# Patient Record
Sex: FEMALE | Race: WHITE | Employment: OTHER | URBAN - NONMETROPOLITAN AREA
[De-identification: names, ages, dates, MRNs, and addresses within clinical notes are randomized per-mention and may not be internally consistent; named-entity substitution may affect disease eponyms.]

---

## 2017-10-27 ENCOUNTER — HOSPITAL ENCOUNTER (OUTPATIENT)
Dept: WOMENS IMAGING | Age: 72
Discharge: HOME | End: 2017-10-27
Payer: MEDICARE

## 2017-10-27 DIAGNOSIS — Z12.31 VISIT FOR SCREENING MAMMOGRAM: ICD-10-CM

## 2017-10-27 PROCEDURE — 77063 BREAST TOMOSYNTHESIS BI: CPT

## 2017-11-24 ENCOUNTER — HOSPITAL ENCOUNTER (EMERGENCY)
Age: 72
Discharge: HOME | End: 2017-11-24
Attending: FAMILY MEDICINE
Payer: MEDICARE

## 2017-11-24 ENCOUNTER — APPOINTMENT (OUTPATIENT)
Dept: GENERAL RADIOLOGY | Age: 72
End: 2017-11-24
Payer: MEDICARE

## 2017-11-24 VITALS
WEIGHT: 190 LBS | TEMPERATURE: 98 F | HEART RATE: 84 BPM | OXYGEN SATURATION: 95 % | DIASTOLIC BLOOD PRESSURE: 67 MMHG | RESPIRATION RATE: 13 BRPM | BODY MASS INDEX: 30.53 KG/M2 | HEIGHT: 66 IN | SYSTOLIC BLOOD PRESSURE: 158 MMHG

## 2017-11-24 DIAGNOSIS — E03.8 OTHER SPECIFIED HYPOTHYROIDISM: ICD-10-CM

## 2017-11-24 DIAGNOSIS — E83.52 HYPERCALCEMIA: ICD-10-CM

## 2017-11-24 DIAGNOSIS — R00.2 PALPITATIONS: Primary | ICD-10-CM

## 2017-11-24 LAB
ALBUMIN SERPL-MCNC: 4.8 G/DL (ref 3.5–5.1)
ALP BLD-CCNC: 39 U/L (ref 38–126)
ALT SERPL-CCNC: 21 U/L (ref 11–66)
ANION GAP SERPL CALCULATED.3IONS-SCNC: 14 MEQ/L (ref 8–16)
AST SERPL-CCNC: 26 U/L (ref 5–40)
BASOPHILS # BLD: 0.5 %
BASOPHILS ABSOLUTE: 0 THOU/MM3 (ref 0–0.1)
BILIRUB SERPL-MCNC: 0.5 MG/DL (ref 0.3–1.2)
BILIRUBIN DIRECT: < 0.2 MG/DL (ref 0–0.3)
BUN BLDV-MCNC: 34 MG/DL (ref 7–22)
CALCIUM SERPL-MCNC: 11 MG/DL (ref 8.5–10.5)
CHLORIDE BLD-SCNC: 94 MEQ/L (ref 98–111)
CO2: 27 MEQ/L (ref 23–33)
CREAT SERPL-MCNC: 1.1 MG/DL (ref 0.4–1.2)
EKG ATRIAL RATE: 91 BPM
EKG P AXIS: 59 DEGREES
EKG P-R INTERVAL: 212 MS
EKG Q-T INTERVAL: 372 MS
EKG QRS DURATION: 86 MS
EKG QTC CALCULATION (BAZETT): 457 MS
EKG R AXIS: -41 DEGREES
EKG T AXIS: 69 DEGREES
EKG VENTRICULAR RATE: 91 BPM
EOSINOPHIL # BLD: 2.2 %
EOSINOPHILS ABSOLUTE: 0.1 THOU/MM3 (ref 0–0.4)
GFR SERPL CREATININE-BSD FRML MDRD: 49 ML/MIN/1.73M2
GLUCOSE BLD-MCNC: 181 MG/DL (ref 70–108)
HCT VFR BLD CALC: 36.9 % (ref 37–47)
HEMOGLOBIN: 12.4 GM/DL (ref 12–16)
LIPASE: 34.4 U/L (ref 5.6–51.3)
LYMPHOCYTES # BLD: 33.8 %
LYMPHOCYTES ABSOLUTE: 1.5 THOU/MM3 (ref 1–4.8)
MACROCYTES: ABNORMAL
MAGNESIUM: 1.6 MG/DL (ref 1.6–2.4)
MCH RBC QN AUTO: 33.6 PG (ref 27–31)
MCHC RBC AUTO-ENTMCNC: 33.8 GM/DL (ref 33–37)
MCV RBC AUTO: 99.5 FL (ref 81–99)
MONOCYTES # BLD: 5.9 %
MONOCYTES ABSOLUTE: 0.3 THOU/MM3 (ref 0.4–1.3)
NUCLEATED RED BLOOD CELLS: 0 /100 WBC
OSMOLALITY CALCULATION: 282.3 MOSMOL/KG (ref 275–300)
PDW BLD-RTO: 14 % (ref 11.5–14.5)
PLATELET # BLD: 278 THOU/MM3 (ref 130–400)
PMV BLD AUTO: 7.2 MCM (ref 7.4–10.4)
POTASSIUM SERPL-SCNC: 4 MEQ/L (ref 3.5–5.2)
PRO-BNP: 188 PG/ML (ref 0–900)
RBC # BLD: 3.71 MILL/MM3 (ref 4.2–5.4)
SEG NEUTROPHILS: 57.6 %
SEGMENTED NEUTROPHILS ABSOLUTE COUNT: 2.6 THOU/MM3 (ref 1.8–7.7)
SODIUM BLD-SCNC: 135 MEQ/L (ref 135–145)
TOTAL PROTEIN: 7.2 G/DL (ref 6.1–8)
TROPONIN T: < 0.01 NG/ML
TSH SERPL DL<=0.05 MIU/L-ACNC: 4.42 UIU/ML (ref 0.4–4.2)
WBC # BLD: 4.5 THOU/MM3 (ref 4.8–10.8)

## 2017-11-24 PROCEDURE — 93005 ELECTROCARDIOGRAM TRACING: CPT

## 2017-11-24 PROCEDURE — 80053 COMPREHEN METABOLIC PANEL: CPT

## 2017-11-24 PROCEDURE — 99285 EMERGENCY DEPT VISIT HI MDM: CPT

## 2017-11-24 PROCEDURE — 83880 ASSAY OF NATRIURETIC PEPTIDE: CPT

## 2017-11-24 PROCEDURE — 2580000003 HC RX 258: Performed by: FAMILY MEDICINE

## 2017-11-24 PROCEDURE — 84484 ASSAY OF TROPONIN QUANT: CPT

## 2017-11-24 PROCEDURE — 83735 ASSAY OF MAGNESIUM: CPT

## 2017-11-24 PROCEDURE — 82248 BILIRUBIN DIRECT: CPT

## 2017-11-24 PROCEDURE — 85025 COMPLETE CBC W/AUTO DIFF WBC: CPT

## 2017-11-24 PROCEDURE — 71010 XR CHEST PORTABLE: CPT

## 2017-11-24 PROCEDURE — 36415 COLL VENOUS BLD VENIPUNCTURE: CPT

## 2017-11-24 PROCEDURE — 83690 ASSAY OF LIPASE: CPT

## 2017-11-24 PROCEDURE — 84443 ASSAY THYROID STIM HORMONE: CPT

## 2017-11-24 RX ORDER — 0.9 % SODIUM CHLORIDE 0.9 %
500 INTRAVENOUS SOLUTION INTRAVENOUS ONCE
Status: COMPLETED | OUTPATIENT
Start: 2017-11-24 | End: 2017-11-24

## 2017-11-24 RX ADMIN — SODIUM CHLORIDE 500 ML: 9 INJECTION, SOLUTION INTRAVENOUS at 15:07

## 2017-11-24 ASSESSMENT — ENCOUNTER SYMPTOMS
SHORTNESS OF BREATH: 1
CHEST TIGHTNESS: 0
NAUSEA: 0
ABDOMINAL PAIN: 0
APNEA: 0
SORE THROAT: 0
CHOKING: 0
WHEEZING: 0
COLOR CHANGE: 0
DIARRHEA: 0
VOMITING: 0
BACK PAIN: 0
STRIDOR: 0
TROUBLE SWALLOWING: 0
COUGH: 0

## 2017-11-24 NOTE — ED PROVIDER NOTES
[facility]  eMERGENCY dEPARTMENT eNCOUnter          CHIEF COMPLAINT       Chief Complaint   Patient presents with    Palpitations       Nurses Notes reviewed and I agree except as noted in the HPI. HISTORY OF PRESENT ILLNESS    Lamar Reilly is a 81 y.o. female who presents to the Emergency Department for the evaluation of Palpitations. Patient has a significant medical history of non-Hodgkin's lymphoma (in remission), diabetes, hypothyroidism and previous stroke. She states that over the past week, she has been experiencing irregular heartbeats. The palpitations occur on a daily basis lasting for several minutes and resolving on their own. During these episodes, she reports fatigue, weakness and presyncope. She feels like she is going to faint, but denies any syncopal episodes. She reports dizziness and lightheadedness during the episodes that also resolved. Denies any spinning room sensation or feeling unbalanced on her feet. Patient reports caffeine intake, drinks 2-3 cups of tea in the morning. Denies any new medications. Denies any fevers, chills, night sweats or weight loss. Denies nausea, vomiting, diarrhea, back or abdominal pain. Denies any chest pain or tightness. She does report dyspnea with and without exertion during these episodes. She reports being hospitalized in May when she was traveling to [place] after suffering a broken ankle that required surgical intervention. No other complaints concerns at this time. The HPI was provided by the patient. REVIEW OF SYSTEMS     Review of Systems   Constitutional: Positive for fatigue. Negative for chills, diaphoresis, fever and unexpected weight change. HENT: Negative for sore throat and trouble swallowing. Eyes: Negative for visual disturbance. Respiratory: Positive for shortness of breath. Negative for apnea, cough, choking, chest tightness, wheezing and stridor. Cardiovascular: Positive for palpitations.  Negative agreement with plan of care. CRITICAL CARE:   None     CONSULTS:  Cardiology - discuss case with Dr. Wyatt Rosario, who recommended outpatient follow-up. She'll be instructed to contact cardiology clinic on Monday to schedule appointment for further evaluation and management. PROCEDURES:  None     FINAL IMPRESSION      1. Palpitations    2. Other specified hypothyroidism    3.  Hypercalcemia          DISPOSITION/PLAN   Discharge     PATIENT REFERRED TO:  Komal Cabrera MD  [address] [facility] [place] [address]  [phone]    Schedule an appointment as soon as possible for a visit in 1 week      [address] [facility] [address] [address] EMERGENCY DEPT  [address]  Go to   If symptoms worsen    [address], [place]  [facility]  Shannan Haskins [facility] 92    Schedule an appointment as soon as possible for a visit in 3 days        DISCHARGE MEDICATIONS:  Discharge Medication List as of 11/24/2017  3:58 PM          (Please note that portions of this note were completed with a voice recognition program.  Efforts were made to edit the dictations but occasionally words are mis-transcribed.)    Lisa Briceno MD 11/24/17 12:50 PM                             Lisa Briceno MD  11/25/17 0059

## 2017-12-20 ENCOUNTER — HOSPITAL ENCOUNTER (OUTPATIENT)
Age: 72
Discharge: HOME | End: 2017-12-20
Payer: MEDICARE

## 2017-12-20 LAB
AVERAGE GLUCOSE: 150 MG/DL (ref 70–126)
BUN BLDV-MCNC: 19 MG/DL (ref 7–22)
CALCIUM SERPL-MCNC: 10.9 MG/DL (ref 8.5–10.5)
CREAT SERPL-MCNC: 0.8 MG/DL (ref 0.4–1.2)
GFR SERPL CREATININE-BSD FRML MDRD: 71 ML/MIN/1.73M2
HBA1C MFR BLD: 7 % (ref 4.4–6.4)
TSH SERPL DL<=0.05 MIU/L-ACNC: 3.94 UIU/ML (ref 0.4–4.2)

## 2017-12-20 PROCEDURE — 82565 ASSAY OF CREATININE: CPT

## 2017-12-20 PROCEDURE — 84443 ASSAY THYROID STIM HORMONE: CPT

## 2017-12-20 PROCEDURE — 84520 ASSAY OF UREA NITROGEN: CPT

## 2017-12-20 PROCEDURE — 83036 HEMOGLOBIN GLYCOSYLATED A1C: CPT

## 2017-12-20 PROCEDURE — 82310 ASSAY OF CALCIUM: CPT

## 2017-12-20 PROCEDURE — 36415 COLL VENOUS BLD VENIPUNCTURE: CPT

## 2018-04-26 ENCOUNTER — HOSPITAL ENCOUNTER (OUTPATIENT)
Age: 73
Discharge: HOME | End: 2018-04-26
Payer: MEDICARE

## 2018-04-26 LAB
AVERAGE GLUCOSE: 141 MG/DL (ref 70–126)
BUN BLDV-MCNC: 23 MG/DL (ref 7–22)
CREAT SERPL-MCNC: 0.8 MG/DL (ref 0.4–1.2)
GFR SERPL CREATININE-BSD FRML MDRD: 71 ML/MIN/1.73M2
HBA1C MFR BLD: 6.7 % (ref 4.4–6.4)

## 2018-04-26 PROCEDURE — 84520 ASSAY OF UREA NITROGEN: CPT

## 2018-04-26 PROCEDURE — 82565 ASSAY OF CREATININE: CPT

## 2018-04-26 PROCEDURE — 36415 COLL VENOUS BLD VENIPUNCTURE: CPT

## 2018-04-26 PROCEDURE — 83036 HEMOGLOBIN GLYCOSYLATED A1C: CPT

## 2018-04-26 PROCEDURE — 83090 ASSAY OF HOMOCYSTEINE: CPT

## 2018-04-27 LAB — HOMOCYSTEINE, TOTAL: 13 UMOL/L

## 2018-05-17 ENCOUNTER — OFFICE VISIT (OUTPATIENT)
Dept: FAMILY MEDICINE CLINIC | Age: 73
End: 2018-05-17
Payer: MEDICARE

## 2018-05-17 VITALS
BODY MASS INDEX: 32.32 KG/M2 | DIASTOLIC BLOOD PRESSURE: 74 MMHG | RESPIRATION RATE: 14 BRPM | WEIGHT: 194 LBS | HEART RATE: 56 BPM | SYSTOLIC BLOOD PRESSURE: 110 MMHG | HEIGHT: 65 IN | TEMPERATURE: 98 F

## 2018-05-17 DIAGNOSIS — Z91.81 AT HIGH RISK FOR FALLS: ICD-10-CM

## 2018-05-17 DIAGNOSIS — Z85.72 HISTORY OF NON-HODGKIN'S LYMPHOMA: ICD-10-CM

## 2018-05-17 DIAGNOSIS — Z79.4 TYPE 2 DIABETES MELLITUS WITH OTHER SPECIFIED COMPLICATION, WITH LONG-TERM CURRENT USE OF INSULIN (HCC): Primary | ICD-10-CM

## 2018-05-17 DIAGNOSIS — I10 ESSENTIAL HYPERTENSION: ICD-10-CM

## 2018-05-17 DIAGNOSIS — R80.9 MICROALBUMINURIA DUE TO TYPE 2 DIABETES MELLITUS (HCC): ICD-10-CM

## 2018-05-17 DIAGNOSIS — E03.8 OTHER SPECIFIED HYPOTHYROIDISM: ICD-10-CM

## 2018-05-17 DIAGNOSIS — E11.29 MICROALBUMINURIA DUE TO TYPE 2 DIABETES MELLITUS (HCC): ICD-10-CM

## 2018-05-17 DIAGNOSIS — E11.69 TYPE 2 DIABETES MELLITUS WITH OTHER SPECIFIED COMPLICATION, WITH LONG-TERM CURRENT USE OF INSULIN (HCC): Primary | ICD-10-CM

## 2018-05-17 DIAGNOSIS — I48.20 CHRONIC ATRIAL FIBRILLATION (HCC): ICD-10-CM

## 2018-05-17 DIAGNOSIS — Z86.73 HISTORY OF CARDIOEMBOLIC CEREBROVASCULAR ACCIDENT (CVA): ICD-10-CM

## 2018-05-17 DIAGNOSIS — M85.88 OSTEOPENIA OF SPINE: ICD-10-CM

## 2018-05-17 PROBLEM — E11.9 DIABETES MELLITUS (HCC): Status: ACTIVE | Noted: 2018-05-17

## 2018-05-17 LAB
CREATININE URINE POCT: NORMAL
MICROALBUMIN/CREAT 24H UR: NORMAL MG/G{CREAT}
MICROALBUMIN/CREAT UR-RTO: NORMAL

## 2018-05-17 PROCEDURE — 1123F ACP DISCUSS/DSCN MKR DOCD: CPT | Performed by: FAMILY MEDICINE

## 2018-05-17 PROCEDURE — G8427 DOCREV CUR MEDS BY ELIG CLIN: HCPCS | Performed by: FAMILY MEDICINE

## 2018-05-17 PROCEDURE — 1090F PRES/ABSN URINE INCON ASSESS: CPT | Performed by: FAMILY MEDICINE

## 2018-05-17 PROCEDURE — 4040F PNEUMOC VAC/ADMIN/RCVD: CPT | Performed by: FAMILY MEDICINE

## 2018-05-17 PROCEDURE — G8417 CALC BMI ABV UP PARAM F/U: HCPCS | Performed by: FAMILY MEDICINE

## 2018-05-17 PROCEDURE — 2022F DILAT RTA XM EVC RTNOPTHY: CPT | Performed by: FAMILY MEDICINE

## 2018-05-17 PROCEDURE — 3044F HG A1C LEVEL LT 7.0%: CPT | Performed by: FAMILY MEDICINE

## 2018-05-17 PROCEDURE — 82044 UR ALBUMIN SEMIQUANTITATIVE: CPT | Performed by: FAMILY MEDICINE

## 2018-05-17 PROCEDURE — 3017F COLORECTAL CA SCREEN DOC REV: CPT | Performed by: FAMILY MEDICINE

## 2018-05-17 PROCEDURE — 99204 OFFICE O/P NEW MOD 45 MIN: CPT | Performed by: FAMILY MEDICINE

## 2018-05-17 PROCEDURE — G8400 PT W/DXA NO RESULTS DOC: HCPCS | Performed by: FAMILY MEDICINE

## 2018-05-17 PROCEDURE — 1036F TOBACCO NON-USER: CPT | Performed by: FAMILY MEDICINE

## 2018-05-17 RX ORDER — AMIODARONE HYDROCHLORIDE 200 MG/1
100 TABLET ORAL DAILY
COMMUNITY
End: 2019-10-24 | Stop reason: ALTCHOICE

## 2018-05-17 RX ORDER — ASPIRIN 325 MG
81 TABLET ORAL DAILY
COMMUNITY
End: 2020-09-24

## 2018-05-17 RX ORDER — HYDROCHLOROTHIAZIDE 25 MG/1
25 TABLET ORAL DAILY
Status: ON HOLD | COMMUNITY
End: 2018-09-21 | Stop reason: HOSPADM

## 2018-05-17 RX ORDER — LOSARTAN POTASSIUM 25 MG/1
25 TABLET ORAL DAILY
COMMUNITY
End: 2018-10-30 | Stop reason: SDUPTHER

## 2018-05-17 RX ORDER — FOLIC ACID 1 MG/1
1 TABLET ORAL 3 TIMES DAILY
COMMUNITY

## 2018-05-17 ASSESSMENT — PATIENT HEALTH QUESTIONNAIRE - PHQ9
2. FEELING DOWN, DEPRESSED OR HOPELESS: 0
SUM OF ALL RESPONSES TO PHQ QUESTIONS 1-9: 0
1. LITTLE INTEREST OR PLEASURE IN DOING THINGS: 0
SUM OF ALL RESPONSES TO PHQ9 QUESTIONS 1 & 2: 0

## 2018-05-17 ASSESSMENT — ENCOUNTER SYMPTOMS
DIARRHEA: 0
DOUBLE VISION: 0
HEMOPTYSIS: 0
BLURRED VISION: 0
VOMITING: 0
EYE REDNESS: 0
EYE DISCHARGE: 0
BLOOD IN STOOL: 0
CONSTIPATION: 0
SHORTNESS OF BREATH: 0
NAUSEA: 0
HEARTBURN: 0
SORE THROAT: 0
WHEEZING: 0
COUGH: 0
EYE PAIN: 0
BACK PAIN: 0
ORTHOPNEA: 0

## 2018-06-28 ENCOUNTER — HOSPITAL ENCOUNTER (OUTPATIENT)
Dept: GENERAL RADIOLOGY | Age: 73
Discharge: HOME | End: 2018-06-28
Payer: MEDICARE

## 2018-06-28 ENCOUNTER — HOSPITAL ENCOUNTER (OUTPATIENT)
Dept: PULMONOLOGY | Age: 73
Discharge: HOME | End: 2018-06-28
Payer: MEDICARE

## 2018-06-28 ENCOUNTER — HOSPITAL ENCOUNTER (OUTPATIENT)
Age: 73
Discharge: HOME | End: 2018-06-28
Payer: MEDICARE

## 2018-06-28 DIAGNOSIS — I10 HYPERTENSION, UNSPECIFIED TYPE: ICD-10-CM

## 2018-06-28 LAB
ALBUMIN SERPL-MCNC: 4.6 G/DL (ref 3.5–5.1)
ALP BLD-CCNC: 39 U/L (ref 38–126)
ALT SERPL-CCNC: 30 U/L (ref 11–66)
ANION GAP SERPL CALCULATED.3IONS-SCNC: 14 MEQ/L (ref 8–16)
AST SERPL-CCNC: 27 U/L (ref 5–40)
BILIRUB SERPL-MCNC: 0.6 MG/DL (ref 0.3–1.2)
BILIRUBIN DIRECT: < 0.2 MG/DL (ref 0–0.3)
BUN BLDV-MCNC: 21 MG/DL (ref 7–22)
CALCIUM SERPL-MCNC: 10.6 MG/DL (ref 8.5–10.5)
CHLORIDE BLD-SCNC: 90 MEQ/L (ref 98–111)
CO2: 26 MEQ/L (ref 23–33)
CREAT SERPL-MCNC: 0.8 MG/DL (ref 0.4–1.2)
GFR SERPL CREATININE-BSD FRML MDRD: 71 ML/MIN/1.73M2
GLUCOSE BLD-MCNC: 123 MG/DL (ref 70–108)
POTASSIUM SERPL-SCNC: 4.7 MEQ/L (ref 3.5–5.2)
SODIUM BLD-SCNC: 130 MEQ/L (ref 135–145)
T4 FREE: 1.45 NG/DL (ref 0.93–1.76)
TOTAL PROTEIN: 7.4 G/DL (ref 6.1–8)
TSH SERPL DL<=0.05 MIU/L-ACNC: 13.84 UIU/ML (ref 0.4–4.2)

## 2018-06-28 PROCEDURE — 82248 BILIRUBIN DIRECT: CPT

## 2018-06-28 PROCEDURE — 84439 ASSAY OF FREE THYROXINE: CPT

## 2018-06-28 PROCEDURE — 94729 DIFFUSING CAPACITY: CPT

## 2018-06-28 PROCEDURE — 94726 PLETHYSMOGRAPHY LUNG VOLUMES: CPT

## 2018-06-28 PROCEDURE — 94060 EVALUATION OF WHEEZING: CPT

## 2018-06-28 PROCEDURE — 84443 ASSAY THYROID STIM HORMONE: CPT

## 2018-06-28 PROCEDURE — 71046 X-RAY EXAM CHEST 2 VIEWS: CPT

## 2018-06-28 PROCEDURE — 80053 COMPREHEN METABOLIC PANEL: CPT

## 2018-06-28 PROCEDURE — 36415 COLL VENOUS BLD VENIPUNCTURE: CPT

## 2018-07-25 ENCOUNTER — HOSPITAL ENCOUNTER (OUTPATIENT)
Age: 73
Discharge: HOME | End: 2018-07-25
Payer: MEDICARE

## 2018-07-25 LAB
ALBUMIN SERPL-MCNC: 4.7 G/DL (ref 3.5–5.1)
ALP BLD-CCNC: 36 U/L (ref 38–126)
ALT SERPL-CCNC: 33 U/L (ref 11–66)
ANION GAP SERPL CALCULATED.3IONS-SCNC: 14 MEQ/L (ref 8–16)
AST SERPL-CCNC: 29 U/L (ref 5–40)
BASOPHILS # BLD: 0.8 %
BASOPHILS ABSOLUTE: 0 THOU/MM3 (ref 0–0.1)
BILIRUB SERPL-MCNC: 0.6 MG/DL (ref 0.3–1.2)
BUN BLDV-MCNC: 25 MG/DL (ref 7–22)
CALCIUM SERPL-MCNC: 11 MG/DL (ref 8.5–10.5)
CHLORIDE BLD-SCNC: 91 MEQ/L (ref 98–111)
CO2: 28 MEQ/L (ref 23–33)
CREAT SERPL-MCNC: 0.8 MG/DL (ref 0.4–1.2)
EOSINOPHIL # BLD: 2 %
EOSINOPHILS ABSOLUTE: 0.1 THOU/MM3 (ref 0–0.4)
ERYTHROCYTE [DISTWIDTH] IN BLOOD BY AUTOMATED COUNT: 13 % (ref 11.5–14.5)
ERYTHROCYTE [DISTWIDTH] IN BLOOD BY AUTOMATED COUNT: 47.4 FL (ref 35–45)
GFR SERPL CREATININE-BSD FRML MDRD: 71 ML/MIN/1.73M2
GLUCOSE BLD-MCNC: 105 MG/DL (ref 70–108)
HCT VFR BLD CALC: 38.8 % (ref 37–47)
HEMOGLOBIN: 13.1 GM/DL (ref 12–16)
IMMATURE GRANS (ABS): 0.01 THOU/MM3 (ref 0–0.07)
IMMATURE GRANULOCYTES: 0.3 %
LYMPHOCYTES # BLD: 29.4 %
LYMPHOCYTES ABSOLUTE: 1.1 THOU/MM3 (ref 1–4.8)
MCH RBC QN AUTO: 33.7 PG (ref 26–33)
MCHC RBC AUTO-ENTMCNC: 33.8 GM/DL (ref 32.2–35.5)
MCV RBC AUTO: 99.7 FL (ref 81–99)
MONOCYTES # BLD: 8.7 %
MONOCYTES ABSOLUTE: 0.3 THOU/MM3 (ref 0.4–1.3)
NUCLEATED RED BLOOD CELLS: 0 /100 WBC
PLATELET # BLD: 217 THOU/MM3 (ref 130–400)
PMV BLD AUTO: 8.8 FL (ref 9.4–12.4)
POTASSIUM SERPL-SCNC: 4.5 MEQ/L (ref 3.5–5.2)
RBC # BLD: 3.89 MILL/MM3 (ref 4.2–5.4)
SEG NEUTROPHILS: 58.8 %
SEGMENTED NEUTROPHILS ABSOLUTE COUNT: 2.1 THOU/MM3 (ref 1.8–7.7)
SODIUM BLD-SCNC: 133 MEQ/L (ref 135–145)
TOTAL PROTEIN: 7.3 G/DL (ref 6.1–8)
WBC # BLD: 3.6 THOU/MM3 (ref 4.8–10.8)

## 2018-07-25 PROCEDURE — 36415 COLL VENOUS BLD VENIPUNCTURE: CPT

## 2018-07-25 PROCEDURE — 80053 COMPREHEN METABOLIC PANEL: CPT

## 2018-07-25 PROCEDURE — 85025 COMPLETE CBC W/AUTO DIFF WBC: CPT

## 2018-07-25 PROCEDURE — 83090 ASSAY OF HOMOCYSTEINE: CPT

## 2018-07-26 LAB — HOMOCYSTEINE, TOTAL: 12 UMOL/L

## 2018-07-30 ENCOUNTER — PATIENT MESSAGE (OUTPATIENT)
Dept: FAMILY MEDICINE CLINIC | Age: 73
End: 2018-07-30

## 2018-08-03 ENCOUNTER — TELEPHONE (OUTPATIENT)
Dept: FAMILY MEDICINE CLINIC | Age: 73
End: 2018-08-03

## 2018-08-07 ENCOUNTER — TELEPHONE (OUTPATIENT)
Dept: FAMILY MEDICINE CLINIC | Age: 73
End: 2018-08-07

## 2018-08-07 RX ORDER — LEVOTHYROXINE SODIUM 0.15 MG/1
150 TABLET ORAL DAILY
Qty: 90 TABLET | Refills: 3 | Status: SHIPPED | OUTPATIENT
Start: 2018-08-07 | End: 2019-06-21

## 2018-08-07 NOTE — TELEPHONE ENCOUNTER
Spoke with pt. Refill request for levothyroxine sent to PCP. Pt informed that clarification of diagnosis code on test strips sent to pharmacy 3 times today. Fax confirmation received and scanned to chart. No further assistance needed at this time.

## 2018-09-17 ENCOUNTER — APPOINTMENT (OUTPATIENT)
Dept: CT IMAGING | Age: 73
DRG: 493 | End: 2018-09-17
Payer: MEDICARE

## 2018-09-17 ENCOUNTER — HOSPITAL ENCOUNTER (INPATIENT)
Age: 73
LOS: 4 days | Discharge: NURSING, REHAB OR LONG-TERM CARE | DRG: 493 | End: 2018-09-21
Attending: EMERGENCY MEDICINE | Admitting: SURGERY
Payer: MEDICARE

## 2018-09-17 ENCOUNTER — APPOINTMENT (OUTPATIENT)
Dept: GENERAL RADIOLOGY | Age: 73
DRG: 493 | End: 2018-09-17
Payer: MEDICARE

## 2018-09-17 DIAGNOSIS — R52 PAIN: ICD-10-CM

## 2018-09-17 DIAGNOSIS — S42.334A CLOSED NONDISPLACED OBLIQUE FRACTURE OF SHAFT OF RIGHT HUMERUS, INITIAL ENCOUNTER: ICD-10-CM

## 2018-09-17 DIAGNOSIS — S42.251A CLOSED DISPLACED FRACTURE OF GREATER TUBEROSITY OF RIGHT HUMERUS, INITIAL ENCOUNTER: Primary | ICD-10-CM

## 2018-09-17 PROBLEM — S42.112A CLOSED FRACTURE OF BODY OF LEFT SCAPULA: Status: ACTIVE | Noted: 2018-09-17

## 2018-09-17 PROBLEM — S42.331A CLOSED DISPLACED OBLIQUE FRACTURE OF SHAFT OF RIGHT HUMERUS: Status: ACTIVE | Noted: 2018-09-17

## 2018-09-17 PROBLEM — Z79.01 ANTICOAGULANT LONG-TERM USE: Status: ACTIVE | Noted: 2018-09-17
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
## 2018-09-17 LAB
ANION GAP SERPL CALCULATED.3IONS-SCNC: 15 MEQ/L (ref 8–16)
ANION GAP SERPL CALCULATED.3IONS-SCNC: 19 MEQ/L (ref 8–16)
BASOPHILS # BLD: 0.1 %
BASOPHILS # BLD: 0.2 %
BASOPHILS ABSOLUTE: 0 THOU/MM3 (ref 0–0.1)
BASOPHILS ABSOLUTE: 0 THOU/MM3 (ref 0–0.1)
BUN BLDV-MCNC: 18 MG/DL (ref 7–22)
BUN BLDV-MCNC: 21 MG/DL (ref 7–22)
CALCIUM SERPL-MCNC: 10.1 MG/DL (ref 8.5–10.5)
CALCIUM SERPL-MCNC: 10.7 MG/DL (ref 8.5–10.5)
CHLORIDE BLD-SCNC: 89 MEQ/L (ref 98–111)
CHLORIDE BLD-SCNC: 90 MEQ/L (ref 98–111)
CO2: 22 MEQ/L (ref 23–33)
CO2: 24 MEQ/L (ref 23–33)
CREAT SERPL-MCNC: 0.7 MG/DL (ref 0.4–1.2)
CREAT SERPL-MCNC: 0.7 MG/DL (ref 0.4–1.2)
EKG ATRIAL RATE: 69 BPM
EKG P AXIS: -28 DEGREES
EKG P-R INTERVAL: 224 MS
EKG Q-T INTERVAL: 428 MS
EKG QRS DURATION: 100 MS
EKG QTC CALCULATION (BAZETT): 458 MS
EKG R AXIS: -43 DEGREES
EKG T AXIS: 58 DEGREES
EKG VENTRICULAR RATE: 69 BPM
EOSINOPHIL # BLD: 0.1 %
EOSINOPHIL # BLD: 0.2 %
EOSINOPHILS ABSOLUTE: 0 THOU/MM3 (ref 0–0.4)
EOSINOPHILS ABSOLUTE: 0 THOU/MM3 (ref 0–0.4)
ERYTHROCYTE [DISTWIDTH] IN BLOOD BY AUTOMATED COUNT: 12.6 % (ref 11.5–14.5)
ERYTHROCYTE [DISTWIDTH] IN BLOOD BY AUTOMATED COUNT: 12.7 % (ref 11.5–14.5)
ERYTHROCYTE [DISTWIDTH] IN BLOOD BY AUTOMATED COUNT: 45.2 FL (ref 35–45)
ERYTHROCYTE [DISTWIDTH] IN BLOOD BY AUTOMATED COUNT: 45.2 FL (ref 35–45)
GFR SERPL CREATININE-BSD FRML MDRD: 82 ML/MIN/1.73M2
GFR SERPL CREATININE-BSD FRML MDRD: 82 ML/MIN/1.73M2
GLUCOSE BLD-MCNC: 166 MG/DL (ref 70–108)
GLUCOSE BLD-MCNC: 207 MG/DL (ref 70–108)
GLUCOSE BLD-MCNC: 215 MG/DL (ref 70–108)
GLUCOSE BLD-MCNC: 229 MG/DL (ref 70–108)
HCT VFR BLD CALC: 36.6 % (ref 37–47)
HCT VFR BLD CALC: 38.6 % (ref 37–47)
HEMOGLOBIN: 12.4 GM/DL (ref 12–16)
HEMOGLOBIN: 13.1 GM/DL (ref 12–16)
IMMATURE GRANS (ABS): 0.04 THOU/MM3 (ref 0–0.07)
IMMATURE GRANS (ABS): 0.08 THOU/MM3 (ref 0–0.07)
IMMATURE GRANULOCYTES: 0.5 %
IMMATURE GRANULOCYTES: 0.9 %
LYMPHOCYTES # BLD: 7.5 %
LYMPHOCYTES # BLD: 9.6 %
LYMPHOCYTES ABSOLUTE: 0.7 THOU/MM3 (ref 1–4.8)
LYMPHOCYTES ABSOLUTE: 0.7 THOU/MM3 (ref 1–4.8)
MCH RBC QN AUTO: 33.6 PG (ref 26–33)
MCH RBC QN AUTO: 33.7 PG (ref 26–33)
MCHC RBC AUTO-ENTMCNC: 33.9 GM/DL (ref 32.2–35.5)
MCHC RBC AUTO-ENTMCNC: 33.9 GM/DL (ref 32.2–35.5)
MCV RBC AUTO: 99 FL (ref 81–99)
MCV RBC AUTO: 99.5 FL (ref 81–99)
MONOCYTES # BLD: 4.3 %
MONOCYTES # BLD: 5.7 %
MONOCYTES ABSOLUTE: 0.4 THOU/MM3 (ref 0.4–1.3)
MONOCYTES ABSOLUTE: 0.4 THOU/MM3 (ref 0.4–1.3)
NUCLEATED RED BLOOD CELLS: 0 /100 WBC
NUCLEATED RED BLOOD CELLS: 0 /100 WBC
OSMOLALITY CALCULATION: 271.7 MOSMOL/KG (ref 275–300)
PLATELET # BLD: 214 THOU/MM3 (ref 130–400)
PLATELET # BLD: 217 THOU/MM3 (ref 130–400)
PMV BLD AUTO: 9.4 FL (ref 9.4–12.4)
PMV BLD AUTO: 9.6 FL (ref 9.4–12.4)
POTASSIUM SERPL-SCNC: 4.6 MEQ/L (ref 3.5–5.2)
POTASSIUM SERPL-SCNC: 4.8 MEQ/L (ref 3.5–5.2)
RBC # BLD: 3.68 MILL/MM3 (ref 4.2–5.4)
RBC # BLD: 3.9 MILL/MM3 (ref 4.2–5.4)
SEG NEUTROPHILS: 84 %
SEG NEUTROPHILS: 86.9 %
SEGMENTED NEUTROPHILS ABSOLUTE COUNT: 6.2 THOU/MM3 (ref 1.8–7.7)
SEGMENTED NEUTROPHILS ABSOLUTE COUNT: 7.8 THOU/MM3 (ref 1.8–7.7)
SODIUM BLD-SCNC: 128 MEQ/L (ref 135–145)
SODIUM BLD-SCNC: 131 MEQ/L (ref 135–145)
WBC # BLD: 7.4 THOU/MM3 (ref 4.8–10.8)
WBC # BLD: 9 THOU/MM3 (ref 4.8–10.8)

## 2018-09-17 PROCEDURE — 70450 CT HEAD/BRAIN W/O DYE: CPT

## 2018-09-17 PROCEDURE — 6370000000 HC RX 637 (ALT 250 FOR IP): Performed by: SURGERY

## 2018-09-17 PROCEDURE — 6820000001 HC L2 TRAUMA SURGERY EVALUATION

## 2018-09-17 PROCEDURE — 99285 EMERGENCY DEPT VISIT HI MDM: CPT

## 2018-09-17 PROCEDURE — 93005 ELECTROCARDIOGRAM TRACING: CPT | Performed by: EMERGENCY MEDICINE

## 2018-09-17 PROCEDURE — 94760 N-INVAS EAR/PLS OXIMETRY 1: CPT

## 2018-09-17 PROCEDURE — 71045 X-RAY EXAM CHEST 1 VIEW: CPT

## 2018-09-17 PROCEDURE — 73060 X-RAY EXAM OF HUMERUS: CPT

## 2018-09-17 PROCEDURE — 6360000002 HC RX W HCPCS: Performed by: EMERGENCY MEDICINE

## 2018-09-17 PROCEDURE — 6370000000 HC RX 637 (ALT 250 FOR IP): Performed by: INTERNAL MEDICINE

## 2018-09-17 PROCEDURE — 99223 1ST HOSP IP/OBS HIGH 75: CPT | Performed by: SURGERY

## 2018-09-17 PROCEDURE — 1200000000 HC SEMI PRIVATE

## 2018-09-17 PROCEDURE — 2580000003 HC RX 258: Performed by: PHYSICIAN ASSISTANT

## 2018-09-17 PROCEDURE — 80048 BASIC METABOLIC PNL TOTAL CA: CPT

## 2018-09-17 PROCEDURE — 73030 X-RAY EXAM OF SHOULDER: CPT

## 2018-09-17 PROCEDURE — 83036 HEMOGLOBIN GLYCOSYLATED A1C: CPT

## 2018-09-17 PROCEDURE — 71250 CT THORAX DX C-: CPT

## 2018-09-17 PROCEDURE — 96374 THER/PROPH/DIAG INJ IV PUSH: CPT

## 2018-09-17 PROCEDURE — 36415 COLL VENOUS BLD VENIPUNCTURE: CPT

## 2018-09-17 PROCEDURE — APPSS180 APP SPLIT SHARED TIME > 60 MINUTES: Performed by: PHYSICIAN ASSISTANT

## 2018-09-17 PROCEDURE — 85025 COMPLETE CBC W/AUTO DIFF WBC: CPT

## 2018-09-17 PROCEDURE — 82948 REAGENT STRIP/BLOOD GLUCOSE: CPT

## 2018-09-17 PROCEDURE — 6370000000 HC RX 637 (ALT 250 FOR IP): Performed by: PHYSICIAN ASSISTANT

## 2018-09-17 RX ORDER — ACETAMINOPHEN 325 MG/1
650 TABLET ORAL EVERY 4 HOURS PRN
Status: DISCONTINUED | OUTPATIENT
Start: 2018-09-17 | End: 2018-09-21 | Stop reason: HOSPADM

## 2018-09-17 RX ORDER — HYDROCHLOROTHIAZIDE 25 MG/1
25 TABLET ORAL DAILY
Status: DISCONTINUED | OUTPATIENT
Start: 2018-09-18 | End: 2018-09-18

## 2018-09-17 RX ORDER — MORPHINE SULFATE 2 MG/ML
2 INJECTION, SOLUTION INTRAMUSCULAR; INTRAVENOUS
Status: DISCONTINUED | OUTPATIENT
Start: 2018-09-17 | End: 2018-09-21 | Stop reason: HOSPADM

## 2018-09-17 RX ORDER — LEVOTHYROXINE SODIUM 0.15 MG/1
150 TABLET ORAL DAILY
Status: DISCONTINUED | OUTPATIENT
Start: 2018-09-18 | End: 2018-09-21 | Stop reason: HOSPADM

## 2018-09-17 RX ORDER — AMIODARONE HYDROCHLORIDE 200 MG/1
200 TABLET ORAL DAILY
Status: DISCONTINUED | OUTPATIENT
Start: 2018-09-18 | End: 2018-09-21 | Stop reason: HOSPADM

## 2018-09-17 RX ORDER — ACETAMINOPHEN 325 MG/1
650 TABLET ORAL EVERY 4 HOURS PRN
Status: CANCELLED | OUTPATIENT
Start: 2018-09-17

## 2018-09-17 RX ORDER — HYDROCODONE BITARTRATE AND ACETAMINOPHEN 5; 325 MG/1; MG/1
1 TABLET ORAL EVERY 4 HOURS PRN
Status: DISCONTINUED | OUTPATIENT
Start: 2018-09-17 | End: 2018-09-21 | Stop reason: HOSPADM

## 2018-09-17 RX ORDER — LOSARTAN POTASSIUM 25 MG/1
25 TABLET ORAL DAILY
Status: DISCONTINUED | OUTPATIENT
Start: 2018-09-17 | End: 2018-09-21 | Stop reason: HOSPADM

## 2018-09-17 RX ORDER — SODIUM CHLORIDE 0.9 % (FLUSH) 0.9 %
10 SYRINGE (ML) INJECTION PRN
Status: CANCELLED | OUTPATIENT
Start: 2018-09-17

## 2018-09-17 RX ORDER — MORPHINE SULFATE 2 MG/ML
2 INJECTION, SOLUTION INTRAMUSCULAR; INTRAVENOUS ONCE
Status: COMPLETED | OUTPATIENT
Start: 2018-09-17 | End: 2018-09-17

## 2018-09-17 RX ORDER — SODIUM CHLORIDE 0.9 % (FLUSH) 0.9 %
10 SYRINGE (ML) INJECTION PRN
Status: DISCONTINUED | OUTPATIENT
Start: 2018-09-17 | End: 2018-09-21 | Stop reason: HOSPADM

## 2018-09-17 RX ORDER — DOCUSATE SODIUM 100 MG/1
100 CAPSULE, LIQUID FILLED ORAL 2 TIMES DAILY
Status: DISCONTINUED | OUTPATIENT
Start: 2018-09-17 | End: 2018-09-21 | Stop reason: HOSPADM

## 2018-09-17 RX ORDER — SIMVASTATIN 40 MG
40 TABLET ORAL NIGHTLY
Status: DISCONTINUED | OUTPATIENT
Start: 2018-09-17 | End: 2018-09-18

## 2018-09-17 RX ORDER — ASPIRIN 81 MG/1
81 TABLET, CHEWABLE ORAL DAILY
Status: DISCONTINUED | OUTPATIENT
Start: 2018-09-18 | End: 2018-09-21 | Stop reason: HOSPADM

## 2018-09-17 RX ORDER — MORPHINE SULFATE 4 MG/ML
4 INJECTION, SOLUTION INTRAMUSCULAR; INTRAVENOUS
Status: DISCONTINUED | OUTPATIENT
Start: 2018-09-17 | End: 2018-09-21 | Stop reason: HOSPADM

## 2018-09-17 RX ORDER — HYDROCODONE BITARTRATE AND ACETAMINOPHEN 5; 325 MG/1; MG/1
2 TABLET ORAL EVERY 4 HOURS PRN
Status: DISCONTINUED | OUTPATIENT
Start: 2018-09-17 | End: 2018-09-21 | Stop reason: HOSPADM

## 2018-09-17 RX ORDER — FOLIC ACID 1 MG/1
2 TABLET ORAL 2 TIMES DAILY
Status: DISCONTINUED | OUTPATIENT
Start: 2018-09-17 | End: 2018-09-21 | Stop reason: HOSPADM

## 2018-09-17 RX ORDER — ONDANSETRON 2 MG/ML
4 INJECTION INTRAMUSCULAR; INTRAVENOUS EVERY 6 HOURS PRN
Status: CANCELLED | OUTPATIENT
Start: 2018-09-17

## 2018-09-17 RX ORDER — DEXTROSE MONOHYDRATE 50 MG/ML
100 INJECTION, SOLUTION INTRAVENOUS PRN
Status: DISCONTINUED | OUTPATIENT
Start: 2018-09-17 | End: 2018-09-21 | Stop reason: HOSPADM

## 2018-09-17 RX ORDER — NICOTINE POLACRILEX 4 MG
15 LOZENGE BUCCAL PRN
Status: DISCONTINUED | OUTPATIENT
Start: 2018-09-17 | End: 2018-09-21 | Stop reason: HOSPADM

## 2018-09-17 RX ORDER — FAMOTIDINE 20 MG/1
20 TABLET, FILM COATED ORAL 2 TIMES DAILY
Status: DISCONTINUED | OUTPATIENT
Start: 2018-09-17 | End: 2018-09-21 | Stop reason: HOSPADM

## 2018-09-17 RX ORDER — ONDANSETRON 2 MG/ML
4 INJECTION INTRAMUSCULAR; INTRAVENOUS EVERY 6 HOURS PRN
Status: DISCONTINUED | OUTPATIENT
Start: 2018-09-17 | End: 2018-09-21 | Stop reason: HOSPADM

## 2018-09-17 RX ORDER — DEXTROSE MONOHYDRATE 25 G/50ML
12.5 INJECTION, SOLUTION INTRAVENOUS PRN
Status: DISCONTINUED | OUTPATIENT
Start: 2018-09-17 | End: 2018-09-21 | Stop reason: HOSPADM

## 2018-09-17 RX ORDER — SODIUM CHLORIDE 0.9 % (FLUSH) 0.9 %
10 SYRINGE (ML) INJECTION EVERY 12 HOURS SCHEDULED
Status: CANCELLED | OUTPATIENT
Start: 2018-09-17

## 2018-09-17 RX ORDER — SODIUM CHLORIDE 0.9 % (FLUSH) 0.9 %
10 SYRINGE (ML) INJECTION EVERY 12 HOURS SCHEDULED
Status: DISCONTINUED | OUTPATIENT
Start: 2018-09-17 | End: 2018-09-21 | Stop reason: HOSPADM

## 2018-09-17 RX ADMIN — Medication 1 UNITS: at 21:10

## 2018-09-17 RX ADMIN — Medication 10 ML: at 21:25

## 2018-09-17 RX ADMIN — MORPHINE SULFATE 2 MG: 2 INJECTION, SOLUTION INTRAMUSCULAR; INTRAVENOUS at 12:23

## 2018-09-17 RX ADMIN — HYDROMORPHONE HYDROCHLORIDE 1 MG: 1 INJECTION, SOLUTION INTRAMUSCULAR; INTRAVENOUS; SUBCUTANEOUS at 13:51

## 2018-09-17 RX ADMIN — METOPROLOL TARTRATE 25 MG: 25 TABLET ORAL at 21:24

## 2018-09-17 RX ADMIN — SIMVASTATIN 40 MG: 40 TABLET, FILM COATED ORAL at 21:09

## 2018-09-17 RX ADMIN — LOSARTAN POTASSIUM 25 MG: 25 TABLET, FILM COATED ORAL at 21:09

## 2018-09-17 RX ADMIN — FOLIC ACID 2 MG: 1 TABLET ORAL at 21:10

## 2018-09-17 RX ADMIN — DOCUSATE SODIUM 100 MG: 100 CAPSULE, LIQUID FILLED ORAL at 21:10

## 2018-09-17 RX ADMIN — HYDROCODONE BITARTRATE AND ACETAMINOPHEN 2 TABLET: 5; 325 TABLET ORAL at 21:10

## 2018-09-17 RX ADMIN — FAMOTIDINE 20 MG: 20 TABLET ORAL at 21:10

## 2018-09-17 ASSESSMENT — PAIN DESCRIPTION - PAIN TYPE
TYPE: ACUTE PAIN

## 2018-09-17 ASSESSMENT — PAIN DESCRIPTION - FREQUENCY
FREQUENCY: CONTINUOUS

## 2018-09-17 ASSESSMENT — PAIN SCALES - GENERAL
PAINLEVEL_OUTOF10: 5
PAINLEVEL_OUTOF10: 7
PAINLEVEL_OUTOF10: 5
PAINLEVEL_OUTOF10: 6
PAINLEVEL_OUTOF10: 7
PAINLEVEL_OUTOF10: 8
PAINLEVEL_OUTOF10: 4
PAINLEVEL_OUTOF10: 5
PAINLEVEL_OUTOF10: 5

## 2018-09-17 ASSESSMENT — PAIN DESCRIPTION - DESCRIPTORS
DESCRIPTORS: ACHING

## 2018-09-17 ASSESSMENT — PAIN DESCRIPTION - ONSET: ONSET: ON-GOING

## 2018-09-17 ASSESSMENT — ENCOUNTER SYMPTOMS
EYE PAIN: 0
DIARRHEA: 0
SINUS PAIN: 0
BACK PAIN: 0
WHEEZING: 0
COUGH: 0
EYE DISCHARGE: 0
VOMITING: 0
RHINORRHEA: 0
COLOR CHANGE: 0
SORE THROAT: 0
CHEST TIGHTNESS: 0
NAUSEA: 0
SHORTNESS OF BREATH: 0
ABDOMINAL PAIN: 0

## 2018-09-17 ASSESSMENT — PAIN DESCRIPTION - LOCATION
LOCATION: SHOULDER
LOCATION: ARM
LOCATION: ARM

## 2018-09-17 ASSESSMENT — PAIN DESCRIPTION - PROGRESSION: CLINICAL_PROGRESSION: NOT CHANGED

## 2018-09-17 ASSESSMENT — PAIN DESCRIPTION - ORIENTATION
ORIENTATION: RIGHT;LEFT
ORIENTATION: RIGHT;LEFT
ORIENTATION: RIGHT

## 2018-09-17 NOTE — CONSULTS
Consults       Consult History & Physical      Patient:  Lance Galindo  YOB: 1945    MRN: [id number]     Acct: [de-identified]      Chief Complaint:  Medical mngt    Date of Service: Pt seen/examined in consultation on 9.17.2018    History Of Present Illness:      72 y.o. female who we are asked to see/evaluate by Shea Fried MD for medical management of medical mngt, afib,hypothyroid,htn, hlp, and dm. No current issues except for pain    Past Medical History:        Diagnosis Date    Atrial fibrillation ([address]     Cancer ([facility]     Diabetes mellitus ([facility] [facility]     Deepika filter in place     Hyperlipidemia     Stroke (cerebrum) ([facility] [address]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    Thyroid disease        Past Surgical History:        Procedure Laterality Date    APPENDECTOMY      CHOLECYSTECTOMY      FRACTURE SURGERY      ankle surgery    HERNIA REPAIR      HYSTERECTOMY      TUBAL LIGATION         Medications Prior to Admission:    Prior to Admission medications    Medication Sig Start Date End Date Taking? Authorizing Provider   levothyroxine (SYNTHROID) 150 MCG tablet Take 1 tablet by mouth Daily 8/7/18  Yes Santiago Harrison, DO   blood glucose test strips (EXACTECH TEST) strip 1 each by In Vitro route 3 times daily As needed.  7/30/18  Yes Santiago Harrison, DO   insulin glargine (BASAGLAR KWIKPEN) 100 UNIT/ML injection pen Inject 25 Units into the skin every morning (before breakfast)   Yes Historical Provider, MD   losartan (COZAAR) 25 MG tablet Take 25 mg by mouth daily   Yes Historical Provider, MD   hydrochlorothiazide (HYDRODIURIL) 25 MG tablet Take 25 mg by mouth daily   Yes Historical Provider, MD   metoprolol tartrate (LOPRESSOR) 25 MG tablet Take 25 mg by mouth 2 times daily   Yes Historical Provider, MD   apixaban (ELIQUIS) 5 MG TABS tablet Take by mouth 2 times daily   Yes Historical Provider, MD   amiodarone (CORDARONE) 200 MG tablet Take 200 mg by mouth daily   Yes Historical Provider, MD   folic acid (FOLVITE) 1 MG tablet Take 2 mg by mouth 2 times daily   Yes Historical Provider, MD   aspirin 81 MG tablet Take 81 mg by mouth daily   Yes Historical Provider, MD   Multiple Vitamins-Minerals (ICAPS AREDS 2) CAPS Take by mouth 2 times daily   Yes Historical Provider, MD   metFORMIN (GLUCOPHAGE) 500 MG tablet Take 500 mg by mouth 2 times daily (with meals). Yes Historical Provider, MD   glimepiride (AMARYL) 4 MG tablet Take 4 mg by mouth 2 times daily. Yes Historical Provider, MD   simvastatin (ZOCOR) 40 MG tablet Take 40 mg by mouth nightly. Yes Historical Provider, MD   Multiple Vitamin (THERA-PLUS) LIQD Take 5 mLs by mouth daily. Yes Historical Provider, MD   Omega-3 Fatty Acids (FISH OIL PO) Take  by mouth. Historical Provider, MD       Allergies:  Patient has no known allergies. Social History:      The patient currently lives spouse    TOBACCO:   reports that she has never smoked. She has never used smokeless tobacco.  ETOH:   reports that she does not drink alcohol. Family History:         Family History   Problem Relation Age of Onset    Heart Disease Mother     Heart Disease Father        Diet:  DIET CARB CONTROL;  DIET CARB CONTROL; Carb Control: 4 carb choices (60 gms)/meal  Diet NPO, After Midnight    REVIEW OF SYSTEMS:   Pertinent positives as noted in the HPI. All other systems reviewed and negative. PHYSICAL EXAM:  BP (!) 176/74   Pulse 67   Temp 96.7 °F (35.9 °C) (Oral)   Resp 16   Ht 5' 6\" (1.676 m)   Wt 190 lb (86.2 kg)   SpO2 98%   BMI 30.67 kg/m²   General appearance:  No apparent distress, appears stated age and cooperative. HEENT:  Normal cephalic, atraumatic without obvious deformity. Pupils equal, round, and reactive to light. Extra ocular muscles intact. Conjunctivae/corneas clear. Neck: Supple, with full range of motion. no jugular venous distention. Trachea midline. no carotid bruits  Respiratory:  Normal respiratory effort.  Clear to auscultation, created using voice recognition software. It may contain minor errors which are inherent in voice recognition technology. **      Final report electronically signed by Dr. Alisson Marte on 9/17/2018 2:43 PM      XR HUMERUS LEFT (MIN 2 VIEWS)   Final Result   1. Normal left humerus. 2. Suggestion of a scapular fracture. Routine scapula x-rays recommended for further evaluation. **This report has been created using voice recognition software. It may contain minor errors which are inherent in voice recognition technology. **      Final report electronically signed by Dr. Benita Brooke on 9/17/2018 1:30 PM      XR CHEST 1 VW   Final Result   1. Normal heart size. No acute infiltrates or effusions are seen. 2. Several old rib fractures right side. 3. Acute mildly comminuted fracture proximal right humerus. There appears be a left scapular fracture. .      Final report electronically signed by Dr. Benita Brooke on 9/17/2018 1:28 PM      XR SHOULDER RIGHT (MIN 2 VIEWS)   Final Result   Mildly comminuted moderately displaced, mildly angulated proximal humeral shaft fracture with suspicion of extension through the greater tuberosity of the humeral head. .         **This report has been created using voice recognition software. It may contain minor errors which are inherent in voice recognition technology. **      Final report electronically signed by Dr. Benita Brooke on 9/17/2018 1:27 PM      XR HUMERUS RIGHT (MIN 2 VIEWS)   Final Result   Mildly comminuted moderately displaced, mildly angulated proximal humeral shaft fracture with suspicion of extension through the greater tuberosity of the humeral head. .         **This report has been created using voice recognition software. It may contain minor errors which are inherent in voice recognition technology. **      Final report electronically signed by Dr. Benita Brooke on 9/17/2018 1:27 PM             EKG:  I have reviewed the EKG with the following interpretation:        DVT prophylaxis: [] Lovenox                                 [x] SCDs                                 [] SQ Heparin                                 [] Encourage ambulation           [] Already on Anticoagulation      Code Status: Full Code    PT/OT Eval Status: Active and ongoing      ASSESSMENT:    Active Hospital Problems    Diagnosis Date Noted    Closed displaced fracture of greater tuberosity of right humerus [S42.251A] 09/17/2018    Closed fracture of body of left scapula [S42.112A] 09/17/2018    Anticoagulant long-term use [Z79.01] 09/17/2018    Fall on same level from slipping, tripping or stumbling [W01. 0XXA] 09/17/2018    Closed nondisplaced oblique fracture of shaft of right humerus [S42.334A] 09/17/2018    Chronic atrial fibrillation ([facility] [I48.2] 05/17/2018    Diabetes mellitus ([facility] [E11.9] 05/17/2018    Essential hypertension [I10] 05/17/2018    Osteopenia of spine [M85.88] 05/17/2018    Other specified hypothyroidism [E03.8] 05/17/2018       PLAN:    1. Hold eliquis  2. Insulin sliding scale  3. Resume home meds in am  4. Check tsh free t4  5. Cbc  6. bmp         Thank you for the consultation.     Electronically signed by Pricilla Curiel DO on 9/17/2018 at 7:40 PM

## 2018-09-17 NOTE — ED NOTES
Pt admitted to hospital. Transported to floor by cart in stable condition       Rickford Echevaria List, LPN  72/82/56 [id number]

## 2018-09-17 NOTE — H&P
Trauma H&P  Non-activation  Dr. Kaushik Fuentes    Patient:  Osvaldo Monge date: 9/17/2018   YOB: 1945 Date of Evaluation: 9/17/2018  MRN: [id number]  Acct: [de-identified]    Injury Date:09/17/18  Injury time: ~1030  PCP: Suzie Barber DO   Referring physician: Zeenat Conde DO     Time of Trauma Surgeon Assessment: 1510    Assessment:    Principal Problem:    Closed displaced fracture of greater tuberosity of right humerus  Active Problems:    Chronic atrial fibrillation ([address]    Diabetes mellitus ([facility]    Essential hypertension    Osteopenia of spine    Other specified hypothyroidism    Closed fracture of body of left scapula    Anticoagulant long-term use    Fall on same level from slipping, tripping or stumbling    Closed nondisplaced oblique fracture of shaft of right humerus  Resolved Problems:    * No resolved hospital problems. *    Plan:      Patient admitted under Trauma Services with telemetry to [address] [place] [address]    Closed displaced fracture of greater tuberosity of right humerus   -orthopedic surgery consulted   -likely nonoperative, with slinging   -Routine neurovascular checks   -Nonweightbearing    -pain control   -PT/OT when appropriate    Closed fracture of body of left scapula   -Orthopedic surgery consulted   -Nonoperative   -pain control    Pain Management   -Morphine, Norco    Prophylaxis: SCD's, Incentive Spirometry, Colace, Pepcid, Zofran    Gen.  Cardiac control diet, unless advised otherwise by orthopedic surgery    Restart home meds, hold anticoagulation  Regular Neurovascular Checks  Repeat Labs Tomorrow AM  PT/OT Eval and Treat  Activity as tolerated, pt up with assistance    Planned Discharge pending clinical course    Activation: []Level I (Trauma Alert) []Level II (Injury Call) [x]Level III (Trauma Consult)  Mode of Arrival: EMS transportation  Referring Facility:   Loss of Consciousness [x]No []Yes[]Unknown  Duration(min)  Mechanism of Injury:  []Motor Vehicle crash   []Single arthralgias ( Bilateral shoulder, L posterior, R anterior) and myalgias. Negative for neck stiffness. Skin: Negative for color change and wound. Neurological: Negative for dizziness, syncope, weakness, light-headedness, numbness and headaches. Psychiatric/Behavioral: Negative for confusion and decreased concentration. The patient is not nervous/anxious. Patient has no known allergies. Past Surgical History:   Procedure Laterality Date    APPENDECTOMY      CHOLECYSTECTOMY      FRACTURE SURGERY      ankle surgery    HERNIA REPAIR      HYSTERECTOMY      TUBAL LIGATION       Past Medical History:   Diagnosis Date    At[facility] ([facility]     Cancer ([facility]     Diabetes mellitus ([facility]     Deepika filter in place     Hyperlipidemia     Stroke (cerebrum) ([facility] [facility]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    Thyroid disease      Past Surgical History:   Procedure Laterality Date    APPENDECTOMY      CHOLECYSTECTOMY      FRACTURE SURGERY      ankle surgery    HERNIA REPAIR      HYSTERECTOMY      TUBAL LIGATION       Social History     Social History    Marital status:      Spouse name: N/A    Number of children: N/A    Years of education: N/A     Social History Main Topics    Smoking status: Never Smoker    Smokeless tobacco: Never Used    Alcohol use No    Drug use: No    Sexual activity: Not Asked     Other Topics Concern    None     Social History Narrative    None     Family History   Problem Relation Age of Onset    Heart Disease Mother     Heart Disease Father        Home medications:    Previous Medications    AMIODARONE (CORDARONE) 200 MG TABLET    Take 200 mg by mouth daily    APIXABAN (ELIQUIS) 5 MG TABS TABLET    Take by mouth 2 times daily    ASPIRIN 81 MG TABLET    Take 81 mg by mouth daily    BLOOD GLUCOSE TEST STRIPS (EXACTECH TEST) STRIP    1 each by In Vitro route 3 times daily As needed.     FOLIC ACID (FOLVITE) 1 MG TABLET    Take 2 mg by mouth 2 times daily 400 thou/mm3    MPV 9.6 9.4 - 12.4 fL    Seg Neutrophils 86.9 %    Lymphocytes 7.5 %    Monocytes 4.3 %    Eosinophils 0.2 %    Basophils 0.2 %    Immature Granulocytes 0.9 %    Segs Absolute 7.8 (H) 1.8 - 7.7 thou/mm3    Lymphocytes # 0.7 (L) 1.0 - 4.8 thou/mm3    Monocytes # 0.4 0.4 - 1.3 thou/mm3    Eosinophils # 0.0 0.0 - 0.4 thou/mm3    Basophils # 0.0 0.0 - 0.1 thou/mm3    Immature Grans (Abs) 0.08 (H) 0.00 - 0.07 thou/mm3    nRBC 0 /100 wbc   Anion Gap   Result Value Ref Range    Anion Gap 19.0 (H) 8.0 - 16.0 meq/L   Glomerular Filtration Rate, Estimated   Result Value Ref Range    Est, Glom Filt Rate 82 (A) ml/min/1.73m2   Osmolality   Result Value Ref Range    Osmolality Calc 271.7 (L) 275.0 - 300 mOsmol/kg       Physical Exam:  Patient Vitals for the past 24 hrs:   BP Temp Temp src Pulse Resp SpO2 Height Weight   09/17/18 1542 (!) 182/60 - - 79 16 99 % - -   09/17/18 1436 (!) 157/59 - - - - - - -   09/17/18 1435 - - - 68 16 97 % - -   09/17/18 1317 (!) 159/48 - - 63 21 100 % - -   09/17/18 1205 (!) 135/47 - - - - - - -   09/17/18 1203 - 98.3 °F (36.8 °C) Oral - - - 5' 6\" (1.676 m) 190 lb (86.2 kg)   09/17/18 1200 - - - 82 20 - - -     Primary Assessment:  Airway: Patent, trachea midline  Breathing: Breath sounds present and equal bilaterally, spontaneous, and unlabored  Circulation: Hemodynamically stable, 2+ central and peripheral pulses. Disability: RAMIREZ x 4, following commands. GCS =15    Secondary Assessment:  General: Alert, NAD. Head: Normocephalic, mid face stable, Tympanic membranes intact, Nares patent bilaterally, no epistaxis. Mouth clear of foreign bodies, no lacerations or abrasions. Eyes: PERRLA, EOMI, Nontraumatic  Neurologic: A & O x3. Following commands. CN 2-12 intact  Neck: Immobilized in cervical collar, trachea midline. Cervical spines NTTP midline, without step-offs, crepitus or deformity. Back:TL spines are NTTP midline, without step-offs, crepitus or deformity.   No abrasions, contusions, or ecchymosis noted. Lungs: Clear to auscultation bilaterally. Chest Wall: Chest rise symmetrical.  Chest wall without tenderness to palpation. No crepitus, deformities, lacerations, or abrasions. Heart: RRR. Normal S1/S2. No obvious M/G/R. Abdomen:  Soft, NTTP. No guarding. Non-peritoneal.  Pelvis:  NTTP, stable to compression. Femoral pulses 2+. GI/: No blood at the urinary meatus. No gross hematuria. Extremities: Radial /DP/PT pulses 2+ bilaterally. Right shoulder: She exhibits decreased range of motion secondary to pain, with severe diffuse tenderness to palpation. No significant swelling, ecchymoses, bony tenderness. Neurovascularly intact in distal right upper extremity. Left shoulder: She exhibits decreased range of motion secondary to pain with posterior tenderness to palpation. No notable deformity, swelling, ecchymoses, bony tenderness. neurovascularly intact in distal left upper extremity  Skin: Skin warm and dry. Normal for ethnicity. Radiology:     CT HEAD WO CONTRAST   Final Result   There is no acute fracture or hemorrhage. **This report has been created using voice recognition software. It may contain minor errors which are inherent in voice recognition technology. **      Final report electronically signed by Dr. Balbina Smith on 9/17/2018 2:49 PM      CT CHEST WO CONTRAST   Final Result       Bilateral infiltrates as above. Fractures involve the right humerus and left scapula. **This report has been created using voice recognition software. It may contain minor errors which are inherent in voice recognition technology. **      Final report electronically signed by Dr. Balbina Smith on 9/17/2018 2:43 PM      XR HUMERUS LEFT (MIN 2 VIEWS)   Final Result   1. Normal left humerus. 2. Suggestion of a scapular fracture. Routine scapula x-rays recommended for further evaluation.                **This report has been created using voice recognition software. It may contain minor errors which are inherent in voice recognition technology. **      Final report electronically signed by Dr. Adela Wiggins on 9/17/2018 1:30 PM      XR CHEST 1 VW   Final Result   1. Normal heart size. No acute infiltrates or effusions are seen. 2. Several old rib fractures right side. 3. Acute mildly comminuted fracture proximal right humerus. There appears be a left scapular fracture. .      Final report electronically signed by Dr. Adela Wiggins on 9/17/2018 1:28 PM      XR SHOULDER RIGHT (MIN 2 VIEWS)   Final Result   Mildly comminuted moderately displaced, mildly angulated proximal humeral shaft fracture with suspicion of extension through the greater tuberosity of the humeral head. .         **This report has been created using voice recognition software. It may contain minor errors which are inherent in voice recognition technology. **      Final report electronically signed by Dr. Adela Wiggins on 9/17/2018 1:27 PM      XR HUMERUS RIGHT (MIN 2 VIEWS)   Final Result   Mildly comminuted moderately displaced, mildly angulated proximal humeral shaft fracture with suspicion of extension through the greater tuberosity of the humeral head. .         **This report has been created using voice recognition software. It may contain minor errors which are inherent in voice recognition technology. **      Final report electronically signed by Dr. Adela Wiggins on 9/17/2018 1:27 PM        Fast Exam: No, not clinically relevant secondary to mechanism of injury, patient's sign symptoms    Electronically signed by Martin Burch PA-C on 9/17/2018 at 3:50 PM  Pt seen  See my separate note

## 2018-09-17 NOTE — PROGRESS NOTES
Pt arrived in [address] [place] [address] via cart. Complaints: Right Humerus bone and left scapula fracture       pt has patent IV in left arm. Pt alert and oriented X4. Refer to flow sheet for MAR and Head to toe assessment. Call light and bedside table within reach. The best day to schedule a follow up Dr appointment is:  Thursday p.m.       The patient is interested in [facility]. [facility] meds to [facility] program?:  No

## 2018-09-17 NOTE — ED NOTES
Dr. Jono Escudero made aware of pt's bp at this time 182/60- no new orders.       Shanthi Hillman RN  09/17/18 [id number]

## 2018-09-17 NOTE — ED PROVIDER NOTES
[facility]  eMERGENCY dEPARTMENT eNCOUnter          CHIEF COMPLAINT       Chief Complaint   Patient presents with    Fall    Shoulder Injury       Nurses Notes reviewed and I agree except as noted in the HPI. HISTORY OF PRESENT ILLNESS    Dali Hayes is a 72 y.o. female who presents to the Emergency Department via EMS where she given 100 mg Fentanyl for the evaluation following a mechanical fall. The patient reports to have been working in her yard when she committed her fall. She denies knowing the exact mechanism which caused her to commit her fall. During her fall, the patient states that she extended both her arms to catch her fall. She denies a head trauma or loss of consciousness. She currently complains or bilateral shoulder pain with increasing pain within her right shoulder when compared to the left. She rates her current pain as a 5/10 in severity and aching in character. She states to be experiencing minimal radiating pain down her upper extremities. Her shoulder pain is exacerbated secondary to movement and is relieved with elevation. She denies any neck pain, chest pain, abdominal pain, knee pain, or back pain. The patient is currently taking Eliquis for her A-fib. The patient reports no additional symptoms or complaints at this time. The HPI was provided by the patient. REVIEW OF SYSTEMS     Review of Systems   Constitutional: Negative for appetite change, chills, fatigue and fever. HENT: Negative for congestion, ear pain, rhinorrhea and sore throat. Eyes: Negative for pain, discharge and visual disturbance. Respiratory: Negative for cough, shortness of breath and wheezing. Cardiovascular: Negative for chest pain, palpitations and leg swelling. Gastrointestinal: Negative for abdominal pain, diarrhea, nausea and vomiting. Genitourinary: Negative for difficulty urinating, dysuria, hematuria and vaginal discharge.    Musculoskeletal: Positive for arthralgias (bilateral shoulder pain) and myalgias. Negative for back pain, joint swelling and neck pain. Skin: Negative for pallor and rash. Neurological: Negative for dizziness, syncope, weakness, light-headedness, numbness and headaches. Hematological: Negative for adenopathy. Psychiatric/Behavioral: Negative for confusion and suicidal ideas. The patient is not nervous/anxious. PAST MEDICAL HISTORY    has a past medical history of Atrial fibrillation ([facility] [address] Cancer ([facility] [address] Diabetes mellitus ([facility] [address] Deepika filter in place; Hyperlipidemia; Stroke (cerebrum) ([facility] [address] and Thyroid disease. SURGICAL HISTORY      has a past surgical history that includes Hysterectomy; Appendectomy; Tubal ligation; hernia repair; Cholecystectomy; and fracture surgery. CURRENT MEDICATIONS       Current Discharge Medication List      CONTINUE these medications which have NOT CHANGED    Details   levothyroxine (SYNTHROID) 150 MCG tablet Take 1 tablet by mouth Daily  Qty: 90 tablet, Refills: 3      blood glucose test strips (EXACTECH TEST) strip 1 each by In Vitro route 3 times daily As needed. Qty: 100 each, Refills: 3      insulin glargine (BASAGLAR KWIKPEN) 100 UNIT/ML injection pen Inject 25 Units into the skin every morning (before breakfast)      losartan (COZAAR) 25 MG tablet Take 25 mg by mouth daily      hydrochlorothiazide (HYDRODIURIL) 25 MG tablet Take 25 mg by mouth daily      metoprolol tartrate (LOPRESSOR) 25 MG tablet Take 25 mg by mouth 2 times daily      apixaban (ELIQUIS) 5 MG TABS tablet Take by mouth 2 times daily      amiodarone (CORDARONE) 200 MG tablet Take 200 mg by mouth daily      folic acid (FOLVITE) 1 MG tablet Take 2 mg by mouth 2 times daily      aspirin 81 MG tablet Take 81 mg by mouth daily      Multiple Vitamins-Minerals (ICAPS AREDS 2) CAPS Take by mouth 2 times daily      metFORMIN (GLUCOPHAGE) 500 MG tablet Take 500 mg by mouth 2 times daily (with meals).       glimepiride (AMARYL) 4 MG tablet Take 4 mg by mouth 2 times daily. simvastatin (ZOCOR) 40 MG tablet Take 40 mg by mouth nightly. Multiple Vitamin (THERA-PLUS) LIQD Take 5 mLs by mouth daily. Omega-3 Fatty Acids (FISH OIL PO) Take  by mouth. ALLERGIES     has No Known Allergies. FAMILY HISTORY     indicated that the status of her mother is unknown. She indicated that the status of her father is unknown.    family history includes Heart Disease in her father and mother. SOCIAL HISTORY      reports that she has never smoked. She has never used smokeless tobacco. She reports that she does not drink alcohol or use drugs. PHYSICAL EXAM     INITIAL VITALS:  height is 5' 6\" (1.676 m) and weight is 190 lb (86.2 kg). Her oral temperature is 98.3 °F (36.8 °C). Her blood pressure is 174/87 (abnormal) and her pulse is 107. Her respiration is 18 and oxygen saturation is 93%. Physical Exam   Constitutional: She is oriented to person, place, and time. She appears well-developed and well-nourished. Non-toxic appearance. Nasal cannula in place. HENT:   Head: Normocephalic and atraumatic. Right Ear: Tympanic membrane and external ear normal.   Left Ear: Tympanic membrane and external ear normal.   Nose: Nose normal.   Mouth/Throat: Oropharynx is clear and moist and mucous membranes are normal. No oropharyngeal exudate, posterior oropharyngeal edema or posterior oropharyngeal erythema. Eyes: Conjunctivae and EOM are normal.   Neck: Normal range of motion. Neck supple. No JVD present. Cardiovascular: Normal rate, regular rhythm, normal heart sounds, intact distal pulses and normal pulses. Exam reveals no gallop and no friction rub. No murmur heard. Pulmonary/Chest: Effort normal and breath sounds normal. No respiratory distress. She has no decreased breath sounds. She has no wheezes. She has no rhonchi. She has no rales. Abdominal: Soft. Bowel sounds are normal. She exhibits no distension. There is no tenderness. There is no rebound, no guarding and no CVA tenderness. Musculoskeletal: She exhibits no edema. Right shoulder: She exhibits decreased range of motion (secondary to pain) and tenderness. She exhibits no bony tenderness and no swelling. Left shoulder: She exhibits decreased range of motion (secondary to pain) and tenderness. She exhibits no bony tenderness and no swelling. Right hip: Normal. She exhibits normal range of motion, normal strength and no tenderness. Left hip: Normal. She exhibits normal range of motion, normal strength and no tenderness. Right knee: Normal. She exhibits normal range of motion, no swelling, no effusion and no ecchymosis. No tenderness found. Left knee: Normal. She exhibits normal range of motion, no swelling, no effusion and no ecchymosis. No tenderness found. Cervical back: Normal. She exhibits normal range of motion, no tenderness, no bony tenderness and no swelling. Thoracic back: Normal. She exhibits normal range of motion, no tenderness, no bony tenderness and no swelling. Lumbar back: Normal. She exhibits normal range of motion, no tenderness, no bony tenderness and no swelling. Neurological: She is alert and oriented to person, place, and time. She exhibits normal muscle tone. Coordination normal.   Skin: Skin is warm and dry. No rash noted. She is not diaphoretic. Nursing note and vitals reviewed. DIFFERENTIAL DIAGNOSIS:   Include and are not limited to: fracture, dislocation, contusion, hematoma    DIAGNOSTIC RESULTS     EKG: All EKG's are interpreted by the Emergency Department Physician who either signs or Co-signs this chart in the absence of a cardiologist.  EKG interpreted by Edna Ng, DO:    None    RADIOLOGY: non-plain film images(s) such as CT, Ultrasound and MRI are read by the radiologist.    Karis Ling   Final Result   There is no acute fracture or hemorrhage.                 **This report has been created using voice recognition software. It may contain minor errors which are inherent in voice recognition technology. **      Final report electronically signed by Dr. Francois Gao on 9/17/2018 2:49 PM      CT CHEST WO CONTRAST   Final Result       Bilateral infiltrates as above. Fractures involve the right humerus and left scapula. **This report has been created using voice recognition software. It may contain minor errors which are inherent in voice recognition technology. **      Final report electronically signed by Dr. Francois Gao on 9/17/2018 2:43 PM      XR HUMERUS LEFT (MIN 2 VIEWS)   Final Result   1. Normal left humerus. 2. Suggestion of a scapular fracture. Routine scapula x-rays recommended for further evaluation. **This report has been created using voice recognition software. It may contain minor errors which are inherent in voice recognition technology. **      Final report electronically signed by Dr. Lizy Kang on 9/17/2018 1:30 PM      XR CHEST 1 VW   Final Result   1. Normal heart size. No acute infiltrates or effusions are seen. 2. Several old rib fractures right side. 3. Acute mildly comminuted fracture proximal right humerus. There appears be a left scapular fracture. .      Final report electronically signed by Dr. Lizy Kang on 9/17/2018 1:28 PM      XR SHOULDER RIGHT (MIN 2 VIEWS)   Final Result   Mildly comminuted moderately displaced, mildly angulated proximal humeral shaft fracture with suspicion of extension through the greater tuberosity of the humeral head. .         **This report has been created using voice recognition software. It may contain minor errors which are inherent in voice recognition technology. **      Final report electronically signed by Dr. Lizy Kang on 9/17/2018 1:27 PM      XR HUMERUS RIGHT (MIN 2 VIEWS)   Final Result   Mildly comminuted moderately displaced, mildly angulated proximal humeral shaft fracture with suspicion of extension through the greater tuberosity of the humeral head. .         **This report has been created using voice recognition software. It may contain minor errors which are inherent in voice recognition technology. **      Final report electronically signed by Dr. Adam Cooley on 9/17/2018 1:27 PM          LABS:   Labs Reviewed   BASIC METABOLIC PANEL - Abnormal; Notable for the following:        Result Value    Sodium 131 (*)     Chloride 90 (*)     CO2 22 (*)     Glucose 207 (*)     Calcium 10.7 (*)     All other components within normal limits   CBC WITH AUTO DIFFERENTIAL - Abnormal; Notable for the following:     RBC 3.90 (*)     MCH 33.6 (*)     RDW-SD 45.2 (*)     Segs Absolute 7.8 (*)     Lymphocytes # 0.7 (*)     Immature Grans (Abs) 0.08 (*)     All other components within normal limits   ANION GAP - Abnormal; Notable for the following:      Anion Gap 19.0 (*)     All other components within normal limits   GLOMERULAR FILTRATION RATE, ESTIMATED - Abnormal; Notable for the following:     Est, Glom Filt Rate 82 (*)     All other components within normal limits   OSMOLALITY - Abnormal; Notable for the following:     Osmolality Calc 271.7 (*)     All other components within normal limits   BASIC METABOLIC PANEL W/ REFLEX TO MG FOR LOW K  - Abnormal; Notable for the following:     Sodium 127 (*)     Chloride 89 (*)     CO2 22 (*)     Glucose 154 (*)     All other components within normal limits   BASIC METABOLIC PANEL - Abnormal; Notable for the following:     Sodium 128 (*)     Chloride 89 (*)     Glucose 215 (*)     All other components within normal limits   CBC WITH AUTO DIFFERENTIAL - Abnormal; Notable for the following:     RBC 3.68 (*)     Hematocrit 36.6 (*)     MCV 99.5 (*)     MCH 33.7 (*)     RDW-SD 45.2 (*)     Lymphocytes # 0.7 (*)     All other components within normal limits   HEMOGLOBIN A1C - Abnormal; Notable for the following:     AVERAGE GLUCOSE 132 (*)     All other components within normal limits   GLOMERULAR FILTRATION RATE, ESTIMATED - Abnormal; Notable for the following:     Est, Glom Filt Rate 82 (*)     All other components within normal limits   POCT GLUCOSE - Abnormal; Notable for the following:     POC Glucose 166 (*)     All other components within normal limits   POCT GLUCOSE - Abnormal; Notable for the following:     POC Glucose 229 (*)     All other components within normal limits   POCT GLUCOSE - Abnormal; Notable for the following:     POC Glucose 157 (*)     All other components within normal limits   HEMOGLOBIN AND HEMATOCRIT, BLOOD   ANION GAP   ANION GAP   GLOMERULAR FILTRATION RATE, ESTIMATED   TSH WITHOUT REFLEX   T4, FREE   POCT GLUCOSE       EMERGENCY DEPARTMENT COURSE:   Vitals:    Vitals:    09/17/18 2306 09/18/18 0015 09/18/18 0425 09/18/18 0812   BP:  (!) 174/75 (!) 174/90 (!) 174/87   Pulse:  62 90 107   Resp:  16 16 18   Temp:    98.3 °F (36.8 °C)   TempSrc:    Oral   SpO2: 92% 93% 93% 93%   Weight:       Height:           12:10 PM: The patient was seen and evaluated. MDM:  The patient was seen and evaluated within the ED today for the evaluation following a fall. She reported bilateral shoulder pain. The patient presented in minimal acute distress secondary to her shoulder pain. Physical exam revealed tenderness secondary to palpation and decreased range of motion of both the left and right shoulder. Labs showed no acute findings and were chronically stable. CT head without contrast reveals no acute fracture or hemorrhage. CT chest without contrast reveals bilateral infiltrates and fractures involving the right humerus and left scapula. Right shoulder and right humerus XR reveals a mildly comminuted moderately displaced, mildly angulated proximal humeral shaft fracture with suspicion of extension through the greater tuberosity of the humeral head. Left humerus XR reveals a possible scapular fracture.   Chest XR reveals a mildly comminuted

## 2018-09-18 PROBLEM — E87.1 HYPONATREMIA: Status: ACTIVE | Noted: 2018-09-18

## 2018-09-18 LAB
ANION GAP SERPL CALCULATED.3IONS-SCNC: 16 MEQ/L (ref 8–16)
AVERAGE GLUCOSE: 132 MG/DL (ref 70–126)
BUN BLDV-MCNC: 14 MG/DL (ref 7–22)
CALCIUM SERPL-MCNC: 10.1 MG/DL (ref 8.5–10.5)
CHLORIDE BLD-SCNC: 89 MEQ/L (ref 98–111)
CO2: 22 MEQ/L (ref 23–33)
CREAT SERPL-MCNC: 0.5 MG/DL (ref 0.4–1.2)
GFR SERPL CREATININE-BSD FRML MDRD: > 90 ML/MIN/1.73M2
GLUCOSE BLD-MCNC: 154 MG/DL (ref 70–108)
GLUCOSE BLD-MCNC: 157 MG/DL (ref 70–108)
GLUCOSE BLD-MCNC: 194 MG/DL (ref 70–108)
GLUCOSE BLD-MCNC: 195 MG/DL (ref 70–108)
GLUCOSE BLD-MCNC: 210 MG/DL (ref 70–108)
HBA1C MFR BLD: 6.4 % (ref 4.4–6.4)
HCT VFR BLD CALC: 38.3 % (ref 37–47)
HEMOGLOBIN: 13.4 GM/DL (ref 12–16)
POTASSIUM REFLEX MAGNESIUM: 4.2 MEQ/L (ref 3.5–5.2)
SODIUM BLD-SCNC: 127 MEQ/L (ref 135–145)
T4 FREE: 2.27 NG/DL (ref 0.93–1.76)
TSH SERPL DL<=0.05 MIU/L-ACNC: 2.44 UIU/ML (ref 0.4–4.2)

## 2018-09-18 PROCEDURE — 85018 HEMOGLOBIN: CPT

## 2018-09-18 PROCEDURE — 6360000002 HC RX W HCPCS: Performed by: SURGERY

## 2018-09-18 PROCEDURE — 84443 ASSAY THYROID STIM HORMONE: CPT

## 2018-09-18 PROCEDURE — 99232 SBSQ HOSP IP/OBS MODERATE 35: CPT | Performed by: SURGERY

## 2018-09-18 PROCEDURE — 80048 BASIC METABOLIC PNL TOTAL CA: CPT

## 2018-09-18 PROCEDURE — 6370000000 HC RX 637 (ALT 250 FOR IP): Performed by: INTERNAL MEDICINE

## 2018-09-18 PROCEDURE — 84439 ASSAY OF FREE THYROXINE: CPT

## 2018-09-18 PROCEDURE — 82948 REAGENT STRIP/BLOOD GLUCOSE: CPT

## 2018-09-18 PROCEDURE — 85014 HEMATOCRIT: CPT

## 2018-09-18 PROCEDURE — 1200000000 HC SEMI PRIVATE

## 2018-09-18 PROCEDURE — 6370000000 HC RX 637 (ALT 250 FOR IP): Performed by: PHYSICIAN ASSISTANT

## 2018-09-18 PROCEDURE — 2580000003 HC RX 258: Performed by: PHYSICIAN ASSISTANT

## 2018-09-18 PROCEDURE — 36415 COLL VENOUS BLD VENIPUNCTURE: CPT

## 2018-09-18 PROCEDURE — 99233 SBSQ HOSP IP/OBS HIGH 50: CPT | Performed by: INTERNAL MEDICINE

## 2018-09-18 PROCEDURE — 6370000000 HC RX 637 (ALT 250 FOR IP): Performed by: SURGERY

## 2018-09-18 PROCEDURE — 93010 ELECTROCARDIOGRAM REPORT: CPT | Performed by: INTERNAL MEDICINE

## 2018-09-18 PROCEDURE — 6360000002 HC RX W HCPCS: Performed by: INTERNAL MEDICINE

## 2018-09-18 PROCEDURE — APPSS60 APP SPLIT SHARED TIME 46-60 MINUTES: Performed by: PHYSICIAN ASSISTANT

## 2018-09-18 RX ORDER — INSULIN GLARGINE 100 [IU]/ML
15 INJECTION, SOLUTION SUBCUTANEOUS DAILY
Status: DISCONTINUED | OUTPATIENT
Start: 2018-09-18 | End: 2018-09-21 | Stop reason: HOSPADM

## 2018-09-18 RX ORDER — HYDRALAZINE HYDROCHLORIDE 20 MG/ML
5 INJECTION INTRAMUSCULAR; INTRAVENOUS EVERY 4 HOURS PRN
Status: DISCONTINUED | OUTPATIENT
Start: 2018-09-18 | End: 2018-09-21 | Stop reason: HOSPADM

## 2018-09-18 RX ORDER — AMLODIPINE BESYLATE 10 MG/1
10 TABLET ORAL DAILY
Status: DISCONTINUED | OUTPATIENT
Start: 2018-09-18 | End: 2018-09-21 | Stop reason: HOSPADM

## 2018-09-18 RX ORDER — ATORVASTATIN CALCIUM 20 MG/1
20 TABLET, FILM COATED ORAL NIGHTLY
Status: DISCONTINUED | OUTPATIENT
Start: 2018-09-18 | End: 2018-09-21 | Stop reason: HOSPADM

## 2018-09-18 RX ADMIN — Medication 10 ML: at 20:04

## 2018-09-18 RX ADMIN — INSULIN LISPRO 1 UNITS: 100 INJECTION, SOLUTION INTRAVENOUS; SUBCUTANEOUS at 17:27

## 2018-09-18 RX ADMIN — METOPROLOL TARTRATE 25 MG: 25 TABLET ORAL at 20:01

## 2018-09-18 RX ADMIN — MORPHINE SULFATE 2 MG: 2 INJECTION, SOLUTION INTRAMUSCULAR; INTRAVENOUS at 13:03

## 2018-09-18 RX ADMIN — HYDROCODONE BITARTRATE AND ACETAMINOPHEN 2 TABLET: 5; 325 TABLET ORAL at 20:01

## 2018-09-18 RX ADMIN — HYDROCODONE BITARTRATE AND ACETAMINOPHEN 1 TABLET: 5; 325 TABLET ORAL at 10:29

## 2018-09-18 RX ADMIN — INSULIN LISPRO 2 UNITS: 100 INJECTION, SOLUTION INTRAVENOUS; SUBCUTANEOUS at 11:25

## 2018-09-18 RX ADMIN — FOLIC ACID 2 MG: 1 TABLET ORAL at 08:34

## 2018-09-18 RX ADMIN — HYDRALAZINE HYDROCHLORIDE 5 MG: 20 INJECTION INTRAMUSCULAR; INTRAVENOUS at 13:07

## 2018-09-18 RX ADMIN — MORPHINE SULFATE 2 MG: 2 INJECTION, SOLUTION INTRAMUSCULAR; INTRAVENOUS at 00:22

## 2018-09-18 RX ADMIN — HYDROCHLOROTHIAZIDE 25 MG: 25 TABLET ORAL at 08:26

## 2018-09-18 RX ADMIN — MORPHINE SULFATE 2 MG: 2 INJECTION, SOLUTION INTRAMUSCULAR; INTRAVENOUS at 04:29

## 2018-09-18 RX ADMIN — MORPHINE SULFATE 4 MG: 4 INJECTION INTRAVENOUS at 08:29

## 2018-09-18 RX ADMIN — LOSARTAN POTASSIUM 25 MG: 25 TABLET, FILM COATED ORAL at 08:26

## 2018-09-18 RX ADMIN — Medication 10 ML: at 08:29

## 2018-09-18 RX ADMIN — MORPHINE SULFATE 2 MG: 2 INJECTION, SOLUTION INTRAMUSCULAR; INTRAVENOUS at 23:41

## 2018-09-18 RX ADMIN — ATORVASTATIN CALCIUM 20 MG: 20 TABLET, FILM COATED ORAL at 20:01

## 2018-09-18 RX ADMIN — AMLODIPINE BESYLATE 10 MG: 10 TABLET ORAL at 11:18

## 2018-09-18 RX ADMIN — FAMOTIDINE 20 MG: 20 TABLET ORAL at 08:26

## 2018-09-18 RX ADMIN — DOCUSATE SODIUM 100 MG: 100 CAPSULE, LIQUID FILLED ORAL at 20:01

## 2018-09-18 RX ADMIN — METOPROLOL TARTRATE 25 MG: 25 TABLET ORAL at 08:26

## 2018-09-18 RX ADMIN — FOLIC ACID 2 MG: 1 TABLET ORAL at 20:01

## 2018-09-18 RX ADMIN — HYDROCODONE BITARTRATE AND ACETAMINOPHEN 2 TABLET: 5; 325 TABLET ORAL at 15:17

## 2018-09-18 RX ADMIN — FAMOTIDINE 20 MG: 20 TABLET ORAL at 20:01

## 2018-09-18 RX ADMIN — LEVOTHYROXINE SODIUM 150 MCG: 150 TABLET ORAL at 08:26

## 2018-09-18 RX ADMIN — MORPHINE SULFATE 2 MG: 2 INJECTION, SOLUTION INTRAMUSCULAR; INTRAVENOUS at 18:21

## 2018-09-18 RX ADMIN — AMIODARONE HYDROCHLORIDE 200 MG: 200 TABLET ORAL at 08:26

## 2018-09-18 ASSESSMENT — PAIN SCALES - GENERAL
PAINLEVEL_OUTOF10: 4
PAINLEVEL_OUTOF10: 4
PAINLEVEL_OUTOF10: 5
PAINLEVEL_OUTOF10: 5
PAINLEVEL_OUTOF10: 4
PAINLEVEL_OUTOF10: 5
PAINLEVEL_OUTOF10: 7
PAINLEVEL_OUTOF10: 7
PAINLEVEL_OUTOF10: 4
PAINLEVEL_OUTOF10: 7
PAINLEVEL_OUTOF10: 5
PAINLEVEL_OUTOF10: 4
PAINLEVEL_OUTOF10: 5

## 2018-09-18 ASSESSMENT — PAIN DESCRIPTION - LOCATION
LOCATION: ARM

## 2018-09-18 ASSESSMENT — PAIN DESCRIPTION - ORIENTATION
ORIENTATION: LEFT;RIGHT

## 2018-09-18 ASSESSMENT — PAIN DESCRIPTION - DESCRIPTORS: DESCRIPTORS: ACHING;DISCOMFORT

## 2018-09-18 ASSESSMENT — PAIN DESCRIPTION - PAIN TYPE
TYPE: ACUTE PAIN

## 2018-09-18 ASSESSMENT — PAIN DESCRIPTION - FREQUENCY: FREQUENCY: CONTINUOUS

## 2018-09-18 NOTE — FLOWSHEET NOTE
[address]  OCCUPATIONAL THERAPY MISSED TREATMENT NOTE  [facility] 7K  [id number]      Date: 2018  Patient Name: Francisco Gardiner        CSN: [id number]   : 1945  (72 y.o.)  Gender: female                REASON FOR MISSED TREATMENT:  Hold treatment per nursing request.  Pt currently with bedrest orders, awaiting ortho input regarding surgery. Will hold OT this date, await ortho input.

## 2018-09-18 NOTE — PROGRESS NOTES
[facility]  Daily Progress Note  Pt Name: Y[address]iel [address] Record Number: [id number]  Date of Birth 1945   Today's Date: 9/18/2018    HD: # 1    \"My right arm really hurts when I move it. \"    ASSESSMENT  1. Active Hospital Problems    Diagnosis Date Noted    Hyponatremia [E87.1] 09/18/2018    Closed displaced fracture of greater tuberosity of right humerus [S42.251A] 09/17/2018    Closed fracture of body of left scapula [S42.112A] 09/17/2018    Anticoagulant long-term use [Z79.01] 09/17/2018    Fall on same level from slipping, tripping or stumbling [W01. 0XXA] 09/17/2018    Closed nondisplaced oblique fracture of shaft of right humerus [S42.334A] 09/17/2018    Chronic atrial fibrillation ([facility] [address] [I48.2] 05/17/2018    Diabetes mellitus ([facility] [address] [E11.9] 05/17/2018    Essential hypertension [I10] 05/17/2018    Osteopenia of spine [M85.88] 05/17/2018    Other specified hypothyroidism [E03.8] 05/17/2018         PLAN  Patient admitted under Trauma Services with telemetry to [address] [place] [address]     Closed displaced fracture of greater tuberosity of right humerus              -orthopedic surgery consulted              -awaiting their decision for operative vs. non-op management              -Routine neurovascular checks              -Nonweightbearing               -pain control              -PT/OT when appropriate  Closed fracture of body of left scapula              -Orthopedic surgery consulted              -Non-operative management, rest, Ice              -pain control  Pain Management              -Morphine, Norco  Prophylaxis: SCD's, Incentive Spirometry, Colace, Pepcid, Zofran  NPO until lifted by Orthopedics  Restart home meds, hold anticoagulation  Regular Neurovascular Checks  Repeat Labs Tomorrow AM  PT/OT Eval and Treat  Activity as tolerated, pt up with assistance    Planned Discharge pending clinical course     SUBJECTIVE  Pt continues on 7K.   She is doing fair this morning but continues to complain or right arm pain. She states the pain is unbearable when the arm is moved rating a 10/10, but the pain is tolerable without ROM at 5/10. She also complains of paresthesias extending into her right fingers, but not her thumb. Patient states her left scapula is sore with movement as well, and she has to \"inch her fingers slowly\" in order to move her left arm. She is currently NPO as Orthopedics decides whether or not they will be operating on her. Patient denies any nausea or vomiting. Her last bowel movement was yesterday and we will continue to monitor once she is able to eat. Discharge planning, pending clinical course, anticipate home with family.     Wt Readings from Last 3 Encounters:   09/17/18 190 lb (86.2 kg)   05/17/18 194 lb (88 kg)   11/24/17 190 lb (86.2 kg)     Temp Readings from Last 3 Encounters:   09/18/18 98.3 °F (36.8 °C) (Oral)   05/17/18 98 °F (36.7 °C) (Oral)   11/24/17 98 °F (36.7 °C) (Oral)     BP Readings from Last 3 Encounters:   09/18/18 (!) 174/87   05/17/18 110/74   11/24/17 (!) 158/67     Pulse Readings from Last 3 Encounters:   09/18/18 107   05/17/18 56   11/24/17 84       24 HR INTAKE/OUTPUT :   Intake/Output Summary (Last 24 hours) at 09/18/18 0919  Last data filed at 09/18/18 0600   Gross per 24 hour   Intake               60 ml   Output              800 ml   Net             -740 ml     Diet NPO, After Midnight    OBJECTIVE  CURRENT VITALS BP (!) 174/87   Pulse 107   Temp 98.3 °F (36.8 °C) (Oral)   Resp 18   Ht 5' 6\" (1.676 m)   Wt 190 lb (86.2 kg)   SpO2 93%   BMI 30.67 kg/m²   GENERAL: alert, cooperative, no distress  LUNGS: clear to auscultation bilaterally- no wheezes, rales or rhonchi, normal air movement, no respiratory distress  HEART: normal rate, normal S1 and S2, no gallops and intact distal pulses  ABDOMEN: soft, non-tender, non-distended, normal bowel sounds, no masses or organomegaly  WOUNDS: no dehiscence, no

## 2018-09-18 NOTE — CONSULTS
Consults       Consult Progress  Patient:  Heather Saenz  YOB: 1945    MRN: [id number]     Acct: [de-identified]      Chief Complaint:  Medical mngt    Date of Service: Pt seen/examined in consultation on 9.17.2018    History Of Present Illness:      72 y.o. female who we are asked to see/evaluate by King Bhupendra MD for medical management of medical mngt, afib,hypothyroid,htn, hlp, and dm. No current issues except for pain    Subjective-  Doing okay  Has left proximal right humerus fracture and a left scapular fracture-     Scheduled Meds:   amLODIPine  10 mg Oral Daily    atorvastatin  20 mg Oral Nightly    insulin glargine  15 Units Subcutaneous Daily    sodium chloride flush  10 mL Intravenous 2 times per day    docusate sodium  100 mg Oral BID    famotidine  20 mg Oral BID    amiodarone  200 mg Oral Daily    aspirin  81 mg Oral Daily    folic acid  2 mg Oral BID    levothyroxine  150 mcg Oral Daily    losartan  25 mg Oral Daily    metoprolol tartrate  25 mg Oral BID    insulin lispro  0-6 Units Subcutaneous TID WC    insulin lispro  0-3 Units Subcutaneous Nightly     Continuous Infusions:   dextrose       PRN Meds:.hydrALAZINE, sodium chloride flush, acetaminophen, magnesium hydroxide, ondansetron, HYDROcodone 5 mg - acetaminophen **OR** HYDROcodone 5 mg - acetaminophen, morphine **OR** morphine, glucose, dextrose, glucagon (rDNA), dextrose    Diet:  DIET CARB CONTROL;    REVIEW OF SYSTEMS:   Pertinent positives as noted in the HPI. All other systems reviewed and negative. PHYSICAL EXAM:  BP (!) 164/71   Pulse 62   Temp 97.8 °F (36.6 °C) (Oral)   Resp 18   Ht 5' 6\" (1.676 m)   Wt 190 lb (86.2 kg)   SpO2 94%   BMI 30.67 kg/m²   General appearance:  No apparent distress, appears stated age and cooperative. HEENT:  Normal cephalic, atraumatic without obvious deformity. Pupils equal, round, and reactive to light. Extra ocular muscles intact.  Conjunctivae/corneas clear.  Neck: Supple, with full range of motion. no jugular venous distention. Trachea midline. no carotid bruits  Respiratory:  Normal respiratory effort. Clear to auscultation, bilaterally without Rales/Wheezes/Rhonchi. Breath sounds equal bilaterally  Cardiovascular:  Regular rate and rhythm with normal S1/S2 without murmurs, rubs or gallops. PMI non displaced  Abdomen: Soft, non-tender, non-distended with normal bowel sounds. No guarding, rebound. Musculoskeletal:  No clubbing, cyanosis or edema bilaterally. Full range of motion without deformity. Skin: Skin color, texture, turgor normal.  No rashes or lesions, or suspicious lesions. Neurologic:  Neurovascularly intact without any focal sensory/motor deficits. Cranial nerves: II-XII intact, grossly non-focal.  Psychiatric:  Alert and oriented, thought content appropriate, normal insight  Capillary Refill: Brisk,< 2 seconds   Peripheral Pulses: +2 palpable, equal bilaterally upper and lower extremities  Lymphatics: no lymphadenopathy    Labs:     Recent Labs      09/17/18   1352  09/17/18   2035  09/18/18   0602   WBC  9.0  7.4   --    HGB  13.1  12.4  13.4   HCT  38.6  36.6*  38.3   PLT  217  214   --      Recent Labs      09/17/18   1352  09/17/18   2035  09/18/18   0602   NA  131*  128*  127*   K  4.8  4.6  4.2   CL  90*  89*  89*   CO2  22*  24  22*   BUN  21  18  14   CREATININE  0.7  0.7  0.5   CALCIUM  10.7*  10.1  10.1     No results for input(s): AST, ALT, BILIDIR, BILITOT, ALKPHOS in the last 72 hours. No results for input(s): INR in the last 72 hours. No results for input(s): Mitcheal Buckle in the last 72 hours. Urinalysis:    Lab Results   Component Value Date    NITRU NEGATIVE 02/27/2012    SPECGRAV 1.017 02/27/2012       Radiology: I have reviewed the radiology reports with the following interpretation:     CT HEAD WO CONTRAST   Final Result   There is no acute fracture or hemorrhage.                 **This report has been created using voice recognition software. It may contain minor errors which are inherent in voice recognition technology. **      Final report electronically signed by Dr. Rojas Barry on 9/17/2018 2:49 PM      CT CHEST WO CONTRAST   Final Result       Bilateral infiltrates as above. Fractures involve the right humerus and left scapula. **This report has been created using voice recognition software. It may contain minor errors which are inherent in voice recognition technology. **      Final report electronically signed by Dr. Rojas Barry on 9/17/2018 2:43 PM      XR HUMERUS LEFT (MIN 2 VIEWS)   Final Result   1. Normal left humerus. 2. Suggestion of a scapular fracture. Routine scapula x-rays recommended for further evaluation. **This report has been created using voice recognition software. It may contain minor errors which are inherent in voice recognition technology. **      Final report electronically signed by Dr. Brian Mejia on 9/17/2018 1:30 PM      XR CHEST 1 VW   Final Result   1. Normal heart size. No acute infiltrates or effusions are seen. 2. Several old rib fractures right side. 3. Acute mildly comminuted fracture proximal right humerus. There appears be a left scapular fracture. .      Final report electronically signed by Dr. Brian Mejia on 9/17/2018 1:28 PM      XR SHOULDER RIGHT (MIN 2 VIEWS)   Final Result   Mildly comminuted moderately displaced, mildly angulated proximal humeral shaft fracture with suspicion of extension through the greater tuberosity of the humeral head. .         **This report has been created using voice recognition software. It may contain minor errors which are inherent in voice recognition technology. **      Final report electronically signed by Dr. Brian Mejia on 9/17/2018 1:27 PM      XR HUMERUS RIGHT (MIN 2 VIEWS)   Final Result   Mildly comminuted moderately displaced, mildly angulated proximal humeral shaft fracture with suspicion of hydrochlorothiazide and IV fluid          Thank you for the consultation.     Electronically signed by Heydi Brooks MD on 9/18/2018 at 3:29 PM

## 2018-09-18 NOTE — CONSULTS
quite a bit of pain associated with this  right shoulder. She maintains active range of motion of both right and  left upper extremity digits. She can flex into a full fist and extension  to palm. Shakes _____ all of her fingers. Sensation is well maintained in  bilateral upper extremities distally. Distal pulses are palpable. Compartments are soft. She is neurovascularly intact. ASSESSMENT:  Right comminuted displaced proximal humerus fracture as well  as left scapular fracture. PLAN:  At this point, scapular fracture will be treated conservatively. Rest, ice, pain medications as needed. As far as this right proximal  humerus fracture, it is quite comminuted and displaced. At this point, she  is on anticoagulant. We are going to hold on anticoagulants. I am going  to keep her n.p.o. after midnight and then, she is going to be evaluated by  Dr. Jack Rodriguez tomorrow for possible surgical intervention and to discuss  further treatment options. MAN Johnson     D: 09/17/2018 18:22:43       T: 09/18/2018 0:41:40     [email or web address]  Job#: [id number]     Doc#: [id number]    CC:

## 2018-09-18 NOTE — PLAN OF CARE
Problem: Pain:  Goal: Pain level will decrease  Pain level will decrease   Outcome: Ongoing  Patient complains of pain in the bilateral arms and shoulders with a rating of 4-5 on 0-10 scale. Pain is worsened by movement up to 10/10. Norco and Morphine given as ordered to help achieve patient's stated pain goal of 3. Problem: Neurological  Goal: Maximum potential motor/sensory/cognitive function  Outcome: Ongoing  Patient is alert and oriented x 4. Complains of chronic numbness and tingling in the lower extremities. Problem: Cardiovascular  Goal: No DVT, peripheral vascular complications  Outcome: Ongoing  Patient has had no s/sx of DVT during this shift. SCD and Eliquis used for prevention. Problem: Respiratory  Goal: O2 Sat > 90%  Outcome: Ongoing  Lung sounds clear throughout. O2 sats remain greater than 90% on room air. Denies any SOB. Patient encouraged to cough and deep breath. Problem: GI  Goal: No bowel complications  Outcome: Ongoing  Bowel sounds active x 4. Patient states that she has not passed gas recently. Colace given as ordered. No BM during this shift. Problem:   Goal: Adequate urinary output  Outcome: Ongoing  Patient has been voiding adequate amounts of clear, yellow urine during this shift. Problem: Nutrition  Goal: Optimal nutrition therapy  Outcome: Ongoing  Patient is on a carb control diet. Denies any nausea or vomiting during this shift. Tolerates PO fluids well. Problem: Skin Integrity/Risk  Goal: No skin breakdown during hospitalization  Outcome: Ongoing  No new skin issues noted during this shift. Buttocks is red, but blanchable. Patient has been encouraged to turn and reposition. Problem: Musculor/Skeletal Functional Status  Goal: Highest potential functional level  Outcome: Ongoing  Patient is on bedrest at this time. Goal: Absence of falls  Outcome: Ongoing  Patient has remained free of falls during this shift. Fall prevention measures in place.

## 2018-09-19 ENCOUNTER — APPOINTMENT (OUTPATIENT)
Dept: GENERAL RADIOLOGY | Age: 73
DRG: 493 | End: 2018-09-19
Payer: MEDICARE

## 2018-09-19 ENCOUNTER — ANESTHESIA EVENT (OUTPATIENT)
Dept: OPERATING ROOM | Age: 73
DRG: 493 | End: 2018-09-19
Payer: MEDICARE

## 2018-09-19 ENCOUNTER — ANESTHESIA (OUTPATIENT)
Dept: OPERATING ROOM | Age: 73
DRG: 493 | End: 2018-09-19
Payer: MEDICARE

## 2018-09-19 VITALS
SYSTOLIC BLOOD PRESSURE: 127 MMHG | DIASTOLIC BLOOD PRESSURE: 59 MMHG | RESPIRATION RATE: 23 BRPM | OXYGEN SATURATION: 100 %

## 2018-09-19 LAB
GLUCOSE BLD-MCNC: 204 MG/DL (ref 70–108)
GLUCOSE BLD-MCNC: 207 MG/DL (ref 70–108)
GLUCOSE BLD-MCNC: 217 MG/DL (ref 70–108)
GLUCOSE BLD-MCNC: 217 MG/DL (ref 70–108)
GLUCOSE BLD-MCNC: 256 MG/DL (ref 70–108)
INR BLD: 1.13 (ref 0.85–1.13)

## 2018-09-19 PROCEDURE — 0PSF04Z REPOSITION RIGHT HUMERAL SHAFT WITH INTERNAL FIXATION DEVICE, OPEN APPROACH: ICD-10-PCS | Performed by: ORTHOPAEDIC SURGERY

## 2018-09-19 PROCEDURE — 82948 REAGENT STRIP/BLOOD GLUCOSE: CPT

## 2018-09-19 PROCEDURE — 7100000001 HC PACU RECOVERY - ADDTL 15 MIN: Performed by: ORTHOPAEDIC SURGERY

## 2018-09-19 PROCEDURE — 94760 N-INVAS EAR/PLS OXIMETRY 1: CPT

## 2018-09-19 PROCEDURE — 3700000000 HC ANESTHESIA ATTENDED CARE: Performed by: ORTHOPAEDIC SURGERY

## 2018-09-19 PROCEDURE — 6360000002 HC RX W HCPCS: Performed by: SURGERY

## 2018-09-19 PROCEDURE — 3600000004 HC SURGERY LEVEL 4 BASE: Performed by: ORTHOPAEDIC SURGERY

## 2018-09-19 PROCEDURE — 3700000001 HC ADD 15 MINUTES (ANESTHESIA): Performed by: ORTHOPAEDIC SURGERY

## 2018-09-19 PROCEDURE — 3600000014 HC SURGERY LEVEL 4 ADDTL 15MIN: Performed by: ORTHOPAEDIC SURGERY

## 2018-09-19 PROCEDURE — 2709999900 HC NON-CHARGEABLE SUPPLY: Performed by: ORTHOPAEDIC SURGERY

## 2018-09-19 PROCEDURE — 6370000000 HC RX 637 (ALT 250 FOR IP): Performed by: INTERNAL MEDICINE

## 2018-09-19 PROCEDURE — 6360000002 HC RX W HCPCS: Performed by: PHYSICIAN ASSISTANT

## 2018-09-19 PROCEDURE — 6370000000 HC RX 637 (ALT 250 FOR IP): Performed by: SURGERY

## 2018-09-19 PROCEDURE — A4565 SLINGS: HCPCS | Performed by: ORTHOPAEDIC SURGERY

## 2018-09-19 PROCEDURE — C1713 ANCHOR/SCREW BN/BN,TIS/BN: HCPCS | Performed by: ORTHOPAEDIC SURGERY

## 2018-09-19 PROCEDURE — 1200000003 HC TELEMETRY R&B

## 2018-09-19 PROCEDURE — 2580000003 HC RX 258: Performed by: PHYSICIAN ASSISTANT

## 2018-09-19 PROCEDURE — APPSS45 APP SPLIT SHARED TIME 31-45 MINUTES: Performed by: PHYSICIAN ASSISTANT

## 2018-09-19 PROCEDURE — 7100000000 HC PACU RECOVERY - FIRST 15 MIN: Performed by: ORTHOPAEDIC SURGERY

## 2018-09-19 PROCEDURE — 64415 NJX AA&/STRD BRCH PLXS IMG: CPT | Performed by: ANESTHESIOLOGY

## 2018-09-19 PROCEDURE — 1200000000 HC SEMI PRIVATE

## 2018-09-19 PROCEDURE — 2700000000 HC OXYGEN THERAPY PER DAY

## 2018-09-19 PROCEDURE — 2580000003 HC RX 258: Performed by: ANESTHESIOLOGY

## 2018-09-19 PROCEDURE — 2500000003 HC RX 250 WO HCPCS: Performed by: ANESTHESIOLOGY

## 2018-09-19 PROCEDURE — 3E0T3BZ INTRODUCTION OF ANESTHETIC AGENT INTO PERIPHERAL NERVES AND PLEXI, PERCUTANEOUS APPROACH: ICD-10-PCS | Performed by: ANESTHESIOLOGY

## 2018-09-19 PROCEDURE — 85610 PROTHROMBIN TIME: CPT

## 2018-09-19 PROCEDURE — 6360000002 HC RX W HCPCS: Performed by: ANESTHESIOLOGY

## 2018-09-19 PROCEDURE — 73010 X-RAY EXAM OF SHOULDER BLADE: CPT

## 2018-09-19 PROCEDURE — 6370000000 HC RX 637 (ALT 250 FOR IP): Performed by: PHYSICIAN ASSISTANT

## 2018-09-19 PROCEDURE — 73060 X-RAY EXAM OF HUMERUS: CPT

## 2018-09-19 PROCEDURE — 99232 SBSQ HOSP IP/OBS MODERATE 35: CPT | Performed by: SURGERY

## 2018-09-19 PROCEDURE — 36415 COLL VENOUS BLD VENIPUNCTURE: CPT

## 2018-09-19 PROCEDURE — 3209999900 FLUORO FOR SURGICAL PROCEDURES

## 2018-09-19 DEVICE — PERI-LOC 3.5MM T20 LOCKING SCREW                                    36MM SELF-TAPPING
Type: IMPLANTABLE DEVICE | Site: SHOULDER | Status: FUNCTIONAL
Brand: PERI-LOC

## 2018-09-19 DEVICE — PERI-LOC 3.5MM PROXIMAL HUMERUS                                    LOCKING PLATE 3 HOLE RIGHT 89MM
Type: IMPLANTABLE DEVICE | Site: SHOULDER | Status: FUNCTIONAL
Brand: PERI-LOC

## 2018-09-19 DEVICE — PERI-LOC 3.5MM T20 LOCKING SCREW                                    40MM SELF-TAPPING
Type: IMPLANTABLE DEVICE | Site: SHOULDER | Status: FUNCTIONAL
Brand: PERI-LOC

## 2018-09-19 DEVICE — PERI-LOC 3.5MM T20 LOCKING SCREW                                    34MM SELF-TAPPING
Type: IMPLANTABLE DEVICE | Site: SHOULDER | Status: FUNCTIONAL
Brand: PERI-LOC

## 2018-09-19 DEVICE — PERI-LOC 3.5MM T20 CORTEX SCREW                                    24MM SELF-TAPPING
Type: IMPLANTABLE DEVICE | Site: SHOULDER | Status: FUNCTIONAL
Brand: PERI-LOC

## 2018-09-19 DEVICE — PERI-LOC K-WIRE 2.0MM X 150MM                                    LENGTH TROCAR POINT
Type: IMPLANTABLE DEVICE | Site: SHOULDER | Status: FUNCTIONAL
Brand: PERI-LOC

## 2018-09-19 DEVICE — PERI-LOC 3.5MM T20 LOCKING SCREW                                    32MM SELF-TAPPING
Type: IMPLANTABLE DEVICE | Site: SHOULDER | Status: FUNCTIONAL
Brand: PERI-LOC

## 2018-09-19 DEVICE — PERI-LOC 3.5MM T20 LOCKING SCREW                                    22MM SELF-TAPPING
Type: IMPLANTABLE DEVICE | Site: SHOULDER | Status: FUNCTIONAL
Brand: PERI-LOC

## 2018-09-19 DEVICE — PERI-LOC 3.5MM T20 LOCKING SCREW                                    30MM SELF-TAPPING
Type: IMPLANTABLE DEVICE | Site: SHOULDER | Status: FUNCTIONAL
Brand: PERI-LOC

## 2018-09-19 RX ORDER — LABETALOL HYDROCHLORIDE 5 MG/ML
5 INJECTION, SOLUTION INTRAVENOUS EVERY 5 MIN PRN
Status: DISCONTINUED | OUTPATIENT
Start: 2018-09-19 | End: 2018-09-19 | Stop reason: HOSPADM

## 2018-09-19 RX ORDER — PROPOFOL 10 MG/ML
INJECTION, EMULSION INTRAVENOUS PRN
Status: DISCONTINUED | OUTPATIENT
Start: 2018-09-19 | End: 2018-09-19 | Stop reason: SDUPTHER

## 2018-09-19 RX ORDER — HYDRALAZINE HYDROCHLORIDE 20 MG/ML
5 INJECTION INTRAMUSCULAR; INTRAVENOUS EVERY 10 MIN PRN
Status: DISCONTINUED | OUTPATIENT
Start: 2018-09-19 | End: 2018-09-19 | Stop reason: HOSPADM

## 2018-09-19 RX ORDER — SODIUM CHLORIDE 0.9 % (FLUSH) 0.9 %
10 SYRINGE (ML) INJECTION EVERY 12 HOURS SCHEDULED
Status: DISCONTINUED | OUTPATIENT
Start: 2018-09-19 | End: 2018-09-21 | Stop reason: HOSPADM

## 2018-09-19 RX ORDER — DIPHENHYDRAMINE HYDROCHLORIDE 50 MG/ML
12.5 INJECTION INTRAMUSCULAR; INTRAVENOUS
Status: DISCONTINUED | OUTPATIENT
Start: 2018-09-19 | End: 2018-09-19 | Stop reason: HOSPADM

## 2018-09-19 RX ORDER — NEOSTIGMINE METHYLSULFATE 5 MG/5 ML
SYRINGE (ML) INTRAVENOUS PRN
Status: DISCONTINUED | OUTPATIENT
Start: 2018-09-19 | End: 2018-09-19 | Stop reason: SDUPTHER

## 2018-09-19 RX ORDER — CEFAZOLIN SODIUM 1 G/3ML
INJECTION, POWDER, FOR SOLUTION INTRAMUSCULAR; INTRAVENOUS PRN
Status: DISCONTINUED | OUTPATIENT
Start: 2018-09-19 | End: 2018-09-19 | Stop reason: SDUPTHER

## 2018-09-19 RX ORDER — SODIUM CHLORIDE 9 MG/ML
INJECTION, SOLUTION INTRAVENOUS CONTINUOUS PRN
Status: DISCONTINUED | OUTPATIENT
Start: 2018-09-19 | End: 2018-09-19 | Stop reason: SDUPTHER

## 2018-09-19 RX ORDER — MORPHINE SULFATE 2 MG/ML
2 INJECTION, SOLUTION INTRAMUSCULAR; INTRAVENOUS EVERY 5 MIN PRN
Status: DISCONTINUED | OUTPATIENT
Start: 2018-09-19 | End: 2018-09-19 | Stop reason: HOSPADM

## 2018-09-19 RX ORDER — MEPERIDINE HYDROCHLORIDE 25 MG/ML
12.5 INJECTION INTRAMUSCULAR; INTRAVENOUS; SUBCUTANEOUS EVERY 5 MIN PRN
Status: DISCONTINUED | OUTPATIENT
Start: 2018-09-19 | End: 2018-09-19 | Stop reason: HOSPADM

## 2018-09-19 RX ORDER — MIDAZOLAM HYDROCHLORIDE 1 MG/ML
INJECTION INTRAMUSCULAR; INTRAVENOUS PRN
Status: DISCONTINUED | OUTPATIENT
Start: 2018-09-19 | End: 2018-09-19 | Stop reason: SDUPTHER

## 2018-09-19 RX ORDER — FENTANYL CITRATE 50 UG/ML
50 INJECTION, SOLUTION INTRAMUSCULAR; INTRAVENOUS EVERY 5 MIN PRN
Status: DISCONTINUED | OUTPATIENT
Start: 2018-09-19 | End: 2018-09-19 | Stop reason: HOSPADM

## 2018-09-19 RX ORDER — HYDRALAZINE HYDROCHLORIDE 25 MG/1
25 TABLET, FILM COATED ORAL EVERY 8 HOURS SCHEDULED
Status: DISCONTINUED | OUTPATIENT
Start: 2018-09-19 | End: 2018-09-21 | Stop reason: HOSPADM

## 2018-09-19 RX ORDER — FENTANYL CITRATE 50 UG/ML
INJECTION, SOLUTION INTRAMUSCULAR; INTRAVENOUS PRN
Status: DISCONTINUED | OUTPATIENT
Start: 2018-09-19 | End: 2018-09-19 | Stop reason: SDUPTHER

## 2018-09-19 RX ORDER — SODIUM CHLORIDE 0.9 % (FLUSH) 0.9 %
10 SYRINGE (ML) INJECTION PRN
Status: DISCONTINUED | OUTPATIENT
Start: 2018-09-19 | End: 2018-09-21 | Stop reason: HOSPADM

## 2018-09-19 RX ORDER — ROCURONIUM BROMIDE 10 MG/ML
INJECTION, SOLUTION INTRAVENOUS PRN
Status: DISCONTINUED | OUTPATIENT
Start: 2018-09-19 | End: 2018-09-19 | Stop reason: SDUPTHER

## 2018-09-19 RX ORDER — GLYCOPYRROLATE 1 MG/5 ML
SYRINGE (ML) INTRAVENOUS PRN
Status: DISCONTINUED | OUTPATIENT
Start: 2018-09-19 | End: 2018-09-19 | Stop reason: SDUPTHER

## 2018-09-19 RX ORDER — ONDANSETRON 2 MG/ML
4 INJECTION INTRAMUSCULAR; INTRAVENOUS
Status: DISCONTINUED | OUTPATIENT
Start: 2018-09-19 | End: 2018-09-19 | Stop reason: HOSPADM

## 2018-09-19 RX ADMIN — PHENYLEPHRINE HYDROCHLORIDE 100 MCG: 10 INJECTION INTRAVENOUS at 13:05

## 2018-09-19 RX ADMIN — PHENYLEPHRINE HYDROCHLORIDE 100 MCG: 10 INJECTION INTRAVENOUS at 13:21

## 2018-09-19 RX ADMIN — ATORVASTATIN CALCIUM 20 MG: 20 TABLET, FILM COATED ORAL at 20:39

## 2018-09-19 RX ADMIN — HYDROCODONE BITARTRATE AND ACETAMINOPHEN 2 TABLET: 5; 325 TABLET ORAL at 20:35

## 2018-09-19 RX ADMIN — PHENYLEPHRINE HYDROCHLORIDE 100 MCG: 10 INJECTION INTRAVENOUS at 12:49

## 2018-09-19 RX ADMIN — PHENYLEPHRINE HYDROCHLORIDE 100 MCG: 10 INJECTION INTRAVENOUS at 12:55

## 2018-09-19 RX ADMIN — SODIUM CHLORIDE: 9 INJECTION, SOLUTION INTRAVENOUS at 12:04

## 2018-09-19 RX ADMIN — HYDROCODONE BITARTRATE AND ACETAMINOPHEN 2 TABLET: 5; 325 TABLET ORAL at 00:32

## 2018-09-19 RX ADMIN — INSULIN LISPRO 2 UNITS: 100 INJECTION, SOLUTION INTRAVENOUS; SUBCUTANEOUS at 18:27

## 2018-09-19 RX ADMIN — PHENYLEPHRINE HYDROCHLORIDE 100 MCG: 10 INJECTION INTRAVENOUS at 12:35

## 2018-09-19 RX ADMIN — MIDAZOLAM HYDROCHLORIDE 2 MG: 1 INJECTION, SOLUTION INTRAMUSCULAR; INTRAVENOUS at 12:05

## 2018-09-19 RX ADMIN — ROCURONIUM BROMIDE 50 MG: 10 INJECTION INTRAVENOUS at 12:10

## 2018-09-19 RX ADMIN — Medication 0.4 MG: at 14:00

## 2018-09-19 RX ADMIN — AMLODIPINE BESYLATE 10 MG: 10 TABLET ORAL at 08:01

## 2018-09-19 RX ADMIN — CEFAZOLIN SODIUM 2 G: 10 INJECTION, POWDER, FOR SOLUTION INTRAVENOUS at 20:53

## 2018-09-19 RX ADMIN — FOLIC ACID 2 MG: 1 TABLET ORAL at 20:39

## 2018-09-19 RX ADMIN — MORPHINE SULFATE 2 MG: 2 INJECTION, SOLUTION INTRAMUSCULAR; INTRAVENOUS at 04:30

## 2018-09-19 RX ADMIN — PHENYLEPHRINE HYDROCHLORIDE 100 MCG: 10 INJECTION INTRAVENOUS at 13:25

## 2018-09-19 RX ADMIN — FAMOTIDINE 20 MG: 20 TABLET ORAL at 20:39

## 2018-09-19 RX ADMIN — FENTANYL CITRATE 100 MCG: 50 INJECTION INTRAMUSCULAR; INTRAVENOUS at 12:05

## 2018-09-19 RX ADMIN — MORPHINE SULFATE 2 MG: 2 INJECTION, SOLUTION INTRAMUSCULAR; INTRAVENOUS at 10:05

## 2018-09-19 RX ADMIN — METOPROLOL TARTRATE 25 MG: 25 TABLET ORAL at 08:01

## 2018-09-19 RX ADMIN — Medication 2 UNITS: at 20:46

## 2018-09-19 RX ADMIN — PROPOFOL 100 MG: 10 INJECTION, EMULSION INTRAVENOUS at 12:10

## 2018-09-19 RX ADMIN — PHENYLEPHRINE HYDROCHLORIDE 100 MCG: 10 INJECTION INTRAVENOUS at 12:47

## 2018-09-19 RX ADMIN — METOPROLOL TARTRATE 25 MG: 25 TABLET ORAL at 20:39

## 2018-09-19 RX ADMIN — LOSARTAN POTASSIUM 25 MG: 25 TABLET, FILM COATED ORAL at 08:01

## 2018-09-19 RX ADMIN — PHENYLEPHRINE HYDROCHLORIDE 100 MCG: 10 INJECTION INTRAVENOUS at 13:00

## 2018-09-19 RX ADMIN — PHENYLEPHRINE HYDROCHLORIDE 100 MCG: 10 INJECTION INTRAVENOUS at 13:17

## 2018-09-19 RX ADMIN — CEFAZOLIN 2000 MG: 1 INJECTION, POWDER, FOR SOLUTION INTRAMUSCULAR; INTRAVENOUS; PARENTERAL at 12:31

## 2018-09-19 RX ADMIN — PHENYLEPHRINE HYDROCHLORIDE 100 MCG: 10 INJECTION INTRAVENOUS at 13:10

## 2018-09-19 RX ADMIN — AMIODARONE HYDROCHLORIDE 200 MG: 200 TABLET ORAL at 08:01

## 2018-09-19 RX ADMIN — DOCUSATE SODIUM 100 MG: 100 CAPSULE, LIQUID FILLED ORAL at 20:39

## 2018-09-19 RX ADMIN — Medication 3 MG: at 14:00

## 2018-09-19 ASSESSMENT — PULMONARY FUNCTION TESTS
PIF_VALUE: 18
PIF_VALUE: 26
PIF_VALUE: 18
PIF_VALUE: 18
PIF_VALUE: 27
PIF_VALUE: 19
PIF_VALUE: 18
PIF_VALUE: 19
PIF_VALUE: 21
PIF_VALUE: 18
PIF_VALUE: 19
PIF_VALUE: 18
PIF_VALUE: 0
PIF_VALUE: 18
PIF_VALUE: 7
PIF_VALUE: 18
PIF_VALUE: 12
PIF_VALUE: 18
PIF_VALUE: 19
PIF_VALUE: 18
PIF_VALUE: 19
PIF_VALUE: 12
PIF_VALUE: 18
PIF_VALUE: 21
PIF_VALUE: 18
PIF_VALUE: 0
PIF_VALUE: 19
PIF_VALUE: 18
PIF_VALUE: 19
PIF_VALUE: 18
PIF_VALUE: 27
PIF_VALUE: 18
PIF_VALUE: 18
PIF_VALUE: 1
PIF_VALUE: 18
PIF_VALUE: 19
PIF_VALUE: 18
PIF_VALUE: 1
PIF_VALUE: 19
PIF_VALUE: 18
PIF_VALUE: 0
PIF_VALUE: 18
PIF_VALUE: 6
PIF_VALUE: 18
PIF_VALUE: 0
PIF_VALUE: 19
PIF_VALUE: 5
PIF_VALUE: 18
PIF_VALUE: 4
PIF_VALUE: 18
PIF_VALUE: 0
PIF_VALUE: 18
PIF_VALUE: 2
PIF_VALUE: 5
PIF_VALUE: 18
PIF_VALUE: 18
PIF_VALUE: 19
PIF_VALUE: 18
PIF_VALUE: 19
PIF_VALUE: 18
PIF_VALUE: 21
PIF_VALUE: 18
PIF_VALUE: 18

## 2018-09-19 ASSESSMENT — PAIN DESCRIPTION - DESCRIPTORS
DESCRIPTORS: ACHING;DISCOMFORT
DESCRIPTORS: ACHING
DESCRIPTORS: ACHING

## 2018-09-19 ASSESSMENT — PAIN DESCRIPTION - PROGRESSION: CLINICAL_PROGRESSION: NOT CHANGED

## 2018-09-19 ASSESSMENT — PAIN DESCRIPTION - PAIN TYPE
TYPE: ACUTE PAIN;SURGICAL PAIN
TYPE: ACUTE PAIN
TYPE: ACUTE PAIN

## 2018-09-19 ASSESSMENT — PAIN DESCRIPTION - LOCATION
LOCATION: ARM

## 2018-09-19 ASSESSMENT — PAIN SCALES - GENERAL
PAINLEVEL_OUTOF10: 7
PAINLEVEL_OUTOF10: 4
PAINLEVEL_OUTOF10: 5
PAINLEVEL_OUTOF10: 3

## 2018-09-19 ASSESSMENT — PAIN DESCRIPTION - FREQUENCY
FREQUENCY: CONTINUOUS
FREQUENCY: CONTINUOUS
FREQUENCY: INTERMITTENT

## 2018-09-19 ASSESSMENT — PAIN DESCRIPTION - ORIENTATION
ORIENTATION: LEFT;RIGHT
ORIENTATION: RIGHT;LEFT
ORIENTATION: RIGHT;LEFT

## 2018-09-19 ASSESSMENT — PAIN DESCRIPTION - ONSET: ONSET: ON-GOING

## 2018-09-19 NOTE — ANESTHESIA PRE PROCEDURE
Take 5 mLs by mouth daily. Yes Historical Provider, MD   Omega-3 Fatty Acids (FISH OIL PO) Take  by mouth.     Historical Provider, MD       Current medications:    Current Facility-Administered Medications   Medication Dose Route Frequency Provider Last Rate Last Dose    ceFAZolin (ANCEF) 1 g in dextrose 5 % 50 mL IVPB (mini-bag)  1 g Intravenous Once Romayne Lopez, APRN - CNP        hydrALAZINE (APRESOLINE) tablet 25 mg  25 mg Oral 3 times per day [address] MD        amLODIPine (NORVASC) tablet 10 mg  10 mg Oral Daily Carmen 40[address] Germaine Hoyos MD   10 mg at 09/19/18 0801    hydrALAZINE (APRESOLINE) injection 5 mg  5 mg Intravenous Q4H PRN Carmen Ervin MD   5 mg at 09/18/18 1307    atorvastatin (LIPITOR) tablet 20 mg  20 mg Oral Nightly Carmen [address] Germaine Hoyos MD   20 mg at 09/18/18 2001    insulin glargine (LANTUS) injection vial 15 Units  15 Units Subcutaneous Daily Carmen Ervin MD        sodium chloride flush 0.9 % injection 10 mL  10 mL Intravenous 2 times per day Hodan Anguiano PA-C   10 mL at 09/18/18 2004    sodium chloride flush 0.9 % injection 10 mL  10 mL Intravenous PRN Hodan Anguiano PA-C        acetaminophen (TYLENOL) tablet 650 mg  650 mg Oral Q4H PRN Hodan Anguiano PA-C        magnesium hydroxide (MILK OF MAGNESIA) 400 MG/5ML suspension 30 mL  30 mL Oral Daily PRN Hodan Anguiano PA-C        ondansetron (ZOFRAN) injection 4 mg  4 mg Intravenous Q6H PRN Hodan Anguiano PA-C        docusate sodium (COLACE) capsule 100 mg  100 mg Oral BID BERT Allen-C   100 mg at 09/18/18 2001    famotidine (PEPCID) tablet 20 mg  20 mg Oral BID Santy BERT Lynch-C   20 mg at 09/18/18 2001    HYDROcodone-acetaminophen (NORCO) 5-325 MG per tablet 1 tablet  1 tablet Oral Q4H PRN Trevor Siddiqi MD   1 tablet at 09/18/18 1029    Or    HYDROcodone-acetaminophen right humerus S42.251A    Closed fracture of body of left scapula S42.112A    Anticoagulant long-term use Z79.01    Fall on same level from slipping, tripping or stumbling W01. 0XXA    Closed nondisplaced oblique fracture of shaft of right humerus S42.334A    Hyponatremia E87.1       Past Medical History:        Diagnosis Date    [facility]     Cancer ([facility]     Diabetes mellitus ([facility]     [place] filter in place     Hyperlipidemia     Stroke (cerebrum) ([facility]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    Thyroid disease        Past Surgical History:        Procedure Laterality Date    APPENDECTOMY      CHOLECYSTECTOMY      FRACTURE SURGERY      ankle surgery    HERNIA REPAIR      HYSTERECTOMY      TUBAL LIGATION         Social History:    Social History   Substance Use Topics    Smoking status: Never Smoker    Smokeless tobacco: Never Used    Alcohol use No                                Counseling given: Not Answered      Vital Signs (Current):   Vitals:    09/18/18 1955 09/18/18 2335 09/19/18 0415 09/19/18 0754   BP: (!) 193/78 (!) 183/94 (!) 143/68 (!) 180/75   Pulse: 73 95 97 66   Resp: 16 16 16 16   Temp: 98.3 °F (36.8 °C)   98.2 °F (36.8 °C)   TempSrc: Oral   Oral   SpO2: 93% 93% 95% 95%   Weight:       Height:                                                  BP Readings from Last 3 Encounters:   09/19/18 (!) 180/75   05/17/18 110/74   11/24/17 (!) 158/67       NPO Status:                                                                                 BMI:   Wt Readings from Last 3 Encounters:   09/17/18 190 lb (86.2 kg)   05/17/18 194 lb (88 kg)   11/24/17 190 lb (86.2 kg)     Body mass index is 30.67 kg/m².     CBC:   Lab Results   Component Value Date    WBC 7.4 09/17/2018    RBC 3.68 09/17/2018    RBC 3.94 02/27/2012    HGB 13.4 09/18/2018    HCT 38.3 09/18/2018    MCV 99.5 09/17/2018    RDW 14.0 11/24/2017     09/17/2018       CMP:   Lab Results   Component Value Date    NA

## 2018-09-19 NOTE — ANESTHESIA POSTPROCEDURE EVALUATION
Department of Anesthesiology  Postprocedure Note    Patient: Aurelia Mathews  MRN: [id number]  YOB: 1945  Date of evaluation: 9/19/2018  Time:  3:46 PM     Procedure Summary     Date:  09/19/18 Room / Location:  [address] [place]    Anesthesia Start:  1204 Anesthesia Stop:  2636    Procedure:  ORIF RIGHT PROXIMAL HUMERUS FX (Right ) Diagnosis:  (CLOSED DISPLACED FX GREATER TUBEROSITY RIGHT HUMERUS)    Surgeon:  Molly Monroy MD Responsible Provider:  Natalie Ramirez MD    Anesthesia Type:  general ASA Status:  4          Anesthesia Type: general    Ochoa Phase I: [place] Score: 9    Ochoa Phase II:      Last vitals: Reviewed and per EMR flowsheets.        Anesthesia Post Evaluation    Patient location during evaluation: PACU  Patient participation: complete - patient participated  Level of consciousness: awake  Airway patency: patent  Nausea & Vomiting: no vomiting and no nausea  Complications: no  Cardiovascular status: hemodynamically stable  Respiratory status: acceptable  Hydration status: stable  Comments: Good pain relief from block

## 2018-09-19 NOTE — OP NOTE
the operating room and placed supine on the operating table. Anesthesia was administered and care of the head, neck, and airway was maintained by the anesthesia staff throughout the entire procedure. The patient was place in the beachchair position. Xray was brought in and over the patient. The arm was prepped and draped in the usual sterile orthopaedic fashion. A surgical timeout was performed. Antibiotics were confirmed to have been given. A deltopectoral approach to the humerus  was utilized. Once the deltopectoral interval was defined, care was taken to protect the cephalic vein throughout the procedure. A fukuda retractor was used to retract the humeral head after the subdeltoid plane was developed. Pull stitches with 2-0 fiberwire were placed in the subscap, supraspinatous, and infraspinatous tendons. The fracture site was exposed, cleaned, and irrigated. The fracture was reduced and held provisionally. A pre-contoured Smith [employer] Nephew small fragment plate was slid beneath the deltoid and, once positioned with the proximal tip 1cm below the greater tuberosity, was fixed with cortical screws placed in the sliding hole. The proximal portion was provisionally pinned. Fluoroscopic imaging confirmed an excellent overall reduction and appropriate placement of all hardware. The remaining proximal holes were filled with 3.5mm locking screws inserted under the subchondral surface. The distal shaft holes were also filled with locking screws. Live fluroscopy was used to confirm that the hole construct was moving as a unit and that no screw penetrated the subchondral bone. The fiberwire stitches were tied to the plate. Wounds were copiously irrigated with sterile saline and closed in a layered fashion. A sterile dressing was applied.   Once the patient was awakened from anesthesia, they were transported to the PACU in stable condition, having tolerated surgery well with no immediate

## 2018-09-19 NOTE — FLOWSHEET NOTE
[facility] [place]  PHYSICAL THERAPY MISSED TREATMENT NOTE  ACUTE CARE  [facility] ORTHOPEDICS 7K              Missed Treatment    Pt having sx to humerus for ORIF at 1430 today.  See pt tomorrow.

## 2018-09-19 NOTE — PLAN OF CARE
Problem: Pain:  Goal: Pain level will decrease  Pain level will decrease   Outcome: Ongoing  Patient complains of pain in the bilateral arms and shoulders with a rating of 5-6 on 0-10 scale. Norco and morphine given as ordered to help achieve patient's stated pain goal of no pain. Problem: Neurological  Goal: Maximum potential motor/sensory/cognitive function  Outcome: Ongoing  Patient is alert and oriented x 4. Complains of occasional tingling in the right hand that resolves with repositioning. Problem: Cardiovascular  Goal: No DVT, peripheral vascular complications  Outcome: Ongoing  Patient has had no s/sx of DVT during this shift. SCD used for prevention. Patient takes eliquis, but is on hold for possible surgery. Problem: Respiratory  Goal: O2 Sat > 90%  Outcome: Ongoing  Lung sounds clear throughout. Denies any SOB. O2 sats remain greater than 90% on room air. Patient encouraged to cough and deep breath. Problem: GI  Goal: No bowel complications  Outcome: Ongoing  Bowel sounds active x 4. Patient states that she is passing gas. Colace given as ordered. No BM during this shift. Problem:   Goal: Adequate urinary output  Outcome: Ongoing  Patient has been voiding adequate amounts of clear, yellow urine. Problem: Nutrition  Goal: Optimal nutrition therapy  Outcome: Ongoing  Patient is on a carb control diet. Denies any nausea or vomiting during this shift. Tolerates PO fluids. NPO at midnight. Problem: Skin Integrity/Risk  Goal: No skin breakdown during hospitalization  Outcome: Ongoing  No new skin issues noted during this shift. Redness noted on the face, back of neck, and buttocks. Patient has been encouraged to reposition frequently during this shift.      Problem: Musculor/Skeletal Functional Status  Goal: Highest potential functional level  Outcome: Ongoing  Patient is currently on bedrest.   Goal: Absence of falls  Outcome: Ongoing  Patient has remained free of falls during this shift. Fall prevention measures in place. Patient compliant with using call light for assistance when needed. Problem: Daily Care:  Goal: Daily care needs are met  Daily care needs are met   Outcome: Ongoing  Patient requires moderate assistance with daily care needs due to increased pain in the arms. Problem: Discharge Planning:  Goal: Discharged to appropriate level of care  Discharged to appropriate level of care   Outcome: Ongoing  Patient is planning to return home with her  at discharge and possibly [place]. Case management assisting with discharge needs. Comments: Care plan reviewed with patient. Patient verbalize understanding of the plan of care and contribute to goal setting.

## 2018-09-19 NOTE — CONSULTS
Consults         Ortho H&P/Consult  Patient:  Heather Saenz  YOB: 1945  MRN: [id number]     Acct: [de-identified]    PCP: Jhonathan Bowser DO  Date of Admission: 9/17/2018  Date of Service: Pt seen/examined on 9/19/2018     Chief Complaint: bilateral shoulder pain  History Of Present Illness: 72 y.o. female who presents with right proximal humerus fracture and left scapula fracture after a mechanical fall. Noted pain to left posterior shoulder and right latearl shoulder. Pain is described as aching and stabbing. Was very severe initially but improved now. Patient ambulation status: mild difficulty. Antiplatelets/Anticoagulation includes: eliquis for afib last taken 9/17/2018 at 0730 in am.    Hx from chart and/or Pt. 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 Authorizing Provider   levothyroxine (SYNTHROID) 150 MCG tablet Take 1 tablet by mouth Daily 8/7/18  Yes Santiago Harrison, DO   blood glucose test strips (EXACTECH TEST) strip 1 each by In Vitro route 3 times daily As needed.  7/30/18  Yes Santiago Harrison, DO   insulin glargine (BASAGLAR KWIKPEN) 100 UNIT/ML injection pen Inject 25 Units into the skin every morning (before breakfast)   Yes Historical Provider, MD   losartan (COZAAR) 25 MG tablet Take 25 mg by mouth daily   Yes Historical Provider, MD   hydrochlorothiazide (HYDRODIURIL) 25 MG tablet Take 25 mg by mouth daily   Yes Historical Provider, MD   metoprolol tartrate (LOPRESSOR) 25 MG tablet Take 25 mg by mouth 2 times daily   Yes Historical Provider, MD   apixaban (ELIQUIS) 5 MG TABS tablet Take by mouth 2 times daily   Yes Historical Provider, MD   amiodarone (CORDARONE) 200 MG tablet Take 200 mg by mouth daily   Yes Historical Provider, MD   folic acid (FOLVITE) 1 MG tablet Take 2 mg by mouth 2 times daily   Yes Historical Provider, MD   aspirin 81 MG tablet Take 81 mg by mouth daily   Yes Historical Provider, MD   Multiple Vitamins-Minerals (ICAPS AREDS 2) CAPS Take by mouth 2 times daily   Yes Historical Provider, MD   metFORMIN (GLUCOPHAGE) 500 MG tablet Take 500 mg by mouth 2 times daily (with meals). Yes Historical Provider, MD   glimepiride (AMARYL) 4 MG tablet Take 4 mg by mouth 2 times daily. Yes Historical Provider, MD   simvastatin (ZOCOR) 40 MG tablet Take 40 mg by mouth nightly. Yes Historical Provider, MD   Multiple Vitamin (THERA-PLUS) LIQD Take 5 mLs by mouth daily. Yes Historical Provider, MD   Omega-3 Fatty Acids (FISH OIL PO) Take  by mouth.     Historical Provider, MD       Current Hospital Medications:    Current Facility-Administered Medications:     ceFAZolin (ANCEF) 1 g in dextrose 5 % 50 mL IVPB (mini-bag), 1 g, Intravenous, Once, EMMANUEL Spence CNP    hydrALAZINE (APRESOLINE) tablet 25 mg, 25 mg, Oral, 3 times per day, Carmen Ervin MD    morphine injection 2 mg, 2 mg, Intravenous, Q5 Min PRN, Sabina Severs, MD    fentaNYL (SUBLIMAZE) injection 50 mcg, 50 mcg, Intravenous, Q5 Min PRN, Sabina Severs, MD    HYDROmorphone (DILAUDID) injection 0.25 mg, 0.25 mg, Intravenous, Q5 Min PRN, Sabina Severs, MD    diphenhydrAMINE (BENADRYL) injection 12.5 mg, 12.5 mg, Intravenous, Once PRN, Sabina Severs, MD    ondansetron [facility]) injection 4 mg, 4 mg, Intravenous, Once PRN, Sabina Severs, MD    labetalol (NORMODYNE;TRANDATE) injection 5 mg, 5 mg, Intravenous, Q5 Min PRN, Sabina Severs, MD    hydrALAZINE (APRESOLINE) injection 5 mg, 5 mg, Intravenous, Q10 Min PRN, Carolina Champion MD    meperidine (DEMEROL) injection 12.5 mg, 12.5 mg, Intravenous, Q5 Min PRN, Carolina Champion MD    amLODIPine (NORVASC) tablet 10 mg, 10 mg, Oral, Daily, Carmen Ervin MD, 10 mg at 09/19/18 0801    hydrALAZINE (APRESOLINE) injection 5 mg, 5 mg, Intravenous, Q4H PRN, Carmen Ervin MD, 5 mg at 09/18/18 1307    atorvastatin (LIPITOR) tablet 20 mg, 20 mg, Oral, Nightly, Carmen Ervin MD, 20 mg at 09/18/18 2001    insulin glargine (LANTUS) injection vial 15 Units, 15 Units, Subcutaneous, Daily, Carmen Ervin MD    sodium chloride flush 0.9 % injection 10 mL, 10 mL, Intravenous, 2 times per day, Katlyn Pablo PA-C, 10 mL at 09/18/18 2004    sodium chloride flush 0.9 % injection 10 mL, 10 mL, Intravenous, PRN, Elissa Gamez PA-C    acetaminophen (TYLENOL) tablet 650 mg, 650 mg, Oral, Q4H PRN, Katlyn Pablo PA-C    magnesium hydroxide (MILK OF MAGNESIA) 400 MG/5ML suspension 30 mL, 30 mL, Oral, Daily PRN, Katlyn Pablo PA-C    ondansetron (ZOFRAN) injection 4 mg, 4 mg, Intravenous, Q6H PRN, Katlyn Pablo PA-C    docusate sodium (COLACE) capsule 100 mg, 100 mg, Oral, BID, Katlyn Pablo PA-C, 100 mg at 09/18/18 2001    famotidine (PEPCID) tablet 20 mg, 20 mg, Oral, BID, Romero Pablo PA-C, 20 mg at 09/18/18 2001    HYDROcodone-acetaminophen (NORCO) 5-325 MG per tablet 1 tablet, 1 tablet, Oral, Q4H PRN, 1 tablet at 09/18/18 1029 **OR** HYDROcodone-acetaminophen (NORCO) 5-325 MG per tablet 2 tablet, 2 tablet, Oral, Q4H PRN, Wilfredo Banda MD, 2 tablet at 09/19/18 0032    morphine injection 2 mg, 2 mg, Intravenous, Q2H PRN, 2 mg at 09/19/18 1005 **OR** morphine (PF) injection 4 mg, 4 mg, Intravenous, Q2H PRN, Wilfredo Banda MD, 4 mg at 09/18/18 0274    amiodarone (CORDARONE) tablet 200 mg, 200 mg, Oral, Daily, Ja Velez DO, 200 mg at 09/19/18 0801    aspirin chewable tablet 81 mg, 81 mg, Oral, Daily, [facility],     folic acid (FOLVITE) tablet 2 mg, 2 mg, Oral, BID, [facility], DO, 2 mg at 09/18/18 2001    levothyroxine (SYNTHROID) tablet 150 mcg, 150 mcg, Oral, Daily, [facility], DO, 150 mcg at 09/18/18 9748    losartan (COZAAR) tablet 25 mg, 25 mg, Oral, Daily, [facility], DO, 25 mg at 09/19/18 0801    metoprolol tartrate (LOPRESSOR) tablet 25 mg, 25 mg, Oral, BID, [facility], DO, 25 mg at 09/19/18 0801    glucose (GLUTOSE) 40 % oral gel 15 g, 15 g, Oral, PRN, [facility],     dextrose 50 % solution 12.5 g, 12.5 g, Intravenous, PRN, [facility], DO    glucagon (rDNA) injection 1 mg, 1 mg, Intramuscular, PRN, [facility],     dextrose 5 % solution, 100 mL/hr, Intravenous, PRN, [facility], DO    insulin lispro (HUMALOG) injection vial 0-6 Units, 0-6 Units, Subcutaneous, TID WC, [facility], DO, 1 Units at 09/18/18 1727    insulin lispro (HUMALOG) injection vial 0-3 Units, 0-3 Units, Subcutaneous, Nightly, [facility], DO, 1 Units at 09/17/18 2110    Facility-Administered Medications Ordered in Other Encounters:     0.9 % sodium chloride infusion, , , Continuous PRN, Mike Orantes MD    midazolam (VERSED) injection, , , PRN, Mike Orantes MD, 2 mg at 09/19/18 1205    fentaNYL (SUBLIMAZE) injection, , , PRN, Mike Orantes MD, 100 mcg at 09/19/18 1205    ceFAZolin (ANCEF) injection, , Intravenous, PRN, Mike Orantes MD, 2,000 mg at 09/19/18 1231    propofol injection, , , PRN, Mike Orantes MD, 100 mg at 09/19/18 1210    rocuronium (ZEMURON) injection, , , PRN, Mike Orantes MD, 50 mg at 09/19/18 1210    phenylephrine (ROSALVA-SYNEPHRINE) injection, , , PRN, Mike Orantes MD, 100 mcg at 09/19/18 1325     Allergies:  Patient has no known allergies. Social History:    Social History     Social History    Marital status:      Spouse name: N/A    Number of children: N/A    Years of education: N/A     Occupational History    Not on file.      Social History Main Topics    Smoking status: Never Smoker    Smokeless tobacco: Never Used    Alcohol use No    Drug use: No    Sexual activity: Not on file     Other Topics Concern    Not on file     Social History Narrative    No narrative on file     Family History:    Family History   Problem Relation Age of Onset    Heart Disease Mother     Heart Disease Father      Further Family History is noncontributory to this injury. REVIEW OF SYSTEMS:      Review of Systems - General ROS: negative for - chills, fatigue, fever, malaise or night sweats  Psychological ROS: negative  Ophthalmic ROS: negative   ENT ROS: negative for - headaches or sore throat  Hematological and Lymphatic ROS: negative for - bleeding problems or blood clots  Respiratory ROS: no cough, shortness of breath, or wheezing  Cardiovascular ROS: no chest pain or dyspnea on exertion  Gastrointestinal ROS: negative  Musculoskeletal ROS: See HPI  Neurological ROS: negative for - bowel and bladder control changes, gait disturbance or numbness/tingling  All other systems reviewed and are negative      PHYSICAL EXAM:  BP (!) 180/75   Pulse 66   Temp 98.2 °F (36.8 °C) (Oral)   Resp 16   Ht 5' 6\" (1.676 m)   Wt 190 lb (86.2 kg)   SpO2 95%   BMI 30.67 kg/m²   GENERAL APPEARANCE: Awake and oriented x3. No acute distress, except appropriate to injury. MOOD AND AFFECT: Calm appropriate to situation  GAIT AND STATION: Patient is in bed and unable to ambulate secondary to known injury. REFLEXES: No clonus or Babinski. COORDINATION and BALANCE: Patient is grossly coordinated unable to ambulate secondary to known injury. Lymphadenopathy: none on examination of the affected extremity(s)    Right Upper Extremity:  No obvious pain or deformity to inspection with normal joint range of motion, stability, and muscle strength except noted below. Sensation intact to light touch in radial/median/and ulnar nerve distributions. Radial pulse 2+. No Lymphedema. Skin intact except where noted below. Painless passive range of motion at shoulder/elbow/wrist, no tenderness to palpation over clavicle/shoulder/humerus/elbow/forearm/distal radius/hand. Tender to palpation over right shoulder, sensation intact to light touch axillary    Left Upper Extremity:  No obvious pain or deformity to inspection with normal joint range of motion, stability, and muscle strength except noted below. Sensation intact to light touch in radial/median/and ulnar nerve distributions. Radial pulse 2+. No Lymphedema. Skin intact except where noted below. Painless passive range of motion at shoulder/elbow/wrist, no tenderness to palpation over clavicle/shoulder/humerus/elbow/forearm/distal radius/hand. TTP posterior shoulder and scapula    Right Lower Extremity:  No obvious pain or deformity to inspection with normal joint range of motion, stability, and muscle strength except noted below. DP/PT 1+. No Lymphedema. Skin intact except where noted below. No tenderness to palpation over hip/knee/tibia/medial mal/lateral mal/calcaneus/midfoot/forefoot. Sensation intact to light touch in the superficial peroneal, deep peroneal, tibial, sural, saphenous nerve distributions. Motor function of tibialis anterior, extensor hallucis longus, and gactrocsoleus complex intact. Chronic decreased sensation secondary to diabetic neuropathy    Left Lower Extremity:  No obvious pain or deformity to inspection with normal joint range of motion, stability, and muscle strength except noted below. DP/PT 1+. No Lymphedema. Skin intact except where noted below. No tenderness to palpation over hip/knee/tibia/medial mal/lateral mal/calcaneus/midfoot/forefoot. Sensation intact to light touch in the superficial peroneal, deep peroneal, tibial, sural, saphenous nerve distributions. Motor function of tibialis anterior, extensor hallucis longus, and gactrocsoleus complex intact.  Chronic decreased sensation secondary to diabetic

## 2018-09-19 NOTE — PROGRESS NOTES
Orthopedic Surgery      Orthopaedic Attending Note    Discussed the current issue with Dr. Christiano Hussein, Plan for OR today for ORIF right humerus today. Keep NPO.

## 2018-09-19 NOTE — PROGRESS NOTES
1408- Pt arrived to PACU, pt hooked up to monitor, VSS, pt placed on 02 on arrival, Dr Kristi Reyes at bedside for report. Pt has dressing to right shoulder /upper arm area, dry and intact, sling on, pt had nerve block pre op, pt is comfortable. Denies pain. SCD's  Placed on ice pack applied to right arm.   1426- Pt wakes up when name called. 1430- Pt denies pain, easily arouses, pt breathing deeply on 02.  1434- Report called to Lender Comes on 7 K 2517 (on hold)  [id number]-  Family updated in waiting room to return to room. 1440- PT meets discharge criteria, VSS, pt breathing deeply on 02 3 L, pt denies pain, dressing dry and intact, intact, ice pack on, fingers warm good cap refill palpable radial pulse, pt continues to deny pain. [address] called back for report. Questioning BP during anesthesia, Dr Jose Spring made aware. 1446- PT transported to floor via transport team on 02.

## 2018-09-19 NOTE — CARE COORDINATION
09/19/18  9:47 AM  Trauma, ortho, hosp following. Planned ORIF of right humerus today at 1430. PT/OT. Pain control. Incentive spirometry. Neuro checks. N/V checks.

## 2018-09-20 LAB
ANION GAP SERPL CALCULATED.3IONS-SCNC: 12 MEQ/L (ref 8–16)
BUN BLDV-MCNC: 16 MG/DL (ref 7–22)
CALCIUM SERPL-MCNC: 9.2 MG/DL (ref 8.5–10.5)
CHLORIDE BLD-SCNC: 94 MEQ/L (ref 98–111)
CO2: 24 MEQ/L (ref 23–33)
CREAT SERPL-MCNC: 0.7 MG/DL (ref 0.4–1.2)
ERYTHROCYTE [DISTWIDTH] IN BLOOD BY AUTOMATED COUNT: 12.9 % (ref 11.5–14.5)
ERYTHROCYTE [DISTWIDTH] IN BLOOD BY AUTOMATED COUNT: 46.2 FL (ref 35–45)
GFR SERPL CREATININE-BSD FRML MDRD: 82 ML/MIN/1.73M2
GLUCOSE BLD-MCNC: 172 MG/DL (ref 70–108)
GLUCOSE BLD-MCNC: 201 MG/DL (ref 70–108)
GLUCOSE BLD-MCNC: 251 MG/DL (ref 70–108)
GLUCOSE BLD-MCNC: 258 MG/DL (ref 70–108)
GLUCOSE BLD-MCNC: 261 MG/DL (ref 70–108)
HCT VFR BLD CALC: 34.5 % (ref 37–47)
HEMOGLOBIN: 11.8 GM/DL (ref 12–16)
MCH RBC QN AUTO: 33.7 PG (ref 26–33)
MCHC RBC AUTO-ENTMCNC: 34.2 GM/DL (ref 32.2–35.5)
MCV RBC AUTO: 98.6 FL (ref 81–99)
PLATELET # BLD: 220 THOU/MM3 (ref 130–400)
PMV BLD AUTO: 9.4 FL (ref 9.4–12.4)
POTASSIUM REFLEX MAGNESIUM: 3.9 MEQ/L (ref 3.5–5.2)
RBC # BLD: 3.5 MILL/MM3 (ref 4.2–5.4)
SODIUM BLD-SCNC: 130 MEQ/L (ref 135–145)
WBC # BLD: 5.1 THOU/MM3 (ref 4.8–10.8)

## 2018-09-20 PROCEDURE — 6370000000 HC RX 637 (ALT 250 FOR IP): Performed by: INTERNAL MEDICINE

## 2018-09-20 PROCEDURE — G8978 MOBILITY CURRENT STATUS: HCPCS

## 2018-09-20 PROCEDURE — 97166 OT EVAL MOD COMPLEX 45 MIN: CPT

## 2018-09-20 PROCEDURE — 1200000003 HC TELEMETRY R&B

## 2018-09-20 PROCEDURE — 97535 SELF CARE MNGMENT TRAINING: CPT

## 2018-09-20 PROCEDURE — APPSS60 APP SPLIT SHARED TIME 46-60 MINUTES: Performed by: NURSE PRACTITIONER

## 2018-09-20 PROCEDURE — 6360000002 HC RX W HCPCS: Performed by: SURGERY

## 2018-09-20 PROCEDURE — 85027 COMPLETE CBC AUTOMATED: CPT

## 2018-09-20 PROCEDURE — 36415 COLL VENOUS BLD VENIPUNCTURE: CPT

## 2018-09-20 PROCEDURE — 6360000002 HC RX W HCPCS: Performed by: PHYSICIAN ASSISTANT

## 2018-09-20 PROCEDURE — 6370000000 HC RX 637 (ALT 250 FOR IP): Performed by: SURGERY

## 2018-09-20 PROCEDURE — 97162 PT EVAL MOD COMPLEX 30 MIN: CPT

## 2018-09-20 PROCEDURE — 82948 REAGENT STRIP/BLOOD GLUCOSE: CPT

## 2018-09-20 PROCEDURE — 99232 SBSQ HOSP IP/OBS MODERATE 35: CPT | Performed by: SURGERY

## 2018-09-20 PROCEDURE — 6370000000 HC RX 637 (ALT 250 FOR IP): Performed by: NURSE PRACTITIONER

## 2018-09-20 PROCEDURE — G8979 MOBILITY GOAL STATUS: HCPCS

## 2018-09-20 PROCEDURE — 99232 SBSQ HOSP IP/OBS MODERATE 35: CPT | Performed by: INTERNAL MEDICINE

## 2018-09-20 PROCEDURE — 97530 THERAPEUTIC ACTIVITIES: CPT

## 2018-09-20 PROCEDURE — 2580000003 HC RX 258: Performed by: PHYSICIAN ASSISTANT

## 2018-09-20 PROCEDURE — 80048 BASIC METABOLIC PNL TOTAL CA: CPT

## 2018-09-20 PROCEDURE — 6370000000 HC RX 637 (ALT 250 FOR IP): Performed by: PHYSICIAN ASSISTANT

## 2018-09-20 RX ORDER — SENNA PLUS 8.6 MG/1
2 TABLET ORAL NIGHTLY
Status: DISCONTINUED | OUTPATIENT
Start: 2018-09-20 | End: 2018-09-21 | Stop reason: HOSPADM

## 2018-09-20 RX ORDER — POLYETHYLENE GLYCOL 3350 17 G/17G
17 POWDER, FOR SOLUTION ORAL DAILY
Status: DISCONTINUED | OUTPATIENT
Start: 2018-09-20 | End: 2018-09-21 | Stop reason: HOSPADM

## 2018-09-20 RX ADMIN — Medication 2 UNITS: at 20:56

## 2018-09-20 RX ADMIN — CEFAZOLIN SODIUM 2 G: 10 INJECTION, POWDER, FOR SOLUTION INTRAVENOUS at 05:37

## 2018-09-20 RX ADMIN — HYDRALAZINE HYDROCHLORIDE 25 MG: 25 TABLET ORAL at 21:48

## 2018-09-20 RX ADMIN — DOCUSATE SODIUM 100 MG: 100 CAPSULE, LIQUID FILLED ORAL at 09:03

## 2018-09-20 RX ADMIN — ATORVASTATIN CALCIUM 20 MG: 20 TABLET, FILM COATED ORAL at 20:55

## 2018-09-20 RX ADMIN — INSULIN LISPRO 3 UNITS: 100 INJECTION, SOLUTION INTRAVENOUS; SUBCUTANEOUS at 16:50

## 2018-09-20 RX ADMIN — HYDROCODONE BITARTRATE AND ACETAMINOPHEN 2 TABLET: 5; 325 TABLET ORAL at 12:56

## 2018-09-20 RX ADMIN — HYDROCODONE BITARTRATE AND ACETAMINOPHEN 2 TABLET: 5; 325 TABLET ORAL at 16:49

## 2018-09-20 RX ADMIN — METOPROLOL TARTRATE 25 MG: 25 TABLET ORAL at 20:59

## 2018-09-20 RX ADMIN — INSULIN LISPRO 3 UNITS: 100 INJECTION, SOLUTION INTRAVENOUS; SUBCUTANEOUS at 12:58

## 2018-09-20 RX ADMIN — INSULIN GLARGINE 15 UNITS: 100 INJECTION, SOLUTION SUBCUTANEOUS at 09:03

## 2018-09-20 RX ADMIN — AMIODARONE HYDROCHLORIDE 200 MG: 200 TABLET ORAL at 09:03

## 2018-09-20 RX ADMIN — POLYETHYLENE GLYCOL 3350 17 G: 17 POWDER, FOR SOLUTION ORAL at 16:49

## 2018-09-20 RX ADMIN — DOCUSATE SODIUM 100 MG: 100 CAPSULE, LIQUID FILLED ORAL at 20:55

## 2018-09-20 RX ADMIN — HYDROCODONE BITARTRATE AND ACETAMINOPHEN 2 TABLET: 5; 325 TABLET ORAL at 02:38

## 2018-09-20 RX ADMIN — Medication 10 ML: at 09:03

## 2018-09-20 RX ADMIN — ASPIRIN 81 MG CHEWABLE TABLET 81 MG: 81 TABLET CHEWABLE at 09:03

## 2018-09-20 RX ADMIN — FOLIC ACID 2 MG: 1 TABLET ORAL at 09:03

## 2018-09-20 RX ADMIN — METOPROLOL TARTRATE 25 MG: 25 TABLET ORAL at 09:09

## 2018-09-20 RX ADMIN — FOLIC ACID 2 MG: 1 TABLET ORAL at 20:55

## 2018-09-20 RX ADMIN — MORPHINE SULFATE 4 MG: 4 INJECTION INTRAVENOUS at 05:29

## 2018-09-20 RX ADMIN — FAMOTIDINE 20 MG: 20 TABLET ORAL at 20:55

## 2018-09-20 RX ADMIN — AMLODIPINE BESYLATE 10 MG: 10 TABLET ORAL at 12:56

## 2018-09-20 RX ADMIN — HYDROCODONE BITARTRATE AND ACETAMINOPHEN 2 TABLET: 5; 325 TABLET ORAL at 07:50

## 2018-09-20 RX ADMIN — HYDROCODONE BITARTRATE AND ACETAMINOPHEN 2 TABLET: 5; 325 TABLET ORAL at 20:55

## 2018-09-20 RX ADMIN — LOSARTAN POTASSIUM 25 MG: 25 TABLET, FILM COATED ORAL at 09:03

## 2018-09-20 RX ADMIN — Medication 10 ML: at 20:56

## 2018-09-20 RX ADMIN — SENNOSIDES 17.2 MG: 8.6 TABLET, FILM COATED ORAL at 20:55

## 2018-09-20 RX ADMIN — APIXABAN 5 MG: 5 TABLET, FILM COATED ORAL at 23:07

## 2018-09-20 RX ADMIN — FAMOTIDINE 20 MG: 20 TABLET ORAL at 09:03

## 2018-09-20 RX ADMIN — HYDRALAZINE HYDROCHLORIDE 25 MG: 25 TABLET ORAL at 07:52

## 2018-09-20 RX ADMIN — LEVOTHYROXINE SODIUM 150 MCG: 150 TABLET ORAL at 09:03

## 2018-09-20 ASSESSMENT — PAIN DESCRIPTION - PAIN TYPE
TYPE: SURGICAL PAIN
TYPE: SURGICAL PAIN
TYPE: ACUTE PAIN;SURGICAL PAIN
TYPE: SURGICAL PAIN

## 2018-09-20 ASSESSMENT — PAIN DESCRIPTION - DESCRIPTORS
DESCRIPTORS: ACHING
DESCRIPTORS: ACHING
DESCRIPTORS: ACHING;DISCOMFORT
DESCRIPTORS: ACHING
DESCRIPTORS: ACHING;DISCOMFORT
DESCRIPTORS: ACHING

## 2018-09-20 ASSESSMENT — PAIN SCALES - GENERAL
PAINLEVEL_OUTOF10: 8
PAINLEVEL_OUTOF10: 8
PAINLEVEL_OUTOF10: 5
PAINLEVEL_OUTOF10: 7
PAINLEVEL_OUTOF10: 8
PAINLEVEL_OUTOF10: 7
PAINLEVEL_OUTOF10: 5

## 2018-09-20 ASSESSMENT — PAIN DESCRIPTION - FREQUENCY
FREQUENCY: INTERMITTENT
FREQUENCY: INTERMITTENT

## 2018-09-20 ASSESSMENT — PAIN DESCRIPTION - LOCATION
LOCATION: ARM

## 2018-09-20 ASSESSMENT — PAIN DESCRIPTION - ORIENTATION
ORIENTATION: RIGHT;LEFT

## 2018-09-20 ASSESSMENT — PAIN - FUNCTIONAL ASSESSMENT: PAIN_FUNCTIONAL_ASSESSMENT: 0-10

## 2018-09-20 ASSESSMENT — PAIN DESCRIPTION - PROGRESSION: CLINICAL_PROGRESSION: NOT CHANGED

## 2018-09-20 ASSESSMENT — PAIN DESCRIPTION - ONSET: ONSET: PROGRESSIVE

## 2018-09-20 NOTE — CONSULTS
Consults       Consult Progress  Patient:  Katlyn Ojeda  YOB: 1945    MRN: [id number]     Acct: [de-identified]      Chief Complaint:  Medical mngt    Date of Service: Pt seen/examined in consultation on 9.17.2018    History Of Present Illness:      72 y.o. female who we are asked to see/evaluate by Woody Champion MD for medical management of medical mngt, afib,hypothyroid,htn, hlp, and dm. No current issues except for pain    Subjective-  Doing okay  Has left proximal right humerus fracture and a left scapular fracture- s/p Open reduction internal fixation of right proximal humerus fracture on 09/19/2018  In pain- however better now    Scheduled Meds:   hydrALAZINE  25 mg Oral 3 times per day    sodium chloride flush  10 mL Intravenous 2 times per day    amLODIPine  10 mg Oral Daily    atorvastatin  20 mg Oral Nightly    insulin glargine  15 Units Subcutaneous Daily    sodium chloride flush  10 mL Intravenous 2 times per day    docusate sodium  100 mg Oral BID    famotidine  20 mg Oral BID    amiodarone  200 mg Oral Daily    aspirin  81 mg Oral Daily    folic acid  2 mg Oral BID    levothyroxine  150 mcg Oral Daily    losartan  25 mg Oral Daily    metoprolol tartrate  25 mg Oral BID    insulin lispro  0-6 Units Subcutaneous TID WC    insulin lispro  0-3 Units Subcutaneous Nightly     Continuous Infusions:   dextrose       PRN Meds:.sodium chloride flush, hydrALAZINE, sodium chloride flush, acetaminophen, magnesium hydroxide, ondansetron, HYDROcodone 5 mg - acetaminophen **OR** HYDROcodone 5 mg - acetaminophen, morphine **OR** morphine, glucose, dextrose, glucagon (rDNA), dextrose    Diet:  DIET GENERAL;    REVIEW OF SYSTEMS:   Pertinent positives as noted in the HPI. All other systems reviewed and negative.     PHYSICAL EXAM:  /60   Pulse 75   Temp 98.2 °F (36.8 °C) (Oral)   Resp 16   Ht 5' 6\" (1.676 m)   Wt 190 lb (86.2 kg)   SpO2 97%   BMI 30.67 kg/m²   General appearance:  No apparent distress, appears stated age and cooperative. HEENT:  Normal cephalic, atraumatic without obvious deformity. Pupils equal, round, and reactive to light. Extra ocular muscles intact. Conjunctivae/corneas clear. Neck: Supple, with full range of motion. no jugular venous distention. Trachea midline. no carotid bruits  Respiratory:  Normal respiratory effort. Clear to auscultation, bilaterally without Rales/Wheezes/Rhonchi. Breath sounds equal bilaterally  Cardiovascular:  Regular rate and rhythm with normal S1/S2 without murmurs, rubs or gallops. PMI non displaced  Abdomen: Soft, non-tender, non-distended with normal bowel sounds. No guarding, rebound. Musculoskeletal:  No clubbing, cyanosis or edema bilaterally. Full range of motion without deformity. Skin: Skin color, texture, turgor normal.  No rashes or lesions, or suspicious lesions. Neurologic:  Neurovascularly intact without any focal sensory/motor deficits. Cranial nerves: II-XII intact, grossly non-focal.  Psychiatric:  Alert and oriented, thought content appropriate, normal insight  Capillary Refill: Brisk,< 2 seconds   Peripheral Pulses: +2 palpable, equal bilaterally upper and lower extremities  Lymphatics: no lymphadenopathy    Labs:     Recent Labs      09/17/18   1352  09/17/18 2035 09/18/18   0602  09/20/18   0526   WBC  9.0  7.4   --   5.1   HGB  13.1  12.4  13.4  11.8*   HCT  38.6  36.6*  38.3  34.5*   PLT  217  214   --   220     Recent Labs      09/17/18 2035 09/18/18   0602  09/20/18   0526   NA  128*  127*  130*   K  4.6  4.2  3.9   CL  89*  89*  94*   CO2  24  22*  24   BUN  18  14  16   CREATININE  0.7  0.5  0.7   CALCIUM  10.1  10.1  9.2     No results for input(s): AST, ALT, BILIDIR, BILITOT, ALKPHOS in the last 72 hours. Recent Labs      09/19/18   0803   INR  1.13     No results for input(s): Ann Bibber in the last 72 hours.     Urinalysis:    Lab Results   Component Value Date    NITRU NEGATIVE **This report has been created using voice recognition software. It may contain minor errors which are inherent in voice recognition technology. **      Final report electronically signed by Dr. Danae Ramirez on 9/17/2018 1:30 PM      XR CHEST 1 VW   Final Result   1. Normal heart size. No acute infiltrates or effusions are seen. 2. Several old rib fractures right side. 3. Acute mildly comminuted fracture proximal right humerus. There appears be a left scapular fracture. .      Final report electronically signed by Dr. Danae Ramirez on 9/17/2018 1:28 PM      XR SHOULDER RIGHT (MIN 2 VIEWS)   Final Result   Mildly comminuted moderately displaced, mildly angulated proximal humeral shaft fracture with suspicion of extension through the greater tuberosity of the humeral head. .         **This report has been created using voice recognition software. It may contain minor errors which are inherent in voice recognition technology. **      Final report electronically signed by Dr. Danae Ramirez on 9/17/2018 1:27 PM      XR HUMERUS RIGHT (MIN 2 VIEWS)   Final Result   Mildly comminuted moderately displaced, mildly angulated proximal humeral shaft fracture with suspicion of extension through the greater tuberosity of the humeral head. .         **This report has been created using voice recognition software. It may contain minor errors which are inherent in voice recognition technology. **      Final report electronically signed by Dr. Danae Ramirez on 9/17/2018 1:27 PM             EKG:  I have reviewed the EKG with the following interpretation:        DVT prophylaxis: [] Lovenox                                 [x] SCDs                                 [] SQ Heparin                                 [] Encourage ambulation           [] Already on Anticoagulation      Code Status: Full Code    PT/OT Eval Status: Active and ongoing      ASSESSMENT:    Active Hospital Problems    Diagnosis Date Noted    Hyponatremia [E87.1] 09/18/2018    Closed displaced fracture of greater tuberosity of right humerus [S42.251A] 09/17/2018    Closed fracture of body of left scapula [S42.112A] 09/17/2018    Anticoagulant long-term use [Z79.01] 09/17/2018    Fall on same level from slipping, tripping or stumbling [W01. 0XXA] 09/17/2018    Closed nondisplaced oblique fracture of shaft of right humerus [S42.334A] 09/17/2018    Chronic atrial fibrillation ([address] [I48.2] 05/17/2018    Diabetes mellitus ([address] [E11.9] 05/17/2018    Essential hypertension [I10] 05/17/2018    Osteopenia of spine [M85.88] 05/17/2018    Other specified hypothyroidism [E03.8] 05/17/2018       PLAN:    Principal Problem:    Closed displaced fracture of greater tuberosity of right humerus- Has left proximal right humerus fracture and a left scapular fracture- s/p Open reduction internal fixation of right proximal humerus fracture on 09/19/2018- management per ortho, incentive tonia, pt ot    Chronic atrial fibrillation ([address]- Holding anticoagulants for anticipating surgery    Diabetes mellitus ([address]- Add Lantus once she starts eating after surgery    Essential hypertension- Controlled for now    Osteopenia of spine   Fall on same level from slipping, tripping or stumbling  Hyponatremia- Stop hydrochlorothiazide - resolving slowly    Can go home whenever primary physician decides to do so    Medically stable  Thank you for the consultation.     Electronically signed by Denver Massing, MD on 9/20/2018 at 11:30 AM

## 2018-09-20 NOTE — CARE COORDINATION
9/20/18, 2:40 PM    DISCHARGE BARRIERS      Confirmed plan for [facility] with Kiana Jesus and her . They plan for  to transport, and are anticipating discharge tomorrow.

## 2018-09-20 NOTE — PROGRESS NOTES
[address] [id number]  Dept: [phone]  Loc: [phone]        Adult Orthopaedic Service      Patient Name:  Fransico Laura  YOB: 1945   MRN:  [id number]   Date: 09/20/18          Assessment:    s/p  Right proximal humerus ORIFon 9/19/18 doing well    Plan:   Keep dressing clean, dry, intact for 7-10 days  NWB LUE  Phase I PT; 0° ER, 90° FE  DVT per primary service; ok to resume eliquis from ortho standpoint  Follow up in the office in 10-17 days  OK for discharge to [facility] from ortho standpoint    Subjective:   Pain controlled, tolerating diet, sitting in chair, in no acute distress    Medications:      senna  2 tablet Oral Nightly    polyethylene glycol  17 g Oral Daily    hydrALAZINE  25 mg Oral 3 times per day    sodium chloride flush  10 mL Intravenous 2 times per day    amLODIPine  10 mg Oral Daily    atorvastatin  20 mg Oral Nightly    insulin glargine  15 Units Subcutaneous Daily    sodium chloride flush  10 mL Intravenous 2 times per day    docusate sodium  100 mg Oral BID    famotidine  20 mg Oral BID    amiodarone  200 mg Oral Daily    aspirin  81 mg Oral Daily    folic acid  2 mg Oral BID    levothyroxine  150 mcg Oral Daily    losartan  25 mg Oral Daily    metoprolol tartrate  25 mg Oral BID    insulin lispro  0-6 Units Subcutaneous TID WC    insulin lispro  0-3 Units Subcutaneous Nightly       Physical Exam:     Vitals:    09/20/18 1252   BP: 136/63   Pulse: 66   Resp: 16   Temp:    SpO2: 94%       Intake and Output Summary (Last 24 hours) at Date Time    Intake/Output Summary (Last 24 hours) at 09/20/18 1638  Last data filed at 09/20/18 1232   Gross per 24 hour   Intake          2121.59 ml   Output              800 ml   Net          1321.59 ml       General appearance - no acute distress  Musculoskeletal -  Dressing/Splint C/D/I  Fires AIN/PIN/U  SILT M/R/U  WWP fingers  Bilateral 2+ radial pulse  Calves soft, nontender bilateral    Labs:     CBC:   Lab Results   Component Value Date    WBC 5.1 09/20/2018    RBC 3.50 09/20/2018    RBC 3.94 02/27/2012    HGB 11.8 09/20/2018    HCT 34.5 09/20/2018    MCV 98.6 09/20/2018    MCH 33.7 09/20/2018    MCHC 34.2 09/20/2018    RDW 14.0 11/24/2017     09/20/2018    MPV 9.4 09/20/2018     Hemoglobin/Hematocrit:    Lab Results   Component Value Date    HGB 11.8 09/20/2018    HCT 34.5 09/20/2018     PT/INR:    Lab Results   Component Value Date    INR 1.13 09/19/2018     PTT:  No results found for: APTT, PTT[APTT}  Rads:   Radiological Procedure reviewed.   Signed by: Irene King

## 2018-09-20 NOTE — PLAN OF CARE
Problem: Pain:  Goal: Pain level will decrease  Pain level will decrease   Outcome: Ongoing  Patient reports pain level to RN and she has been receiving pain medications PRN. Problem: Neurological  Goal: Maximum potential motor/sensory/cognitive function  Outcome: Ongoing  She has been alert and oriented this shift. Problem: Cardiovascular  Goal: No DVT, peripheral vascular complications  Outcome: Ongoing  She has no redness noted to either calf and has been wearing SCDs while in bed. Problem: Respiratory  Goal: O2 Sat > 90%  Outcome: Ongoing  She has been 94% on room air for most of this shift. Care plan reviewed with patient and S/O. Patient and S/O verbalize understanding of the plan of care and contribute to goal setting.

## 2018-09-20 NOTE — PROGRESS NOTES
[facility]  Daily Progress Note  Pt Name: Yariel [address] Record Number: [id number]  Date of Birth 1945   Today's Date: 9/20/2018    HD: # 3    CC: \"I can feel the block wearing off, little more pain since middle of the night\"     ASSESSMENT  1. Active Hospital Problems    Diagnosis Date Noted    Hyponatremia [E87.1] 09/18/2018    Closed displaced fracture of greater tuberosity of right humerus [S42.251A] 09/17/2018    Closed fracture of body of left scapula [S42.112A] 09/17/2018    Anticoagulant long-term use [Z79.01] 09/17/2018    Fall on same level from slipping, tripping or stumbling [W01. 0XXA] 09/17/2018    Closed nondisplaced oblique fracture of shaft of right humerus [S42.334A] 09/17/2018    Chronic atrial fibrillation ([facility] [I48.2] 05/17/2018    Diabetes mellitus ([facility] [E11.9] 05/17/2018    Essential hypertension [I10] 05/17/2018    Osteopenia of spine [M85.88] 05/17/2018    Other specified hypothyroidism [E03.8] 05/17/2018     PROCEDURES    09/19/18 - Open reduction internal fixation of right proximal humerus fracture    PLAN  Patient admitted under Trauma Services with telemetry to [address] [place] [address]     Closed displaced fracture of greater tuberosity of right humerus              -orthopedic surgery managing   - S/P ORIF   - maintain sling              -Routine neurovascular checks              -Nonweightbearing     Closed fracture of body of left scapula              -Orthopedic surgery managing              -Non-operative management, rest, Ice              -pain control    Pain Management              -Morphine, Norco  Prophylaxis: SCD's, Incentive Spirometry, Colace, Pepcid, Zofran  General diet as tolerated  Restart home meds, restart anticoagulation as cleared by Ortho  Regular Neurovascular Checks  Repeat Labs Tomorrow AM  PT/OT Eval and Treat  Activity as tolerated, pt up with assistance    Planned Discharge pending clinical course    -

## 2018-09-20 NOTE — PLAN OF CARE
Problem: DISCHARGE BARRIERS  Goal: Patient's continuum of care needs are met  Outcome: Ongoing  Planning ecf. Please see progress note.

## 2018-09-20 NOTE — PLAN OF CARE
Problem: Pain:  Goal: Pain level will decrease  Pain level will decrease   Outcome: Ongoing  Patient pain is decreased with as needed pain meds with a pain goal of 3. Goal: Control of acute pain  Control of acute pain   Outcome: Met This Shift    Goal: Control of chronic pain  Control of chronic pain   Outcome: Met This Shift      Problem: Neurological  Goal: Maximum potential motor/sensory/cognitive function  Outcome: Ongoing  Patient is alert and oriented times 4 and complains of no numbness or tingling in both hands and feet. Problem: Cardiovascular  Goal: No DVT, peripheral vascular complications  Outcome: Ongoing  Patient has no signs of DVT and has has SCD's on. Problem: Respiratory  Goal: O2 Sat > 90%  Outcome: Ongoing  Patient is on 1 liter of oxygen and with pulse ox of 98, no SOB at this shift. Problem: GI  Goal: No bowel complications  Outcome: Ongoing  Patient has an active bowel sound, passing gas and takes stool softer to help with bowel movement. No complication at this shift. Problem:   Goal: Adequate urinary output  Outcome: Ongoing  Patient is having a clear urin output at this shift. Problem: Nutrition  Goal: Optimal nutrition therapy  Outcome: Ongoing  Patient eats great. Problem: Skin Integrity/Risk  Goal: No skin breakdown during hospitalization  Outcome: Ongoing  Patient has no skin breakdown at this shift. Problem: Musculor/Skeletal Functional Status  Goal: Highest potential functional level  Outcome: Ongoing  Patient is one assist and has weakness in both upper extremities. Goal: Absence of falls  Outcome: Ongoing  Patient has no falls at this shift, bed in a low position and call light within reach. Problem: Daily Care:  Goal: Daily care needs are met  Daily care needs are met   Outcome: Ongoing  Patient is one assist with daily care and is satisfied with care provided.     Problem: Discharge Planning:  Goal: Discharged to appropriate level of care  Discharged to appropriate level of care   Outcome: Ongoing  Patient plan home at discharge or to dicuss an ECF closer to home for therapy before going home. Comments: Care plan reviewed with patient, patient verbalized understanding and contribute to plan of care.

## 2018-09-20 NOTE — PROGRESS NOTES
[address] [address]  INPATIENT PHYSICAL THERAPY  EVALUATION  [facility] ORTHOPEDICS 7K - 7K-02/002-A    Time In: 0740  Time Out: 0806  Timed Code Treatment Minutes: 10 Minutes  Minutes: 26          Date: 2018  Patient Name: Leonard Hsu,  Gender:  female        MRN: [id number]  : 1945  (72 y.o.)      Referring Practitioner: BERT Eugene  Diagnosis: closed displaced oblique fracture of shaft of right humerus  Additional Pertinent Hx: admit s/p fall with above diagnosis, closed fracture body of left scapula, s/p ORIF right humerus fracture on 18     Past Medical History:   Diagnosis Date    Atrial fibrillation ([address]     Cancer ([facility]     Diabetes mellitus ([address]     Aguilar filter in place     Hyperlipidemia     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    Thyroid disease      Past Surgical History:   Procedure Laterality Date    APPENDECTOMY      CHOLECYSTECTOMY      FRACTURE SURGERY      ankle surgery    HERNIA REPAIR      HYSTERECTOMY      MI OFFICE/OUTPT VISIT,PROCEDURE ONLY Right 2018    ORIF RIGHT PROXIMAL HUMERUS FX performed by Katharina Hernandez MD at [address] [place] [address]         Restrictions/Precautions:  Weight Bearing, Fall Risk, General Precautions  Right Upper Extremity Weight Bearing: Non Weight Bearing  Left Upper Extremity Weight Bearing: Non Weight Bearing     Right Upper Extremity Brace/Splint: Sling    Subjective:  Chart Reviewed: Yes  Patient assessed for rehabilitation services?: Yes  Family / Caregiver Present: Yes  Subjective: pleasant and cooperative, spouse present and supportive    General:    Pain:  Yes.   Pain Assessment  Pain Level: 7 (RUE)       Social/Functional History:    Lives With: Spouse  Type of Home: House  Home Layout: One level  Home Access: Ramped entrance (no HR)  Home Equipment:  (no AD used PTA)   Ambulation Assistance: Independent  Transfer Assistance: Independent      Objective:       RLE AROM: WFL    LLE AROM : WFL           Strength RLE: WFL    Strength LLE: WFL      Balance  Sitting - Static: Good  Sitting - Dynamic: Good, -  Standing - Static: Fair  Comments: sitting edge of bed initially with S, static std initially with CGA    Supine to Sit: Moderate assistance (x1, HOB up 45 degrees, pt cues to bridge to scoot buttock to edge of bed and able to complete and bring BLE over edge of bed on own, however required inc assist at trunk with NWB BUE)  Sit to Supine: Dependent/Total    Transfers  Sit to Stand: Contact guard assistance (edge of bed)  Stand to sit: Maximum Assistance       Ambulation 1  Surface: level tile  Device: No Device  Assistance: Minimal assistance (to CGA)  Quality of Gait: after around 6' pt stated feeling shakey but ok to proceed to chair however pt become less responsive and became maxA to bring to edge of bed and on PT's lap with spouse calling out for assistance-then required assist of 3 to get supine in bed,  pt initial gait with slow and guarded with dec BLE step length and uneven steps  Distance: 6'x1             Exercises:  Comments: sup BLE ankle pumps, heelslide x10 reps,tolerated well          Activity Tolerance:  Activity Tolerance: Patient limited by pain  Activity Tolerance: pt with anticipate vagel response per nursing requiring inc assist at end of session    Treatment Initiated: pt sat edge of bed around 8 min with SBA with PT adjusting pt's gown and sling and to prepare for transfers/gait    Assessment:   Body structures, Functions, Activity limitations: Decreased functional mobility , Decreased endurance, Decreased balance, Decreased strength  Assessment: pt tolerated fair, pt with inc pain primarily in RUE, NWB BUE, generalized weakness, dec balance, on O2, inc assist for safe mobility, recommend cont PT to inc pt functional mobility  Prognosis: Excellent    Clinical Presentation: Moderate - Evolving with Changing Characteristics:   pt with inc pain primarily in RUE, NWB BUE, generalized weakness, dec balance, on O2, inc assist for safe mobility, recommend cont PT    Decision Making: High Complexitybased on patient assessment and decision making process of determining plan of care and establishing reasonable expectations for measurable functional outcomes    REQUIRES PT FOLLOW UP: Yes  Discharge Recommendations: Continue to assess pending progress, Subacute/Skilled Nursing Facility, Patient would benefit from continued therapy after discharge    Patient Education:  Patient Education: POC, NWB BUE with mobility    Equipment Recommendations:  Equipment Needed: No    Safety:  Type of devices: All fall risk precautions in place, Bed alarm in place, Call light within reach, Nurse notified, Gait belt, Patient at risk for falls, Left in bed (nursing with pt after session)    Plan:  Times per week: 5-6X O  Times per day: Daily  Specific instructions for Next Treatment: therex, mobility  Current Treatment Recommendations: Strengthening, Balance Training, Endurance Training, Functional Mobility Training, Transfer Training, Gait Training, Pain Management, Equipment Evaluation, Education, & procurement, Patient/Caregiver Education & Training, Home Exercise Program    Goals:  Patient goals : per pt her and spouse discussed SNF for rehab due to inability to use BUE  Short term goals  Time Frame for Short term goals: 2 weeks  Short term goal 1: bed mobility with minAx1 to get in/out of bed  Short term goal 2: transfer with CGA to get in/out of chairs  Short term goal 3: amb 50'x1 no AD and CGA to walk safely to bathroom  Long term goals  Time Frame for Long term goals : no LTGs set secondary to short ELOS    Evaluation Complexity: Based on the findings of patient history, examination, clinical presentation, and decision making during this evaluation, the evaluation of Rhonda Organ  is of medium complexity.     PT G-Codes  Functional Limitation: Mobility: Walking and moving around  Mobility: Walking

## 2018-09-21 VITALS
HEART RATE: 70 BPM | TEMPERATURE: 99.1 F | BODY MASS INDEX: 30.53 KG/M2 | SYSTOLIC BLOOD PRESSURE: 132 MMHG | DIASTOLIC BLOOD PRESSURE: 63 MMHG | HEIGHT: 66 IN | WEIGHT: 190 LBS | RESPIRATION RATE: 18 BRPM | OXYGEN SATURATION: 95 %

## 2018-09-21 LAB
GLUCOSE BLD-MCNC: 204 MG/DL (ref 70–108)
GLUCOSE BLD-MCNC: 296 MG/DL (ref 70–108)

## 2018-09-21 PROCEDURE — 6370000000 HC RX 637 (ALT 250 FOR IP): Performed by: INTERNAL MEDICINE

## 2018-09-21 PROCEDURE — 6370000000 HC RX 637 (ALT 250 FOR IP): Performed by: SURGERY

## 2018-09-21 PROCEDURE — 97116 GAIT TRAINING THERAPY: CPT

## 2018-09-21 PROCEDURE — 99239 HOSP IP/OBS DSCHRG MGMT >30: CPT | Performed by: SURGERY

## 2018-09-21 PROCEDURE — 97110 THERAPEUTIC EXERCISES: CPT

## 2018-09-21 PROCEDURE — 2580000003 HC RX 258: Performed by: PHYSICIAN ASSISTANT

## 2018-09-21 PROCEDURE — 6370000000 HC RX 637 (ALT 250 FOR IP): Performed by: PHYSICIAN ASSISTANT

## 2018-09-21 PROCEDURE — 6370000000 HC RX 637 (ALT 250 FOR IP): Performed by: NURSE PRACTITIONER

## 2018-09-21 PROCEDURE — APPSS45 APP SPLIT SHARED TIME 31-45 MINUTES: Performed by: PHYSICIAN ASSISTANT

## 2018-09-21 PROCEDURE — APPNB60 APP NON BILLABLE TIME 46-60 MINS: Performed by: PHYSICIAN ASSISTANT

## 2018-09-21 PROCEDURE — 82948 REAGENT STRIP/BLOOD GLUCOSE: CPT

## 2018-09-21 RX ORDER — HYDROCODONE BITARTRATE AND ACETAMINOPHEN 5; 325 MG/1; MG/1
1 TABLET ORAL EVERY 4 HOURS PRN
Qty: 42 TABLET | Refills: 0 | Status: SHIPPED | OUTPATIENT
Start: 2018-09-21 | End: 2018-09-28

## 2018-09-21 RX ORDER — PSEUDOEPHEDRINE HCL 30 MG
100 TABLET ORAL 2 TIMES DAILY PRN
DISCHARGE
Start: 2018-09-21 | End: 2019-10-24

## 2018-09-21 RX ORDER — HYDRALAZINE HYDROCHLORIDE 20 MG/ML
5 INJECTION INTRAMUSCULAR; INTRAVENOUS EVERY 4 HOURS PRN
Status: CANCELLED | DISCHARGE
Start: 2018-09-21

## 2018-09-21 RX ORDER — ATORVASTATIN CALCIUM 20 MG/1
20 TABLET, FILM COATED ORAL NIGHTLY
Qty: 30 TABLET | Refills: 3 | DISCHARGE
Start: 2018-09-21 | End: 2019-06-21

## 2018-09-21 RX ORDER — AMLODIPINE BESYLATE 10 MG/1
10 TABLET ORAL DAILY
Qty: 30 TABLET | Refills: 3 | DISCHARGE
Start: 2018-09-22 | End: 2019-06-21

## 2018-09-21 RX ORDER — HYDRALAZINE HYDROCHLORIDE 25 MG/1
25 TABLET, FILM COATED ORAL EVERY 8 HOURS SCHEDULED
Qty: 90 TABLET | Refills: 3 | DISCHARGE
Start: 2018-09-21 | End: 2019-06-21

## 2018-09-21 RX ORDER — POLYETHYLENE GLYCOL 3350 17 G/17G
17 POWDER, FOR SOLUTION ORAL DAILY PRN
Qty: 527 G | Refills: 1 | DISCHARGE
Start: 2018-09-21 | End: 2018-10-21

## 2018-09-21 RX ADMIN — FOLIC ACID 2 MG: 1 TABLET ORAL at 08:04

## 2018-09-21 RX ADMIN — DOCUSATE SODIUM 100 MG: 100 CAPSULE, LIQUID FILLED ORAL at 08:04

## 2018-09-21 RX ADMIN — LOSARTAN POTASSIUM 25 MG: 25 TABLET, FILM COATED ORAL at 08:04

## 2018-09-21 RX ADMIN — ASPIRIN 81 MG CHEWABLE TABLET 81 MG: 81 TABLET CHEWABLE at 08:04

## 2018-09-21 RX ADMIN — POLYETHYLENE GLYCOL 3350 17 G: 17 POWDER, FOR SOLUTION ORAL at 08:11

## 2018-09-21 RX ADMIN — AMLODIPINE BESYLATE 10 MG: 10 TABLET ORAL at 08:04

## 2018-09-21 RX ADMIN — HYDROCODONE BITARTRATE AND ACETAMINOPHEN 2 TABLET: 5; 325 TABLET ORAL at 05:06

## 2018-09-21 RX ADMIN — INSULIN GLARGINE 15 UNITS: 100 INJECTION, SOLUTION SUBCUTANEOUS at 08:07

## 2018-09-21 RX ADMIN — HYDROCODONE BITARTRATE AND ACETAMINOPHEN 2 TABLET: 5; 325 TABLET ORAL at 09:49

## 2018-09-21 RX ADMIN — HYDROCODONE BITARTRATE AND ACETAMINOPHEN 1 TABLET: 5; 325 TABLET ORAL at 00:50

## 2018-09-21 RX ADMIN — APIXABAN 5 MG: 5 TABLET, FILM COATED ORAL at 08:04

## 2018-09-21 RX ADMIN — HYDROCODONE BITARTRATE AND ACETAMINOPHEN 2 TABLET: 5; 325 TABLET ORAL at 13:20

## 2018-09-21 RX ADMIN — METOPROLOL TARTRATE 25 MG: 25 TABLET ORAL at 08:04

## 2018-09-21 RX ADMIN — INSULIN LISPRO 2 UNITS: 100 INJECTION, SOLUTION INTRAVENOUS; SUBCUTANEOUS at 08:05

## 2018-09-21 RX ADMIN — FAMOTIDINE 20 MG: 20 TABLET ORAL at 08:04

## 2018-09-21 RX ADMIN — Medication 10 ML: at 08:12

## 2018-09-21 RX ADMIN — AMIODARONE HYDROCHLORIDE 200 MG: 200 TABLET ORAL at 08:04

## 2018-09-21 RX ADMIN — HYDRALAZINE HYDROCHLORIDE 25 MG: 25 TABLET ORAL at 12:23

## 2018-09-21 RX ADMIN — HYDRALAZINE HYDROCHLORIDE 25 MG: 25 TABLET ORAL at 05:06

## 2018-09-21 RX ADMIN — LEVOTHYROXINE SODIUM 150 MCG: 150 TABLET ORAL at 05:06

## 2018-09-21 RX ADMIN — INSULIN LISPRO 3 UNITS: 100 INJECTION, SOLUTION INTRAVENOUS; SUBCUTANEOUS at 12:24

## 2018-09-21 ASSESSMENT — PAIN SCALES - GENERAL
PAINLEVEL_OUTOF10: 6
PAINLEVEL_OUTOF10: 4
PAINLEVEL_OUTOF10: 8
PAINLEVEL_OUTOF10: 4
PAINLEVEL_OUTOF10: 8
PAINLEVEL_OUTOF10: 5

## 2018-09-21 ASSESSMENT — PAIN DESCRIPTION - PAIN TYPE
TYPE: SURGICAL PAIN
TYPE: SURGICAL PAIN

## 2018-09-21 ASSESSMENT — PAIN DESCRIPTION - ORIENTATION
ORIENTATION: RIGHT
ORIENTATION: RIGHT

## 2018-09-21 ASSESSMENT — PAIN DESCRIPTION - LOCATION
LOCATION: ARM
LOCATION: ARM

## 2018-09-21 NOTE — FLOWSHEET NOTE
[address]  OCCUPATIONAL THERAPY MISSED TREATMENT NOTE  [facility] ORTHOPEDICS 7K  7K-02/002-A      Date: 2018  Patient Name: Fransico Laura        CSN: [id number]   : 1945  (72 y.o.)  Gender: female   Referring Practitioner: Elissa Gamez PA-C  Diagnosis: closed displaced oblique fracture of shaft of right humerus          REASON FOR MISSED TREATMENT:  Missed Treat. Pt working with physical therapy at times of attempt.  Will try again later as time permits

## 2018-09-21 NOTE — CARE COORDINATION
9/21/18, 12:20 PM    DISCHARGE BARRIERS      Spoke with [facility]. Facility is ready to accept today, if discharged. Transfer packet on chart for discharge.

## 2018-09-21 NOTE — PROGRESS NOTES
Physical Therapy   [address] [address]  INPATIENT PHYSICAL THERAPY  DAILYNOTE  [facility] 7K - 7K-02/002-A    Time In: 0935  Time Out: 1000  Timed Code Treatment Minutes: 25 Minutes  Minutes: 25          Date: 2018  Patient Name: Francisco Gardiner,  Gender:  female        MRN: [id number]  : 1945  (72 y.o.)     Referring Practitioner: BERT Horn  Diagnosis: closed displaced oblique fracture of shaft of right humerus  Additional Pertinent Hx: admit s/p fall with above diagnosis, closed fracture body of left scapula, s/p ORIF right humerus fracture on 18     Past Medical History:   Diagnosis Date    [facility]     Cancer ([facility]     Diabetes mellitus ([facility]     Buckner filter in place     Hyperlipidemia     Stroke (cerebrum) ([facility]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    Thyroid disease      Past Surgical History:   Procedure Laterality Date    APPENDECTOMY      CHOLECYSTECTOMY      FRACTURE SURGERY      ankle surgery    HERNIA REPAIR      HYSTERECTOMY      [place] OFFICE/OUTPT VISIT,PROCEDURE ONLY Right 2018    ORIF RIGHT PROXIMAL HUMERUS FX performed by Itzel Rogers MD at [facility]         Restrictions/Precautions:  Weight Bearing, Fall Risk, General Precautions                 Right Upper Extremity Weight Bearing: Non Weight Bearing  Left Upper Extremity Weight Bearing: Non Weight Bearing  Other position/activity restrictions: ok for RUE ER 0deg and PROM to 90 degrees forward flexion, L scap fx  Right Upper Extremity Brace/Splint: Sling    Prior Level of Function:  ADL Assistance: Independent  Homemaking Assistance: Independent  Ambulation Assistance: Independent  Transfer Assistance: Independent  Additional Comments: home all the time to assist     Subjective:     Subjective: RN approved session, and stated pt would like to amb to restroom. Pt resting in bed with  present. Pt pleasant and agreeable to session.      Pain:   .  Pain risk for falls, Left in bed (nursing with pt after session)    Plan:  Times per week: 5-6X O  Times per day: Daily  Specific instructions for Next Treatment: therex, mobility  Current Treatment Recommendations: Strengthening, Balance Training, Endurance Training, Functional Mobility Training, Transfer Training, Gait Training, Pain Management, Equipment Evaluation, Education, & procurement, Patient/Caregiver Education & Training, Home Exercise Program    Goals:  Patient goals : per pt her and spouse discussed SNF for rehab due to inability to use BUE    Short term goals  Time Frame for Short term goals: 2 weeks  Short term goal 1: bed mobility with minAx1 to get in/out of bed  Short term goal 2: transfer with CGA to get in/out of chairs  Short term goal 3: amb 50'x1 no AD and CGA to walk safely to bathroom    Long term goals  Time Frame for Long term goals : no LTGs set secondary to short ELOS            AM-PAC Inpatient Mobility without Stair Climbing Raw Score : 14  AM-PAC Inpatient without Stair Climbing T-Scale Score : 40.85  Mobility Inpatient CMS 0-100% Score: 53.33  Mobility Inpatient without Stair CMS G-Code Modifier : CK

## 2018-09-21 NOTE — PLAN OF CARE
Problem: Daily Care:  Goal: Daily care needs are met  Daily care needs are met   Outcome: Ongoing  Patient requires assistance from staff with ADLs. Patient is able to use call light to request assistance when needed. Will continue to address patient's needs as they arise. Problem: Discharge Planning:  Goal: Discharged to appropriate level of care  Discharged to appropriate level of care   Outcome: Ongoing  Patient planning discharge to [facility].  assisting patient with discharge needs. Problem: Risk for Impaired Skin Integrity  Goal: Tissue integrity - skin and mucous membranes  Structural intactness and normal physiological function of skin and  mucous membranes. Outcome: Ongoing  No new skin issues noted to patient this shift. Patient with surgical incision to RUE. Redness noted to buttocks. Patient assisted with repositioning in bed in order to prevent skin breakdown. Will continue to monitor. Problem: DISCHARGE BARRIERS  Goal: Patient's continuum of care needs are met  Outcome: Ongoing  Planning [facility] at discharge. Problem: Pain Control  Goal: Maintain pain level at or below patient's acceptable level (or 5 if patient is unable to determine acceptable level)  Outcome: Ongoing  Patient with stated pain goal of 3/10. Patient verbalizing pain to bilateral arms. Patient taking norco every four hours PRN for pain and voices relief of pain with PO pain medications. Periods of rest noted after administering pain medications. Will continue to assess patient for pain and administer pain medication as appropriate. Comments: Care plan reviewed with patient. Patient verbalizes understanding of plan of care and contributes to goal setting.

## 2018-09-22 NOTE — DISCHARGE SUMMARY
°F (37 °C) 99.1 °F (37.3 °C)     TempSrc: Oral Oral     SpO2: 95% 94% 95%    Weight:       Height:         Weight: Weight: 190 lb (86.2 kg)     24 hour intake/output:  Intake/Output Summary (Last 24 hours) at 09/21/18 2216  Last data filed at 09/21/18 1400   Gross per 24 hour   Intake              630 ml   Output              425 ml   Net              205 ml       GENERAL: alert, cooperative, no distress, resting comfortably in bed  LUNGS: clear to auscultation bilaterally- no wheezes, rales or rhonchi, normal air movement, no respiratory distress  HEART: normal rate, normal S1 and S2, no gallops and intact distal pulses  ABDOMEN: soft, non-tender, non-distended, normal bowel sounds, no masses or organomegaly  WOUNDS: RUE wrapped in dry dressing and ACE wrap, incision site not visualized  EXTREMITY: no cyanosis and no clubbing, intact  strength bl UE's, mild edema of RUE, pulses intact RUE, sensation intact, equal bilaterally. Dressing to right shoulder is dry and intact. Sling in place   Significant Diagnostics:   Radiology: Xr Shoulder Right (min 2 Views)    Result Date: 9/17/2018  PROCEDURE: XR HUMERUS RIGHT (MIN 2 VIEWS), XR SHOULDER RIGHT (MIN 2 VIEWS) CLINICAL INFORMATION: fall/pain, COMPARISON: No prior study. TECHNIQUE: 3 standard views of the right shoulder and 2 standard views of the right humerus were obtained. FINDINGS: RIGHT SHOULDER: There is a mildly comminuted slightly oblique fracture of the proximal humeral shaft. The major distal fracture fragment is displaced posteriorly one shaft width. There is moderate dilation of the concavity directed anteriorly. Note that a few tiny calcifications project over the midportion of the right scapula on one view are not seen on the other views, possibly artifactual. There is suggestion of the fracture extends through the greater tuberosity as well. RIGHT HUMERUS: the above-described comminuted fracture proximal humerus is again seen.  The remainder of the fracture extends through the greater tuberosity as well. RIGHT HUMERUS: the above-described comminuted fracture proximal humerus is again seen. The remainder of the right humerus is unremarkable. Mildly comminuted moderately displaced, mildly angulated proximal humeral shaft fracture with suspicion of extension through the greater tuberosity of the humeral head. . **This report has been created using voice recognition software. It may contain minor errors which are inherent in voice recognition technology. ** Final report electronically signed by Dr. Alba Limon on 9/17/2018 1:27 PM    Ct Head Wo Contrast    Result Date: 9/17/2018  PROCEDURE: CT HEAD WO CONTRAST CLINICAL INFORMATION: FALL/ ON Jerrol Sor. COMPARISON: No prior study. TECHNIQUE: Noncontrast 5 mm axial images were obtained through the brain. All CT scans at this facility use dose modulation, iterative reconstruction, and/or weight-based dosing when appropriate to reduce radiation dose to as low as reasonably achievable. FINDINGS: Parenchyma: There is no edema or mass effect. Ventricles and sulci: Mild atrophy. Other: There is no acute intracranial hemorrhage. No calvarial fracture. There are no air fluid levels. Mild ethmoid and left maxillary mucosal thickening. There is no acute fracture or hemorrhage. **This report has been created using voice recognition software. It may contain minor errors which are inherent in voice recognition technology. ** Final report electronically signed by Dr. Edna Lilly on 9/17/2018 2:49 PM    Ct Chest Wo Contrast    Result Date: 9/17/2018  PROCEDURE: CT CHEST WO CONTRAST CLINICAL INFORMATION: TRAUMA/SCAPULAR. Patient fell COMPARISON: No prior study. TECHNIQUE: 5 mm noncontrast axial images were obtained through the chest. Sagittal and coronal reconstructions were obtained.  All CT scans at this facility use dose modulation, iterative reconstruction, and/or weight-based dosing when appropriate to reduce Range    POC Glucose 217 (H) 70 - 108 mg/dl   POCT glucose    Collection Time: 09/19/18  3:30 PM   Result Value Ref Range    POC Glucose 204 (H) 70 - 108 mg/dl   POCT glucose    Collection Time: 09/19/18  6:24 PM   Result Value Ref Range    POC Glucose 217 (H) 70 - 108 mg/dl   POCT Glucose    Collection Time: 09/19/18  7:54 PM   Result Value Ref Range    POC Glucose 256 (H) 70 - 108 mg/dl   Basic Metabolic Panel w/ Reflex to MG    Collection Time: 09/20/18  5:26 AM   Result Value Ref Range    Sodium 130 (L) 135 - 145 meq/L    Potassium reflex Magnesium 3.9 3.5 - 5.2 meq/L    Chloride 94 (L) 98 - 111 meq/L    CO2 24 23 - 33 meq/L    Glucose 172 (H) 70 - 108 mg/dL    BUN 16 7 - 22 mg/dL    CREATININE 0.7 0.4 - 1.2 mg/dL    Calcium 9.2 8.5 - 10.5 mg/dL   CBC    Collection Time: 09/20/18  5:26 AM   Result Value Ref Range    WBC 5.1 4.8 - 10.8 thou/mm3    RBC 3.50 (L) 4.20 - 5.40 mill/mm3    Hemoglobin 11.8 (L) 12.0 - 16.0 gm/dl    Hematocrit 34.5 (L) 37.0 - 47.0 %    MCV 98.6 81.0 - 99.0 fL    MCH 33.7 (H) 26.0 - 33.0 pg    MCHC 34.2 32.2 - 35.5 gm/dl    RDW-CV 12.9 11.5 - 14.5 %    RDW-SD 46.2 (H) 35.0 - 45.0 fL    Platelets 611 554 - 901 thou/mm3    MPV 9.4 9.4 - 12.4 fL   Anion Gap    Collection Time: 09/20/18  5:26 AM   Result Value Ref Range    Anion Gap 12.0 8.0 - 16.0 meq/L   Glomerular Filtration Rate, Estimated    Collection Time: 09/20/18  5:26 AM   Result Value Ref Range    Est, Glom Filt Rate 82 (A) ml/min/1.73m2   POCT glucose    Collection Time: 09/20/18  6:12 AM   Result Value Ref Range    POC Glucose 201 (H) 70 - 108 mg/dl   POCT glucose    Collection Time: 09/20/18 10:31 AM   Result Value Ref Range    POC Glucose 251 (H) 70 - 108 mg/dl   POCT glucose    Collection Time: 09/20/18  3:22 PM   Result Value Ref Range    POC Glucose 261 (H) 70 - 108 mg/dl   POCT Glucose    Collection Time: 09/20/18  7:49 PM   Result Value Ref Range    POC Glucose 258 (H) 70 - 108 mg/dl   POCT glucose    Collection Time:

## 2018-10-30 ENCOUNTER — OFFICE VISIT (OUTPATIENT)
Dept: FAMILY MEDICINE CLINIC | Age: 73
End: 2018-10-30
Payer: MEDICARE

## 2018-10-30 VITALS
DIASTOLIC BLOOD PRESSURE: 84 MMHG | HEART RATE: 55 BPM | HEIGHT: 65 IN | SYSTOLIC BLOOD PRESSURE: 142 MMHG | WEIGHT: 184 LBS | TEMPERATURE: 98.1 F | BODY MASS INDEX: 30.66 KG/M2 | RESPIRATION RATE: 14 BRPM

## 2018-10-30 DIAGNOSIS — I10 ESSENTIAL HYPERTENSION: ICD-10-CM

## 2018-10-30 DIAGNOSIS — E03.8 OTHER SPECIFIED HYPOTHYROIDISM: ICD-10-CM

## 2018-10-30 DIAGNOSIS — E11.69 TYPE 2 DIABETES MELLITUS WITH OTHER SPECIFIED COMPLICATION, WITH LONG-TERM CURRENT USE OF INSULIN (HCC): Primary | ICD-10-CM

## 2018-10-30 DIAGNOSIS — Z79.4 TYPE 2 DIABETES MELLITUS WITH OTHER SPECIFIED COMPLICATION, WITH LONG-TERM CURRENT USE OF INSULIN (HCC): Primary | ICD-10-CM

## 2018-10-30 DIAGNOSIS — I48.20 CHRONIC ATRIAL FIBRILLATION (HCC): ICD-10-CM

## 2018-10-30 PROBLEM — S42.112A CLOSED FRACTURE OF BODY OF LEFT SCAPULA: Status: RESOLVED | Noted: 2018-09-17 | Resolved: 2018-10-30

## 2018-10-30 PROBLEM — S42.334A CLOSED NONDISPLACED OBLIQUE FRACTURE OF SHAFT OF RIGHT HUMERUS: Status: RESOLVED | Noted: 2018-09-17 | Resolved: 2018-10-30

## 2018-10-30 PROBLEM — S42.251A CLOSED DISPLACED FRACTURE OF GREATER TUBEROSITY OF RIGHT HUMERUS: Status: RESOLVED | Noted: 2018-09-17 | Resolved: 2018-10-30
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
## 2018-10-30 LAB — HBA1C MFR BLD: 6.4 %

## 2018-10-30 PROCEDURE — 1123F ACP DISCUSS/DSCN MKR DOCD: CPT | Performed by: FAMILY MEDICINE

## 2018-10-30 PROCEDURE — G8400 PT W/DXA NO RESULTS DOC: HCPCS | Performed by: FAMILY MEDICINE

## 2018-10-30 PROCEDURE — 3017F COLORECTAL CA SCREEN DOC REV: CPT | Performed by: FAMILY MEDICINE

## 2018-10-30 PROCEDURE — 1101F PT FALLS ASSESS-DOCD LE1/YR: CPT | Performed by: FAMILY MEDICINE

## 2018-10-30 PROCEDURE — 1090F PRES/ABSN URINE INCON ASSESS: CPT | Performed by: FAMILY MEDICINE

## 2018-10-30 PROCEDURE — 2022F DILAT RTA XM EVC RTNOPTHY: CPT | Performed by: FAMILY MEDICINE

## 2018-10-30 PROCEDURE — 99214 OFFICE O/P EST MOD 30 MIN: CPT | Performed by: FAMILY MEDICINE

## 2018-10-30 PROCEDURE — G8427 DOCREV CUR MEDS BY ELIG CLIN: HCPCS | Performed by: FAMILY MEDICINE

## 2018-10-30 PROCEDURE — 4040F PNEUMOC VAC/ADMIN/RCVD: CPT | Performed by: FAMILY MEDICINE

## 2018-10-30 PROCEDURE — 1036F TOBACCO NON-USER: CPT | Performed by: FAMILY MEDICINE

## 2018-10-30 PROCEDURE — G8482 FLU IMMUNIZE ORDER/ADMIN: HCPCS | Performed by: FAMILY MEDICINE

## 2018-10-30 PROCEDURE — G8417 CALC BMI ABV UP PARAM F/U: HCPCS | Performed by: FAMILY MEDICINE

## 2018-10-30 PROCEDURE — 3044F HG A1C LEVEL LT 7.0%: CPT | Performed by: FAMILY MEDICINE

## 2018-10-30 PROCEDURE — 83036 HEMOGLOBIN GLYCOSYLATED A1C: CPT | Performed by: FAMILY MEDICINE

## 2018-10-30 RX ORDER — HYDROCHLOROTHIAZIDE 25 MG/1
25 TABLET ORAL DAILY
Qty: 90 TABLET | Refills: 3 | Status: SHIPPED | OUTPATIENT
Start: 2018-10-30 | End: 2019-10-24 | Stop reason: SDUPTHER

## 2018-10-30 RX ORDER — LOSARTAN POTASSIUM 25 MG/1
25 TABLET ORAL DAILY
Qty: 90 TABLET | Refills: 3 | Status: SHIPPED | OUTPATIENT
Start: 2018-10-30 | End: 2019-06-21

## 2018-10-30 RX ORDER — HYDROCHLOROTHIAZIDE 25 MG/1
25 TABLET ORAL
COMMUNITY
End: 2018-10-30 | Stop reason: SDUPTHER

## 2018-10-30 NOTE — PROGRESS NOTES
Saloni Patrick is a 72 y.o. female that presents for     Chief Complaint   Patient presents with    Follow-up     6 month f/u DM, no new concerns            HPI:    Diabetes Type 2    Glucose control:   Does patient check blood glucoses at home? Yes - FBG generally below 100, today was 81. PM sugars are variable can be . Report of hypoglycemia: yes  Lab Results   Component Value Date    LABA1C 6.4 10/30/2018     No results found for: EAG    Symptoms  Polyuria, Polydipsia or Polyphagia? No  Chest Pain, SOB, or Palpitations? -  No  New Vision complaints? No  Paresthesias of the extremities? No    Medications  Current medication were reviewed. Compliant with medications? yes  Medication side effects? Yes - hypoglycemia once per week  On ACE-I or ARB? Yes  On antiplatelet therapy? Yes  On Statin? Yes    Exercise  Exercise? No  Wt Readings from Last 3 Encounters:   10/30/18 184 lb (83.5 kg)   09/17/18 190 lb (86.2 kg)   05/17/18 194 lb (88 kg)       Diet discipline?:  Low salt, fat, sugar diet?  no    Blood pressure control:  BP Readings from Last 3 Encounters:   10/30/18 (!) 142/84   09/21/18 132/63   09/19/18 (!) 127/59       No results found for: LABMICR, [id number]    Lab Results   Component Value Date    [address] [employer] 60 02/27/2012       HTN    Does patient check BP regularly at home? - Yes - has been a little higher at home recently  Current Medication regimen - Metoprolol, HCTZ, Hydralazine, Losartan  Tolerating medications well? - yes    Shortness of breath or chest pain? No  Headache or visual complaints? No  Neurologic changes like confusion? No  Extremity edema? No    BP Readings from Last 3 Encounters:   10/30/18 (!) 142/84   09/21/18 132/63   09/19/18 (!) 127/59     AFib:  Follow with Dr Alex Bedolla. Notes occasional palpitations. No CP, SOB.     Health Maintenance   Topic Date Due    Hepatitis C screen  1945    Diabetic foot exam  02/26/1957    Diabetic retinal exam  02/26/1957   Nick Ennis DTaP/Tdap/Td vaccine (1 - Tdap) 02/26/1966    Shingles Vaccine (1 of 2 - 2 Dose Series) 02/26/1997    Lipid screen  02/27/2013    Pneumococcal low/med risk (2 of 2 - PCV13) 06/23/2018    A1C test (Diabetic or Prediabetic)  09/17/2019    TSH testing  09/18/2019    Potassium monitoring  09/20/2019    Creatinine monitoring  09/20/2019    Breast cancer screen  10/27/2019    Colon cancer screen colonoscopy  07/02/2025    DEXA (modify frequency per FRAX score)  Completed    Flu vaccine  Completed       Past Medical History:   Diagnosis Date    Atrial fibrillation ([address]     Cancer ([address]     Diabetes mellitus ([address]     Deepika filter in place     Hyperlipidemia     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    Thyroid disease        Past Surgical History:   Procedure Laterality Date    APPENDECTOMY      CHOLECYSTECTOMY      FRACTURE SURGERY      ankle surgery    HERNIA REPAIR      HYSTERECTOMY      NM OFFICE/OUTPT VISIT,PROCEDURE ONLY Right 9/19/2018    ORIF RIGHT PROXIMAL HUMERUS FX performed by Narda Carnes MD at [address] [place] [address]         Social History   Substance Use Topics    Smoking status: Never Smoker    Smokeless tobacco: Never Used    Alcohol use No       Family History   Problem Relation Age of Onset    Heart Disease Mother     Heart Disease Father          I have reviewed the patient's past medical history, past surgical history, allergies, medications, social and family history and I have made updates where appropriate.     Review of Systems        PHYSICAL EXAM:  BP (!) 142/84   Pulse 55   Temp 98.1 °F (36.7 °C) (Oral)   Resp 14   Ht 5' 4.5\" (1.638 m)   Wt 184 lb (83.5 kg)   BMI 31.10 kg/m²     General Appearance: well developed and well- nourished, in no acute distress  Head: normocephalic and atraumatic  ENT: external ear and ear canal normal bilaterally, nose without deformity, nasal mucosa and turbinates normal without polyps, oropharynx normal,

## 2018-11-20 ENCOUNTER — NURSE ONLY (OUTPATIENT)
Dept: LAB | Age: 73
End: 2018-11-20

## 2018-11-20 LAB
ALBUMIN SERPL-MCNC: 4.2 G/DL (ref 3.5–5.1)
ALP BLD-CCNC: 62 U/L (ref 38–126)
ALT SERPL-CCNC: 35 U/L (ref 11–66)
ANION GAP SERPL CALCULATED.3IONS-SCNC: 15 MEQ/L (ref 8–16)
AST SERPL-CCNC: 38 U/L (ref 5–40)
BASOPHILS # BLD: 0.7 %
BASOPHILS ABSOLUTE: 0 THOU/MM3 (ref 0–0.1)
BILIRUB SERPL-MCNC: 0.5 MG/DL (ref 0.3–1.2)
BILIRUBIN DIRECT: < 0.2 MG/DL (ref 0–0.3)
BUN BLDV-MCNC: 19 MG/DL (ref 7–22)
CALCIUM SERPL-MCNC: 10.7 MG/DL (ref 8.5–10.5)
CHLORIDE BLD-SCNC: 91 MEQ/L (ref 98–111)
CHOLESTEROL, TOTAL: 153 MG/DL (ref 100–199)
CO2: 27 MEQ/L (ref 23–33)
CREAT SERPL-MCNC: 0.6 MG/DL (ref 0.4–1.2)
EOSINOPHIL # BLD: 2.5 %
EOSINOPHILS ABSOLUTE: 0.1 THOU/MM3 (ref 0–0.4)
ERYTHROCYTE [DISTWIDTH] IN BLOOD BY AUTOMATED COUNT: 13.1 % (ref 11.5–14.5)
ERYTHROCYTE [DISTWIDTH] IN BLOOD BY AUTOMATED COUNT: 48.6 FL (ref 35–45)
GFR SERPL CREATININE-BSD FRML MDRD: > 90 ML/MIN/1.73M2
GLUCOSE BLD-MCNC: 138 MG/DL (ref 70–108)
HCT VFR BLD CALC: 38.1 % (ref 37–47)
HDLC SERPL-MCNC: 46 MG/DL
HEMOGLOBIN: 12.5 GM/DL (ref 12–16)
IMMATURE GRANS (ABS): 0.02 THOU/MM3 (ref 0–0.07)
IMMATURE GRANULOCYTES: 0.5 %
LDL CHOLESTEROL CALCULATED: 76 MG/DL
LYMPHOCYTES # BLD: 21.9 %
LYMPHOCYTES ABSOLUTE: 0.9 THOU/MM3 (ref 1–4.8)
MCH RBC QN AUTO: 33.2 PG (ref 26–33)
MCHC RBC AUTO-ENTMCNC: 32.8 GM/DL (ref 32.2–35.5)
MCV RBC AUTO: 101.3 FL (ref 81–99)
MONOCYTES # BLD: 7.9 %
MONOCYTES ABSOLUTE: 0.3 THOU/MM3 (ref 0.4–1.3)
NUCLEATED RED BLOOD CELLS: 0 /100 WBC
PLATELET # BLD: 241 THOU/MM3 (ref 130–400)
PMV BLD AUTO: 10 FL (ref 9.4–12.4)
POTASSIUM SERPL-SCNC: 4.8 MEQ/L (ref 3.5–5.2)
RBC # BLD: 3.76 MILL/MM3 (ref 4.2–5.4)
SEG NEUTROPHILS: 66.5 %
SEGMENTED NEUTROPHILS ABSOLUTE COUNT: 2.7 THOU/MM3 (ref 1.8–7.7)
SODIUM BLD-SCNC: 133 MEQ/L (ref 135–145)
T4 FREE: 2.15 NG/DL (ref 0.93–1.76)
TOTAL PROTEIN: 7.3 G/DL (ref 6.1–8)
TRIGL SERPL-MCNC: 156 MG/DL (ref 0–199)
TSH SERPL DL<=0.05 MIU/L-ACNC: 4.36 UIU/ML (ref 0.4–4.2)
WBC # BLD: 4.1 THOU/MM3 (ref 4.8–10.8)

## 2018-11-22 LAB — HOMOCYSTEINE, TOTAL: 13 UMOL/L

## 2019-01-13 ENCOUNTER — PATIENT MESSAGE (OUTPATIENT)
Dept: FAMILY MEDICINE CLINIC | Age: 74
End: 2019-01-13

## 2019-01-13 DIAGNOSIS — E11.9 TYPE 2 DIABETES MELLITUS WITHOUT COMPLICATION, UNSPECIFIED WHETHER LONG TERM INSULIN USE (HCC): ICD-10-CM

## 2019-01-14 ENCOUNTER — TELEPHONE (OUTPATIENT)
Dept: FAMILY MEDICINE CLINIC | Age: 74
End: 2019-01-14

## 2019-01-14 DIAGNOSIS — E11.69 TYPE 2 DIABETES MELLITUS WITH OTHER SPECIFIED COMPLICATION, WITH LONG-TERM CURRENT USE OF INSULIN (HCC): Primary | ICD-10-CM

## 2019-01-14 DIAGNOSIS — Z79.4 TYPE 2 DIABETES MELLITUS WITH OTHER SPECIFIED COMPLICATION, WITH LONG-TERM CURRENT USE OF INSULIN (HCC): Primary | ICD-10-CM

## 2019-01-14 RX ORDER — METFORMIN HYDROCHLORIDE 500 MG/1
1000 TABLET, EXTENDED RELEASE ORAL 2 TIMES DAILY
Qty: 360 TABLET | Refills: 3 | Status: SHIPPED | OUTPATIENT
Start: 2019-01-14 | End: 2019-10-24 | Stop reason: DRUGHIGH

## 2019-02-15 ENCOUNTER — HOSPITAL ENCOUNTER (OUTPATIENT)
Dept: PULMONOLOGY | Age: 74
Discharge: HOME | End: 2019-02-15
Payer: MEDICARE

## 2019-02-15 ENCOUNTER — HOSPITAL ENCOUNTER (OUTPATIENT)
Age: 74
Discharge: HOME | End: 2019-02-15
Payer: MEDICARE

## 2019-02-15 ENCOUNTER — HOSPITAL ENCOUNTER (OUTPATIENT)
Dept: GENERAL RADIOLOGY | Age: 74
Discharge: HOME | End: 2019-02-15
Payer: MEDICARE

## 2019-02-15 DIAGNOSIS — R00.2 PALPITATION: ICD-10-CM

## 2019-02-15 LAB
ALBUMIN SERPL-MCNC: 4.6 G/DL (ref 3.5–5.1)
ALP BLD-CCNC: 58 U/L (ref 38–126)
ALT SERPL-CCNC: 40 U/L (ref 11–66)
AST SERPL-CCNC: 32 U/L (ref 5–40)
BASOPHILS # BLD: 1 %
BASOPHILS ABSOLUTE: 0 THOU/MM3 (ref 0–0.1)
BILIRUB SERPL-MCNC: 0.4 MG/DL (ref 0.3–1.2)
BILIRUBIN DIRECT: < 0.2 MG/DL (ref 0–0.3)
EOSINOPHIL # BLD: 3.6 %
EOSINOPHILS ABSOLUTE: 0.2 THOU/MM3 (ref 0–0.4)
ERYTHROCYTE [DISTWIDTH] IN BLOOD BY AUTOMATED COUNT: 13 % (ref 11.5–14.5)
ERYTHROCYTE [DISTWIDTH] IN BLOOD BY AUTOMATED COUNT: 46.5 FL (ref 35–45)
HCT VFR BLD CALC: 39.9 % (ref 37–47)
HEMOGLOBIN: 13.5 GM/DL (ref 12–16)
IMMATURE GRANS (ABS): 0.02 THOU/MM3 (ref 0–0.07)
IMMATURE GRANULOCYTES: 0.5 %
LYMPHOCYTES # BLD: 27.7 %
LYMPHOCYTES ABSOLUTE: 1.2 THOU/MM3 (ref 1–4.8)
MCH RBC QN AUTO: 32.9 PG (ref 26–33)
MCHC RBC AUTO-ENTMCNC: 33.8 GM/DL (ref 32.2–35.5)
MCV RBC AUTO: 97.3 FL (ref 81–99)
MONOCYTES # BLD: 7.9 %
MONOCYTES ABSOLUTE: 0.3 THOU/MM3 (ref 0.4–1.3)
NUCLEATED RED BLOOD CELLS: 0 /100 WBC
PLATELET # BLD: 225 THOU/MM3 (ref 130–400)
PMV BLD AUTO: 9.2 FL (ref 9.4–12.4)
RBC # BLD: 4.1 MILL/MM3 (ref 4.2–5.4)
SEG NEUTROPHILS: 59.3 %
SEGMENTED NEUTROPHILS ABSOLUTE COUNT: 2.5 THOU/MM3 (ref 1.8–7.7)
T4 FREE: 2.87 NG/DL (ref 0.93–1.76)
TOTAL PROTEIN: 7.5 G/DL (ref 6.1–8)
TSH SERPL DL<=0.05 MIU/L-ACNC: 0.58 UIU/ML (ref 0.4–4.2)
WBC # BLD: 4.2 THOU/MM3 (ref 4.8–10.8)

## 2019-02-15 PROCEDURE — 71046 X-RAY EXAM CHEST 2 VIEWS: CPT

## 2019-02-15 PROCEDURE — 94726 PLETHYSMOGRAPHY LUNG VOLUMES: CPT

## 2019-02-15 PROCEDURE — 80076 HEPATIC FUNCTION PANEL: CPT

## 2019-02-15 PROCEDURE — 84443 ASSAY THYROID STIM HORMONE: CPT

## 2019-02-15 PROCEDURE — 94060 EVALUATION OF WHEEZING: CPT

## 2019-02-15 PROCEDURE — 2709999900 HC NON-CHARGEABLE SUPPLY

## 2019-02-15 PROCEDURE — 36415 COLL VENOUS BLD VENIPUNCTURE: CPT

## 2019-02-15 PROCEDURE — 85025 COMPLETE CBC W/AUTO DIFF WBC: CPT

## 2019-02-15 PROCEDURE — 83090 ASSAY OF HOMOCYSTEINE: CPT

## 2019-02-15 PROCEDURE — 94729 DIFFUSING CAPACITY: CPT

## 2019-02-15 PROCEDURE — 84439 ASSAY OF FREE THYROXINE: CPT

## 2019-02-16 LAB — HOMOCYSTEINE, TOTAL: 13 UMOL/L

## 2019-03-04 RX ORDER — GLIMEPIRIDE 4 MG/1
4 TABLET ORAL 2 TIMES DAILY
Qty: 30 TABLET | Refills: 11 | Status: SHIPPED | OUTPATIENT
Start: 2019-03-04 | End: 2019-03-09 | Stop reason: SDUPTHER

## 2019-03-09 RX ORDER — GLIMEPIRIDE 4 MG/1
4 TABLET ORAL 2 TIMES DAILY
Qty: 180 TABLET | Refills: 3 | Status: SHIPPED | OUTPATIENT
Start: 2019-03-09 | End: 2020-03-09 | Stop reason: SDUPTHER

## 2019-03-28 ENCOUNTER — TELEPHONE (OUTPATIENT)
Dept: FAMILY MEDICINE CLINIC | Age: 74
End: 2019-03-28

## 2019-04-12 ENCOUNTER — INITIAL CONSULT (OUTPATIENT)
Dept: PULMONOLOGY | Age: 74
End: 2019-04-12
Payer: MEDICARE

## 2019-04-12 VITALS
DIASTOLIC BLOOD PRESSURE: 72 MMHG | BODY MASS INDEX: 31.99 KG/M2 | WEIGHT: 192 LBS | HEIGHT: 65 IN | TEMPERATURE: 97.6 F | OXYGEN SATURATION: 99 % | SYSTOLIC BLOOD PRESSURE: 124 MMHG | HEART RATE: 57 BPM

## 2019-04-12 DIAGNOSIS — G47.10 HYPERSOMNIA: ICD-10-CM

## 2019-04-12 DIAGNOSIS — I63.9 CEREBROVASCULAR ACCIDENT (CVA), UNSPECIFIED MECHANISM (HCC): ICD-10-CM

## 2019-04-12 DIAGNOSIS — R06.83 SNORING: Primary | ICD-10-CM

## 2019-04-12 DIAGNOSIS — I48.20 CHRONIC A-FIB (HCC): ICD-10-CM

## 2019-04-12 PROCEDURE — 1123F ACP DISCUSS/DSCN MKR DOCD: CPT | Performed by: INTERNAL MEDICINE

## 2019-04-12 PROCEDURE — G8400 PT W/DXA NO RESULTS DOC: HCPCS | Performed by: INTERNAL MEDICINE

## 2019-04-12 PROCEDURE — 99203 OFFICE O/P NEW LOW 30 MIN: CPT | Performed by: INTERNAL MEDICINE

## 2019-04-12 PROCEDURE — 1036F TOBACCO NON-USER: CPT | Performed by: INTERNAL MEDICINE

## 2019-04-12 PROCEDURE — G8417 CALC BMI ABV UP PARAM F/U: HCPCS | Performed by: INTERNAL MEDICINE

## 2019-04-12 PROCEDURE — G8427 DOCREV CUR MEDS BY ELIG CLIN: HCPCS | Performed by: INTERNAL MEDICINE

## 2019-04-12 PROCEDURE — 3017F COLORECTAL CA SCREEN DOC REV: CPT | Performed by: INTERNAL MEDICINE

## 2019-04-12 PROCEDURE — G8598 ASA/ANTIPLAT THER USED: HCPCS | Performed by: INTERNAL MEDICINE

## 2019-04-12 PROCEDURE — 4040F PNEUMOC VAC/ADMIN/RCVD: CPT | Performed by: INTERNAL MEDICINE

## 2019-04-12 PROCEDURE — 1090F PRES/ABSN URINE INCON ASSESS: CPT | Performed by: INTERNAL MEDICINE

## 2019-04-12 NOTE — PATIENT INSTRUCTIONS
Recommendations/Plan:  -Will schedule patient for polysomnogram in the sleep lab. -I had a discussion with patient regarding avialable treatment options for her sleep disorder breathing including but not limited to CPAP titration in the sleep lab Vs.Dental appliance placement with referral to a local dentist Vs other available surgical options including Uvulopalatopharyngoplasty, maxillomandibular ostomy and tracheostomy as last option. At the end of discussion, she is not decided on her   treatment if she found to have obstructive sleep apnea at this time.  -We will see Tasneem Pola back in 1week after the sleep study to go over the sleep study results and further management options.  -She was educated to practice good sleep hygiene practices. She  was provided with a good sleep hygiene hand out. Anton Carter was advised to make earlier appointment with my clinic if she develops any worsening of sleep symptoms. She verbalizes understanding.  -Carolina Hernandez was advised to not to drive any motor vehicles or operate heavy equipment until her sleep symptoms are under good control. Kristal Enamorado verbalizes understanding.  -She was advised to loose weight by controlling diet and doing exercise once cleared by her cardiologist Dr. Luis Antonio Pelletier was educated about my impression and plan. She verbalizes understanding.

## 2019-04-12 NOTE — PROGRESS NOTES
[facility]                                                                                        Sleep Medicine Initial Consultation    Erin Malcolm                                             Chief Complaint:  New pt ref by Dr Elvia Hart. No previous studies per pt      Chief complaint: Erin Malcolm is a 69 y. o.oldfemale came for further evaluation regarding her ?sleep apnea  with referral from Dr. Leobardo John MD.      [place]:    Sleep/Wake schedule:  Usual time to go to bed during the work/regular day of week: 10:00 to 11:00 PM.  Usual time to wake up during the work//regular day of week: 6:00 to 7:00 AM.  Over the weekends her sleep schedule: [x] Remain same. .  She usually falls a sleep in less than: 15 to 20 minutes. She takes naps: Yes. Number of naps per week:  7 times. During each nap she spends a total of: 20 Minutes. The naps were reported as refreshing: No.     Sleep Hygiene:    Is the temperature and evironment in her bed room is acceptable to her: Yes. She watches Television in her bed room: No.  She read books, study, pay bills etc in the bed: No.  Frequency She wake up during night/sleep: 1 to 2  Majority of nocturnal awakenings are for urination: Yes. Difficulty in falling back to sleep after nocturnal awakenings: Yes- some times  . Do you drink coffee: No.     Do you drink caffeinated beverages i.e sodas: No.     Do you drink decaffeinated tea:Yes. 2 to 3 cup/s/glasse/s per day. Do you drink alcoholic beverages: No.  History of recreational drug use: No.     Sleep apnea symptoms:  Noticed to have loud snoring:Yes. Noted by her family member- spouse  Witnessed apneas during sleep noticed: No.  History of choking and gasping sensation at night time: No.  History of headaches in the morning:No.  History of dry mouth in the morning: Yes.   History of palpitations during night time/nocturnal awakenings: No.  History of sweating during night time/nocturnal awakenings: No    General:  History of head injury in the past: No.  She had a CVA in 2015 with right sided weakness. Associated loss of consciousness with head injury: No.  History of seizures: No.   Rest less legs syndrome symptoms:NO  History suggestive of periodic limb movements during sleep: NO  History suggestive of hypnagogic hallucinations: NO  History suggestive of hypnopompic hallucinations: NO  History suggestive of sleep talking:NO  History suggestive of sleep walking:NO  History suggestive of bruxism: No.   History suggestive of cataplexy: NO  History suggestive of sleep paralysis: NO    Family history of sleep disorders:  Family history of obstructive sleep apnea: No.   Family history of Narcolepsy: No.   Family history of Rest less legs syndrome : No.     History regarding old sleep studies:  Prior history of sleep study: No.  Using CPAP device: No.  Currently using home Oxygen: NO.       Patient considerations:  Is the patient is ambulatory: Yes  Patient is currently using: None of these Wheelchair, [employer] or [employer]. Para/Quadriplegic: NO  Hearing deficit : NO  Claustrophobic: NO  MDD : NO  Blind: NO  Incontinent: NO  Para/Quadraplegi: NO.   Need transportation to and from Sleep Center:NO      Social History:  Social History     Tobacco Use    Smoking status: Never Smoker    Smokeless tobacco: Never Used   Substance Use Topics    Alcohol use: No    Drug use: No   .  She is currently working: No.       [x] Retired.                           Past Medical History:   Diagnosis Date    At[facility] fibrillation ([facility]     Cancer ([facility]     Diabetes mellitus ([facility]     [place] filter in place     Hyperlipidemia     Stroke (cerebrum) ([facility]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    Thyroid disease        Past Surgical History:   Procedure Laterality Date    APPENDECTOMY      CHOLECYSTECTOMY      FRACTURE SURGERY      ankle surgery    HERNIA REPAIR      HYSTERECTOMY      DE OFFICE/OUTPT VISIT,PROCEDURE ONLY Right 9/19/2018    ORIF RIGHT PROXIMAL HUMERUS FX performed by Arthurine Blizzard, MD at [address] [place] [address]         No Known Allergies    Current Outpatient Medications   Medication Sig Dispense Refill    blood glucose test strips (EXACTECH TEST) strip 1 each by In Vitro route 3 times daily As needed. 100 each 3    glimepiride (AMARYL) 4 MG tablet Take 1 tablet by mouth 2 times daily 180 tablet 3    metFORMIN (GLUCOPHAGE-XR) 500 MG extended release tablet Take 2 tablets by mouth 2 times daily 360 tablet 3    losartan (COZAAR) 25 MG tablet Take 1 tablet by mouth daily 90 tablet 3    insulin glargine (BASAGLAR KWIKPEN) 100 UNIT/ML injection pen Inject 18 Units into the skin every morning (before breakfast) 5 pen     amLODIPine (NORVASC) 10 MG tablet Take 1 tablet by mouth daily 30 tablet 3    docusate (COLACE, DULCOLAX) 100 MG CAPS Take 100 mg by mouth 2 times daily as needed (Constipation)      levothyroxine (SYNTHROID) 150 MCG tablet Take 1 tablet by mouth Daily 90 tablet 3    metoprolol tartrate (LOPRESSOR) 25 MG tablet Take 25 mg by mouth 2 times daily      apixaban (ELIQUIS) 5 MG TABS tablet Take by mouth 2 times daily      amiodarone (CORDARONE) 200 MG tablet Take 200 mg by mouth daily      folic acid (FOLVITE) 1 MG tablet Take 2 mg by mouth 2 times daily      aspirin 81 MG tablet Take 81 mg by mouth daily      Multiple Vitamins-Minerals (ICAPS AREDS 2) CAPS Take by mouth 2 times daily      Multiple Vitamin (THERA-PLUS) LIQD Take 5 mLs by mouth daily.  hydrochlorothiazide (HYDRODIURIL) 25 MG tablet Take 1 tablet by mouth daily 90 tablet 3    hydrALAZINE (APRESOLINE) 25 MG tablet Take 1 tablet by mouth every 8 hours 90 tablet 3    atorvastatin (LIPITOR) 20 MG tablet Take 1 tablet by mouth nightly 30 tablet 3    Omega-3 Fatty Acids (FISH OIL PO) Take  by mouth.        No current Extremities: Patient exhibits no edema and no tenderness. Lymphadenopathy:  No cervical adenopathy. Neurological: Patient is alert and oriented to person, place, and time. Skin: Skin is warm and dry. Patient is not diaphoretic. Psychiatric: Patient  has a normal mood and affect. Patient behavior is normal.     Diagnostic Data:  None related sleep. Holter monitoring:                Assessment:  -Snoring without witnessed apneas,frequent nocturnal awakenings and excessive daytime sleepiness to evaluate for obstructive sleep apnea. -Inadequate sleep hygiene.  -Hypersomnia ( Excessive daytime sleepiness)may be due to obstructive sleep apnea Vs Inadequate sleep hygiene.  -Chronic atrial fibrillation on treatment with Eliquis. She follows with Dr. Vishal Freeman.  -CVA in 2015 with right sided weakness.  -Diabetes mellitus ([address] -Non Hodgkin's lymphoma. S/p Radiation and chemo. She is following with Dr. Mika Galaviz.  -S/p [place] filter in place     Recommendations/Plan:  -Will schedule patient for polysomnogram in the sleep lab. -I had a discussion with patient regarding avialable treatment options for her sleep disorder breathing including but not limited to CPAP titration in the sleep lab Vs.Dental appliance placement with referral to a local dentist Vs other available surgical options including Uvulopalatopharyngoplasty, maxillomandibular ostomy and tracheostomy as last option. At the end of discussion, she is not decided on her   treatment if she found to have obstructive sleep apnea at this time.  -We will see Oksana Timmons back in 1week after the sleep study to go over the sleep study results and further management options.  -She was educated to practice good sleep hygiene practices. She  was provided with a good sleep hygiene hand out. Fabiana Castaneda was advised to make earlier appointment with my clinic if she develops any worsening of sleep symptoms.  She verbalizes understanding.  -Lorna was advised to not to drive any motor vehicles or operate heavy equipment until her sleep symptoms are under good control. Kristal Enamorado verbalizes understanding.  -She was advised to loose weight by controlling diet and doing exercise once cleared by her cardiologist Dr. Tricia Brewer was educated about my impression and plan. She verbalizes understanding.

## 2019-04-24 ENCOUNTER — OFFICE VISIT (OUTPATIENT)
Dept: FAMILY MEDICINE CLINIC | Age: 74
End: 2019-04-24
Payer: MEDICARE

## 2019-04-24 VITALS
DIASTOLIC BLOOD PRESSURE: 66 MMHG | WEIGHT: 193 LBS | TEMPERATURE: 97.6 F | SYSTOLIC BLOOD PRESSURE: 102 MMHG | BODY MASS INDEX: 32.15 KG/M2 | HEIGHT: 65 IN | HEART RATE: 64 BPM | RESPIRATION RATE: 16 BRPM

## 2019-04-24 DIAGNOSIS — Z12.31 ENCOUNTER FOR SCREENING MAMMOGRAM FOR MALIGNANT NEOPLASM OF BREAST: ICD-10-CM

## 2019-04-24 DIAGNOSIS — E11.29 MICROALBUMINURIA DUE TO TYPE 2 DIABETES MELLITUS (HCC): ICD-10-CM

## 2019-04-24 DIAGNOSIS — I10 ESSENTIAL HYPERTENSION: ICD-10-CM

## 2019-04-24 DIAGNOSIS — Z79.4 TYPE 2 DIABETES MELLITUS WITH OTHER SPECIFIED COMPLICATION, WITH LONG-TERM CURRENT USE OF INSULIN (HCC): ICD-10-CM

## 2019-04-24 DIAGNOSIS — E16.2 HYPOGLYCEMIA: ICD-10-CM

## 2019-04-24 DIAGNOSIS — E03.8 OTHER SPECIFIED HYPOTHYROIDISM: ICD-10-CM

## 2019-04-24 DIAGNOSIS — E11.69 TYPE 2 DIABETES MELLITUS WITH OTHER SPECIFIED COMPLICATION, WITH LONG-TERM CURRENT USE OF INSULIN (HCC): ICD-10-CM

## 2019-04-24 DIAGNOSIS — R80.9 MICROALBUMINURIA DUE TO TYPE 2 DIABETES MELLITUS (HCC): ICD-10-CM

## 2019-04-24 DIAGNOSIS — Z00.00 ROUTINE GENERAL MEDICAL EXAMINATION AT A HEALTH CARE FACILITY: Primary | ICD-10-CM

## 2019-04-24 LAB — HBA1C MFR BLD: 6.6 %

## 2019-04-24 PROCEDURE — 99214 OFFICE O/P EST MOD 30 MIN: CPT | Performed by: FAMILY MEDICINE

## 2019-04-24 PROCEDURE — G8598 ASA/ANTIPLAT THER USED: HCPCS | Performed by: FAMILY MEDICINE

## 2019-04-24 PROCEDURE — 1090F PRES/ABSN URINE INCON ASSESS: CPT | Performed by: FAMILY MEDICINE

## 2019-04-24 PROCEDURE — G8417 CALC BMI ABV UP PARAM F/U: HCPCS | Performed by: FAMILY MEDICINE

## 2019-04-24 PROCEDURE — G8427 DOCREV CUR MEDS BY ELIG CLIN: HCPCS | Performed by: FAMILY MEDICINE

## 2019-04-24 PROCEDURE — 2022F DILAT RTA XM EVC RTNOPTHY: CPT | Performed by: FAMILY MEDICINE

## 2019-04-24 PROCEDURE — 3017F COLORECTAL CA SCREEN DOC REV: CPT | Performed by: FAMILY MEDICINE

## 2019-04-24 PROCEDURE — 1036F TOBACCO NON-USER: CPT | Performed by: FAMILY MEDICINE

## 2019-04-24 PROCEDURE — 3044F HG A1C LEVEL LT 7.0%: CPT | Performed by: FAMILY MEDICINE

## 2019-04-24 PROCEDURE — 4040F PNEUMOC VAC/ADMIN/RCVD: CPT | Performed by: FAMILY MEDICINE

## 2019-04-24 PROCEDURE — G0439 PPPS, SUBSEQ VISIT: HCPCS | Performed by: FAMILY MEDICINE

## 2019-04-24 PROCEDURE — 1123F ACP DISCUSS/DSCN MKR DOCD: CPT | Performed by: FAMILY MEDICINE

## 2019-04-24 PROCEDURE — G8400 PT W/DXA NO RESULTS DOC: HCPCS | Performed by: FAMILY MEDICINE

## 2019-04-24 PROCEDURE — 83036 HEMOGLOBIN GLYCOSYLATED A1C: CPT | Performed by: FAMILY MEDICINE

## 2019-04-24 RX ORDER — SIMVASTATIN 40 MG
40 TABLET ORAL NIGHTLY
COMMUNITY
End: 2019-04-24 | Stop reason: SDUPTHER

## 2019-04-24 RX ORDER — SIMVASTATIN 40 MG
40 TABLET ORAL NIGHTLY
Qty: 90 TABLET | Refills: 3 | Status: SHIPPED | OUTPATIENT
Start: 2019-04-24 | End: 2019-04-24 | Stop reason: SDUPTHER

## 2019-04-24 RX ORDER — SIMVASTATIN 40 MG
40 TABLET ORAL NIGHTLY
Qty: 90 TABLET | Refills: 3 | Status: SHIPPED | OUTPATIENT
Start: 2019-04-24 | End: 2020-07-13

## 2019-04-24 RX ORDER — SPIRONOLACTONE 25 MG/1
25 TABLET ORAL DAILY
COMMUNITY
End: 2019-10-24 | Stop reason: ALTCHOICE

## 2019-04-24 ASSESSMENT — ANXIETY QUESTIONNAIRES: GAD7 TOTAL SCORE: 0

## 2019-04-24 ASSESSMENT — PATIENT HEALTH QUESTIONNAIRE - PHQ9
SUM OF ALL RESPONSES TO PHQ QUESTIONS 1-9: 0
SUM OF ALL RESPONSES TO PHQ QUESTIONS 1-9: 0

## 2019-04-24 NOTE — PROGRESS NOTES
levothyroxine and she is due for labs in about 1 month. Health Maintenance   Topic Date Due    Hepatitis C screen  1945    Diabetic foot exam  02/26/1957    DTaP/Tdap/Td vaccine (1 - Tdap) 02/26/1966    Shingles Vaccine (1 of 2) 02/26/1997    Pneumococcal 65+ years Vaccine (2 of 2 - PCV13) 06/23/2018    Diabetic retinal exam  04/25/2019    Breast cancer screen  10/27/2019    A1C test (Diabetic or Prediabetic)  10/30/2019    Lipid screen  11/20/2019    Potassium monitoring  11/20/2019    Creatinine monitoring  11/20/2019    TSH testing  02/15/2020    Colon cancer screen colonoscopy  07/02/2025    DEXA (modify frequency per FRAX score)  Completed    Flu vaccine  Completed       Past Medical History:   Diagnosis Date    Atrial fibrillation ([address]     Cancer ([address]     Diabetes mellitus ([address]     Deepika filter in place     Hyperlipidemia     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    Thyroid disease        Past Surgical History:   Procedure Laterality Date    APPENDECTOMY      CHOLECYSTECTOMY      FRACTURE SURGERY      ankle surgery    HERNIA REPAIR      HYSTERECTOMY      GA OFFICE/OUTPT VISIT,PROCEDURE ONLY Right 9/19/2018    ORIF RIGHT PROXIMAL HUMERUS FX performed by Camilo Anaya MD at [facility]         Social History     Tobacco Use    Smoking status: Never Smoker    Smokeless tobacco: Never Used   Substance Use Topics    Alcohol use: No    Drug use: No       Family History   Problem Relation Age of Onset    Heart Disease Mother     Heart Disease Father          I have reviewed the patient's past medical history, past surgical history, allergies, medications, social and family history and I have made updates where appropriate.     Review of Systems        PHYSICAL EXAM:  /66   Pulse 64   Temp 97.6 °F (36.4 °C)   Resp 16   Ht 5' 4.5\" (1.638 m)   Wt 193 lb (87.5 kg)   BMI 32.62 kg/m²     General Appearance: well developed and well- nourished, in no acute distress  Head: normocephalic and atraumatic  ENT: external ear and ear canal normal bilaterally, nose without deformity, nasal mucosa and turbinates normal without polyps, oropharynx normal, dentition is normal for age, no lipor gum lesions noted  Neck: supple and non-tender without mass, no thyromegaly or thyroid nodules, no cervical lymphadenopathy  Pulmonary/Chest: clear to auscultation bilaterally- no wheezes, rales or rhonchi, normal air movement, no respiratory distress or retractions  Cardiovascular: normal rate, regular rhythm, normal S1 and S2, no murmurs, rubs, clicks, or gallops, distal pulses intact  Extremities: no cyanosis, clubbing or edema of the lower extremities  Psych:  Normal affect without evidence of depression oranxiety, insight and judgement are appropriate, memory appears intact  Skin: warm and dry, no rash or erythema      ASSESSMENT & PLAN  BARB Banegas was seen today for medicare aw. Diagnoses and all orders for this visit:    Routine general medical examination at a health care facility    Microalbuminuria due to type 2 diabetes mellitus ([facility]    Type 2 diabetes mellitus with other specified complication, with long-term current use of insulin ([facility]  -     POCT glycosylated hemoglobin (Hb A1C)  -     Discontinue: simvastatin (ZOCOR) 40 MG tablet; Take 1 tablet by mouth nightly  -     simvastatin (ZOCOR) 40 MG tablet; Take 1 tablet by mouth nightly    Encounter for screening mammogram for malignant neoplasm of breast  -     MALINI DIGITAL SCREEN W CAD BILATERAL; Future    Essential hypertension    Other specified hypothyroidism    Hypoglycemia    -Will back down Basaglar to 10 units q daily due to hypoglycemia, continue Amaryl and metformin  -Other Chronic issues stable, continue current medications  -Advised to call if any issues      Return in about 6 months (around 10/24/2019), or if symptoms worsen or fail to improve.     Controlled Substances Monitoring:

## 2019-04-24 NOTE — PROGRESS NOTES
Medicare Annual Wellness Visit  Name: Santiago Elena Date: 2019   MRN: [id number] Sex: Female   Age: 69 y.o. Ethnicity: Non-/Non    : 1945 Race: Allan Jaimes is here for Medicare AWV (Pt presents for a Medicare AWV )    Screenings for behavioral, psychosocial and functional/safety risks, and cognitive dysfunction are all negative except as indicated below. These results, as well as other patient data from the [address] form, are documented in Flowsheets linked to this Encounter. No Known Allergies    Prior to Visit Medications    Medication Sig Taking? Authorizing Provider   spironolactone (ALDACTONE) 25 MG tablet Take 25 mg by mouth daily Yes Historical Provider, MD   simvastatin (ZOCOR) 40 MG tablet Take 40 mg by mouth nightly Yes Historical Provider, MD   blood glucose test strips (EXACTECH TEST) strip 1 each by In Vitro route 3 times daily As needed.  Yes Santiago Harrison DO   glimepiride (AMARYL) 4 MG tablet Take 1 tablet by mouth 2 times daily Yes Britni Kemp,    metFORMIN (GLUCOPHAGE-XR) 500 MG extended release tablet Take 2 tablets by mouth 2 times daily Yes Angelica Harrison DO   losartan (COZAAR) 25 MG tablet Take 1 tablet by mouth daily  Patient taking differently: Take 50 mg by mouth daily  Yes Britni Kemp DO   hydrochlorothiazide (HYDRODIURIL) 25 MG tablet Take 1 tablet by mouth daily Yes Santiago Harrison DO   insulin glargine (BASAGLAR KWIKPEN) 100 UNIT/ML injection pen Inject 18 Units into the skin every morning (before breakfast) Yes Santiago Harrison DO   amLODIPine (NORVASC) 10 MG tablet Take 1 tablet by mouth daily Yes Suzi Rose PA-C   docusate (COLACE, DULCOLAX) 100 MG CAPS Take 100 mg by mouth 2 times daily as needed (Constipation) Yes Suzi Rose PA-C   hydrALAZINE (APRESOLINE) 25 MG tablet Take 1 tablet by mouth every 8 hours Yes Suzi Rose PA-C   atorvastatin (LIPITOR) 20 MG tablet Take 1 tablet by mouth nightly Yes Larisa Vogel PA-C   levothyroxine (SYNTHROID) 150 MCG tablet Take 1 tablet by mouth Daily  Patient taking differently: Take 125 mcg by mouth Daily  Yes Trinidad Hodgkin, DO   metoprolol tartrate (LOPRESSOR) 25 MG tablet Take 25 mg by mouth 2 times daily Yes Historical Provider, MD   apixaban (ELIQUIS) 5 MG TABS tablet Take by mouth 2 times daily Yes Historical Provider, MD   amiodarone (CORDARONE) 200 MG tablet Take 200 mg by mouth daily Yes Historical Provider, MD   folic acid (FOLVITE) 1 MG tablet Take 2 mg by mouth 2 times daily Yes Historical Provider, MD   aspirin 81 MG tablet Take 81 mg by mouth daily Yes Historical Provider, MD   Multiple Vitamins-Minerals (ICAPS AREDS 2) CAPS Take by mouth 2 times daily Yes Historical Provider, MD   Omega-3 Fatty Acids (FISH OIL PO) Take  by mouth. Yes Historical Provider, MD   Multiple Vitamin (THERA-PLUS) LIQD Take 5 mLs by mouth daily.  Yes Historical Provider, MD       Past Medical History:   Diagnosis Date    Atrial fibrillation ([address]     Cancer ([address]     Diabetes mellitus ([address]     Deepika filter in place     Hyperlipidemia     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    Thyroid disease      Past Surgical History:   Procedure Laterality Date    APPENDECTOMY      CHOLECYSTECTOMY      FRACTURE SURGERY      ankle surgery    HERNIA REPAIR      HYSTERECTOMY      AL OFFICE/OUTPT VISIT,PROCEDURE ONLY Right 9/19/2018    ORIF RIGHT PROXIMAL HUMERUS FX performed [facility] Arthurine Blizzard, MD at [facility]         Family History   Problem Relation Age of Onset    Heart Disease Mother     Heart Disease Father        CareTeam (Including outside providers/suppliers regularly involved in providing care):   Patient Care Team:  Trinidad Hodgkin, DO as PCP - General (Family Medicine)  Trinidad Hodgkin, DO as PCP - [facility] Attributed Provider    Wt Readings from Last 3 Encounters:   04/24/19 193 lb (87.5 kg)   04/12/19 192 lb (87.1 kg)   10/30/18 184 lb (83.5 kg)     Vitals:    04/24/19 0905   BP: 102/66   Pulse: 64   Resp: 16   Temp: 97.6 °F (36.4 °C)   Weight: 193 lb (87.5 kg)   Height: 5' 4.5\" (1.638 m)     Body mass index is 32.62 kg/m². Based upon direct observation of the patient, evaluation of cognition reveals recent and remote memory intact. Patient's complete Health Risk Assessment and screening values have been reviewed and are found in Flowsheets. The following problems were reviewed today and where indicated follow up appointments were made and/or referrals ordered. Positive Risk Factor Screenings with Interventions:     Health Habits/Nutrition:     Body mass index is 32.62 kg/m². Health Habits/Nutrition Interventions:  · discussed with patient today    Personalized Preventive Plan   Current Health Maintenance Status  Immunization History   Administered Date(s) Administered    Influenza Virus Vaccine 10/24/2017    Influenza, High Dose (Fluzone 65 yrs and older) 10/01/2018    Pneumococcal Polysaccharide ([id number]) 06/23/2017        Health Maintenance   Topic Date Due    Hepatitis C screen  1945    Diabetic foot exam  02/26/1957    DTaP/Tdap/Td vaccine (1 - Tdap) 02/26/1966    Shingles Vaccine (1 of 2) 02/26/1997    Pneumococcal 65+ years Vaccine (2 of 2 - PCV13) 06/23/2018    Diabetic retinal exam  04/25/2019    Breast cancer screen  10/27/2019    A1C test (Diabetic or Prediabetic)  10/30/2019    Lipid screen  11/20/2019    Potassium monitoring  11/20/2019    Creatinine monitoring  11/20/2019    TSH testing  02/15/2020    Colon cancer screen colonoscopy  07/02/2025    DEXA (modify frequency per FRAX score)  Completed    Flu vaccine  Completed     Recommendations for Preventive Services Due: see orders and patient instructions/AVS.  .   Recommended screening schedule for the next 5-10 years is provided to the patient in written form: see Patient Instructions/AVS.

## 2019-04-24 NOTE — PATIENT INSTRUCTIONS
You may receive a survey about your visit with us today. The feedback from our patients helps us identify what is working well and where the service to all patients can be enhanced. Thank you! Personalized Preventive Plan for Jennifer Miller - 4/24/2019  Medicare offers a range of preventive health benefits. Some of the tests and screenings are paid in full while other may be subject to a deductible, co-insurance, and/or copay. Some of these benefits include a comprehensive review of your medical history including lifestyle, illnesses that may run in your family, and various assessments and screenings as appropriate. After reviewing your medical record and screening and assessments performed today your provider may have ordered immunizations, labs, imaging, and/or referrals for you. A list of these orders (if applicable) as well as your Preventive Care list are included within your After Visit Summary for your review. Other Preventive Recommendations:    · A preventive eye exam performed by an eye specialist is recommended every 1-2 years to screen for glaucoma; cataracts, macular degeneration, and other eye disorders. · A preventive dental visit is recommended every 6 months. · Try to get at least 150 minutes of exercise per week or 10,000 steps per day on a pedometer . · Order or download the FREE \"Exercise & Physical Activity: Your Everyday Guide\" from [employer]. Call 3-484.684.1556 or search [employer] online. · You need 6999-3687 mg of calcium and 7490-3219 IU of vitamin D per day. It is possible to meet your calcium requirement with diet alone, but a vitamin D supplement is usually necessary to meet this goal.  · When exposed to the sun, use a sunscreen that protects against both UVA and UVB radiation with an SPF of 30 or greater. Reapply every 2 to 3 hours or after sweating, drying off with a towel, or swimming.   · Always wear a seat belt when traveling in a car. Always wear a helmet when riding a bicycle or motorcycle.

## 2019-05-23 NOTE — TELEPHONE ENCOUNTER
Rodrigo Peter called requesting a refill on the following medications:  Requested Prescriptions     Pending Prescriptions Disp Refills    insulin glargine (BASAGLAR KWIKPEN) 100 UNIT/ML injection pen 5 pen      Sig: Inject 10 Units into the skin every morning (before breakfast)     Pharmacy verified: [facility]  .       Date of last visit:  4/24/19  Date of next visit (if applicable): 98/31/2311

## 2019-06-20 ENCOUNTER — HOSPITAL ENCOUNTER (OUTPATIENT)
Dept: GENERAL RADIOLOGY | Age: 74
Discharge: HOME | End: 2019-06-20
Payer: MEDICARE

## 2019-06-20 ENCOUNTER — HOSPITAL ENCOUNTER (OUTPATIENT)
Age: 74
Discharge: HOME | End: 2019-06-20
Payer: MEDICARE

## 2019-06-20 DIAGNOSIS — S42.231K: ICD-10-CM

## 2019-06-20 LAB
ALBUMIN SERPL-MCNC: 4.7 G/DL (ref 3.5–5.1)
ALP BLD-CCNC: 53 U/L (ref 38–126)
ALT SERPL-CCNC: 35 U/L (ref 11–66)
ANION GAP SERPL CALCULATED.3IONS-SCNC: 17 MEQ/L (ref 8–16)
AST SERPL-CCNC: 32 U/L (ref 5–40)
BASOPHILS # BLD: 0.7 %
BASOPHILS ABSOLUTE: 0 THOU/MM3 (ref 0–0.1)
BILIRUB SERPL-MCNC: 0.6 MG/DL (ref 0.3–1.2)
BILIRUBIN DIRECT: < 0.2 MG/DL (ref 0–0.3)
BUN BLDV-MCNC: 22 MG/DL (ref 7–22)
CALCIUM SERPL-MCNC: 11.2 MG/DL (ref 8.5–10.5)
CHLORIDE BLD-SCNC: 92 MEQ/L (ref 98–111)
CHOLESTEROL, TOTAL: 151 MG/DL (ref 100–199)
CO2: 25 MEQ/L (ref 23–33)
CREAT SERPL-MCNC: 0.6 MG/DL (ref 0.4–1.2)
EOSINOPHIL # BLD: 2.2 %
EOSINOPHILS ABSOLUTE: 0.1 THOU/MM3 (ref 0–0.4)
ERYTHROCYTE [DISTWIDTH] IN BLOOD BY AUTOMATED COUNT: 13 % (ref 11.5–14.5)
ERYTHROCYTE [DISTWIDTH] IN BLOOD BY AUTOMATED COUNT: 48.9 FL (ref 35–45)
GFR SERPL CREATININE-BSD FRML MDRD: > 90 ML/MIN/1.73M2
GLUCOSE BLD-MCNC: 131 MG/DL (ref 70–108)
HCT VFR BLD CALC: 38.9 % (ref 37–47)
HDLC SERPL-MCNC: 56 MG/DL
HEMOGLOBIN: 13.1 GM/DL (ref 12–16)
IMMATURE GRANS (ABS): 0.02 THOU/MM3 (ref 0–0.07)
IMMATURE GRANULOCYTES: 0.4 %
LDL CHOLESTEROL CALCULATED: 66 MG/DL
LYMPHOCYTES # BLD: 23 %
LYMPHOCYTES ABSOLUTE: 1.1 THOU/MM3 (ref 1–4.8)
MCH RBC QN AUTO: 34.4 PG (ref 26–33)
MCHC RBC AUTO-ENTMCNC: 33.7 GM/DL (ref 32.2–35.5)
MCV RBC AUTO: 102.1 FL (ref 81–99)
MONOCYTES # BLD: 7.4 %
MONOCYTES ABSOLUTE: 0.3 THOU/MM3 (ref 0.4–1.3)
NUCLEATED RED BLOOD CELLS: 0 /100 WBC
PLATELET # BLD: 249 THOU/MM3 (ref 130–400)
PMV BLD AUTO: 9.1 FL (ref 9.4–12.4)
POTASSIUM SERPL-SCNC: 4.7 MEQ/L (ref 3.5–5.2)
RBC # BLD: 3.81 MILL/MM3 (ref 4.2–5.4)
SEG NEUTROPHILS: 66.3 %
SEGMENTED NEUTROPHILS ABSOLUTE COUNT: 3 THOU/MM3 (ref 1.8–7.7)
SODIUM BLD-SCNC: 134 MEQ/L (ref 135–145)
T4 FREE: 2.34 NG/DL (ref 0.93–1.76)
TOTAL PROTEIN: 7.8 G/DL (ref 6.1–8)
TRIGL SERPL-MCNC: 145 MG/DL (ref 0–199)
TSH SERPL DL<=0.05 MIU/L-ACNC: 1.95 UIU/ML (ref 0.4–4.2)
WBC # BLD: 4.6 THOU/MM3 (ref 4.8–10.8)

## 2019-06-20 PROCEDURE — 83090 ASSAY OF HOMOCYSTEINE: CPT

## 2019-06-20 PROCEDURE — 71046 X-RAY EXAM CHEST 2 VIEWS: CPT

## 2019-06-20 PROCEDURE — 36415 COLL VENOUS BLD VENIPUNCTURE: CPT

## 2019-06-20 PROCEDURE — 84439 ASSAY OF FREE THYROXINE: CPT

## 2019-06-20 PROCEDURE — 80053 COMPREHEN METABOLIC PANEL: CPT

## 2019-06-20 PROCEDURE — 85025 COMPLETE CBC W/AUTO DIFF WBC: CPT

## 2019-06-20 PROCEDURE — 84443 ASSAY THYROID STIM HORMONE: CPT

## 2019-06-20 PROCEDURE — 80061 LIPID PANEL: CPT

## 2019-06-20 PROCEDURE — 82248 BILIRUBIN DIRECT: CPT

## 2019-06-21 RX ORDER — LANOLIN ALCOHOL/MO/W.PET/CERES
1000 CREAM (GRAM) TOPICAL DAILY
COMMUNITY

## 2019-06-21 RX ORDER — LOSARTAN POTASSIUM 50 MG/1
100 TABLET ORAL DAILY
COMMUNITY
End: 2020-09-21 | Stop reason: SDUPTHER

## 2019-06-21 RX ORDER — LEVOTHYROXINE SODIUM 0.12 MG/1
125 TABLET ORAL DAILY
COMMUNITY
End: 2019-07-08 | Stop reason: SDUPTHER

## 2019-06-22 LAB — HOMOCYSTEINE, TOTAL: 12 UMOL/L

## 2019-06-27 ENCOUNTER — ANESTHESIA (OUTPATIENT)
Dept: OPERATING ROOM | Age: 74
End: 2019-06-27
Payer: MEDICARE

## 2019-06-27 ENCOUNTER — APPOINTMENT (OUTPATIENT)
Dept: GENERAL RADIOLOGY | Age: 74
End: 2019-06-27
Attending: ORTHOPAEDIC SURGERY
Payer: MEDICARE

## 2019-06-27 ENCOUNTER — ANESTHESIA EVENT (OUTPATIENT)
Dept: OPERATING ROOM | Age: 74
End: 2019-06-27
Payer: MEDICARE

## 2019-06-27 ENCOUNTER — HOSPITAL ENCOUNTER (OUTPATIENT)
Age: 74
LOS: 1 days | Discharge: HOME | End: 2019-06-27
Attending: ORTHOPAEDIC SURGERY | Admitting: ORTHOPAEDIC SURGERY
Payer: MEDICARE

## 2019-06-27 VITALS
OXYGEN SATURATION: 100 % | TEMPERATURE: 98.2 F | DIASTOLIC BLOOD PRESSURE: 76 MMHG | WEIGHT: 192.02 LBS | SYSTOLIC BLOOD PRESSURE: 171 MMHG | RESPIRATION RATE: 18 BRPM | BODY MASS INDEX: 31.99 KG/M2 | HEIGHT: 65 IN | HEART RATE: 65 BPM

## 2019-06-27 VITALS
SYSTOLIC BLOOD PRESSURE: 189 MMHG | DIASTOLIC BLOOD PRESSURE: 132 MMHG | OXYGEN SATURATION: 100 % | RESPIRATION RATE: 7 BRPM

## 2019-06-27 DIAGNOSIS — Z98.890 H/O MAJOR ORTHOPEDIC SURGERY: Primary | ICD-10-CM

## 2019-06-27 PROBLEM — Z87.81 HISTORY OF HUMERUS FRACTURE: Status: ACTIVE | Noted: 2019-06-27

## 2019-06-27 LAB
GLUCOSE BLD-MCNC: 173 MG/DL (ref 70–108)
GLUCOSE BLD-MCNC: 179 MG/DL (ref 70–108)

## 2019-06-27 PROCEDURE — 7100000000 HC PACU RECOVERY - FIRST 15 MIN: Performed by: ORTHOPAEDIC SURGERY

## 2019-06-27 PROCEDURE — 3700000001 HC ADD 15 MINUTES (ANESTHESIA): Performed by: ORTHOPAEDIC SURGERY

## 2019-06-27 PROCEDURE — 3600000014 HC SURGERY LEVEL 4 ADDTL 15MIN: Performed by: ORTHOPAEDIC SURGERY

## 2019-06-27 PROCEDURE — 3600000004 HC SURGERY LEVEL 4 BASE: Performed by: ORTHOPAEDIC SURGERY

## 2019-06-27 PROCEDURE — 2580000003 HC RX 258: Performed by: ORTHOPAEDIC SURGERY

## 2019-06-27 PROCEDURE — 2720000010 HC SURG SUPPLY STERILE: Performed by: ORTHOPAEDIC SURGERY

## 2019-06-27 PROCEDURE — 6360000002 HC RX W HCPCS: Performed by: ANESTHESIOLOGY

## 2019-06-27 PROCEDURE — 87075 CULTR BACTERIA EXCEPT BLOOD: CPT

## 2019-06-27 PROCEDURE — 7100000010 HC PHASE II RECOVERY - FIRST 15 MIN: Performed by: ORTHOPAEDIC SURGERY

## 2019-06-27 PROCEDURE — 87070 CULTURE OTHR SPECIMN AEROBIC: CPT

## 2019-06-27 PROCEDURE — 3209999900 FLUORO FOR SURGICAL PROCEDURES

## 2019-06-27 PROCEDURE — 7100000001 HC PACU RECOVERY - ADDTL 15 MIN: Performed by: ORTHOPAEDIC SURGERY

## 2019-06-27 PROCEDURE — 2500000003 HC RX 250 WO HCPCS: Performed by: ORTHOPAEDIC SURGERY

## 2019-06-27 PROCEDURE — 2709999900 HC NON-CHARGEABLE SUPPLY: Performed by: ORTHOPAEDIC SURGERY

## 2019-06-27 PROCEDURE — 82948 REAGENT STRIP/BLOOD GLUCOSE: CPT

## 2019-06-27 PROCEDURE — 2500000003 HC RX 250 WO HCPCS: Performed by: ANESTHESIOLOGY

## 2019-06-27 PROCEDURE — 6360000002 HC RX W HCPCS: Performed by: ORTHOPAEDIC SURGERY

## 2019-06-27 PROCEDURE — C1713 ANCHOR/SCREW BN/BN,TIS/BN: HCPCS | Performed by: ORTHOPAEDIC SURGERY

## 2019-06-27 PROCEDURE — 87205 SMEAR GRAM STAIN: CPT

## 2019-06-27 PROCEDURE — 7100000011 HC PHASE II RECOVERY - ADDTL 15 MIN: Performed by: ORTHOPAEDIC SURGERY

## 2019-06-27 PROCEDURE — 3700000000 HC ANESTHESIA ATTENDED CARE: Performed by: ORTHOPAEDIC SURGERY

## 2019-06-27 PROCEDURE — 73060 X-RAY EXAM OF HUMERUS: CPT

## 2019-06-27 DEVICE — 5.0MM X 42MM SELF-TAPPING                                    CANCELLOUS SCREW TITANIUM
Type: IMPLANTABLE DEVICE | Site: HUMERUS | Status: FUNCTIONAL
Brand: TRIGEN

## 2019-06-27 DEVICE — TRIGEN 4.0MM X 24MM SELF-TAPPING                                    CORTICAL SCREW TITANIUM
Type: IMPLANTABLE DEVICE | Site: HUMERUS | Status: FUNCTIONAL
Brand: TRIGEN

## 2019-06-27 DEVICE — 5.0MM X 38MM SELF-TAPPING                                    CANCELLOUS SCREW TITANIUM
Type: IMPLANTABLE DEVICE | Site: HUMERUS | Status: FUNCTIONAL
Brand: TRIGEN

## 2019-06-27 DEVICE — 5.0MM X 40MM SELF-TAPPING                                    CANCELLOUS SCREW TITANIUM
Type: IMPLANTABLE DEVICE | Site: HUMERUS | Status: FUNCTIONAL
Brand: TRIGEN

## 2019-06-27 DEVICE — TRIGEN HUMERAL NAIL 8/7MM X 16CM                                    PROXIMAL BENT
Type: IMPLANTABLE DEVICE | Site: HUMERUS | Status: FUNCTIONAL
Brand: TRIGEN

## 2019-06-27 RX ORDER — SODIUM CHLORIDE 0.9 % (FLUSH) 0.9 %
10 SYRINGE (ML) INJECTION EVERY 12 HOURS SCHEDULED
Status: DISCONTINUED | OUTPATIENT
Start: 2019-06-27 | End: 2019-06-27 | Stop reason: HOSPADM

## 2019-06-27 RX ORDER — MORPHINE SULFATE 2 MG/ML
2 INJECTION, SOLUTION INTRAMUSCULAR; INTRAVENOUS EVERY 5 MIN PRN
Status: DISCONTINUED | OUTPATIENT
Start: 2019-06-27 | End: 2019-06-27 | Stop reason: HOSPADM

## 2019-06-27 RX ORDER — FENTANYL CITRATE 50 UG/ML
50 INJECTION, SOLUTION INTRAMUSCULAR; INTRAVENOUS EVERY 5 MIN PRN
Status: DISCONTINUED | OUTPATIENT
Start: 2019-06-27 | End: 2019-06-27 | Stop reason: HOSPADM

## 2019-06-27 RX ORDER — HYDROMORPHONE HCL 110MG/55ML
PATIENT CONTROLLED ANALGESIA SYRINGE INTRAVENOUS PRN
Status: DISCONTINUED | OUTPATIENT
Start: 2019-06-27 | End: 2019-06-27 | Stop reason: SDUPTHER

## 2019-06-27 RX ORDER — HYDRALAZINE HYDROCHLORIDE 20 MG/ML
5 INJECTION INTRAMUSCULAR; INTRAVENOUS EVERY 10 MIN PRN
Status: DISCONTINUED | OUTPATIENT
Start: 2019-06-27 | End: 2019-06-27 | Stop reason: HOSPADM

## 2019-06-27 RX ORDER — DIPHENHYDRAMINE HYDROCHLORIDE 50 MG/ML
12.5 INJECTION INTRAMUSCULAR; INTRAVENOUS
Status: DISCONTINUED | OUTPATIENT
Start: 2019-06-27 | End: 2019-06-27 | Stop reason: HOSPADM

## 2019-06-27 RX ORDER — MIDAZOLAM HYDROCHLORIDE 1 MG/ML
INJECTION INTRAMUSCULAR; INTRAVENOUS PRN
Status: DISCONTINUED | OUTPATIENT
Start: 2019-06-27 | End: 2019-06-27 | Stop reason: SDUPTHER

## 2019-06-27 RX ORDER — ONDANSETRON 2 MG/ML
4 INJECTION INTRAMUSCULAR; INTRAVENOUS
Status: DISCONTINUED | OUTPATIENT
Start: 2019-06-27 | End: 2019-06-27 | Stop reason: HOSPADM

## 2019-06-27 RX ORDER — SODIUM CHLORIDE 9 MG/ML
INJECTION, SOLUTION INTRAVENOUS CONTINUOUS
Status: DISCONTINUED | OUTPATIENT
Start: 2019-06-27 | End: 2019-06-27 | Stop reason: HOSPADM

## 2019-06-27 RX ORDER — PROPOFOL 10 MG/ML
INJECTION, EMULSION INTRAVENOUS PRN
Status: DISCONTINUED | OUTPATIENT
Start: 2019-06-27 | End: 2019-06-27 | Stop reason: SDUPTHER

## 2019-06-27 RX ORDER — ROCURONIUM BROMIDE 10 MG/ML
INJECTION, SOLUTION INTRAVENOUS PRN
Status: DISCONTINUED | OUTPATIENT
Start: 2019-06-27 | End: 2019-06-27 | Stop reason: SDUPTHER

## 2019-06-27 RX ORDER — BUPIVACAINE HYDROCHLORIDE AND EPINEPHRINE 5; 5 MG/ML; UG/ML
INJECTION, SOLUTION EPIDURAL; INTRACAUDAL; PERINEURAL PRN
Status: DISCONTINUED | OUTPATIENT
Start: 2019-06-27 | End: 2019-06-27 | Stop reason: ALTCHOICE

## 2019-06-27 RX ORDER — LABETALOL HYDROCHLORIDE 5 MG/ML
5 INJECTION, SOLUTION INTRAVENOUS EVERY 5 MIN PRN
Status: DISCONTINUED | OUTPATIENT
Start: 2019-06-27 | End: 2019-06-27 | Stop reason: HOSPADM

## 2019-06-27 RX ORDER — SODIUM CHLORIDE 0.9 % (FLUSH) 0.9 %
10 SYRINGE (ML) INJECTION PRN
Status: DISCONTINUED | OUTPATIENT
Start: 2019-06-27 | End: 2019-06-27 | Stop reason: HOSPADM

## 2019-06-27 RX ORDER — FENTANYL CITRATE 50 UG/ML
INJECTION, SOLUTION INTRAMUSCULAR; INTRAVENOUS PRN
Status: DISCONTINUED | OUTPATIENT
Start: 2019-06-27 | End: 2019-06-27 | Stop reason: SDUPTHER

## 2019-06-27 RX ORDER — MEPERIDINE HYDROCHLORIDE 25 MG/ML
12.5 INJECTION INTRAMUSCULAR; INTRAVENOUS; SUBCUTANEOUS EVERY 5 MIN PRN
Status: DISCONTINUED | OUTPATIENT
Start: 2019-06-27 | End: 2019-06-27 | Stop reason: HOSPADM

## 2019-06-27 RX ORDER — HYDROCODONE BITARTRATE AND ACETAMINOPHEN 5; 325 MG/1; MG/1
1-2 TABLET ORAL
Qty: 40 TABLET | Refills: 0 | Status: SHIPPED | OUTPATIENT
Start: 2019-06-27 | End: 2019-07-04

## 2019-06-27 RX ADMIN — HYDROMORPHONE HYDROCHLORIDE 0.2 MG: 1 INJECTION, SOLUTION INTRAMUSCULAR; INTRAVENOUS; SUBCUTANEOUS at 11:43

## 2019-06-27 RX ADMIN — HYDROMORPHONE HYDROCHLORIDE 0.2 MG: 2 INJECTION INTRAMUSCULAR; INTRAVENOUS; SUBCUTANEOUS at 10:07

## 2019-06-27 RX ADMIN — ROCURONIUM BROMIDE 50 MG: 10 INJECTION INTRAVENOUS at 09:28

## 2019-06-27 RX ADMIN — HYDRALAZINE HYDROCHLORIDE 5 MG: 20 INJECTION INTRAMUSCULAR; INTRAVENOUS at 11:36

## 2019-06-27 RX ADMIN — SUGAMMADEX 200 MG: 100 INJECTION, SOLUTION INTRAVENOUS at 10:41

## 2019-06-27 RX ADMIN — HYDROMORPHONE HYDROCHLORIDE 0.2 MG: 1 INJECTION, SOLUTION INTRAMUSCULAR; INTRAVENOUS; SUBCUTANEOUS at 11:05

## 2019-06-27 RX ADMIN — PHENYLEPHRINE HYDROCHLORIDE 100 MCG: 10 INJECTION INTRAVENOUS at 09:42

## 2019-06-27 RX ADMIN — HYDROMORPHONE HYDROCHLORIDE 0.25 MG: 1 INJECTION, SOLUTION INTRAMUSCULAR; INTRAVENOUS; SUBCUTANEOUS at 11:36

## 2019-06-27 RX ADMIN — HYDROMORPHONE HYDROCHLORIDE 0.25 MG: 1 INJECTION, SOLUTION INTRAMUSCULAR; INTRAVENOUS; SUBCUTANEOUS at 11:26

## 2019-06-27 RX ADMIN — PROPOFOL 130 MG: 10 INJECTION, EMULSION INTRAVENOUS at 09:28

## 2019-06-27 RX ADMIN — HYDRALAZINE HYDROCHLORIDE 5 MG: 20 INJECTION INTRAMUSCULAR; INTRAVENOUS at 11:25

## 2019-06-27 RX ADMIN — FENTANYL CITRATE 100 MCG: 50 INJECTION INTRAMUSCULAR; INTRAVENOUS at 09:23

## 2019-06-27 RX ADMIN — SODIUM CHLORIDE: 9 INJECTION, SOLUTION INTRAVENOUS at 07:16

## 2019-06-27 RX ADMIN — Medication 2 G: at 09:37

## 2019-06-27 RX ADMIN — HYDROMORPHONE HYDROCHLORIDE 1.8 MG: 2 INJECTION INTRAMUSCULAR; INTRAVENOUS; SUBCUTANEOUS at 11:00

## 2019-06-27 RX ADMIN — HYDROMORPHONE HYDROCHLORIDE 0.2 MG: 1 INJECTION, SOLUTION INTRAMUSCULAR; INTRAVENOUS; SUBCUTANEOUS at 11:10

## 2019-06-27 RX ADMIN — HYDROMORPHONE HYDROCHLORIDE 0.25 MG: 1 INJECTION, SOLUTION INTRAMUSCULAR; INTRAVENOUS; SUBCUTANEOUS at 11:20

## 2019-06-27 RX ADMIN — PHENYLEPHRINE HYDROCHLORIDE 100 MCG: 10 INJECTION INTRAVENOUS at 09:51

## 2019-06-27 RX ADMIN — HYDROMORPHONE HYDROCHLORIDE 0.25 MG: 1 INJECTION, SOLUTION INTRAMUSCULAR; INTRAVENOUS; SUBCUTANEOUS at 11:15

## 2019-06-27 RX ADMIN — MIDAZOLAM HYDROCHLORIDE 2 MG: 1 INJECTION, SOLUTION INTRAMUSCULAR; INTRAVENOUS at 09:23

## 2019-06-27 ASSESSMENT — PULMONARY FUNCTION TESTS
PIF_VALUE: 22
PIF_VALUE: 19
PIF_VALUE: 23
PIF_VALUE: 22
PIF_VALUE: 20
PIF_VALUE: 24
PIF_VALUE: 18
PIF_VALUE: 22
PIF_VALUE: 22
PIF_VALUE: 23
PIF_VALUE: 23
PIF_VALUE: 22
PIF_VALUE: 12
PIF_VALUE: 20
PIF_VALUE: 23
PIF_VALUE: 3
PIF_VALUE: 21
PIF_VALUE: 22
PIF_VALUE: 21
PIF_VALUE: 0
PIF_VALUE: 16
PIF_VALUE: 25
PIF_VALUE: 22
PIF_VALUE: 10
PIF_VALUE: 20
PIF_VALUE: 23
PIF_VALUE: 21
PIF_VALUE: 18
PIF_VALUE: 21
PIF_VALUE: 0
PIF_VALUE: 23
PIF_VALUE: 22
PIF_VALUE: 20
PIF_VALUE: 25
PIF_VALUE: 22
PIF_VALUE: 21
PIF_VALUE: 23
PIF_VALUE: 3
PIF_VALUE: 23
PIF_VALUE: 26
PIF_VALUE: 23
PIF_VALUE: 23
PIF_VALUE: 1
PIF_VALUE: 16
PIF_VALUE: 20
PIF_VALUE: 21
PIF_VALUE: 22
PIF_VALUE: 23
PIF_VALUE: 24
PIF_VALUE: 23
PIF_VALUE: 23
PIF_VALUE: 22
PIF_VALUE: 18
PIF_VALUE: 19
PIF_VALUE: 23
PIF_VALUE: 23
PIF_VALUE: 22
PIF_VALUE: 22
PIF_VALUE: 24
PIF_VALUE: 23
PIF_VALUE: 18
PIF_VALUE: 23
PIF_VALUE: 3
PIF_VALUE: 22
PIF_VALUE: 17
PIF_VALUE: 0
PIF_VALUE: 2
PIF_VALUE: 1
PIF_VALUE: 0
PIF_VALUE: 20
PIF_VALUE: 22
PIF_VALUE: 22
PIF_VALUE: 16
PIF_VALUE: 23
PIF_VALUE: 23
PIF_VALUE: 19
PIF_VALUE: 23
PIF_VALUE: 1
PIF_VALUE: 20
PIF_VALUE: 11
PIF_VALUE: 23
PIF_VALUE: 21
PIF_VALUE: 23
PIF_VALUE: 20
PIF_VALUE: 23
PIF_VALUE: 7

## 2019-06-27 ASSESSMENT — PAIN SCALES - GENERAL
PAINLEVEL_OUTOF10: 5
PAINLEVEL_OUTOF10: 2
PAINLEVEL_OUTOF10: 4
PAINLEVEL_OUTOF10: 2
PAINLEVEL_OUTOF10: 5
PAINLEVEL_OUTOF10: 5
PAINLEVEL_OUTOF10: 4
PAINLEVEL_OUTOF10: 6
PAINLEVEL_OUTOF10: 4
PAINLEVEL_OUTOF10: 5
PAINLEVEL_OUTOF10: 6

## 2019-06-27 ASSESSMENT — PAIN - FUNCTIONAL ASSESSMENT: PAIN_FUNCTIONAL_ASSESSMENT: 0-10

## 2019-06-27 ASSESSMENT — PAIN DESCRIPTION - DESCRIPTORS
DESCRIPTORS: CONSTANT
DESCRIPTORS: ACHING

## 2019-06-27 ASSESSMENT — PAIN DESCRIPTION - FREQUENCY: FREQUENCY: CONTINUOUS

## 2019-06-27 ASSESSMENT — PAIN DESCRIPTION - ORIENTATION: ORIENTATION: RIGHT

## 2019-06-27 ASSESSMENT — PAIN DESCRIPTION - LOCATION: LOCATION: ARM

## 2019-06-27 ASSESSMENT — PAIN DESCRIPTION - PAIN TYPE: TYPE: SURGICAL PAIN

## 2019-06-27 NOTE — PROGRESS NOTES
Admit to sds. Fall risk band applied to the patient. Spouse, Bennie and Daughter, Maria Luz Slice with the patient.

## 2019-06-27 NOTE — ANESTHESIA PRE PROCEDURE
Yes Historical Provider, MD   aspirin 81 MG tablet Take 81 mg by mouth daily   Yes Historical Provider, MD   Multiple Vitamins-Minerals (ICAPS AREDS 2) CAPS Take by mouth 2 times daily   Yes Historical Provider, MD   Multiple Vitamin (THERA-PLUS) LIQD Take 5 mLs by mouth daily. Yes Historical Provider, MD   blood glucose test strips (EXACTECH TEST) strip 1 each by In Vitro route 3 times daily As needed.  3/19/19   Maddie Moulton, DO       Current medications:    Current Facility-Administered Medications   Medication Dose Route Frequency Provider Last Rate Last Dose    sodium chloride flush 0.9 % injection 10 mL  10 mL Intravenous 2 times per day Mary Arnold MD        sodium chloride flush 0.9 % injection 10 mL  10 mL Intravenous PRN Mary Arnold MD        0.9 % sodium chloride infusion   Intravenous Continuous Mary Arnold  mL/hr at 06/27/19 0716      ceFAZolin (ANCEF) 2 g in dextrose 5 % 50 mL IVPB  2 g Intravenous On Call to Dilip Mayes MD           Allergies:  No Known Allergies    Problem List:    Patient Active Problem List   Diagnosis Code    Microalbuminuria due to type 2 diabetes mellitus ([facility] E11.29, R80.9    Chronic atrial fibrillation (HCC) I48.2    Diabetes mellitus ([address] E11.9    Essential hypertension I10    Osteopenia of spine M85.88    Other specified hypothyroidism E03.8    History of non-Hodgkin's lymphoma Z85.72    Anticoagulant long-term use Z79.01    Hyponatremia E87.1    History of humerus fracture Z87.81       Past Medical History:        Diagnosis Date    Arthritis     Atrial fibrillation ([address]     Cancer ([facility] 2000    NON HODGKINS LYMPHOMA    Diabetes mellitus ([address]     Moosic filter in place     Hx of blood clots 2000    LEGS    Hyperlipidemia     Hypertension     Prolonged emergence from general anesthesia     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    Thyroid disease        Past Surgical History:        Procedure Laterality Date    APPENDECTOMY      CARDIAC SURGERY      CHOLECYSTECTOMY      FRACTURE SURGERY      ankle surgery    FRACTURE SURGERY Right 09/2018    HUMERUS    HERNIA REPAIR      HYSTERECTOMY      CT OFFICE/OUTPT VISIT,PROCEDURE ONLY Right 9/19/2018    ORIF RIGHT PROXIMAL HUMERUS FX performed by Mechelle Chapman MD at [address] [place] [address]         Social History:    Social History     Tobacco Use    Smoking status: Never Smoker    Smokeless tobacco: Never Used   Substance Use Topics    Alcohol use: No                                Counseling given: Not Answered      Vital Signs (Current):   Vitals:    06/21/19 0958 06/27/19 0644   BP:  (!) 186/76   Pulse:  58   Resp:  18   Temp:  97.6 °F (36.4 °C)   TempSrc:  Temporal   SpO2:  97%   Weight: 190 lb (86.2 kg) 192 lb 0.3 oz (87.1 kg)   Height: 5' 5\" (1.651 m) 5' 5\" (1.651 m)                                              BP Readings from Last 3 Encounters:   06/27/19 (!) 186/76   04/24/19 102/66   04/12/19 124/72       NPO Status: Time of last liquid consumption: 0500                        Time of last solid consumption: 0830                        Date of last liquid consumption: 06/27/19                        Date of last solid food consumption: 06/26/19    BMI:   Wt Readings from Last 3 Encounters:   06/27/19 192 lb 0.3 oz (87.1 kg)   04/24/19 193 lb (87.5 kg)   04/12/19 192 lb (87.1 kg)     Body mass index is 31.95 kg/m².     CBC:   Lab Results   Component Value Date    WBC 4.6 06/20/2019    RBC 3.81 06/20/2019    RBC 3.94 02/27/2012    HGB 13.1 06/20/2019    HCT 38.9 06/20/2019    .1 06/20/2019    RDW 14.0 11/24/2017     06/20/2019       CMP:   Lab Results   Component Value Date     06/20/2019    K 4.7 06/20/2019    K 3.9 09/20/2018    CL 92 06/20/2019    CO2 25 06/20/2019    BUN 22 06/20/2019    CREATININE 0.6 06/20/2019    LABGLOM >90 06/20/2019    GLUCOSE 131 06/20/2019    GLUCOSE 167 02/27/2012    PROT 7.8 06/20/2019    CALCIUM 11.2 06/20/2019    BILITOT 0.6 06/20/2019    ALKPHOS 53 06/20/2019    AST 32 06/20/2019    ALT 35 06/20/2019       POC Tests:   Recent Labs     06/27/19  0716   POCGLU 173*       Coags:   Lab Results   Component Value Date    INR 1.13 09/19/2018       HCG (If Applicable): No results found for: PREGTESTUR, PREGSERUM, HCG, HCGQUANT     ABGs: No results found for: PHART, PO2ART, TMT6RCQ, XKY7ZZF, BEART, W1PFEREI     Type & Screen (If Applicable):  No results found for: LABABO, LABRH    Anesthesia Evaluation    Airway: Mallampati: II       Mouth opening: > = 3 FB Dental:          Pulmonary:                              Cardiovascular:    (+) hypertension:, dysrhythmias: atrial fibrillation,       ECG reviewed                        Neuro/Psych:   (+) CVA:,             GI/Hepatic/Renal:             Endo/Other:    (+) Diabetes, hypothyroidism::., .                 Abdominal:   (+) obese,         Vascular:                                        Anesthesia Plan      general     ASA 3       Induction: intravenous. Anesthetic plan and risks discussed with patient.                       Queenie Cantu MD   6/27/2019

## 2019-06-28 NOTE — ANESTHESIA POSTPROCEDURE EVALUATION
Department of Anesthesiology  Postprocedure Note    Patient: Akira Cooper  MRN: [id number]  YOB: 1945  Date of evaluation: 6/28/2019  Time:  7:54 AM     Procedure Summary     Date:  06/27/19 Room / Location:  [facility] / [address] OR    Anesthesia Start:  0922 Anesthesia Stop:  1058    Procedure:  REVISION RT HUMERUS FX, HARDWARE REMOVAL, HUMERAL NAIL INSERTION (Right ) Diagnosis:  (FAILED RIGHT PROX HUMERUS FIXATION)    Surgeon:  Jt Rubalcava MD Responsible Provider:  Yissel Lewis MD    Anesthesia Type:  general ASA Status:  3          Anesthesia Type: No value filed. Ochoa Phase I: Ochoa Score: 10    Ochoa Phase II: Ochoa Score: 10    Last vitals: Reviewed and per EMR flowsheets. Anesthesia Post Evaluation [facility] [address] [facility]  POST-ANESTHESIA NOTE       Name:  Akira Cooper                                         Age:  69 y.o. MRN:  [id number]      Last Vitals:  BP (!) 171/76   Pulse 65   Temp 98.2 °F (36.8 °C) (Temporal)   Resp 18   Ht 5' 5\" (1.651 m)   Wt 192 lb 0.3 oz (87.1 kg)   SpO2 100%   BMI 31.95 kg/m²   No data found.     Level of Consciousness:  Awake    Respiratory:  Stable    Oxygen Saturation:  Stable    Cardiovascular:  Stable    Hydration:  Adequate    PONV:  Stable    Post-op Pain:  Adequate analgesia    Post-op Assessment:  No apparent anesthetic complications    Additional Follow-Up / Treatment / Comment:  None    Yissel Lewis MD  June 28, 2019   7:55 AM

## 2019-07-02 LAB
AEROBIC CULTURE: NORMAL
ANAEROBIC CULTURE: NORMAL
GRAM STAIN RESULT: NORMAL

## 2019-08-20 ENCOUNTER — HOSPITAL ENCOUNTER (OUTPATIENT)
Dept: SLEEP CENTER | Age: 74
Discharge: HOME | End: 2019-08-22
Payer: MEDICARE

## 2019-08-20 DIAGNOSIS — R06.83 SNORING: ICD-10-CM

## 2019-08-20 DIAGNOSIS — I48.20 CHRONIC A-FIB (HCC): ICD-10-CM

## 2019-08-20 DIAGNOSIS — I63.9 CEREBROVASCULAR ACCIDENT (CVA), UNSPECIFIED MECHANISM (HCC): ICD-10-CM

## 2019-08-20 DIAGNOSIS — G47.10 HYPERSOMNIA: ICD-10-CM

## 2019-08-20 PROCEDURE — 95810 POLYSOM 6/> YRS 4/> PARAM: CPT

## 2019-08-23 LAB — STATUS: NORMAL

## 2019-08-27 ENCOUNTER — OFFICE VISIT (OUTPATIENT)
Dept: PULMONOLOGY | Age: 74
End: 2019-08-27
Payer: MEDICARE

## 2019-08-27 VITALS
BODY MASS INDEX: 32.79 KG/M2 | HEIGHT: 65 IN | SYSTOLIC BLOOD PRESSURE: 148 MMHG | OXYGEN SATURATION: 98 % | WEIGHT: 196.8 LBS | DIASTOLIC BLOOD PRESSURE: 72 MMHG | HEART RATE: 57 BPM

## 2019-08-27 DIAGNOSIS — G47.10 HYPERSOMNIA: ICD-10-CM

## 2019-08-27 DIAGNOSIS — I48.20 CHRONIC ATRIAL FIBRILLATION (HCC): ICD-10-CM

## 2019-08-27 DIAGNOSIS — G47.33 OBSTRUCTIVE SLEEP APNEA: Primary | ICD-10-CM

## 2019-08-27 DIAGNOSIS — I10 ESSENTIAL HYPERTENSION: ICD-10-CM

## 2019-08-27 DIAGNOSIS — E66.9 OBESITY (BMI 30-39.9): ICD-10-CM

## 2019-08-27 PROCEDURE — G8427 DOCREV CUR MEDS BY ELIG CLIN: HCPCS | Performed by: PHYSICIAN ASSISTANT

## 2019-08-27 PROCEDURE — G8417 CALC BMI ABV UP PARAM F/U: HCPCS | Performed by: PHYSICIAN ASSISTANT

## 2019-08-27 PROCEDURE — 4040F PNEUMOC VAC/ADMIN/RCVD: CPT | Performed by: PHYSICIAN ASSISTANT

## 2019-08-27 PROCEDURE — 99214 OFFICE O/P EST MOD 30 MIN: CPT | Performed by: PHYSICIAN ASSISTANT

## 2019-08-27 PROCEDURE — G8400 PT W/DXA NO RESULTS DOC: HCPCS | Performed by: PHYSICIAN ASSISTANT

## 2019-08-27 PROCEDURE — 1123F ACP DISCUSS/DSCN MKR DOCD: CPT | Performed by: PHYSICIAN ASSISTANT

## 2019-08-27 PROCEDURE — 1036F TOBACCO NON-USER: CPT | Performed by: PHYSICIAN ASSISTANT

## 2019-08-27 PROCEDURE — G8598 ASA/ANTIPLAT THER USED: HCPCS | Performed by: PHYSICIAN ASSISTANT

## 2019-08-27 PROCEDURE — 1090F PRES/ABSN URINE INCON ASSESS: CPT | Performed by: PHYSICIAN ASSISTANT

## 2019-08-27 PROCEDURE — 3017F COLORECTAL CA SCREEN DOC REV: CPT | Performed by: PHYSICIAN ASSISTANT

## 2019-08-27 ASSESSMENT — ENCOUNTER SYMPTOMS
DIARRHEA: 0
ALLERGIC/IMMUNOLOGIC NEGATIVE: 1
EYES NEGATIVE: 1
CHEST TIGHTNESS: 0
COUGH: 0
BACK PAIN: 0
WHEEZING: 0
NAUSEA: 0
SHORTNESS OF BREATH: 0
STRIDOR: 0

## 2019-08-27 NOTE — PROGRESS NOTES
addressing her concerns, Sharmin Brown  decided to move ahead with a CPAP titration. Sharmin Brown  also was interested trying out some masks before the study.   Follows with cardiology for HTN and Afib     Schedule for a CPAP Titration   Mask desensitization   Follow up 6-8 weeks after PAP set up        Taina Rosales PA-C, MPAS  8/27/2019

## 2019-10-02 ENCOUNTER — NURSE ONLY (OUTPATIENT)
Dept: FAMILY MEDICINE CLINIC | Age: 74
End: 2019-10-02
Payer: MEDICARE

## 2019-10-02 DIAGNOSIS — I10 ESSENTIAL HYPERTENSION: ICD-10-CM

## 2019-10-02 PROCEDURE — 36415 COLL VENOUS BLD VENIPUNCTURE: CPT | Performed by: NURSE PRACTITIONER

## 2019-10-08 ENCOUNTER — HOSPITAL ENCOUNTER (OUTPATIENT)
Dept: WOMENS IMAGING | Age: 74
Discharge: HOME | End: 2019-10-08
Payer: MEDICARE

## 2019-10-08 DIAGNOSIS — Z12.31 ENCOUNTER FOR SCREENING MAMMOGRAM FOR MALIGNANT NEOPLASM OF BREAST: ICD-10-CM

## 2019-10-08 PROCEDURE — 77063 BREAST TOMOSYNTHESIS BI: CPT

## 2019-10-09 DIAGNOSIS — G47.33 OSA (OBSTRUCTIVE SLEEP APNEA): Primary | ICD-10-CM

## 2019-10-14 ENCOUNTER — TELEPHONE (OUTPATIENT)
Dept: SLEEP CENTER | Age: 74
End: 2019-10-14

## 2019-10-24 ENCOUNTER — OFFICE VISIT (OUTPATIENT)
Dept: FAMILY MEDICINE CLINIC | Age: 74
End: 2019-10-24
Payer: MEDICARE

## 2019-10-24 VITALS
WEIGHT: 197 LBS | TEMPERATURE: 98.3 F | BODY MASS INDEX: 32.82 KG/M2 | HEIGHT: 65 IN | DIASTOLIC BLOOD PRESSURE: 76 MMHG | SYSTOLIC BLOOD PRESSURE: 134 MMHG | HEART RATE: 63 BPM | RESPIRATION RATE: 12 BRPM

## 2019-10-24 DIAGNOSIS — E03.8 OTHER SPECIFIED HYPOTHYROIDISM: ICD-10-CM

## 2019-10-24 DIAGNOSIS — E11.69 TYPE 2 DIABETES MELLITUS WITH OTHER SPECIFIED COMPLICATION, WITH LONG-TERM CURRENT USE OF INSULIN (HCC): Primary | ICD-10-CM

## 2019-10-24 DIAGNOSIS — Z91.81 AT HIGH RISK FOR FALLS: ICD-10-CM

## 2019-10-24 DIAGNOSIS — Z79.4 TYPE 2 DIABETES MELLITUS WITH OTHER SPECIFIED COMPLICATION, WITH LONG-TERM CURRENT USE OF INSULIN (HCC): Primary | ICD-10-CM

## 2019-10-24 DIAGNOSIS — L98.9 SKIN LESION OF LEFT UPPER EXTREMITY: ICD-10-CM

## 2019-10-24 DIAGNOSIS — I10 ESSENTIAL HYPERTENSION: ICD-10-CM

## 2019-10-24 LAB — HBA1C MFR BLD: 7.4 %

## 2019-10-24 PROCEDURE — 4040F PNEUMOC VAC/ADMIN/RCVD: CPT | Performed by: FAMILY MEDICINE

## 2019-10-24 PROCEDURE — 99214 OFFICE O/P EST MOD 30 MIN: CPT | Performed by: FAMILY MEDICINE

## 2019-10-24 PROCEDURE — 2022F DILAT RTA XM EVC RTNOPTHY: CPT | Performed by: FAMILY MEDICINE

## 2019-10-24 PROCEDURE — 1090F PRES/ABSN URINE INCON ASSESS: CPT | Performed by: FAMILY MEDICINE

## 2019-10-24 PROCEDURE — G8417 CALC BMI ABV UP PARAM F/U: HCPCS | Performed by: FAMILY MEDICINE

## 2019-10-24 PROCEDURE — G8484 FLU IMMUNIZE NO ADMIN: HCPCS | Performed by: FAMILY MEDICINE

## 2019-10-24 PROCEDURE — 3017F COLORECTAL CA SCREEN DOC REV: CPT | Performed by: FAMILY MEDICINE

## 2019-10-24 PROCEDURE — 83036 HEMOGLOBIN GLYCOSYLATED A1C: CPT | Performed by: FAMILY MEDICINE

## 2019-10-24 PROCEDURE — G8427 DOCREV CUR MEDS BY ELIG CLIN: HCPCS | Performed by: FAMILY MEDICINE

## 2019-10-24 PROCEDURE — G8400 PT W/DXA NO RESULTS DOC: HCPCS | Performed by: FAMILY MEDICINE

## 2019-10-24 PROCEDURE — 1123F ACP DISCUSS/DSCN MKR DOCD: CPT | Performed by: FAMILY MEDICINE

## 2019-10-24 PROCEDURE — 1036F TOBACCO NON-USER: CPT | Performed by: FAMILY MEDICINE

## 2019-10-24 PROCEDURE — G8598 ASA/ANTIPLAT THER USED: HCPCS | Performed by: FAMILY MEDICINE

## 2019-10-24 RX ORDER — METFORMIN HYDROCHLORIDE 500 MG/1
1000 TABLET, EXTENDED RELEASE ORAL
Qty: 180 TABLET | Refills: 3 | Status: SHIPPED | OUTPATIENT
Start: 2019-10-24 | End: 2019-10-26 | Stop reason: SDUPTHER

## 2019-10-24 RX ORDER — HYDRALAZINE HYDROCHLORIDE 50 MG/1
50 TABLET, FILM COATED ORAL 2 TIMES DAILY
COMMUNITY
End: 2020-09-21 | Stop reason: SDUPTHER

## 2019-10-24 RX ORDER — LEVOTHYROXINE SODIUM 0.1 MG/1
100 TABLET ORAL DAILY
Qty: 90 TABLET | Refills: 3 | Status: SHIPPED | OUTPATIENT
Start: 2019-10-24 | End: 2020-01-08 | Stop reason: SDUPTHER

## 2019-10-24 RX ORDER — HYDROCHLOROTHIAZIDE 25 MG/1
25 TABLET ORAL DAILY
Qty: 90 TABLET | Refills: 3 | Status: SHIPPED | OUTPATIENT
Start: 2019-10-24 | End: 2019-12-19 | Stop reason: SDUPTHER

## 2019-10-26 DIAGNOSIS — E11.69 TYPE 2 DIABETES MELLITUS WITH OTHER SPECIFIED COMPLICATION, WITH LONG-TERM CURRENT USE OF INSULIN (HCC): ICD-10-CM

## 2019-10-26 DIAGNOSIS — Z79.4 TYPE 2 DIABETES MELLITUS WITH OTHER SPECIFIED COMPLICATION, WITH LONG-TERM CURRENT USE OF INSULIN (HCC): ICD-10-CM

## 2019-10-28 RX ORDER — METFORMIN HYDROCHLORIDE 500 MG/1
1000 TABLET, EXTENDED RELEASE ORAL
Qty: 180 TABLET | Refills: 3 | Status: SHIPPED | OUTPATIENT
Start: 2019-10-28 | End: 2019-12-19

## 2019-12-05 ENCOUNTER — NURSE ONLY (OUTPATIENT)
Dept: FAMILY MEDICINE CLINIC | Age: 74
End: 2019-12-05
Payer: MEDICARE

## 2019-12-05 DIAGNOSIS — I10 ESSENTIAL HYPERTENSION: ICD-10-CM

## 2019-12-05 PROCEDURE — 36415 COLL VENOUS BLD VENIPUNCTURE: CPT | Performed by: NURSE PRACTITIONER

## 2019-12-19 ENCOUNTER — OFFICE VISIT (OUTPATIENT)
Dept: FAMILY MEDICINE CLINIC | Age: 74
End: 2019-12-19
Payer: MEDICARE

## 2019-12-19 VITALS
DIASTOLIC BLOOD PRESSURE: 70 MMHG | SYSTOLIC BLOOD PRESSURE: 132 MMHG | BODY MASS INDEX: 32.65 KG/M2 | HEIGHT: 65 IN | TEMPERATURE: 97.8 F | WEIGHT: 196 LBS | RESPIRATION RATE: 12 BRPM | HEART RATE: 67 BPM

## 2019-12-19 DIAGNOSIS — I10 ESSENTIAL HYPERTENSION: ICD-10-CM

## 2019-12-19 DIAGNOSIS — E11.65 TYPE 2 DIABETES MELLITUS WITH HYPERGLYCEMIA, WITHOUT LONG-TERM CURRENT USE OF INSULIN (HCC): Primary | ICD-10-CM

## 2019-12-19 PROCEDURE — 4040F PNEUMOC VAC/ADMIN/RCVD: CPT | Performed by: FAMILY MEDICINE

## 2019-12-19 PROCEDURE — 1036F TOBACCO NON-USER: CPT | Performed by: FAMILY MEDICINE

## 2019-12-19 PROCEDURE — G8598 ASA/ANTIPLAT THER USED: HCPCS | Performed by: FAMILY MEDICINE

## 2019-12-19 PROCEDURE — 3017F COLORECTAL CA SCREEN DOC REV: CPT | Performed by: FAMILY MEDICINE

## 2019-12-19 PROCEDURE — 1123F ACP DISCUSS/DSCN MKR DOCD: CPT | Performed by: FAMILY MEDICINE

## 2019-12-19 PROCEDURE — 99213 OFFICE O/P EST LOW 20 MIN: CPT | Performed by: FAMILY MEDICINE

## 2019-12-19 PROCEDURE — 3045F PR MOST RECENT HEMOGLOBIN A1C LEVEL 7.0-9.0%: CPT | Performed by: FAMILY MEDICINE

## 2019-12-19 PROCEDURE — 1090F PRES/ABSN URINE INCON ASSESS: CPT | Performed by: FAMILY MEDICINE

## 2019-12-19 PROCEDURE — G8484 FLU IMMUNIZE NO ADMIN: HCPCS | Performed by: FAMILY MEDICINE

## 2019-12-19 PROCEDURE — G8427 DOCREV CUR MEDS BY ELIG CLIN: HCPCS | Performed by: FAMILY MEDICINE

## 2019-12-19 PROCEDURE — G8400 PT W/DXA NO RESULTS DOC: HCPCS | Performed by: FAMILY MEDICINE

## 2019-12-19 PROCEDURE — 2022F DILAT RTA XM EVC RTNOPTHY: CPT | Performed by: FAMILY MEDICINE

## 2019-12-19 PROCEDURE — G8417 CALC BMI ABV UP PARAM F/U: HCPCS | Performed by: FAMILY MEDICINE

## 2019-12-19 RX ORDER — HYDROCHLOROTHIAZIDE 25 MG/1
12.5 TABLET ORAL DAILY
Qty: 45 TABLET | Refills: 3 | Status: SHIPPED | OUTPATIENT
Start: 2019-12-19 | End: 2020-05-20

## 2019-12-19 RX ORDER — METFORMIN HYDROCHLORIDE 500 MG/1
1000 TABLET, EXTENDED RELEASE ORAL 2 TIMES DAILY
Qty: 360 TABLET | Refills: 3 | Status: SHIPPED | OUTPATIENT
Start: 2019-12-19 | End: 2020-12-28 | Stop reason: SDUPTHER

## 2019-12-30 ENCOUNTER — OFFICE VISIT (OUTPATIENT)
Dept: PULMONOLOGY | Age: 74
End: 2019-12-30
Payer: MEDICARE

## 2019-12-30 VITALS
BODY MASS INDEX: 32.82 KG/M2 | HEART RATE: 66 BPM | WEIGHT: 197 LBS | OXYGEN SATURATION: 97 % | HEIGHT: 65 IN | SYSTOLIC BLOOD PRESSURE: 130 MMHG | DIASTOLIC BLOOD PRESSURE: 72 MMHG

## 2019-12-30 DIAGNOSIS — G47.33 OSA ON CPAP: Primary | ICD-10-CM

## 2019-12-30 DIAGNOSIS — Z99.89 OSA ON CPAP: Primary | ICD-10-CM

## 2019-12-30 DIAGNOSIS — E66.9 OBESITY (BMI 30-39.9): ICD-10-CM

## 2019-12-30 PROCEDURE — G8598 ASA/ANTIPLAT THER USED: HCPCS | Performed by: PHYSICIAN ASSISTANT

## 2019-12-30 PROCEDURE — G8484 FLU IMMUNIZE NO ADMIN: HCPCS | Performed by: PHYSICIAN ASSISTANT

## 2019-12-30 PROCEDURE — 3017F COLORECTAL CA SCREEN DOC REV: CPT | Performed by: PHYSICIAN ASSISTANT

## 2019-12-30 PROCEDURE — 1123F ACP DISCUSS/DSCN MKR DOCD: CPT | Performed by: PHYSICIAN ASSISTANT

## 2019-12-30 PROCEDURE — G8417 CALC BMI ABV UP PARAM F/U: HCPCS | Performed by: PHYSICIAN ASSISTANT

## 2019-12-30 PROCEDURE — G8427 DOCREV CUR MEDS BY ELIG CLIN: HCPCS | Performed by: PHYSICIAN ASSISTANT

## 2019-12-30 PROCEDURE — G8400 PT W/DXA NO RESULTS DOC: HCPCS | Performed by: PHYSICIAN ASSISTANT

## 2019-12-30 PROCEDURE — 1090F PRES/ABSN URINE INCON ASSESS: CPT | Performed by: PHYSICIAN ASSISTANT

## 2019-12-30 PROCEDURE — 1036F TOBACCO NON-USER: CPT | Performed by: PHYSICIAN ASSISTANT

## 2019-12-30 PROCEDURE — 99214 OFFICE O/P EST MOD 30 MIN: CPT | Performed by: PHYSICIAN ASSISTANT

## 2019-12-30 PROCEDURE — 4040F PNEUMOC VAC/ADMIN/RCVD: CPT | Performed by: PHYSICIAN ASSISTANT

## 2019-12-30 RX ORDER — NEBULIZER ACCESSORIES
EACH MISCELLANEOUS
Qty: 1 EACH | Refills: 0 | Status: SHIPPED | OUTPATIENT
Start: 2019-12-30 | End: 2020-09-24

## 2019-12-30 ASSESSMENT — ENCOUNTER SYMPTOMS
DIARRHEA: 0
SHORTNESS OF BREATH: 0
COUGH: 0
CHEST TIGHTNESS: 0
WHEEZING: 0
EYES NEGATIVE: 1
STRIDOR: 0
NAUSEA: 0
ALLERGIC/IMMUNOLOGIC NEGATIVE: 1
BACK PAIN: 0

## 2020-01-08 ENCOUNTER — NURSE ONLY (OUTPATIENT)
Dept: LAB | Age: 75
End: 2020-01-08

## 2020-01-08 LAB
T4 FREE: 1.51 NG/DL (ref 0.93–1.76)
TSH SERPL DL<=0.05 MIU/L-ACNC: 6.64 UIU/ML (ref 0.4–4.2)

## 2020-01-08 RX ORDER — LEVOTHYROXINE SODIUM 112 UG/1
112 TABLET ORAL DAILY
Qty: 90 TABLET | Refills: 1 | Status: SHIPPED | OUTPATIENT
Start: 2020-01-08 | End: 2020-07-11 | Stop reason: SDUPTHER

## 2020-02-25 ENCOUNTER — HOSPITAL ENCOUNTER (OUTPATIENT)
Age: 75
Setting detail: SPECIMEN
Discharge: HOME | End: 2020-02-25
Payer: MEDICARE

## 2020-02-25 LAB
ABSOLUTE EOS #: 0.1 K/UL (ref 0–0.44)
ABSOLUTE IMMATURE GRANULOCYTE: <0.03 K/UL (ref 0–0.3)
ABSOLUTE LYMPH #: 1.11 K/UL (ref 1.1–3.7)
ABSOLUTE MONO #: 0.27 K/UL (ref 0.1–1.2)
BASOPHILS # BLD: 1 % (ref 0–2)
BASOPHILS ABSOLUTE: 0.03 K/UL (ref 0–0.2)
DIFFERENTIAL TYPE: ABNORMAL
EOSINOPHILS RELATIVE PERCENT: 3 % (ref 1–4)
HCT VFR BLD CALC: 38.8 % (ref 36.3–47.1)
HEMOGLOBIN: 12.7 G/DL (ref 11.9–15.1)
HOMOCYSTEINE: 11 UMOL/L
IMMATURE GRANULOCYTES: 1 %
LYMPHOCYTES # BLD: 37 % (ref 24–43)
MCH RBC QN AUTO: 34.5 PG (ref 25.2–33.5)
MCHC RBC AUTO-ENTMCNC: 32.7 G/DL (ref 28.4–34.8)
MCV RBC AUTO: 105.4 FL (ref 82.6–102.9)
MONOCYTES # BLD: 9 % (ref 3–12)
NRBC AUTOMATED: 0 PER 100 WBC
PDW BLD-RTO: 12.5 % (ref 11.8–14.4)
PLATELET # BLD: 199 K/UL (ref 138–453)
PLATELET ESTIMATE: ABNORMAL
PMV BLD AUTO: 10.3 FL (ref 8.1–13.5)
RBC # BLD: 3.68 M/UL (ref 3.95–5.11)
RBC # BLD: ABNORMAL 10*6/UL
SEG NEUTROPHILS: 49 % (ref 36–65)
SEGMENTED NEUTROPHILS ABSOLUTE COUNT: 1.49 K/UL (ref 1.5–8.1)
WBC # BLD: 3 K/UL (ref 3.5–11.3)
WBC # BLD: ABNORMAL 10*3/UL

## 2020-02-27 ENCOUNTER — OFFICE VISIT (OUTPATIENT)
Dept: PULMONOLOGY | Age: 75
End: 2020-02-27
Payer: MEDICARE

## 2020-02-27 VITALS
DIASTOLIC BLOOD PRESSURE: 78 MMHG | HEART RATE: 69 BPM | OXYGEN SATURATION: 97 % | WEIGHT: 204.2 LBS | RESPIRATION RATE: 18 BRPM | SYSTOLIC BLOOD PRESSURE: 136 MMHG | HEIGHT: 65 IN | BODY MASS INDEX: 34.02 KG/M2

## 2020-02-27 PROCEDURE — 3017F COLORECTAL CA SCREEN DOC REV: CPT | Performed by: PHYSICIAN ASSISTANT

## 2020-02-27 PROCEDURE — 1036F TOBACCO NON-USER: CPT | Performed by: PHYSICIAN ASSISTANT

## 2020-02-27 PROCEDURE — G8427 DOCREV CUR MEDS BY ELIG CLIN: HCPCS | Performed by: PHYSICIAN ASSISTANT

## 2020-02-27 PROCEDURE — G8400 PT W/DXA NO RESULTS DOC: HCPCS | Performed by: PHYSICIAN ASSISTANT

## 2020-02-27 PROCEDURE — 1123F ACP DISCUSS/DSCN MKR DOCD: CPT | Performed by: PHYSICIAN ASSISTANT

## 2020-02-27 PROCEDURE — 4040F PNEUMOC VAC/ADMIN/RCVD: CPT | Performed by: PHYSICIAN ASSISTANT

## 2020-02-27 PROCEDURE — 1090F PRES/ABSN URINE INCON ASSESS: CPT | Performed by: PHYSICIAN ASSISTANT

## 2020-02-27 PROCEDURE — 99213 OFFICE O/P EST LOW 20 MIN: CPT | Performed by: PHYSICIAN ASSISTANT

## 2020-02-27 PROCEDURE — G8417 CALC BMI ABV UP PARAM F/U: HCPCS | Performed by: PHYSICIAN ASSISTANT

## 2020-02-27 PROCEDURE — G8484 FLU IMMUNIZE NO ADMIN: HCPCS | Performed by: PHYSICIAN ASSISTANT

## 2020-02-27 ASSESSMENT — ENCOUNTER SYMPTOMS
NAUSEA: 0
EYES NEGATIVE: 1
SHORTNESS OF BREATH: 0
WHEEZING: 0
COUGH: 0
DIARRHEA: 0
CHEST TIGHTNESS: 0
ALLERGIC/IMMUNOLOGIC NEGATIVE: 1
STRIDOR: 0
BACK PAIN: 0

## 2020-02-27 NOTE — PROGRESS NOTES
[facility]      Linette Jack         [id number]  2/27/2020   Chief Complaint   Patient presents with    Follow-up     TRACY 8 wk f/u with download. Less leaks now but  Mask leaves adair on face because so tight        Pt of Dr. Li Erp    PAP Download:   Original or initial AHI: 22.2     Date of initial study: 8-20-19    Compliant  93%     Noncompliant 3 %     PAP Type Auto set Level  5/10 cmH2O  Avg Hrs/Day 6 hrs 49 min   AHI: 1.3   Recorded compliance dates  1-26-20 to 2-24-20  Machine/Mfg: ResMed  Interface: FFM    Provider:    Lian Fatima        __ Jamas Bares    __ [address]            __[employer] __[employer]   __[place]:       __Other    Neck Size: 17.75 in   Mallampati IV  ESS:  7      Here is a scan of the most recent download:        Presentation:   Brandy Hutton presents for sleep medicine follow up for obstructive sleep apnea  Since the last visit, Brandy Hutton is doing much better. AHi is improved. She is complaining her mask is leaving marks on her face. Her mask is fitting better. Equipment issues: The pressure is  acceptable, the mask is acceptable     Sleep issues:  Do you feel better? Yes  More rested? Yes   Better concentration? yes    Progress History:   Since last visit any new medical issues? No  New ER or hospitlal visits? No  Any new or changes in medicines? No  Any new sleep medicines?  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 No current facility-administered medications for this visit. Family History   Problem Relation Age of Onset    Heart Disease Mother     Heart Disease Father         Review of Systems -   Review of Systems   Constitutional: Negative for activity change, appetite change, chills and fever. HENT: Negative for congestion and postnasal drip. Eyes: Negative. Respiratory: Negative for cough, chest tightness, shortness of breath, wheezing and stridor. Cardiovascular: Negative for chest pain and leg swelling. Gastrointestinal: Negative for diarrhea and nausea. Endocrine: Negative. Genitourinary: Negative. Musculoskeletal: Negative. Negative for arthralgias and back pain. Skin: Negative. Allergic/Immunologic: Negative. Neurological: Negative. Negative for dizziness and light-headedness. Psychiatric/Behavioral: Negative. All other systems reviewed and are negative. Physical Exam:    BMI:  Body mass index is 33.98 kg/m². Wt Readings from Last 3 Encounters:   02/27/20 204 lb 3.2 oz (92.6 kg)   12/30/19 197 lb (89.4 kg)   12/19/19 196 lb (88.9 kg)     Weight gained 7 lbs over 2 months  Vitals: /78 (Site: Left Upper Arm, Position: Sitting, Cuff Size: Medium Adult)   Pulse 69   Resp 18   Ht 5' 5\" (1.651 m)   Wt 204 lb 3.2 oz (92.6 kg)   SpO2 97% Comment: on RA  BMI 33.98 kg/m²       Physical Exam  Constitutional:       Appearance: She is well-developed. HENT:      Head: Normocephalic and atraumatic.       Right Ear: External ear normal.      Left Ear: External ear normal.   Eyes:      Conjunctiva/sclera: Conjunctivae

## 2020-03-08 ENCOUNTER — PATIENT MESSAGE (OUTPATIENT)
Dept: FAMILY MEDICINE CLINIC | Age: 75
End: 2020-03-08

## 2020-03-09 RX ORDER — GLIMEPIRIDE 4 MG/1
4 TABLET ORAL 2 TIMES DAILY
Qty: 180 TABLET | Refills: 3 | Status: SHIPPED | OUTPATIENT
Start: 2020-03-09 | End: 2021-03-05 | Stop reason: SDUPTHER

## 2020-04-24 ENCOUNTER — TELEMEDICINE (OUTPATIENT)
Dept: FAMILY MEDICINE CLINIC | Age: 75
End: 2020-04-24
Payer: MEDICARE

## 2020-04-24 PROCEDURE — G8400 PT W/DXA NO RESULTS DOC: HCPCS | Performed by: FAMILY MEDICINE

## 2020-04-24 PROCEDURE — G8427 DOCREV CUR MEDS BY ELIG CLIN: HCPCS | Performed by: FAMILY MEDICINE

## 2020-04-24 PROCEDURE — 1123F ACP DISCUSS/DSCN MKR DOCD: CPT | Performed by: FAMILY MEDICINE

## 2020-04-24 PROCEDURE — 3017F COLORECTAL CA SCREEN DOC REV: CPT | Performed by: FAMILY MEDICINE

## 2020-04-24 PROCEDURE — 4040F PNEUMOC VAC/ADMIN/RCVD: CPT | Performed by: FAMILY MEDICINE

## 2020-04-24 PROCEDURE — 99214 OFFICE O/P EST MOD 30 MIN: CPT | Performed by: FAMILY MEDICINE

## 2020-04-24 PROCEDURE — 2022F DILAT RTA XM EVC RTNOPTHY: CPT | Performed by: FAMILY MEDICINE

## 2020-04-24 PROCEDURE — 1090F PRES/ABSN URINE INCON ASSESS: CPT | Performed by: FAMILY MEDICINE

## 2020-04-24 PROCEDURE — 3046F HEMOGLOBIN A1C LEVEL >9.0%: CPT | Performed by: FAMILY MEDICINE

## 2020-04-24 RX ORDER — METOPROLOL TARTRATE 50 MG/1
50 TABLET, FILM COATED ORAL 2 TIMES DAILY
Qty: 60 TABLET | Refills: 5
Start: 2020-04-24 | End: 2020-05-17 | Stop reason: SDUPTHER

## 2020-04-24 NOTE — PROGRESS NOTES
(THERA-PLUS) LIQD Take 5 mLs by mouth daily. No current facility-administered medications on file prior to visit. PHYSICAL EXAMINATION:  Physical Exam  Nursing note reviewed. Constitutional:       Appearance: She is well-developed. Pulmonary:      Effort: Pulmonary effort is normal. No respiratory distress. Neurological:      Mental Status: She is alert. Psychiatric:         Behavior: Behavior normal.         Thought Content: Thought content normal.         Judgment: Judgment normal.           ASSESSMENT & PLAN  Mahogany Tejeda was seen today for diabetes. Diagnoses and all orders for this visit:    Type 2 diabetes mellitus with hyperglycemia, without long-term current use of insulin (HCC)  -     Comprehensive Metabolic Panel; Future  -     Lipid Panel; Future  -     Hemoglobin A1C; Future    Essential hypertension  -     Comprehensive Metabolic Panel; Future  -     Lipid Panel; Future  -     Hemoglobin A1C; Future    Other specified hypothyroidism  -     Comprehensive Metabolic Panel; Future  -     Lipid Panel; Future  -     Hemoglobin A1C; Future    Chronic atrial fibrillation  -     metoprolol tartrate (LOPRESSOR) 50 MG tablet; Take 1 tablet by mouth 2 times daily    Dermatitis  -     betamethasone valerate (VALISONE) 0.1 % cream; Apply topically 2 times daily PRN. -Increase metoprolol to 50mg PO BID for uncontrolled AFib sx  -Other chronic issues are stable, continue current medications  -Advised to call if any issues      Return if symptoms worsen or fail to improve. An  electronic signature was used to authenticate this note.     --Sindy Wise DO on 4/24/2020 at 9:36 AM        Pursuant to the emergency declaration under the [facility], [facility] waiver authority and the [employer] and [employer] Act, this Virtual  Visit was conducted, with patient's consent, to reduce the patient's risk of exposure to COVID-19 and provide continuity of care for an established patient. Services were provided through a video synchronous discussion virtually to substitute for in-person clinic visit.

## 2020-05-18 RX ORDER — METOPROLOL TARTRATE 50 MG/1
50 TABLET, FILM COATED ORAL 2 TIMES DAILY
Qty: 60 TABLET | Refills: 5 | Status: SHIPPED | OUTPATIENT
Start: 2020-05-18 | End: 2020-11-23 | Stop reason: SDUPTHER

## 2020-05-19 ENCOUNTER — NURSE ONLY (OUTPATIENT)
Dept: LAB | Age: 75
End: 2020-05-19

## 2020-05-19 ENCOUNTER — TELEPHONE (OUTPATIENT)
Dept: FAMILY MEDICINE CLINIC | Age: 75
End: 2020-05-19

## 2020-05-19 LAB
ANION GAP SERPL CALCULATED.3IONS-SCNC: 12 MEQ/L (ref 8–16)
BUN BLDV-MCNC: 14 MG/DL (ref 7–22)
CALCIUM SERPL-MCNC: 10.8 MG/DL (ref 8.5–10.5)
CHLORIDE BLD-SCNC: 92 MEQ/L (ref 98–111)
CO2: 25 MEQ/L (ref 23–33)
CREAT SERPL-MCNC: 0.7 MG/DL (ref 0.4–1.2)
GFR SERPL CREATININE-BSD FRML MDRD: 82 ML/MIN/1.73M2
GLUCOSE BLD-MCNC: 189 MG/DL (ref 70–108)
POTASSIUM SERPL-SCNC: 4.9 MEQ/L (ref 3.5–5.2)
SODIUM BLD-SCNC: 129 MEQ/L (ref 135–145)
T4 FREE: 1.46 NG/DL (ref 0.93–1.76)
TSH SERPL DL<=0.05 MIU/L-ACNC: 2.68 UIU/ML (ref 0.4–4.2)

## 2020-05-20 ENCOUNTER — TELEMEDICINE (OUTPATIENT)
Dept: FAMILY MEDICINE CLINIC | Age: 75
End: 2020-05-20
Payer: MEDICARE

## 2020-05-20 PROCEDURE — G8400 PT W/DXA NO RESULTS DOC: HCPCS | Performed by: FAMILY MEDICINE

## 2020-05-20 PROCEDURE — 99213 OFFICE O/P EST LOW 20 MIN: CPT | Performed by: FAMILY MEDICINE

## 2020-05-20 PROCEDURE — 1123F ACP DISCUSS/DSCN MKR DOCD: CPT | Performed by: FAMILY MEDICINE

## 2020-05-20 PROCEDURE — 3017F COLORECTAL CA SCREEN DOC REV: CPT | Performed by: FAMILY MEDICINE

## 2020-05-20 PROCEDURE — G8428 CUR MEDS NOT DOCUMENT: HCPCS | Performed by: FAMILY MEDICINE

## 2020-05-20 PROCEDURE — 4040F PNEUMOC VAC/ADMIN/RCVD: CPT | Performed by: FAMILY MEDICINE

## 2020-05-20 PROCEDURE — 1090F PRES/ABSN URINE INCON ASSESS: CPT | Performed by: FAMILY MEDICINE

## 2020-05-20 NOTE — PROGRESS NOTES
2020    TELEHEALTH EVALUATION -- Audio/Visual (During DGJKS-12 public health emergency)    HPI:    Dora Salter (:  1945) has requested an audio/video evaluation for   Chief Complaint   Patient presents with    Discuss Labs   :      Patient had recent labs that displayed mild hyponatremia and mild hypercalcemia. Denies N/V, headaches, edema. States that she is feeling well otherwise. States that the calcium has been a little high since her cancer treatments. She is on HCTZ 12.5mg. Advised patient that we should D/C the HCTZ first and recheck labs in 1 month. If labs remain abnormal, will initiate further w/u. Pt in agreement with plan.     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    Thyroid disease        Past Surgical History:   Procedure Laterality Date    APPENDECTOMY      CARDIAC SURGERY      CHOLECYSTECTOMY      FRACTURE SURGERY      ankle surgery    FRACTURE SURGERY Right 2018    HUMERUS    HERNIA REPAIR      HYSTERECTOMY      [place] OFFICE/OUTPT VISIT,PROCEDURE ONLY Right 2018    ORIF RIGHT PROXIMAL HUMERUS FX performed by Mike Kirby MD at [address] Right 2019    REVISION RT HUMERUS FX, HARDWARE REMOVAL, HUMERAL NAIL INSERTION performed by Mykel Jimenez MD at [address] [facility] History     Socioeconomic History    Marital status:      Spouse name: Not on file    Number of children: Not on file    Years of education: Not on file    Highest education level: Not on file   Occupational History    Not on file   Social Needs    Financial resource strain: Not

## 2020-06-09 ENCOUNTER — PATIENT MESSAGE (OUTPATIENT)
Dept: FAMILY MEDICINE CLINIC | Age: 75
End: 2020-06-09

## 2020-06-09 NOTE — TELEPHONE ENCOUNTER
From: Bradford Alvarado  To: Sindy Wise DO  Sent: 6/9/2020 7:51 AM EDT  Subject: Non-Urgent Medical Question    Dr. Tulio Harris, I'm not happy with my cardiologist, Dr. Clifton Garces. Could you refer me to a good cardiologist? When I told him my AFib was giving me problems he asked me why I thought I had AFib when he was the one who diagnosed it.    Thank you,  Cindy Stock

## 2020-06-12 ENCOUNTER — TELEMEDICINE (OUTPATIENT)
Dept: FAMILY MEDICINE CLINIC | Age: 75
End: 2020-06-12
Payer: MEDICARE

## 2020-06-12 PROCEDURE — G8400 PT W/DXA NO RESULTS DOC: HCPCS | Performed by: FAMILY MEDICINE

## 2020-06-12 PROCEDURE — 4040F PNEUMOC VAC/ADMIN/RCVD: CPT | Performed by: FAMILY MEDICINE

## 2020-06-12 PROCEDURE — 1123F ACP DISCUSS/DSCN MKR DOCD: CPT | Performed by: FAMILY MEDICINE

## 2020-06-12 PROCEDURE — 3017F COLORECTAL CA SCREEN DOC REV: CPT | Performed by: FAMILY MEDICINE

## 2020-06-12 PROCEDURE — 99214 OFFICE O/P EST MOD 30 MIN: CPT | Performed by: FAMILY MEDICINE

## 2020-06-12 PROCEDURE — 3046F HEMOGLOBIN A1C LEVEL >9.0%: CPT | Performed by: FAMILY MEDICINE

## 2020-06-12 PROCEDURE — 2022F DILAT RTA XM EVC RTNOPTHY: CPT | Performed by: FAMILY MEDICINE

## 2020-06-12 PROCEDURE — 1090F PRES/ABSN URINE INCON ASSESS: CPT | Performed by: FAMILY MEDICINE

## 2020-06-12 PROCEDURE — G8428 CUR MEDS NOT DOCUMENT: HCPCS | Performed by: FAMILY MEDICINE

## 2020-06-12 RX ORDER — INSULIN GLARGINE 100 [IU]/ML
13 INJECTION, SOLUTION SUBCUTANEOUS
Qty: 5 PEN | Refills: 2 | Status: SHIPPED
Start: 2020-06-12 | End: 2020-06-12 | Stop reason: SDUPTHER

## 2020-06-12 RX ORDER — INSULIN GLARGINE 100 [IU]/ML
13 INJECTION, SOLUTION SUBCUTANEOUS
Qty: 5 PEN | Refills: 2 | Status: SHIPPED | OUTPATIENT
Start: 2020-06-12 | End: 2020-07-10 | Stop reason: SDUPTHER

## 2020-06-15 ENCOUNTER — TELEPHONE (OUTPATIENT)
Dept: FAMILY MEDICINE CLINIC | Age: 75
End: 2020-06-15

## 2020-06-15 ENCOUNTER — NURSE ONLY (OUTPATIENT)
Dept: LAB | Age: 75
End: 2020-06-15

## 2020-06-15 ENCOUNTER — OFFICE VISIT (OUTPATIENT)
Dept: CARDIOLOGY CLINIC | Age: 75
End: 2020-06-15
Payer: MEDICARE

## 2020-06-15 VITALS
DIASTOLIC BLOOD PRESSURE: 52 MMHG | HEART RATE: 59 BPM | WEIGHT: 192 LBS | SYSTOLIC BLOOD PRESSURE: 138 MMHG | HEIGHT: 65 IN | BODY MASS INDEX: 31.99 KG/M2

## 2020-06-15 LAB
ALBUMIN SERPL-MCNC: 4.7 G/DL (ref 3.5–5.1)
ALP BLD-CCNC: 52 U/L (ref 38–126)
ALT SERPL-CCNC: 35 U/L (ref 11–66)
ANION GAP SERPL CALCULATED.3IONS-SCNC: 11 MEQ/L (ref 8–16)
AST SERPL-CCNC: 33 U/L (ref 5–40)
AVERAGE GLUCOSE: 180 MG/DL (ref 70–126)
BILIRUB SERPL-MCNC: 0.6 MG/DL (ref 0.3–1.2)
BUN BLDV-MCNC: 20 MG/DL (ref 7–22)
CALCIUM SERPL-MCNC: 11.3 MG/DL (ref 8.5–10.5)
CHLORIDE BLD-SCNC: 97 MEQ/L (ref 98–111)
CHOLESTEROL, TOTAL: 153 MG/DL (ref 100–199)
CO2: 24 MEQ/L (ref 23–33)
CREAT SERPL-MCNC: 0.7 MG/DL (ref 0.4–1.2)
GFR SERPL CREATININE-BSD FRML MDRD: 82 ML/MIN/1.73M2
GLUCOSE BLD-MCNC: 223 MG/DL (ref 70–108)
HBA1C MFR BLD: 8 % (ref 4.4–6.4)
HDLC SERPL-MCNC: 42 MG/DL
LDL CHOLESTEROL CALCULATED: 74 MG/DL
POTASSIUM SERPL-SCNC: 5.1 MEQ/L (ref 3.5–5.2)
PTH INTACT: 50.5 PG/ML (ref 15–65)
SODIUM BLD-SCNC: 132 MEQ/L (ref 135–145)
TOTAL PROTEIN: 7.3 G/DL (ref 6.1–8)
TRIGL SERPL-MCNC: 185 MG/DL (ref 0–199)

## 2020-06-15 PROCEDURE — G8427 DOCREV CUR MEDS BY ELIG CLIN: HCPCS | Performed by: NUCLEAR MEDICINE

## 2020-06-15 PROCEDURE — G8400 PT W/DXA NO RESULTS DOC: HCPCS | Performed by: NUCLEAR MEDICINE

## 2020-06-15 PROCEDURE — 1090F PRES/ABSN URINE INCON ASSESS: CPT | Performed by: NUCLEAR MEDICINE

## 2020-06-15 PROCEDURE — G8417 CALC BMI ABV UP PARAM F/U: HCPCS | Performed by: NUCLEAR MEDICINE

## 2020-06-15 PROCEDURE — 1036F TOBACCO NON-USER: CPT | Performed by: NUCLEAR MEDICINE

## 2020-06-15 PROCEDURE — 1123F ACP DISCUSS/DSCN MKR DOCD: CPT | Performed by: NUCLEAR MEDICINE

## 2020-06-15 PROCEDURE — 3017F COLORECTAL CA SCREEN DOC REV: CPT | Performed by: NUCLEAR MEDICINE

## 2020-06-15 PROCEDURE — 93000 ELECTROCARDIOGRAM COMPLETE: CPT | Performed by: NUCLEAR MEDICINE

## 2020-06-15 PROCEDURE — 4040F PNEUMOC VAC/ADMIN/RCVD: CPT | Performed by: NUCLEAR MEDICINE

## 2020-06-15 PROCEDURE — 99204 OFFICE O/P NEW MOD 45 MIN: CPT | Performed by: NUCLEAR MEDICINE

## 2020-06-15 ASSESSMENT — ENCOUNTER SYMPTOMS
ANAL BLEEDING: 0
DIARRHEA: 0
BLOOD IN STOOL: 0
CONSTIPATION: 0
ABDOMINAL PAIN: 0
PHOTOPHOBIA: 0
CHEST TIGHTNESS: 0
RECTAL PAIN: 0
SHORTNESS OF BREATH: 1
NAUSEA: 0
BACK PAIN: 1
VOMITING: 0
ABDOMINAL DISTENTION: 0
COLOR CHANGE: 0

## 2020-06-15 NOTE — TELEPHONE ENCOUNTER
----- Message from Aris Shah DO sent at 6/15/2020 10:47 AM EDT -----  Labs showed several minor abnormalities. Her a1c is a little elevated at 8 which we expected with her recent readings. The increase in basaglar should help this. Her sodium is improved since stopping the HCTZ, but her calcium has actually gone up. I would like to get a follow up blood test to evaluate this. Please call the lab and see if they can add a PTH to the blood work that she has there.

## 2020-06-15 NOTE — PROGRESS NOTES
[facility],[address]  [address] [place] [id number]  Dept: [phone]  Dept Fax: [phone]  Loc: [phone]    Visit Date: 6/15/2020    Anastacia Kelley is a 70 y.o. female who presents todayfor:  Chief Complaint   Patient presents with    New Patient    Hypertension    Diabetes    Hyperlipidemia     Here for the first time  Used to see [employer] like she was told A fib at some point but then was taken off after few months  She was told she is having no more A fib   eliquis was stopped  Here lately with more palpitation   Skipped beat  Associated dizziness  No syncope  Some fatigue and dyspnea with it   Lasts several minutes  No ER visits with it   Dm for many years  Not the best control , still being adjusted   Does have HTN   Seems under control   Does have hyperlipidemia  No known CAD  Does have a cath a while back   Some dyspnea on exertion   Some fatigue  Some pain between the shoulder blades  No smoking  Family history of CAD    HPI:  HPI  Past Medical History:   Diagnosis Date    Arthritis     Atrial fibrillation ([address]     Cancer ([address] 2000    NON HODGKINS LYMPHOMA    Diabetes mellitus ([address]     Regina filter in place     Hx of blood clots 2000    LEGS    Hyperlipidemia     Hypertension     Prolonged emergence from general anesthesia     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

## 2020-06-19 ENCOUNTER — NURSE ONLY (OUTPATIENT)
Dept: LAB | Age: 75
End: 2020-06-19

## 2020-06-19 LAB
CALCIUM URINE: 12.7 MG/DL
CALCIUM URINE: 254 MG/24HR (ref 100–240)
HOURS COLLECTED: 24 HRS
URINE VOLUME MEASURE: 2000 ML

## 2020-06-22 ENCOUNTER — TELEPHONE (OUTPATIENT)
Dept: FAMILY MEDICINE CLINIC | Age: 75
End: 2020-06-22

## 2020-06-22 NOTE — TELEPHONE ENCOUNTER
----- Message from Shira Ponce DO sent at 6/22/2020 12:23 PM EDT -----  Spoke with patient regarding results. Will refer to ENT for further evaluation.

## 2020-07-01 ENCOUNTER — HOSPITAL ENCOUNTER (OUTPATIENT)
Dept: NON INVASIVE DIAGNOSTICS | Age: 75
Discharge: HOME | End: 2020-07-01
Payer: MEDICARE

## 2020-07-01 ENCOUNTER — NURSE ONLY (OUTPATIENT)
Dept: LAB | Age: 75
End: 2020-07-01

## 2020-07-01 VITALS — BODY MASS INDEX: 31.99 KG/M2 | WEIGHT: 192 LBS | HEIGHT: 65 IN

## 2020-07-01 LAB
BASOPHILS # BLD: 0.7 %
BASOPHILS ABSOLUTE: 0 THOU/MM3 (ref 0–0.1)
EOSINOPHIL # BLD: 2.4 %
EOSINOPHILS ABSOLUTE: 0.1 THOU/MM3 (ref 0–0.4)
ERYTHROCYTE [DISTWIDTH] IN BLOOD BY AUTOMATED COUNT: 12.4 % (ref 11.5–14.5)
ERYTHROCYTE [DISTWIDTH] IN BLOOD BY AUTOMATED COUNT: 48.4 FL (ref 35–45)
HCT VFR BLD CALC: 39.5 % (ref 37–47)
HEMOGLOBIN: 12.6 GM/DL (ref 12–16)
IMMATURE GRANS (ABS): 0.02 THOU/MM3 (ref 0–0.07)
IMMATURE GRANULOCYTES: 0.3 %
LV EF: 60 %
LVEF MODALITY: NORMAL
LYMPHOCYTES # BLD: 21.9 %
LYMPHOCYTES ABSOLUTE: 1.3 THOU/MM3 (ref 1–4.8)
MCH RBC QN AUTO: 33.6 PG (ref 26–33)
MCHC RBC AUTO-ENTMCNC: 31.9 GM/DL (ref 32.2–35.5)
MCV RBC AUTO: 105.3 FL (ref 81–99)
MONOCYTES # BLD: 7.6 %
MONOCYTES ABSOLUTE: 0.4 THOU/MM3 (ref 0.4–1.3)
NUCLEATED RED BLOOD CELLS: 0 /100 WBC
PLATELET # BLD: 208 THOU/MM3 (ref 130–400)
PMV BLD AUTO: 10.5 FL (ref 9.4–12.4)
RBC # BLD: 3.75 MILL/MM3 (ref 4.2–5.4)
SEG NEUTROPHILS: 67.1 %
SEGMENTED NEUTROPHILS ABSOLUTE COUNT: 3.9 THOU/MM3 (ref 1.8–7.7)
WBC # BLD: 5.8 THOU/MM3 (ref 4.8–10.8)

## 2020-07-01 PROCEDURE — 93306 TTE W/DOPPLER COMPLETE: CPT

## 2020-07-01 PROCEDURE — A9500 TC99M SESTAMIBI: HCPCS | Performed by: NUCLEAR MEDICINE

## 2020-07-01 PROCEDURE — 6360000002 HC RX W HCPCS

## 2020-07-01 PROCEDURE — 93270 REMOTE 30 DAY ECG REV/REPORT: CPT

## 2020-07-01 PROCEDURE — 78452 HT MUSCLE IMAGE SPECT MULT: CPT

## 2020-07-01 PROCEDURE — 93017 CV STRESS TEST TRACING ONLY: CPT | Performed by: NUCLEAR MEDICINE

## 2020-07-01 PROCEDURE — 3430000000 HC RX DIAGNOSTIC RADIOPHARMACEUTICAL: Performed by: NUCLEAR MEDICINE

## 2020-07-01 RX ADMIN — Medication 30.5 MILLICURIE: at 13:20

## 2020-07-01 RX ADMIN — Medication 10 MILLICURIE: at 12:22

## 2020-07-02 LAB — HOMOCYSTEINE: 9.4 UMOL/L

## 2020-07-06 ENCOUNTER — TELEPHONE (OUTPATIENT)
Dept: CARDIOLOGY CLINIC | Age: 75
End: 2020-07-06

## 2020-07-10 ENCOUNTER — PATIENT MESSAGE (OUTPATIENT)
Dept: FAMILY MEDICINE CLINIC | Age: 75
End: 2020-07-10

## 2020-07-10 RX ORDER — INSULIN GLARGINE 100 [IU]/ML
15 INJECTION, SOLUTION SUBCUTANEOUS
Qty: 5 PEN | Refills: 2 | Status: SHIPPED | OUTPATIENT
Start: 2020-07-10 | End: 2021-04-16 | Stop reason: SDUPTHER

## 2020-07-10 NOTE — TELEPHONE ENCOUNTER
From: Husam Reynolds  To: Sidney Cote DO  Sent: 7/10/2020 7:02 AM EDT  Subject: Prescription Question    Dr. Brady Marte, I increased my Basaglar from 13 units to 15 units and the majority of my fasting sugars are below 150. I have not had any low sugars. Should I stay at 15 units? If so, do you need to change my script at [facility]?   Thank you,  Dawood Roman

## 2020-07-13 ENCOUNTER — OFFICE VISIT (OUTPATIENT)
Dept: ENT CLINIC | Age: 75
End: 2020-07-13
Payer: MEDICARE

## 2020-07-13 VITALS
DIASTOLIC BLOOD PRESSURE: 82 MMHG | WEIGHT: 196 LBS | HEART RATE: 60 BPM | TEMPERATURE: 97 F | BODY MASS INDEX: 32.62 KG/M2 | SYSTOLIC BLOOD PRESSURE: 116 MMHG | RESPIRATION RATE: 14 BRPM

## 2020-07-13 PROCEDURE — 99203 OFFICE O/P NEW LOW 30 MIN: CPT | Performed by: OTOLARYNGOLOGY

## 2020-07-13 RX ORDER — POTASSIUM CHLORIDE 750 MG/1
10 TABLET, EXTENDED RELEASE ORAL DAILY
COMMUNITY
Start: 2020-07-11 | End: 2020-09-21 | Stop reason: SDUPTHER

## 2020-07-13 RX ORDER — LOSARTAN POTASSIUM 100 MG/1
100 TABLET ORAL DAILY
COMMUNITY
Start: 2020-05-26 | End: 2020-09-21 | Stop reason: SDUPTHER

## 2020-07-13 RX ORDER — LEVOTHYROXINE SODIUM 112 UG/1
112 TABLET ORAL DAILY
Qty: 90 TABLET | Refills: 1 | Status: SHIPPED | OUTPATIENT
Start: 2020-07-13 | End: 2020-12-28 | Stop reason: SDUPTHER

## 2020-07-13 RX ORDER — SIMVASTATIN 40 MG
TABLET ORAL
Qty: 90 TABLET | Refills: 3 | Status: SHIPPED | OUTPATIENT
Start: 2020-07-13 | End: 2021-07-08 | Stop reason: SDUPTHER

## 2020-07-13 NOTE — PROGRESS NOTES
[facility] PROFESSIONAL SERVS  [facility] EAR, NOSE AND THROAT  [facility] [place]  Dept: [phone]  Dept Fax: [phone]  Loc: [phone]    Mariusz Yang is a 70 y.o. female who was referred by Macarena Lou DO for:  Chief Complaint   Patient presents with   Perla Franz New Patient     Here for hyperparathyroidism. Referred by Dr. Ruffin Greek       HPI:     Mariusz Yang is a 70 y.o. female is referred by More Tanner for evaluation of hypercalcemia and a clinical diagnosis of PTH in the context of high serum calcium and high calciuria. The patient has had at least 3 fractures of minimal trauma in the last couple of years. She broke both of her upper extremities by falling forward on her lawn while gardening. She had to have one revised because the bone that was reduced and fixated with internal screws was insufficiently strong to maintain the fracture. She required rods to be placed within the bones. And then she had a left ankle fracture with a minimal rotation of her left foot as she stepped off of a stair. She denies having ever had kidney stones or salivary gland stones. She has never been told she had calcium deposits in other vital organs. She does not consider herself depressed though she admits to being personally very unhappy with our current circumstance relative to the COVID-19 epidemic. She does have aches and pains of aging but has never attributed them to her elevated calcium. She has had bone imaging done with her bone density having been tested several years ago when she was told she is \"osteopenic\". Reimaging has not been done since that time. She has had a nuclear medicine study of some type as far back as 15 or 20 years ago looking for the basis of her high blood calcium levels. She does not know the results of that study but it was done at [facility].     The patient also has a history of nighttime snoring with obstructive events first noticed by her . She does have a history of obstructive sleep apnea diagnosed approximately 1 year ago by an all-night polysomnogram.  In November 2019 she was prescribed a CPAP machine which she still has significant difficulty getting to work the way she would like. She reports using it 7 out of 7 nights per week and waking up with it in the morning all 7 nights. Additionally she describes her sleepiness symptoms as having all improved though some are still not normal.  On an [place] sleepiness score inventory today she had a score of 5 out of 24, or a negative score for residual sleepiness. HIstory:     No Known Allergies  Current Outpatient Medications   Medication Sig Dispense Refill    losartan (COZAAR) 100 MG tablet Take 100 mg by mouth daily      KLOR-CON M10 10 MEQ extended release tablet Take 10 mg by mouth daily      insulin glargine (BASAGLAR KWIKPEN) 100 UNIT/ML injection pen Inject 15 Units into the skin every morning (before breakfast) 5 pen 2    apixaban (ELIQUIS) 5 MG TABS tablet Take 1 tablet by mouth 2 times daily 60 tablet 0    apixaban (ELIQUIS) 5 MG TABS tablet Take 1 tablet by mouth 2 times daily 180 tablet 3    metoprolol tartrate (LOPRESSOR) 50 MG tablet Take 1 tablet by mouth 2 times daily 60 tablet 5    betamethasone valerate (VALISONE) 0.1 % cream Apply topically 2 times daily PRN. 1 Tube 1    glimepiride (AMARYL) 4 MG tablet Take 1 tablet by mouth 2 times daily 180 tablet 3    CPAP Machine MISC by Does not apply route Please change CPAP pressure 8-10 auto cm H20.   Turn off ramp 1 each 0    Respiratory Therapy Supplies (ADULT MASK) MISC Needs mask re-fit 1 each 0    metFORMIN (GLUCOPHAGE-XR) 500 MG extended release tablet Take 2 tablets by mouth 2 times daily 360 tablet 3    blood glucose test strips (CONTOUR NEXT TEST) strip Check blood sugar twice per day due to hyperglycemia Dx E11.65 200 each 5    hydrALAZINE (APRESOLINE) 50 MG tablet Take 50 mg by mouth 2 times daily       Potassium Chloride (KLOR-CON 10 PO) Take 10 mEq by mouth 2 times daily       mupirocin (BACTROBAN) 2 % ointment Apply 2 times daily PRN. 1 Tube 1    vitamin B-12 (CYANOCOBALAMIN) 1000 MCG tablet Take 1,000 mcg by mouth daily      losartan (COZAAR) 50 MG tablet Take 100 mg by mouth daily       folic acid (FOLVITE) 1 MG tablet Take 3 mg by mouth 2 times daily       aspirin 325 MG tablet Take 81 mg by mouth daily       Multiple Vitamins-Minerals (ICAPS AREDS 2) CAPS Take by mouth 2 times daily      Multiple Vitamin (THERA-PLUS) LIQD Take 5 mLs by mouth daily.  levothyroxine (SYNTHROID) 112 MCG tablet Take 1 tablet by mouth Daily 90 tablet 1    simvastatin (ZOCOR) 40 MG tablet TAKE 1 TABLET NIGHTLY 90 tablet 3     No current facility-administered medications for this visit.       Past Medical History:   Diagnosis Date    Arthritis     Atrial fibrillation ([address]     Cancer [address] [address] 2000    NON HODGKINS LYMPHOMA    Diabetes mellitus ([facility] [address]     Deepika filter in place     Hx of blood clots 2000    LEGS    Hyperlipidemia     Hypertension     Prolonged emergence from general anesthesia     Stroke (cerebrum) (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    Thyroid disease       Past Surgical History:   Procedure Laterality Date    APPENDECTOMY      CARDIAC SURGERY      CHOLECYSTECTOMY      FRACTURE SURGERY      ankle surgery    FRACTURE SURGERY Right 09/2018    HUMERUS    HERNIA REPAIR      HYSTERECTOMY      [place] OFFICE/OUTPT VISIT,PROCEDURE ONLY Right 9/19/2018    ORIF RIGHT PROXIMAL HUMERUS FX performed by Jonathan Sandoval MD at [address] Right 6/27/2019    REVISION RT HUMERUS FX, HARDWARE REMOVAL, HUMERAL NAIL INSERTION performed by Kim Fontaien MD at [facility]       Family History   Problem Relation Age of Onset    Heart Disease Mother     Heart Disease Father      Social History     Tobacco Use    Smoking status: Never Smoker    Smokeless 05/19/2020    K 4.9 12/05/2019    K 3.9 09/20/2018    K 4.2 09/18/2018    CL 97 06/15/2020    CL 92 05/19/2020    CL 94 12/05/2019    CO2 24 06/15/2020    CO2 25 05/19/2020    CO2 25 12/05/2019    BUN 20 06/15/2020    BUN 14 05/19/2020    BUN 18 12/05/2019    CREATININE 0.7 06/15/2020    CREATININE 0.7 05/19/2020    CREATININE 0.9 12/05/2019    CALCIUM 11.3 06/15/2020    CALCIUM 10.8 05/19/2020    CALCIUM 10.6 12/05/2019    PROT 7.3 06/15/2020    PROT 7.8 06/20/2019    PROT 7.5 02/15/2019    LABALBU 4.7 06/15/2020    LABALBU 4.7 06/20/2019    LABALBU 4.6 02/15/2019    LABALBU 4.4 02/27/2012    BILITOT 0.6 06/15/2020    BILITOT 0.6 06/20/2019    BILITOT 0.4 02/15/2019    ALKPHOS 52 06/15/2020    ALKPHOS 53 06/20/2019    ALKPHOS 58 02/15/2019    AST 33 06/15/2020    AST 32 06/20/2019    AST 32 02/15/2019    ALT 35 06/15/2020    ALT 35 06/20/2019    ALT 40 02/15/2019       All of the past medical history, past surgical history, family history,social history, allergies and current medications were reviewed with the patient. Assessment & Plan   Diagnoses and all orders for this visit:     Diagnosis Orders   1. Hypercalcemia  NM PARATHYROID SCAN   2. Age-related osteoporosis with current pathological fracture, initial encounter     3. TRACY on CPAP     4. Impacted cerumen of right ear     5. Diminished pulse in upper extremity         Based on the patient's history, her laboratory studies, and today's physical exam the patient has hypercalcemia with a high normal PTH indicating a significant possibility of a parathyroid adenoma. I recommend getting a sestamibi scan to look for an asymmetrical gland that may be able to be treated surgically with little risk to iatrogenic hypocalcemia.   In addition her high calcium turnover from her bones is likely an important part of her pathological fractures in the form of her bilateral humeral fractures and her left ankle fracture in the recent past.  I recommended a new bone

## 2020-07-23 ENCOUNTER — TELEPHONE (OUTPATIENT)
Dept: FAMILY MEDICINE CLINIC | Age: 75
End: 2020-07-23

## 2020-07-23 NOTE — TELEPHONE ENCOUNTER
----- Message from Hetal Mukherjee DO sent at 7/23/2020  8:27 AM EDT -----  Please contact patient and get her scheduled for a 3 month OV  ----- Message -----  From: Hetal Mukherjee DO  Sent: 7/23/2020  To: Hetal Mukherjee DO    Schedule 3 month OV

## 2020-08-03 ENCOUNTER — OFFICE VISIT (OUTPATIENT)
Dept: FAMILY MEDICINE CLINIC | Age: 75
End: 2020-08-03
Payer: MEDICARE

## 2020-08-03 VITALS
OXYGEN SATURATION: 99 % | HEART RATE: 62 BPM | BODY MASS INDEX: 32.97 KG/M2 | TEMPERATURE: 98.9 F | SYSTOLIC BLOOD PRESSURE: 130 MMHG | DIASTOLIC BLOOD PRESSURE: 72 MMHG | HEIGHT: 65 IN | WEIGHT: 197.9 LBS | RESPIRATION RATE: 12 BRPM

## 2020-08-03 LAB — HBA1C MFR BLD: 7.4 % (ref 4.3–5.7)

## 2020-08-03 PROCEDURE — 1036F TOBACCO NON-USER: CPT | Performed by: FAMILY MEDICINE

## 2020-08-03 PROCEDURE — 3017F COLORECTAL CA SCREEN DOC REV: CPT | Performed by: FAMILY MEDICINE

## 2020-08-03 PROCEDURE — 99214 OFFICE O/P EST MOD 30 MIN: CPT | Performed by: FAMILY MEDICINE

## 2020-08-03 PROCEDURE — G8400 PT W/DXA NO RESULTS DOC: HCPCS | Performed by: FAMILY MEDICINE

## 2020-08-03 PROCEDURE — G8417 CALC BMI ABV UP PARAM F/U: HCPCS | Performed by: FAMILY MEDICINE

## 2020-08-03 PROCEDURE — 1123F ACP DISCUSS/DSCN MKR DOCD: CPT | Performed by: FAMILY MEDICINE

## 2020-08-03 PROCEDURE — 2022F DILAT RTA XM EVC RTNOPTHY: CPT | Performed by: FAMILY MEDICINE

## 2020-08-03 PROCEDURE — 3051F HG A1C>EQUAL 7.0%<8.0%: CPT | Performed by: FAMILY MEDICINE

## 2020-08-03 PROCEDURE — 4040F PNEUMOC VAC/ADMIN/RCVD: CPT | Performed by: FAMILY MEDICINE

## 2020-08-03 PROCEDURE — 1090F PRES/ABSN URINE INCON ASSESS: CPT | Performed by: FAMILY MEDICINE

## 2020-08-03 PROCEDURE — G8427 DOCREV CUR MEDS BY ELIG CLIN: HCPCS | Performed by: FAMILY MEDICINE

## 2020-08-03 ASSESSMENT — PATIENT HEALTH QUESTIONNAIRE - PHQ9
SUM OF ALL RESPONSES TO PHQ QUESTIONS 1-9: 0
SUM OF ALL RESPONSES TO PHQ9 QUESTIONS 1 & 2: 0
1. LITTLE INTEREST OR PLEASURE IN DOING THINGS: 0
2. FEELING DOWN, DEPRESSED OR HOPELESS: 0
SUM OF ALL RESPONSES TO PHQ QUESTIONS 1-9: 0

## 2020-08-03 NOTE — PROGRESS NOTES
Encounters:   08/03/20 130/72   07/13/20 116/82   06/15/20 (!) 138/52       No results found for: Thamas Lennox    Lab Results   Component Value Date    [facility] 74 06/15/2020         Health Maintenance   Topic Date Due    Hepatitis C screen  1945    Diabetic foot exam  02/26/1957    DTaP/Tdap/Td vaccine (1 - Tdap) 02/26/1966    Shingles Vaccine (1 of 2) 02/26/1997    Annual Wellness Visit (AWV)  05/29/2019    Diabetic retinal exam  04/17/2020    Flu vaccine (1) 09/01/2020    TSH testing  05/19/2021    A1C test (Diabetic or Prediabetic)  06/15/2021    Lipid screen  06/15/2021    Potassium monitoring  06/15/2021    Creatinine monitoring  06/15/2021    Breast cancer screen  10/08/2021    Colon cancer screen colonoscopy  07/02/2025    DEXA (modify frequency per FRAX score)  Completed    Pneumococcal 65+ years Vaccine  Completed    Hepatitis A vaccine  Aged Out    Hib vaccine  Aged Out    Meningococcal (ACWY) vaccine  Aged Out       Past Medical History:   Diagnosis Date    Arthritis     Atrial fibrillation ([address]     Cancer ([address] 2000    NON HODGKINS LYMPHOMA    Diabetes mellitus ([address]     Deepika filter in place     Hx of blood clots 2000    LEGS    Hyperlipidemia     Hypertension     Prolonged emergence from general anesthesia     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    Thyroid disease        Past Surgical History:   Procedure Laterality Date    APPENDECTOMY      CARDIAC SURGERY      CHOLECYSTECTOMY      FRACTURE SURGERY      ankle surgery    FRACTURE SURGERY Right 09/2018    HUMERUS    HERNIA REPAIR      HYSTERECTOMY      [place] OFFICE/OUTPT VISIT,PROCEDURE ONLY Right 9/19/2018    ORIF RIGHT PROXIMAL HUMERUS FX performed by Manuel Schulz MD at [address] Right 6/27/2019    REVISION RT HUMERUS FX, HARDWARE REMOVAL, HUMERAL NAIL INSERTION performed by Amber Bolanos MD at [address] [facility] medications  -Advised to call if any issues    Return in about 6 months (around 2/3/2021). Controlled Substance Monitoring:    Acute and Chronic Pain Monitoring:   No flowsheet data found. Kristal received counseling on the following healthy behaviors: medication adherence  Reviewed prior labs and health maintenance. Continue current medications, diet and exercise. Discussed use, benefit, and side effects of prescribed medications. Barriers to medication compliance addressed. Patient given educational materials - see patient instructions. All patient questions answered. Patient voiced understanding.

## 2020-08-04 ENCOUNTER — HOSPITAL ENCOUNTER (OUTPATIENT)
Dept: NUCLEAR MEDICINE | Age: 75
Discharge: HOME | End: 2020-08-04
Payer: MEDICARE

## 2020-08-04 PROCEDURE — A9500 TC99M SESTAMIBI: HCPCS | Performed by: OTOLARYNGOLOGY

## 2020-08-04 PROCEDURE — 78072 PARATHYRD PLANAR W/SPECT&CT: CPT

## 2020-08-04 PROCEDURE — 3430000000 HC RX DIAGNOSTIC RADIOPHARMACEUTICAL: Performed by: OTOLARYNGOLOGY

## 2020-08-04 RX ADMIN — Medication 24.8 MILLICURIE: at 09:50

## 2020-08-06 NOTE — PROCEDURES
[address] [id number]                                 EVENT MONITOR    PATIENT NAME: Nora Carter                     :        1945  MED REC NO:   [id number]                           ROOM:  ACCOUNT NO:   [de-identified]                           ADMIT DATE: 2020  PROVIDER:     Alex Rahman M.D. CLINICAL HISTORY AND INDICATION:  This is a patient with AFib. EVENT MONITOR DESCRIPTION:  Event monitor was attached to the patient  between 2020 and 2020. EVENT MONITOR FINDINGS:  Baseline rhythm showed atrial fibrillation,  atrial flutter pretty much throughout the monitor. The patient was in  this rhythm with varying heart rate with minimal episodes of sinus  bradycardia alternating with atrial flutter. No prolonged pauses. No  V-tach. CONCLUSION:  1. Alternating atrial flutter, fibrillation with short episodes of  sinus bradycardia. 2.  No prolonged pauses. 3.  Abnormal monitor. 4.  Clinical correlation is recommended.         Isrrael Pavon M.D.    D: 2020 15:24:50       T: 2020 16:42:41     [email or web address]  Job#: [id number]     Doc#: [id number]    CC:

## 2020-08-24 ENCOUNTER — OFFICE VISIT (OUTPATIENT)
Dept: ENT CLINIC | Age: 75
End: 2020-08-24
Payer: MEDICARE

## 2020-08-24 VITALS
RESPIRATION RATE: 16 BRPM | DIASTOLIC BLOOD PRESSURE: 78 MMHG | BODY MASS INDEX: 32.95 KG/M2 | WEIGHT: 198 LBS | SYSTOLIC BLOOD PRESSURE: 136 MMHG | TEMPERATURE: 97.2 F | HEART RATE: 70 BPM

## 2020-08-24 PROBLEM — E83.52 HYPERCALCEMIA: Status: ACTIVE | Noted: 2020-08-24

## 2020-08-24 PROBLEM — E34.9 ELEVATED PARATHYROID HORMONE: Status: ACTIVE | Noted: 2020-08-24

## 2020-08-24 PROBLEM — M80.00XA OSTEOPOROSIS WITH PATHOLOGICAL FRACTURE: Status: ACTIVE | Noted: 2020-08-24

## 2020-08-24 PROBLEM — R79.89 ELEVATED PARATHYROID HORMONE: Status: ACTIVE | Noted: 2020-08-24

## 2020-08-24 PROBLEM — D35.1 PARATHYROID ADENOMA: Status: ACTIVE | Noted: 2020-08-24

## 2020-08-24 PROCEDURE — G8400 PT W/DXA NO RESULTS DOC: HCPCS | Performed by: OTOLARYNGOLOGY

## 2020-08-24 PROCEDURE — 4040F PNEUMOC VAC/ADMIN/RCVD: CPT | Performed by: OTOLARYNGOLOGY

## 2020-08-24 PROCEDURE — 99214 OFFICE O/P EST MOD 30 MIN: CPT | Performed by: OTOLARYNGOLOGY

## 2020-08-24 PROCEDURE — 1036F TOBACCO NON-USER: CPT | Performed by: OTOLARYNGOLOGY

## 2020-08-24 PROCEDURE — G8417 CALC BMI ABV UP PARAM F/U: HCPCS | Performed by: OTOLARYNGOLOGY

## 2020-08-24 PROCEDURE — 3017F COLORECTAL CA SCREEN DOC REV: CPT | Performed by: OTOLARYNGOLOGY

## 2020-08-24 PROCEDURE — G8427 DOCREV CUR MEDS BY ELIG CLIN: HCPCS | Performed by: OTOLARYNGOLOGY

## 2020-08-24 PROCEDURE — 1090F PRES/ABSN URINE INCON ASSESS: CPT | Performed by: OTOLARYNGOLOGY

## 2020-08-24 PROCEDURE — 1123F ACP DISCUSS/DSCN MKR DOCD: CPT | Performed by: OTOLARYNGOLOGY

## 2020-08-24 NOTE — PROGRESS NOTES
This is not my patient. I have never seen this patient, and she is also not scheduled to see me. Please, forward to the correct PCP.

## 2020-08-24 NOTE — PROGRESS NOTES
TABS tablet Take 1 tablet by mouth 2 times daily 60 tablet 0    apixaban (ELIQUIS) 5 MG TABS tablet Take 1 tablet by mouth 2 times daily 180 tablet 3    metoprolol tartrate (LOPRESSOR) 50 MG tablet Take 1 tablet by mouth 2 times daily 60 tablet 5    betamethasone valerate (VALISONE) 0.1 % cream Apply topically 2 times daily PRN. 1 Tube 1    glimepiride (AMARYL) 4 MG tablet Take 1 tablet by mouth 2 times daily 180 tablet 3    CPAP Machine MISC by Does not apply route Please change CPAP pressure 8-10 auto cm H20. Turn off ramp 1 each 0    Respiratory Therapy Supplies (ADULT MASK) MISC Needs mask re-fit 1 each 0    metFORMIN (GLUCOPHAGE-XR) 500 MG extended release tablet Take 2 tablets by mouth 2 times daily 360 tablet 3    blood glucose test strips (CONTOUR NEXT TEST) strip Check blood sugar twice per day due to hyperglycemia Dx E11.65 200 each 5    hydrALAZINE (APRESOLINE) 50 MG tablet Take 50 mg by mouth 2 times daily       Potassium Chloride (KLOR-CON 10 PO) Take 10 mEq by mouth 2 times daily       mupirocin (BACTROBAN) 2 % ointment Apply 2 times daily PRN. 1 Tube 1    vitamin B-12 (CYANOCOBALAMIN) 1000 MCG tablet Take 1,000 mcg by mouth daily      losartan (COZAAR) 50 MG tablet Take 100 mg by mouth daily       folic acid (FOLVITE) 1 MG tablet Take 3 mg by mouth 2 times daily       aspirin 325 MG tablet Take 81 mg by mouth daily       Multiple Vitamins-Minerals (ICAPS AREDS 2) CAPS Take by mouth 2 times daily      Multiple Vitamin (THERA-PLUS) LIQD Take 5 mLs by mouth daily. No current facility-administered medications for this visit.       Past Medical History:   Diagnosis Date    Arthritis     Atrial fibrillation ([address]     Cancer ([facility] [address] 2000    NON HODGKINS LYMPHOMA    Diabetes mellitus ([facility] [address]     Deepika filter in place     Hx of blood clots 2000    LEGS    Hyperlipidemia     Hypertension     Prolonged emergence from general anesthesia     Stroke (cerebrum) ([facility] [address] 3/9/15 affected right side    Thyroid disease       Past Surgical History:   Procedure Laterality Date    APPENDECTOMY      CARDIAC SURGERY      CHOLECYSTECTOMY      FRACTURE SURGERY      ankle surgery    FRACTURE SURGERY Right 09/2018    HUMERUS    HERNIA REPAIR      HYSTERECTOMY      [place] OFFICE/OUTPT VISIT,PROCEDURE ONLY Right 9/19/2018    ORIF RIGHT PROXIMAL HUMERUS FX performed by Bradley Sabillon MD at [address] Right 6/27/2019    REVISION RT HUMERUS FX, HARDWARE REMOVAL, HUMERAL NAIL INSERTION performed by Fernando Gutierrez MD at [address] [facility]       Family History   Problem Relation Age of Onset    Heart Disease Mother     Heart Disease Father      Social History     Tobacco Use    Smoking status: Never Smoker    Smokeless tobacco: Never Used   Substance Use Topics    Alcohol use: No        Subjective:      Review of Systems  Rest of review of systems are negative, except as noted in HPI. Objective:     /78 (Site: Right Upper Arm, Position: Sitting)   Pulse 70   Temp 97.2 °F (36.2 °C) (Infrared)   Resp 16   Wt 198 lb (89.8 kg)   BMI 32.95 kg/m²     Physical Exam     Vitals reviewed. Nm Parathyroid W Spect/ct    Result Date: 8/4/2020  Indistinct focus of activity posterior to the left lower lobe of the thyroid gland suspicious for parathyroid adenoma.  Final report electronically signed by Dr. Thelma Hennessy on 8/4/2020 3:23 PM     Lab Results   Component Value Date     06/15/2020     05/19/2020     12/05/2019    K 5.1 06/15/2020    K 4.9 05/19/2020    K 4.9 12/05/2019    K 3.9 09/20/2018    K 4.2 09/18/2018    CL 97 06/15/2020    CL 92 05/19/2020    CL 94 12/05/2019    CO2 24 06/15/2020    CO2 25 05/19/2020    CO2 25 12/05/2019    BUN 20 06/15/2020    BUN 14 05/19/2020    BUN 18 12/05/2019    CREATININE 0.7 06/15/2020    CREATININE 0.7 05/19/2020    CREATININE 0.9 12/05/2019    CALCIUM 11.3 06/15/2020    CALCIUM 10.8 05/19/2020 CALCIUM 10.6 12/05/2019    PROT 7.3 06/15/2020    PROT 7.8 06/20/2019    PROT 7.5 02/15/2019    LABALBU 4.7 06/15/2020    LABALBU 4.7 06/20/2019    LABALBU 4.6 02/15/2019    LABALBU 4.4 02/27/2012    BILITOT 0.6 06/15/2020    BILITOT 0.6 06/20/2019    BILITOT 0.4 02/15/2019    ALKPHOS 52 06/15/2020    ALKPHOS 53 06/20/2019    ALKPHOS 58 02/15/2019    AST 33 06/15/2020    AST 32 06/20/2019    AST 32 02/15/2019    ALT 35 06/15/2020    ALT 35 06/20/2019    ALT 40 02/15/2019       All of the past medical history, past surgical history, family history,social history, allergies and current medications were reviewed with the patient. Assessment & Plan   Diagnoses and all orders for this visit:     Diagnosis Orders   1. Parathyroid adenoma     2. Hypercalcemia     3. Osteoporosis with pathological fracture, sequela         Based on her history and her lab results, I believe that she is a reasonable candidate for a deep neck exploration with the removal of an apparent hyperactive parathyroid gland that may well represent a parathyroid adenoma and the removal of which may produce significant drop in her serum calcium with a commensurate drop in her leaching of her calcium stores from her bones. I discussed this at length with the patient and her  at length, specifically describing the potential that the removal of a single gland may not be enough to bring her into the mid range of serum calcium and reduce this abnormal calcium homeostasis. It may be necessary to additionally explore her neck either doing this first operation or in the future for the removal of 1 or 2 additional glands depending on how big change she needs to have produced. In the past the removal of \"3-1/2 glands\" was the treatment of choice but frequently produced symptomatic hypocalcemia, arguably a bigger problem at least in the acute sense than hypercalcemia.   My preference would be to remove the suspicious gland and test her for her intact PTH.  If there is a sufficiently significant drop in her pre-removal PTH, I would stop the exploration. If on the other hand the drop is minimal, I may recommend and get her permission to allow me to explore the opposite inferior pole to look for a second gland or even a third gland, monitoring the change in her serum PTH in the process. While the risk to her recurrent laryngeal nerves would be increased by exploring the opposite side, I would be using nerve integrity monitoring the whole time to decrease the chances that significant recurrent laryngeal nerve injury would occur at all. I explained all this to the patient and her  to their satisfaction. I reviewed the possibility of multiple glands being operated on during the primary operation on her first visit to see me. During this visit I primarily described it in the context of reoperation. After reviewing her labs over the last several years I have come to the conclusion that she may well need more than one gland removed and possibly 3 glands removed to get her significantly lower in her PTH so that she can benefit from this surgical approach. I now believe that she may need a more extensive dissection at the first operation to reduce the chances that she will need subsequent operations in the future. I will contact her by phone to discuss this with him and arrange a second meeting with them  if she wishes. Return in about 4 weeks (around 9/21/2020) for 4-5 days postop for drain removal if her output is sufficiently low. .       I spent over 30 minutes reviewing all of these facets of her care and factors involved in decision making regarding the operation I have offered to her as well as answering her questions and addressing her and her 's concerns. They both reported understanding the basis of my recommendations and their willingness to proceed as such. **This report has been created using voice recognition software.  It may contain minor errors which are inherent in voice recognition technology. **

## 2020-08-25 ENCOUNTER — TELEPHONE (OUTPATIENT)
Dept: FAMILY MEDICINE CLINIC | Age: 75
End: 2020-08-25

## 2020-08-25 ENCOUNTER — OFFICE VISIT (OUTPATIENT)
Dept: FAMILY MEDICINE CLINIC | Age: 75
End: 2020-08-25
Payer: MEDICARE

## 2020-08-25 VITALS
HEART RATE: 68 BPM | HEIGHT: 64 IN | BODY MASS INDEX: 33.46 KG/M2 | SYSTOLIC BLOOD PRESSURE: 152 MMHG | DIASTOLIC BLOOD PRESSURE: 58 MMHG | TEMPERATURE: 98.4 F | WEIGHT: 196 LBS | RESPIRATION RATE: 14 BRPM | OXYGEN SATURATION: 98 %

## 2020-08-25 PROCEDURE — 1090F PRES/ABSN URINE INCON ASSESS: CPT | Performed by: NURSE PRACTITIONER

## 2020-08-25 PROCEDURE — 3017F COLORECTAL CA SCREEN DOC REV: CPT | Performed by: NURSE PRACTITIONER

## 2020-08-25 PROCEDURE — 4040F PNEUMOC VAC/ADMIN/RCVD: CPT | Performed by: NURSE PRACTITIONER

## 2020-08-25 PROCEDURE — 3051F HG A1C>EQUAL 7.0%<8.0%: CPT | Performed by: NURSE PRACTITIONER

## 2020-08-25 PROCEDURE — 1123F ACP DISCUSS/DSCN MKR DOCD: CPT | Performed by: NURSE PRACTITIONER

## 2020-08-25 PROCEDURE — G8417 CALC BMI ABV UP PARAM F/U: HCPCS | Performed by: NURSE PRACTITIONER

## 2020-08-25 PROCEDURE — 2022F DILAT RTA XM EVC RTNOPTHY: CPT | Performed by: NURSE PRACTITIONER

## 2020-08-25 PROCEDURE — G8400 PT W/DXA NO RESULTS DOC: HCPCS | Performed by: NURSE PRACTITIONER

## 2020-08-25 PROCEDURE — 99214 OFFICE O/P EST MOD 30 MIN: CPT | Performed by: NURSE PRACTITIONER

## 2020-08-25 PROCEDURE — G8428 CUR MEDS NOT DOCUMENT: HCPCS | Performed by: NURSE PRACTITIONER

## 2020-08-25 PROCEDURE — 1036F TOBACCO NON-USER: CPT | Performed by: NURSE PRACTITIONER

## 2020-08-25 NOTE — TELEPHONE ENCOUNTER
----- Message from Jose Gamble DO sent at 8/24/2020  5:15 PM EDT -----      ----- Message -----  From: Bronson Clayton MD  Sent: 8/24/2020   5:10 PM EDT  To: Jose Gamble DO    This is Emiherbie Huston will need medical clearance from her primary care clinician as well as her cardiologist before undergoing this operation. If need be the patient can have surgery with full anticoagulation. She will will just need to be in house for longer interval watching her for the potential of a hematoma.

## 2020-08-25 NOTE — TELEPHONE ENCOUNTER
Sidney Cote, DO at 8/24/2020  5:14 PM     Status: Signed       Her ENT surgeon would like for her to have Preop clearance for her procedure. Can she come in this week and do this? Can walk in M-F, 8-4.

## 2020-08-25 NOTE — PROGRESS NOTES
Josefa Vidal is a 70 y.o. female that presents for Pre-op Exam (Parathyroid, no date, [facility] )      At the request of Dr. Stacy Martinez presents for pre-operative evaluation for her surgery. Planned Surgery: Parathyroidectomy with Dr. Ravi Segura    Patient Active Problem List    Diagnosis Date Noted    Elevated parathyroid hormone 08/24/2020    Parathyroid adenoma 08/24/2020    Hypercalcemia 08/24/2020    Osteoporosis with pathological fracture 08/24/2020    TRACY on CPAP 08/27/2019    Obesity (BMI 30-39.9) 08/27/2019    History of humerus fracture 06/27/2019    Hyponatremia 09/18/2018    Anticoagulant long-term use 09/17/2018    Microalbuminuria due to type 2 diabetes mellitus ([facility] 05/17/2018    Chronic atrial fibrillation 05/17/2018    Diabetes mellitus ([facility] 05/17/2018    Essential hypertension 05/17/2018    Osteopenia of spine 05/17/2018    Other specified hypothyroidism 05/17/2018    History of non-Hodgkin's lymphoma 05/17/2018       PREOPERATIVE FAMILY HISTORY  - She reports that she does not have a history of bad reaction to anesthesia. Difficulty waking up  -she does not have a family history of bleeding problems such as hemophilia, or [place] disease, or von Willebrand disease. PREOPERATIVE ALLERGIES QUESTION:  No Known Allergies  she does not have a history of rash or swelling from exposure to rubber or latex.     PREOPERATIVE MEDICATION QUESTION:  Current Outpatient Medications   Medication Sig Dispense Refill    levothyroxine (SYNTHROID) 112 MCG tablet Take 1 tablet by mouth Daily 90 tablet 1    simvastatin (ZOCOR) 40 MG tablet TAKE 1 TABLET NIGHTLY 90 tablet 3    losartan (COZAAR) 100 MG tablet Take 100 mg by mouth daily      KLOR-CON M10 10 MEQ extended release tablet Take 10 mg by mouth daily      insulin glargine (BASAGLAR KWIKPEN) 100 UNIT/ML injection pen Inject 15 Units into the skin every morning (before breakfast) 5 pen 2    apixaban (ELIQUIS) 5 orthopnea. + slight LLE edema  + palpitations, hx Afib, shortness of breath when she is in Afib    she cannot walk up 1 flight of stairs or 8 steps without stopping (4 METS) Has to stop due to fatigue and bilateral knee pain    she does have a history of CAD  she does not have a history of CHF  she does have a history of CVA or TIA Stroke in 2015  she does have a history of DM requiring insulin Basaglar 15 units QAM  she does not have a history of Renal insufficiency (Cr > 2.0)    Event monitor in July noted alternating atrial flutter, atrial fibrillation with short episodes of sinus bradycardia. On Eliquis and Aspirin. http://circ. ahajournals. org/content/[email or web address]/17/[email or web address]/[email or web address]. large.jpg    PREOPERATIVE REVIEW OF SYSTEMS:  she does not have a history of MRSA or VRE. she does not have a history of seizures. she does have a history of phlebitis or blood clots in arms or legs. Hx in DVT, [place] filter  she does have a history of sleep apnea. Wear Cpap at home  she  does have a history of snoring loudly enough to be heard through a closed door. General/Constitutional:  Negative for fever, chills, fatigue, recent weight gain or loss. HEENT:  Negative for changes in vision, ear pain, nose pain, sore throat, dysphagia or odynophagia. Cardiovascular:  Negative chest pain, dyspnea on exertion, or orthopnea +palpitations at times  Respiratory:  Negative for  cough, hemoptysis, or wheezing.  +dyspnea w/afib  Gastrointestinal:  Negative for abdominal pain, nausea, vomiting, diarrhea, constipation or changes in bowel habits. Genitourinary: Negative for dysuria or hematuria. No frequency, urgency, or hesitancy. Musculoskeletal: Negative for arthralgias, myalgias, or back pain. +RUE swelling   Neurological: Negative for dizziness, syncope, focal weakness, paresthesias or headaches. Psychiatric: Negative for depression, anxiety, SI/HI   Skin: Negative for acute skin lesions.        PHYSICAL EXAM:  Blood pressure (!) 152/58, pulse 68, temperature 98.4 °F (36.9 °C), temperature source Oral, resp. rate 14, height 5' 4\" (1.626 m), weight 196 lb (88.9 kg), SpO2 98 %. GEN: No acute distress  HEENT:  NCAT, PERRL, EOMI, Nares clear, turbinates pink, mucosa is moist.  Oropharynx  is clear. Hearing grossly intact. Dentition is normal for age. Neck: No lymphadenopathy or masses. Thyroid not palpable; no nodules or masses. Heart: RRR. S1 and S2 normal, +murmur. No carotid bruits noted. Lungs:  CTAB,  No wheezing, ronchi, or rales. Normal symmetric air entry throughout both lung fields. Abdomen:  Soft, non tender, non distended. No rebound or guarding. No organomegaly. Extremities:  No gross deformity, erythema or edema of the lower extremities. RUE swelling, feels like fluid filled cyst, had previous surgery there. Skin: No pathologic lesions or significant rash. Psych:  Affect appropriate. Thought process is normal without evidence of depression or psychosis. Good insight and appropriae interaction. Cognition and memory appear to be intact. Silverio Velásquez was seen today for pre-op exam.    Diagnoses and all orders for this visit:    Pre-operative clearance    Type 2 diabetes mellitus without complication, with long-term current use of insulin (HCC)    Atrial fibrillation, unspecified type [facility])    Planning for Parathyroidectomy with Dr. Faby Corea in the near future  Recently had event monitor that showed atrial flutter, atrial fibrillation with intermittent bradycardia  On Eliquis and Aspirin  Follows with Dr. Татьяна Antonio  Will reach out to him for Cardiac Clearance  Advised Grant Ortega to hold Glimepride the day before and the day of surgery  Also instructed her to hold the [address] the morning of her surgery  RUBENJAMIN has two areas, one 50 cent piece sized, the other about 1.5 inch diameter, feel like fluid filled cysts.   Advised her to check with Ortho regarding this as she previously had shoulder surgery, thinks this may have began after her last surgery. Encouraged her to call me with any questions or concerns in the meantime. No follow-ups on file.      .Electronically signed by EMMANUEL Swenson CNP on 8/25/2020 at 3:32 PM

## 2020-08-25 NOTE — TELEPHONE ENCOUNTER
Spoke with pt and informed her of Dr Kenny Frey previous msg stating she could walk in M-F, 8-4.      Pt came into office today to be seen for Pre-op

## 2020-08-26 ASSESSMENT — ENCOUNTER SYMPTOMS
COUGH: 0
CHEST TIGHTNESS: 0
BACK PAIN: 0
WHEEZING: 0
EYES NEGATIVE: 1
STRIDOR: 0
SHORTNESS OF BREATH: 0
DIARRHEA: 0
NAUSEA: 0
ALLERGIC/IMMUNOLOGIC NEGATIVE: 1

## 2020-08-26 NOTE — PROGRESS NOTES
[facility]      Robertha Severe         [id number]  8/27/2020   Chief Complaint   Patient presents with    Follow-up     TRACY      PAP Download:   Original or initial AHI: 22.2     Date of initial study: 8-20-19     Compliant  93%     Noncompliant 7 %     PAP Type APAP Level  8-10   Avg Hrs/Day 7hr  AHI: 2.2     Machine/Mfg:   [x] ResMed    [] Respironics/Dreamstation   Interface:   [] Nasal    [] Nasal pillows   [x] FFM       Provider:      [x] [facility]     [][facility]     [] [employer]    [] [address]    [] [place]               [] [facility]      [] [employer]    [] [address]      [] Other    Here is a scan of the most recent download:          Presentation:   Dariel Donaldson presents for sleep medicine follow up for obstructive sleep apnea  Since the last visit, Dariel Donaldson is doing well with PAP. She had a pressure change after last visit. She is sleeping well and feels rested. Mask is leaking    Equipment issues: The pressure is  acceptable, the mask is unacceptable     Sleep issues:  Do you feel better? Yes  More rested? Yes   Better concentration? yes    Progress History:   Since last visit any new medical issues? Yes adenoma on thyroid  New ER or hospitlal visits? No  Any new or changes in medicines? No  Any new sleep medicines?  No        Past Medical History:   Diagnosis Date    Arthritis     Atrial fibrillation ([address]     Cancer ([address] 2000    NON HODGKINS LYMPHOMA    Diabetes mellitus ([address]     Deepika filter in place     Hx of blood clots 2000    LEGS    Hyperlipidemia     Hypertension     Prolonged emergence from general anesthesia     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    Thyroid disease        Past Surgical History:   Procedure Laterality Date    APPENDECTOMY      CARDIAC SURGERY      CHOLECYSTECTOMY      FRACTURE SURGERY      ankle surgery    FRACTURE SURGERY Right 09/2018    HUMERUS    HERNIA REPAIR      HYSTERECTOMY      [place] OFFICE/OUTPT VISIT,PROCEDURE ONLY Right 9/19/2018    ORIF RIGHT PROXIMAL HUMERUS FX performed by Edgar Sanders MD at [address] Right 6/27/2019    REVISION RT HUMERUS FX, HARDWARE REMOVAL, HUMERAL NAIL INSERTION performed by Luis Lai MD at [facility]         Social History     Tobacco Use    Smoking status: Never Smoker    Smokeless tobacco: Never Used   Substance Use Topics    Alcohol use: No    Drug use: No       No Known Allergies    Current Outpatient Medications   Medication Sig Dispense Refill    levothyroxine (SYNTHROID) 112 MCG tablet Take 1 tablet by mouth Daily 90 tablet 1    simvastatin (ZOCOR) 40 MG tablet TAKE 1 TABLET NIGHTLY 90 tablet 3    losartan (COZAAR) 100 MG tablet Take 100 mg by mouth daily      KLOR-CON M10 10 MEQ extended release tablet Take 10 mg by mouth daily      insulin glargine (BASAGLAR KWIKPEN) 100 UNIT/ML injection pen Inject 15 Units into the skin every morning (before breakfast) 5 pen 2    apixaban (ELIQUIS) 5 MG TABS tablet Take 1 tablet by mouth 2 times daily 60 tablet 0    apixaban (ELIQUIS) 5 MG TABS tablet Take 1 tablet by mouth 2 times daily 180 tablet 3    metoprolol tartrate (LOPRESSOR) 50 MG tablet Take 1 tablet by mouth 2 times daily 60 tablet 5    betamethasone valerate (VALISONE) 0.1 % cream Apply topically 2 times daily PRN. 1 Tube 1    glimepiride (AMARYL) 4 MG tablet Take 1 tablet by mouth 2 times daily 180 tablet 3    CPAP Machine MISC by Does not apply route Please change CPAP pressure 8-10 auto cm H20.   Turn off ramp 1 each 0    Respiratory Therapy Supplies (ADULT MASK) MISC Needs mask re-fit 1 each 0    metFORMIN (GLUCOPHAGE-XR) 500 MG extended release tablet Take 2 tablets by mouth 2 times daily 360 tablet 3    blood glucose test strips (CONTOUR NEXT TEST) strip Check blood sugar twice per day due to hyperglycemia Dx E11.65 200 each 5    hydrALAZINE (APRESOLINE) 50 MG tablet Take 50 mg by mouth 2 times daily       Potassium Chloride (KLOR-CON 10 PO) Take 10 mEq by mouth 2 times daily       mupirocin (BACTROBAN) 2 % ointment Apply 2 times daily PRN. 1 Tube 1    vitamin B-12 (CYANOCOBALAMIN) 1000 MCG tablet Take 1,000 mcg by mouth daily      losartan (COZAAR) 50 MG tablet Take 100 mg by mouth daily       folic acid (FOLVITE) 1 MG tablet Take 3 mg by mouth 2 times daily       aspirin 325 MG tablet Take 81 mg by mouth daily       Multiple Vitamins-Minerals (ICAPS AREDS 2) CAPS Take by mouth 2 times daily      Multiple Vitamin (THERA-PLUS) LIQD Take 5 mLs by mouth daily. No current facility-administered medications for this visit. Family History   Problem Relation Age of Onset    Heart Disease Mother     Heart Disease Father         Review of Systems -   Review of Systems   Constitutional: Negative for activity change, appetite change, chills and fever. HENT: Negative for congestion and postnasal drip. Eyes: Negative. Respiratory: Negative for cough, chest tightness, shortness of breath, wheezing and stridor. Cardiovascular: Negative for chest pain and leg swelling. Gastrointestinal: Negative for diarrhea and nausea. Endocrine: Negative. Genitourinary: Negative. Musculoskeletal: Negative. Negative for arthralgias and back pain. Skin: Negative. Allergic/Immunologic: Negative. Neurological: Negative. Negative for dizziness and light-headedness. Psychiatric/Behavioral: Negative. All other systems reviewed and are negative. Physical Exam:    BMI:  There is no height or weight on file to calculate BMI. Wt Readings from Last 3 Encounters:   08/25/20 196 lb (88.9 kg)   08/24/20 198 lb (89.8 kg)   08/03/20 197 lb 14.4 oz (89.8 kg)     Weight stable / unchanged  Vitals: There were no vitals taken for this visit. Physical Exam  Constitutional:       Appearance: Normal appearance. She is normal weight.    HENT:      Head: Normocephalic and atraumatic. Right Ear: External ear normal.      Left Ear: External ear normal.      Nose: Nose normal.   Eyes:      Extraocular Movements: Extraocular movements intact. Conjunctiva/sclera: Conjunctivae normal.      Pupils: Pupils are equal, round, and reactive to light. Neck:      Musculoskeletal: Normal range of motion and neck supple. Pulmonary:      Effort: Pulmonary effort is normal.   Neurological:      General: No focal deficit present. Mental Status: She is alert and oriented to person, place, and time. Psychiatric:         Attention and Perception: Attention and perception normal.         Mood and Affect: Mood and affect normal.         Speech: Speech normal.         Behavior: Behavior normal. Behavior is cooperative. Thought Content: Thought content normal.         Cognition and Memory: Cognition normal.         Judgment: Judgment normal.           ASSESSMENT/DIAGNOSIS     Diagnosis Orders   1. TRACY on CPAP     2. Obesity (BMI 30-39.9)     3. Chronic atrial fibrillation              Plan   Do you need any equipment today? Yes needs mask fitting    - She  was advised to continue current positive airway pressure therapy with above described pressure. - She  advised to keep good compliance with current recommended pressure to get optimal results and clinical improvement  - Recommend 7-9 hours of sleep with PAP  - She was advised to call [facility] regarding supplies if needed.   -She call my office for earlier appointment if needed for worsening of sleep symptoms.   - She was instructed on weight loss  - Radha Capps was educated about my impression and plan. Patient verbalizesunderstanding. We will see Graeme Diaz back in: 1 year with download    Information added by my medical assistant/LPN was reviewed today        ANASTACIA Jimenez  8/27/2020        Radha Capps is being evaluated by a Virtual Visit (video visit) encounter to address concerns as mentioned above.   A caregiver was present when appropriate. Due to this being a TeleHealth encounter (During VOHAQ-41 public health emergency), evaluation of the following organ systems was limited: Vitals/Constitutional/EENT/Resp/CV/GI//MS/Neuro/Skin/Heme-Lymph-Imm. Pursuant to the emergency declaration under the [address] [place] [address], [address] [place] [address] and the [employer] Act, this Virtual Visit was conducted with patient's (and/or legal guardian's) consent, to reduce the patient's risk of exposure to COVID-19 and provide necessary medical care. The patient (and/or legal guardian) has also been advised to contact this office for worsening conditions or problems, and seek emergency medical treatment and/or call 911 if deemed necessary. Services were provided through a video synchronous discussion virtually to substitute for in-person clinic visit. Patient and provider were located at their individual homes. Patient identification was verified at the start of the visit. Total time spent on this encounter was 15 min     ANASTACIA Beck  8/27/2020      An electronic signature was used to authenticate this note.

## 2020-08-27 ENCOUNTER — TELEMEDICINE (OUTPATIENT)
Dept: PULMONOLOGY | Age: 75
End: 2020-08-27
Payer: MEDICARE

## 2020-08-27 PROCEDURE — 3017F COLORECTAL CA SCREEN DOC REV: CPT | Performed by: PHYSICIAN ASSISTANT

## 2020-08-27 PROCEDURE — 4040F PNEUMOC VAC/ADMIN/RCVD: CPT | Performed by: PHYSICIAN ASSISTANT

## 2020-08-27 PROCEDURE — G8400 PT W/DXA NO RESULTS DOC: HCPCS | Performed by: PHYSICIAN ASSISTANT

## 2020-08-27 PROCEDURE — 1090F PRES/ABSN URINE INCON ASSESS: CPT | Performed by: PHYSICIAN ASSISTANT

## 2020-08-27 PROCEDURE — 99213 OFFICE O/P EST LOW 20 MIN: CPT | Performed by: PHYSICIAN ASSISTANT

## 2020-08-27 PROCEDURE — G8427 DOCREV CUR MEDS BY ELIG CLIN: HCPCS | Performed by: PHYSICIAN ASSISTANT

## 2020-08-27 PROCEDURE — 1036F TOBACCO NON-USER: CPT | Performed by: PHYSICIAN ASSISTANT

## 2020-08-27 PROCEDURE — 1123F ACP DISCUSS/DSCN MKR DOCD: CPT | Performed by: PHYSICIAN ASSISTANT

## 2020-08-27 PROCEDURE — G8417 CALC BMI ABV UP PARAM F/U: HCPCS | Performed by: PHYSICIAN ASSISTANT

## 2020-08-31 ENCOUNTER — TELEPHONE (OUTPATIENT)
Dept: ENT CLINIC | Age: 75
End: 2020-08-31

## 2020-08-31 NOTE — TELEPHONE ENCOUNTER
Patient called in asking to be scheduled for surgery. Patient seen by Dr Ken Nava on 08/24/20 for Parathyroid adenoma. I reviewed her chart and do not see a surgery order in [facility]. Informed patient I would have Dr Ken Nava place the order for surgery. Once that is done our  will be in touch with patient to schedule. Patient verbalized understanding and thanked me. Dr Ken Nava, please place order for surgery.

## 2020-09-04 ENCOUNTER — TELEPHONE (OUTPATIENT)
Dept: ENT CLINIC | Age: 75
End: 2020-09-04

## 2020-09-04 NOTE — TELEPHONE ENCOUNTER
Patient called and left a message on the clinical phone stating she would like to get surgery scheduled.

## 2020-09-14 ENCOUNTER — TELEPHONE (OUTPATIENT)
Dept: ENT CLINIC | Age: 75
End: 2020-09-14

## 2020-09-14 NOTE — TELEPHONE ENCOUNTER
I left a message for Dr. Pam Gomez nurse to call back regarding Kristal's upcoming surgery on 10-1-2020. She is scheduled to have a Parathyroidectomy. Dr. Reese Cerna wants the patient to stop Eliquis 5 days prior to surgery and bridge with Lovenox. The last dose of Lovenox will be the night before surgery and the patient is to continue the Lovenox 1 week after surgery. Dr. Reese Cerna is ok for the patient to continue her 81 mg  Asprin.

## 2020-09-21 ENCOUNTER — OFFICE VISIT (OUTPATIENT)
Dept: CARDIOLOGY CLINIC | Age: 75
End: 2020-09-21
Payer: MEDICARE

## 2020-09-21 ENCOUNTER — TELEPHONE (OUTPATIENT)
Dept: CARDIOLOGY CLINIC | Age: 75
End: 2020-09-21

## 2020-09-21 VITALS
WEIGHT: 193.6 LBS | HEART RATE: 77 BPM | SYSTOLIC BLOOD PRESSURE: 138 MMHG | BODY MASS INDEX: 32.26 KG/M2 | HEIGHT: 65 IN | DIASTOLIC BLOOD PRESSURE: 70 MMHG

## 2020-09-21 PROCEDURE — 93000 ELECTROCARDIOGRAM COMPLETE: CPT | Performed by: NUCLEAR MEDICINE

## 2020-09-21 PROCEDURE — 3017F COLORECTAL CA SCREEN DOC REV: CPT | Performed by: NUCLEAR MEDICINE

## 2020-09-21 PROCEDURE — G8427 DOCREV CUR MEDS BY ELIG CLIN: HCPCS | Performed by: NUCLEAR MEDICINE

## 2020-09-21 PROCEDURE — 1123F ACP DISCUSS/DSCN MKR DOCD: CPT | Performed by: NUCLEAR MEDICINE

## 2020-09-21 PROCEDURE — G8417 CALC BMI ABV UP PARAM F/U: HCPCS | Performed by: NUCLEAR MEDICINE

## 2020-09-21 PROCEDURE — 99214 OFFICE O/P EST MOD 30 MIN: CPT | Performed by: NUCLEAR MEDICINE

## 2020-09-21 PROCEDURE — G8400 PT W/DXA NO RESULTS DOC: HCPCS | Performed by: NUCLEAR MEDICINE

## 2020-09-21 PROCEDURE — 4040F PNEUMOC VAC/ADMIN/RCVD: CPT | Performed by: NUCLEAR MEDICINE

## 2020-09-21 PROCEDURE — 1090F PRES/ABSN URINE INCON ASSESS: CPT | Performed by: NUCLEAR MEDICINE

## 2020-09-21 PROCEDURE — 1036F TOBACCO NON-USER: CPT | Performed by: NUCLEAR MEDICINE

## 2020-09-21 RX ORDER — HYDRALAZINE HYDROCHLORIDE 50 MG/1
50 TABLET, FILM COATED ORAL 2 TIMES DAILY
Qty: 180 TABLET | Refills: 3 | Status: SHIPPED | OUTPATIENT
Start: 2020-09-21 | End: 2021-11-05 | Stop reason: SDUPTHER

## 2020-09-21 RX ORDER — LOSARTAN POTASSIUM 100 MG/1
100 TABLET ORAL DAILY
Qty: 90 TABLET | Refills: 3 | Status: SHIPPED | OUTPATIENT
Start: 2020-09-21 | End: 2021-11-05 | Stop reason: SDUPTHER

## 2020-09-21 RX ORDER — POTASSIUM CHLORIDE 750 MG/1
10 TABLET, EXTENDED RELEASE ORAL DAILY
Qty: 90 TABLET | Refills: 3 | Status: SHIPPED | OUTPATIENT
Start: 2020-09-21 | End: 2021-08-13

## 2020-09-21 NOTE — PROGRESS NOTES
Preop clearance. She denies having any chest pain, dizziness, lightheadedness or KIRA. She has SOB with exertion and palpitations. EKG completed.

## 2020-09-21 NOTE — PROGRESS NOTES
[address]  [place] [address]  Dept: [phone]  Dept Fax: [phone]  Loc: [phone]    Visit Date: 9/21/2020    Josefa Vidal is a 70 y.o. female who presents todayfor:  Chief Complaint   Patient presents with    3 Month Follow-Up    Cardiac Clearance    Atrial Fibrillation    Hypertension    Hyperlipidemia   uncontrolled a fib as documented by event monitor  Known a fib before  Many episodes of A fib lately   Some associated dyspnea   Associated fatigue   failed amiodarone therapy   Going for parathyroid surgery   Stress test and echo were okay   No chest pain  No changes in breathing  BP is stable  No dizziness  No syncope  On statins for hyperlipidemia  Under control         HPI:  HPI  Past Medical History:   Diagnosis Date    Arthritis     Atrial fibrillation ([address]     Cancer ([address] 2000    NON HODGKINS LYMPHOMA    Diabetes mellitus ([address]     Deepika filter in place     Hx of blood clots 2000    LEGS    Hyperlipidemia     Hypertension     Prolonged emergence from general anesthesia     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    Thyroid disease       Past Surgical History:   Procedure Laterality Date    APPENDECTOMY      CARDIAC SURGERY      CHOLECYSTECTOMY      FRACTURE SURGERY      ankle surgery    FRACTURE SURGERY Right 09/2018    HUMERUS    HERNIA REPAIR      HYSTERECTOMY      [place] OFFICE/OUTPT VISIT,PROCEDURE ONLY Right 9/19/2018    ORIF RIGHT PROXIMAL HUMERUS FX performed by Bradley Bryant MD at [address] Right 6/27/2019    REVISION RT HUMERUS FX, HARDWARE REMOVAL, HUMERAL NAIL INSERTION performed by Leti Enciso MD at [facility]       Family History   Problem Relation Age of Onset    Heart Disease Mother     Heart Disease Father      Social History     Tobacco Use    Smoking status: Never Smoker    Smokeless tobacco: Never Used   Substance Use Topics    Alcohol use: No      Current Outpatient Medications   Medication Sig Dispense Refill    levothyroxine (SYNTHROID) 112 MCG tablet Take 1 tablet by mouth Daily 90 tablet 1    simvastatin (ZOCOR) 40 MG tablet TAKE 1 TABLET NIGHTLY 90 tablet 3    losartan (COZAAR) 100 MG tablet Take 100 mg by mouth daily      KLOR-CON M10 10 MEQ extended release tablet Take 10 mg by mouth daily      insulin glargine (BASAGLAR KWIKPEN) 100 UNIT/ML injection pen Inject 15 Units into the skin every morning (before breakfast) 5 pen 2    apixaban (ELIQUIS) 5 MG TABS tablet Take 1 tablet by mouth 2 times daily 60 tablet 0    metoprolol tartrate (LOPRESSOR) 50 MG tablet Take 1 tablet by mouth 2 times daily 60 tablet 5    betamethasone valerate (VALISONE) 0.1 % cream Apply topically 2 times daily PRN. 1 Tube 1    glimepiride (AMARYL) 4 MG tablet Take 1 tablet by mouth 2 times daily 180 tablet 3    CPAP Machine MISC by Does not apply route Please change CPAP pressure 8-10 auto cm H20. Turn off ramp 1 each 0    Respiratory Therapy Supplies (ADULT MASK) MISC Needs mask re-fit 1 each 0    metFORMIN (GLUCOPHAGE-XR) 500 MG extended release tablet Take 2 tablets by mouth 2 times daily 360 tablet 3    blood glucose test strips (CONTOUR NEXT TEST) strip Check blood sugar twice per day due to hyperglycemia Dx E11.65 200 each 5    hydrALAZINE (APRESOLINE) 50 MG tablet Take 50 mg by mouth 2 times daily       Potassium Chloride (KLOR-CON 10 PO) Take 10 mEq by mouth 2 times daily       mupirocin (BACTROBAN) 2 % ointment Apply 2 times daily PRN. 1 Tube 1    vitamin B-12 (CYANOCOBALAMIN) 1000 MCG tablet Take 1,000 mcg by mouth daily      folic acid (FOLVITE) 1 MG tablet Take 3 mg by mouth 2 times daily       aspirin 325 MG tablet Take 81 mg by mouth daily       Multiple Vitamins-Minerals (ICAPS AREDS 2) CAPS Take by mouth 2 times daily      Multiple Vitamin (THERA-PLUS) LIQD Take 5 mLs by mouth daily. above  Uncontrolled A fib   Other wise stable for surgery from CAD stand point  Discussed at length   ECG in office was done today. I reviewed the ECG. No acute findings    Plan:  No follow-ups on file. Discussed options   Might consider rate control vs consider ablation   Symptomatic A fib is the concern  Clear for surgery with some risk associated with it   Continue risk factor modification and medical management'  Thank you for allowing me to participate in the care of your patient. Please don't hesitate to contact me regarding any further issues related to the patient care    Orders Placed:  Orders Placed This Encounter   Procedures    EKG 12 Lead     Order Specific Question:   Reason for Exam?     Answer: Other       Medications Prescribed:  No orders of the defined types were placed in this encounter. Discussed use, benefit, and side effects of prescribed medications. All patient questions answered. Pt voicedunderstanding. Instructed to continue current medications, diet and exercise. Continue risk factor modification and medical management. Patient agreed with treatment plan. Follow up as directed.     Electronically signedby Julisa Navarro MD on 9/21/2020 at 7:44 AM

## 2020-09-24 RX ORDER — M-VIT,TX,IRON,MINS/CALC/FOLIC 27MG-0.4MG
1 TABLET ORAL DAILY
COMMUNITY
End: 2021-08-13

## 2020-09-24 RX ORDER — ASPIRIN 81 MG/1
81 TABLET ORAL DAILY
COMMUNITY

## 2020-09-24 NOTE — PROGRESS NOTES
Following instructions given to patient, who states understanding:     NPO after midnight  [employer] and 's license  Wear comfortable clean clothing  Do not bring jewelry   Shower night before and morning of surgery with a liquid antibacterial soap  Bring medications in original bottles  Follow all instructions given by your physician   needed at discharge  Call [phone] for any questions  [facility] [place] on 2nd floor  If you would become ill prior to surgery, please call the surgeon  May have only 1 visitor accompany you for surgery  Please bring and wear mask  You will be receiving a phone call one or two days before surgery to review covid screening exposure    Patient tes she will get tested at [facility] on 9/25

## 2020-09-25 ENCOUNTER — HOSPITAL ENCOUNTER (OUTPATIENT)
Age: 75
Discharge: HOME | End: 2020-09-25
Payer: MEDICARE

## 2020-09-25 LAB
ALBUMIN SERPL-MCNC: 4.4 G/DL (ref 3.5–5.1)
ALP BLD-CCNC: 49 U/L (ref 38–126)
ALT SERPL-CCNC: 21 U/L (ref 11–66)
ANION GAP SERPL CALCULATED.3IONS-SCNC: 12 MEQ/L (ref 8–16)
AST SERPL-CCNC: 19 U/L (ref 5–40)
BASOPHILS # BLD: 0.9 %
BASOPHILS ABSOLUTE: 0 THOU/MM3 (ref 0–0.1)
BILIRUB SERPL-MCNC: 0.6 MG/DL (ref 0.3–1.2)
BUN BLDV-MCNC: 16 MG/DL (ref 7–22)
CALCIUM SERPL-MCNC: 11.1 MG/DL (ref 8.5–10.5)
CHLORIDE BLD-SCNC: 98 MEQ/L (ref 98–111)
CO2: 25 MEQ/L (ref 23–33)
CREAT SERPL-MCNC: 0.7 MG/DL (ref 0.4–1.2)
EOSINOPHIL # BLD: 1.6 %
EOSINOPHILS ABSOLUTE: 0.1 THOU/MM3 (ref 0–0.4)
ERYTHROCYTE [DISTWIDTH] IN BLOOD BY AUTOMATED COUNT: 12.6 % (ref 11.5–14.5)
ERYTHROCYTE [DISTWIDTH] IN BLOOD BY AUTOMATED COUNT: 46.8 FL (ref 35–45)
GFR SERPL CREATININE-BSD FRML MDRD: 82 ML/MIN/1.73M2
GLUCOSE BLD-MCNC: 204 MG/DL (ref 70–108)
HCT VFR BLD CALC: 42.6 % (ref 37–47)
HEMOGLOBIN: 14 GM/DL (ref 12–16)
IMMATURE GRANS (ABS): 0.02 THOU/MM3 (ref 0–0.07)
IMMATURE GRANULOCYTES: 0.5 %
LYMPHOCYTES # BLD: 23.3 %
LYMPHOCYTES ABSOLUTE: 1 THOU/MM3 (ref 1–4.8)
MCH RBC QN AUTO: 33.3 PG (ref 26–33)
MCHC RBC AUTO-ENTMCNC: 32.9 GM/DL (ref 32.2–35.5)
MCV RBC AUTO: 101.4 FL (ref 81–99)
MONOCYTES # BLD: 7.4 %
MONOCYTES ABSOLUTE: 0.3 THOU/MM3 (ref 0.4–1.3)
NUCLEATED RED BLOOD CELLS: 0 /100 WBC
PLATELET # BLD: 221 THOU/MM3 (ref 130–400)
PMV BLD AUTO: 9.1 FL (ref 9.4–12.4)
POTASSIUM SERPL-SCNC: 5.2 MEQ/L (ref 3.5–5.2)
RBC # BLD: 4.2 MILL/MM3 (ref 4.2–5.4)
SEG NEUTROPHILS: 66.3 %
SEGMENTED NEUTROPHILS ABSOLUTE COUNT: 2.9 THOU/MM3 (ref 1.8–7.7)
SODIUM BLD-SCNC: 135 MEQ/L (ref 135–145)
TOTAL PROTEIN: 7.4 G/DL (ref 6.1–8)
WBC # BLD: 4.4 THOU/MM3 (ref 4.8–10.8)

## 2020-09-25 PROCEDURE — 36415 COLL VENOUS BLD VENIPUNCTURE: CPT

## 2020-09-25 PROCEDURE — U0003 INFECTIOUS AGENT DETECTION BY NUCLEIC ACID (DNA OR RNA); SEVERE ACUTE RESPIRATORY SYNDROME CORONAVIRUS 2 (SARS-COV-2) (CORONAVIRUS DISEASE [COVID-19]), AMPLIFIED PROBE TECHNIQUE, MAKING USE OF HIGH THROUGHPUT TECHNOLOGIES AS DESCRIBED BY CMS-2020-01-R: HCPCS

## 2020-09-25 PROCEDURE — 85025 COMPLETE CBC W/AUTO DIFF WBC: CPT

## 2020-09-25 PROCEDURE — 80053 COMPREHEN METABOLIC PANEL: CPT

## 2020-09-27 LAB — SARS-COV-2: NOT DETECTED

## 2020-09-30 ENCOUNTER — ANESTHESIA EVENT (OUTPATIENT)
Dept: OPERATING ROOM | Age: 75
End: 2020-09-30
Payer: MEDICARE

## 2020-09-30 NOTE — PROGRESS NOTES
Patient contacted regarding COVID-19 screen. Following questions asked: In the last month, have you been in contact with someone who was confirmed or suspected to have Coronavirus/COVID-19:  Patient stated NO    Pt was informed to.please bring masks. Do you or family members have any of the following symptoms:  Cough-no   Muscle pain-no   Shortness of breath-no   Fever-no   Weakness-no  Severe headache-no   Sore throat-no   Respiratory symptoms-no    Have you traveled internationally in the last month?  No     Have you been to the emergency room recently-no

## 2020-10-01 ENCOUNTER — APPOINTMENT (OUTPATIENT)
Dept: GENERAL RADIOLOGY | Age: 75
End: 2020-10-01
Attending: OTOLARYNGOLOGY
Payer: MEDICARE

## 2020-10-01 ENCOUNTER — ANESTHESIA (OUTPATIENT)
Dept: OPERATING ROOM | Age: 75
End: 2020-10-01
Payer: MEDICARE

## 2020-10-01 ENCOUNTER — HOSPITAL ENCOUNTER (OUTPATIENT)
Age: 75
Setting detail: OBSERVATION
Discharge: HOME | End: 2020-10-02
Attending: OTOLARYNGOLOGY | Admitting: OTOLARYNGOLOGY
Payer: MEDICARE

## 2020-10-01 VITALS — DIASTOLIC BLOOD PRESSURE: 63 MMHG | OXYGEN SATURATION: 100 % | TEMPERATURE: 57 F | SYSTOLIC BLOOD PRESSURE: 142 MMHG

## 2020-10-01 PROBLEM — Z90.89 S/P PARATHYROIDECTOMY: Status: ACTIVE | Noted: 2020-10-01

## 2020-10-01 PROBLEM — Z98.890 S/P PARATHYROIDECTOMY: Status: ACTIVE | Noted: 2020-10-01

## 2020-10-01 PROBLEM — E89.2 S/P PARATHYROIDECTOMY (HCC): Status: ACTIVE | Noted: 2020-10-01

## 2020-10-01 PROBLEM — I48.91 ATRIAL FIBRILLATION/FLUTTER (HCC): Status: ACTIVE | Noted: 2020-10-01

## 2020-10-01 PROBLEM — I48.92 ATRIAL FIBRILLATION/FLUTTER (HCC): Status: ACTIVE | Noted: 2020-10-01

## 2020-10-01 LAB
ANION GAP SERPL CALCULATED.3IONS-SCNC: 12 MEQ/L (ref 8–16)
AVERAGE GLUCOSE: 180 MG/DL (ref 70–126)
BUN BLDV-MCNC: 16 MG/DL (ref 7–22)
CALCIUM IONIZED: 1.25 MMOL/L (ref 1.12–1.32)
CALCIUM SERPL-MCNC: 10.6 MG/DL (ref 8.5–10.5)
CHLORIDE BLD-SCNC: 98 MEQ/L (ref 98–111)
CO2: 26 MEQ/L (ref 23–33)
CREAT SERPL-MCNC: 0.6 MG/DL (ref 0.4–1.2)
EKG ATRIAL RATE: 94 BPM
EKG P AXIS: 8 DEGREES
EKG P-R INTERVAL: 240 MS
EKG Q-T INTERVAL: 364 MS
EKG QRS DURATION: 86 MS
EKG QTC CALCULATION (BAZETT): 455 MS
EKG R AXIS: -50 DEGREES
EKG T AXIS: 27 DEGREES
EKG VENTRICULAR RATE: 94 BPM
ERYTHROCYTE [DISTWIDTH] IN BLOOD BY AUTOMATED COUNT: 12.9 % (ref 11.5–14.5)
ERYTHROCYTE [DISTWIDTH] IN BLOOD BY AUTOMATED COUNT: 48.5 FL (ref 35–45)
GFR SERPL CREATININE-BSD FRML MDRD: > 90 ML/MIN/1.73M2
GLUCOSE BLD-MCNC: 199 MG/DL (ref 70–108)
GLUCOSE BLD-MCNC: 208 MG/DL (ref 70–108)
GLUCOSE BLD-MCNC: 259 MG/DL (ref 70–108)
GLUCOSE BLD-MCNC: 279 MG/DL (ref 70–108)
GLUCOSE BLD-MCNC: 340 MG/DL (ref 70–108)
HBA1C MFR BLD: 8 % (ref 4.4–6.4)
HCT VFR BLD CALC: 43.5 % (ref 37–47)
HEMOGLOBIN: 13.8 GM/DL (ref 12–16)
MCH RBC QN AUTO: 32.7 PG (ref 26–33)
MCHC RBC AUTO-ENTMCNC: 31.7 GM/DL (ref 32.2–35.5)
MCV RBC AUTO: 103.1 FL (ref 81–99)
PLATELET # BLD: 214 THOU/MM3 (ref 130–400)
PMV BLD AUTO: 9.1 FL (ref 9.4–12.4)
POTASSIUM SERPL-SCNC: 4.9 MEQ/L (ref 3.5–5.2)
POTASSIUM SERPL-SCNC: 5.2 MEQ/L (ref 3.5–5.2)
PTH INTACT: 32.1 PG/ML (ref 15–65)
PTH INTACT: 42.3 PG/ML (ref 15–65)
RBC # BLD: 4.22 MILL/MM3 (ref 4.2–5.4)
SODIUM BLD-SCNC: 136 MEQ/L (ref 135–145)
WBC # BLD: 7.5 THOU/MM3 (ref 4.8–10.8)

## 2020-10-01 PROCEDURE — 2580000003 HC RX 258: Performed by: NURSE ANESTHETIST, CERTIFIED REGISTERED

## 2020-10-01 PROCEDURE — 2580000003 HC RX 258: Performed by: OTOLARYNGOLOGY

## 2020-10-01 PROCEDURE — 7100000001 HC PACU RECOVERY - ADDTL 15 MIN: Performed by: OTOLARYNGOLOGY

## 2020-10-01 PROCEDURE — 6360000002 HC RX W HCPCS: Performed by: REGISTERED NURSE

## 2020-10-01 PROCEDURE — 3600000004 HC SURGERY LEVEL 4 BASE: Performed by: OTOLARYNGOLOGY

## 2020-10-01 PROCEDURE — 3700000001 HC ADD 15 MINUTES (ANESTHESIA): Performed by: OTOLARYNGOLOGY

## 2020-10-01 PROCEDURE — 36415 COLL VENOUS BLD VENIPUNCTURE: CPT

## 2020-10-01 PROCEDURE — 82330 ASSAY OF CALCIUM: CPT

## 2020-10-01 PROCEDURE — 84132 ASSAY OF SERUM POTASSIUM: CPT

## 2020-10-01 PROCEDURE — 83970 ASSAY OF PARATHORMONE: CPT

## 2020-10-01 PROCEDURE — 60502 RE-EXPLORE PARATHYROIDS: CPT | Performed by: OTOLARYNGOLOGY

## 2020-10-01 PROCEDURE — 88331 PATH CONSLTJ SURG 1 BLK 1SPC: CPT

## 2020-10-01 PROCEDURE — 80048 BASIC METABOLIC PNL TOTAL CA: CPT

## 2020-10-01 PROCEDURE — 2500000003 HC RX 250 WO HCPCS: Performed by: REGISTERED NURSE

## 2020-10-01 PROCEDURE — 6370000000 HC RX 637 (ALT 250 FOR IP): Performed by: PHYSICIAN ASSISTANT

## 2020-10-01 PROCEDURE — 7100000000 HC PACU RECOVERY - FIRST 15 MIN: Performed by: OTOLARYNGOLOGY

## 2020-10-01 PROCEDURE — 85027 COMPLETE CBC AUTOMATED: CPT

## 2020-10-01 PROCEDURE — 6360000002 HC RX W HCPCS: Performed by: OTOLARYNGOLOGY

## 2020-10-01 PROCEDURE — 2500000003 HC RX 250 WO HCPCS: Performed by: OTOLARYNGOLOGY

## 2020-10-01 PROCEDURE — 3600000014 HC SURGERY LEVEL 4 ADDTL 15MIN: Performed by: OTOLARYNGOLOGY

## 2020-10-01 PROCEDURE — 6360000002 HC RX W HCPCS: Performed by: NURSE ANESTHETIST, CERTIFIED REGISTERED

## 2020-10-01 PROCEDURE — 3700000000 HC ANESTHESIA ATTENDED CARE: Performed by: OTOLARYNGOLOGY

## 2020-10-01 PROCEDURE — 71045 X-RAY EXAM CHEST 1 VIEW: CPT

## 2020-10-01 PROCEDURE — 88305 TISSUE EXAM BY PATHOLOGIST: CPT

## 2020-10-01 PROCEDURE — 2709999900 HC NON-CHARGEABLE SUPPLY: Performed by: OTOLARYNGOLOGY

## 2020-10-01 PROCEDURE — G0378 HOSPITAL OBSERVATION PER HR: HCPCS

## 2020-10-01 PROCEDURE — 6370000000 HC RX 637 (ALT 250 FOR IP): Performed by: OTOLARYNGOLOGY

## 2020-10-01 PROCEDURE — 2720000010 HC SURG SUPPLY STERILE: Performed by: OTOLARYNGOLOGY

## 2020-10-01 PROCEDURE — 83036 HEMOGLOBIN GLYCOSYLATED A1C: CPT

## 2020-10-01 PROCEDURE — 82948 REAGENT STRIP/BLOOD GLUCOSE: CPT

## 2020-10-01 PROCEDURE — 93005 ELECTROCARDIOGRAM TRACING: CPT | Performed by: PHYSICIAN ASSISTANT

## 2020-10-01 PROCEDURE — 99215 OFFICE O/P EST HI 40 MIN: CPT | Performed by: PHYSICIAN ASSISTANT

## 2020-10-01 RX ORDER — FENTANYL CITRATE 50 UG/ML
INJECTION, SOLUTION INTRAMUSCULAR; INTRAVENOUS PRN
Status: DISCONTINUED | OUTPATIENT
Start: 2020-10-01 | End: 2020-10-01 | Stop reason: SDUPTHER

## 2020-10-01 RX ORDER — FOLIC ACID 1 MG/1
3 TABLET ORAL 2 TIMES DAILY
Status: DISCONTINUED | OUTPATIENT
Start: 2020-10-01 | End: 2020-10-02 | Stop reason: HOSPADM

## 2020-10-01 RX ORDER — HYDRALAZINE HYDROCHLORIDE 20 MG/ML
5 INJECTION INTRAMUSCULAR; INTRAVENOUS EVERY 10 MIN PRN
Status: DISCONTINUED | OUTPATIENT
Start: 2020-10-01 | End: 2020-10-01 | Stop reason: HOSPADM

## 2020-10-01 RX ORDER — METOPROLOL TARTRATE 50 MG/1
50 TABLET, FILM COATED ORAL 2 TIMES DAILY
Status: DISCONTINUED | OUTPATIENT
Start: 2020-10-01 | End: 2020-10-02 | Stop reason: HOSPADM

## 2020-10-01 RX ORDER — LIDOCAINE HCL/PF 100 MG/5ML
SYRINGE (ML) INJECTION PRN
Status: DISCONTINUED | OUTPATIENT
Start: 2020-10-01 | End: 2020-10-01 | Stop reason: SDUPTHER

## 2020-10-01 RX ORDER — MORPHINE SULFATE 2 MG/ML
2 INJECTION, SOLUTION INTRAMUSCULAR; INTRAVENOUS EVERY 5 MIN PRN
Status: DISCONTINUED | OUTPATIENT
Start: 2020-10-01 | End: 2020-10-01 | Stop reason: HOSPADM

## 2020-10-01 RX ORDER — FENTANYL CITRATE 50 UG/ML
50 INJECTION, SOLUTION INTRAMUSCULAR; INTRAVENOUS EVERY 5 MIN PRN
Status: DISCONTINUED | OUTPATIENT
Start: 2020-10-01 | End: 2020-10-01 | Stop reason: HOSPADM

## 2020-10-01 RX ORDER — LABETALOL 20 MG/4 ML (5 MG/ML) INTRAVENOUS SYRINGE
5 EVERY 5 MIN PRN
Status: DISCONTINUED | OUTPATIENT
Start: 2020-10-01 | End: 2020-10-01 | Stop reason: HOSPADM

## 2020-10-01 RX ORDER — INSULIN GLARGINE 100 [IU]/ML
15 INJECTION, SOLUTION SUBCUTANEOUS
Status: DISCONTINUED | OUTPATIENT
Start: 2020-10-02 | End: 2020-10-02 | Stop reason: HOSPADM

## 2020-10-01 RX ORDER — NICOTINE POLACRILEX 4 MG
15 LOZENGE BUCCAL PRN
Status: DISCONTINUED | OUTPATIENT
Start: 2020-10-01 | End: 2020-10-02 | Stop reason: HOSPADM

## 2020-10-01 RX ORDER — DEXAMETHASONE SODIUM PHOSPHATE 4 MG/ML
INJECTION, SOLUTION INTRA-ARTICULAR; INTRALESIONAL; INTRAMUSCULAR; INTRAVENOUS; SOFT TISSUE PRN
Status: DISCONTINUED | OUTPATIENT
Start: 2020-10-01 | End: 2020-10-01 | Stop reason: SDUPTHER

## 2020-10-01 RX ORDER — HYDRALAZINE HYDROCHLORIDE 50 MG/1
50 TABLET, FILM COATED ORAL 2 TIMES DAILY
Status: DISCONTINUED | OUTPATIENT
Start: 2020-10-01 | End: 2020-10-02 | Stop reason: HOSPADM

## 2020-10-01 RX ORDER — ONDANSETRON 2 MG/ML
4 INJECTION INTRAMUSCULAR; INTRAVENOUS EVERY 6 HOURS PRN
Status: DISCONTINUED | OUTPATIENT
Start: 2020-10-01 | End: 2020-10-02 | Stop reason: HOSPADM

## 2020-10-01 RX ORDER — SODIUM CHLORIDE 0.9 % (FLUSH) 0.9 %
10 SYRINGE (ML) INJECTION PRN
Status: DISCONTINUED | OUTPATIENT
Start: 2020-10-01 | End: 2020-10-02 | Stop reason: HOSPADM

## 2020-10-01 RX ORDER — OXYCODONE HYDROCHLORIDE 5 MG/1
5 TABLET ORAL EVERY 4 HOURS PRN
Status: DISCONTINUED | OUTPATIENT
Start: 2020-10-01 | End: 2020-10-02 | Stop reason: HOSPADM

## 2020-10-01 RX ORDER — SODIUM CHLORIDE 9 MG/ML
INJECTION, SOLUTION INTRAVENOUS CONTINUOUS
Status: DISCONTINUED | OUTPATIENT
Start: 2020-10-01 | End: 2020-10-01

## 2020-10-01 RX ORDER — ONDANSETRON 2 MG/ML
INJECTION INTRAMUSCULAR; INTRAVENOUS PRN
Status: DISCONTINUED | OUTPATIENT
Start: 2020-10-01 | End: 2020-10-01 | Stop reason: SDUPTHER

## 2020-10-01 RX ORDER — DIPHENHYDRAMINE HYDROCHLORIDE 50 MG/ML
12.5 INJECTION INTRAMUSCULAR; INTRAVENOUS
Status: DISCONTINUED | OUTPATIENT
Start: 2020-10-01 | End: 2020-10-01 | Stop reason: HOSPADM

## 2020-10-01 RX ORDER — EPHEDRINE SULFATE/0.9% NACL/PF 50 MG/5 ML
SYRINGE (ML) INTRAVENOUS PRN
Status: DISCONTINUED | OUTPATIENT
Start: 2020-10-01 | End: 2020-10-01 | Stop reason: SDUPTHER

## 2020-10-01 RX ORDER — LEVOTHYROXINE SODIUM 112 UG/1
112 TABLET ORAL DAILY
Status: DISCONTINUED | OUTPATIENT
Start: 2020-10-02 | End: 2020-10-02 | Stop reason: HOSPADM

## 2020-10-01 RX ORDER — PROPOFOL 10 MG/ML
INJECTION, EMULSION INTRAVENOUS PRN
Status: DISCONTINUED | OUTPATIENT
Start: 2020-10-01 | End: 2020-10-01 | Stop reason: SDUPTHER

## 2020-10-01 RX ORDER — ATORVASTATIN CALCIUM 20 MG/1
20 TABLET, FILM COATED ORAL DAILY
Status: DISCONTINUED | OUTPATIENT
Start: 2020-10-01 | End: 2020-10-02 | Stop reason: HOSPADM

## 2020-10-01 RX ORDER — LIDOCAINE HYDROCHLORIDE AND EPINEPHRINE 10; 10 MG/ML; UG/ML
INJECTION, SOLUTION INFILTRATION; PERINEURAL PRN
Status: DISCONTINUED | OUTPATIENT
Start: 2020-10-01 | End: 2020-10-01 | Stop reason: HOSPADM

## 2020-10-01 RX ORDER — CEFAZOLIN SODIUM 1 G/3ML
INJECTION, POWDER, FOR SOLUTION INTRAMUSCULAR; INTRAVENOUS PRN
Status: DISCONTINUED | OUTPATIENT
Start: 2020-10-01 | End: 2020-10-01 | Stop reason: SDUPTHER

## 2020-10-01 RX ORDER — DEXTROSE MONOHYDRATE 50 MG/ML
100 INJECTION, SOLUTION INTRAVENOUS PRN
Status: DISCONTINUED | OUTPATIENT
Start: 2020-10-01 | End: 2020-10-02 | Stop reason: HOSPADM

## 2020-10-01 RX ORDER — MEPERIDINE HYDROCHLORIDE 25 MG/ML
12.5 INJECTION INTRAMUSCULAR; INTRAVENOUS; SUBCUTANEOUS EVERY 5 MIN PRN
Status: DISCONTINUED | OUTPATIENT
Start: 2020-10-01 | End: 2020-10-01 | Stop reason: HOSPADM

## 2020-10-01 RX ORDER — SODIUM CHLORIDE, SODIUM LACTATE, POTASSIUM CHLORIDE, CALCIUM CHLORIDE 600; 310; 30; 20 MG/100ML; MG/100ML; MG/100ML; MG/100ML
INJECTION, SOLUTION INTRAVENOUS CONTINUOUS PRN
Status: DISCONTINUED | OUTPATIENT
Start: 2020-10-01 | End: 2020-10-01 | Stop reason: SDUPTHER

## 2020-10-01 RX ORDER — ONDANSETRON 2 MG/ML
4 INJECTION INTRAMUSCULAR; INTRAVENOUS
Status: DISCONTINUED | OUTPATIENT
Start: 2020-10-01 | End: 2020-10-01 | Stop reason: HOSPADM

## 2020-10-01 RX ORDER — HYDROMORPHONE HCL 110MG/55ML
PATIENT CONTROLLED ANALGESIA SYRINGE INTRAVENOUS PRN
Status: DISCONTINUED | OUTPATIENT
Start: 2020-10-01 | End: 2020-10-01 | Stop reason: SDUPTHER

## 2020-10-01 RX ORDER — SODIUM CHLORIDE 0.9 % (FLUSH) 0.9 %
10 SYRINGE (ML) INJECTION EVERY 12 HOURS SCHEDULED
Status: DISCONTINUED | OUTPATIENT
Start: 2020-10-01 | End: 2020-10-02 | Stop reason: HOSPADM

## 2020-10-01 RX ORDER — DEXTROSE, SODIUM CHLORIDE, SODIUM LACTATE, POTASSIUM CHLORIDE, AND CALCIUM CHLORIDE 5; .6; .31; .03; .02 G/100ML; G/100ML; G/100ML; G/100ML; G/100ML
INJECTION, SOLUTION INTRAVENOUS CONTINUOUS
Status: DISCONTINUED | OUTPATIENT
Start: 2020-10-01 | End: 2020-10-01

## 2020-10-01 RX ORDER — DEXTROSE MONOHYDRATE 25 G/50ML
12.5 INJECTION, SOLUTION INTRAVENOUS PRN
Status: DISCONTINUED | OUTPATIENT
Start: 2020-10-01 | End: 2020-10-02 | Stop reason: HOSPADM

## 2020-10-01 RX ADMIN — CEFAZOLIN 2000 MG: 1 INJECTION, POWDER, FOR SOLUTION INTRAMUSCULAR; INTRAVENOUS; PARENTERAL at 12:32

## 2020-10-01 RX ADMIN — Medication 100 MG: at 12:24

## 2020-10-01 RX ADMIN — Medication 25 MG: at 13:10

## 2020-10-01 RX ADMIN — PHENYLEPHRINE HYDROCHLORIDE 100 MCG: 10 INJECTION INTRAVENOUS at 13:32

## 2020-10-01 RX ADMIN — SODIUM CHLORIDE: 9 INJECTION, SOLUTION INTRAVENOUS at 12:17

## 2020-10-01 RX ADMIN — Medication 10 ML: at 20:08

## 2020-10-01 RX ADMIN — SODIUM CHLORIDE: 9 INJECTION, SOLUTION INTRAVENOUS at 11:27

## 2020-10-01 RX ADMIN — PHENYLEPHRINE HYDROCHLORIDE 15 MCG/MIN: 10 INJECTION INTRAVENOUS at 13:50

## 2020-10-01 RX ADMIN — ONDANSETRON HYDROCHLORIDE 4 MG: 4 INJECTION, SOLUTION INTRAMUSCULAR; INTRAVENOUS at 12:24

## 2020-10-01 RX ADMIN — OXYCODONE 5 MG: 5 TABLET ORAL at 21:02

## 2020-10-01 RX ADMIN — ONDANSETRON HYDROCHLORIDE 4 MG: 4 INJECTION, SOLUTION INTRAMUSCULAR; INTRAVENOUS at 14:42

## 2020-10-01 RX ADMIN — PROPOFOL 200 MG: 10 INJECTION, EMULSION INTRAVENOUS at 12:24

## 2020-10-01 RX ADMIN — HYDRALAZINE HYDROCHLORIDE 50 MG: 50 TABLET ORAL at 20:07

## 2020-10-01 RX ADMIN — CEFAZOLIN 2 G: 10 INJECTION, POWDER, FOR SOLUTION INTRAVENOUS at 20:08

## 2020-10-01 RX ADMIN — FENTANYL CITRATE 100 MCG: 50 INJECTION, SOLUTION INTRAMUSCULAR; INTRAVENOUS at 12:25

## 2020-10-01 RX ADMIN — HYDROMORPHONE HYDROCHLORIDE 1 MG: 2 INJECTION INTRAMUSCULAR; INTRAVENOUS; SUBCUTANEOUS at 12:57

## 2020-10-01 RX ADMIN — METOPROLOL TARTRATE 50 MG: 50 TABLET, FILM COATED ORAL at 20:07

## 2020-10-01 RX ADMIN — DEXAMETHASONE SODIUM PHOSPHATE 10 MG: 4 INJECTION, SOLUTION INTRAMUSCULAR; INTRAVENOUS at 12:24

## 2020-10-01 RX ADMIN — PHENYLEPHRINE HYDROCHLORIDE 100 MCG: 10 INJECTION INTRAVENOUS at 13:51

## 2020-10-01 RX ADMIN — PHENYLEPHRINE HYDROCHLORIDE 100 MCG: 10 INJECTION INTRAVENOUS at 13:49

## 2020-10-01 RX ADMIN — ATORVASTATIN CALCIUM 20 MG: 20 TABLET, FILM COATED ORAL at 20:06

## 2020-10-01 RX ADMIN — FOLIC ACID 3 MG: 1 TABLET ORAL at 20:06

## 2020-10-01 RX ADMIN — SODIUM CHLORIDE, POTASSIUM CHLORIDE, SODIUM LACTATE AND CALCIUM CHLORIDE: 600; 310; 30; 20 INJECTION, SOLUTION INTRAVENOUS at 13:36

## 2020-10-01 ASSESSMENT — PULMONARY FUNCTION TESTS
PIF_VALUE: 16
PIF_VALUE: 17
PIF_VALUE: 16
PIF_VALUE: 18
PIF_VALUE: 0
PIF_VALUE: 17
PIF_VALUE: 10
PIF_VALUE: 0
PIF_VALUE: 16
PIF_VALUE: 19
PIF_VALUE: 17
PIF_VALUE: 18
PIF_VALUE: 18
PIF_VALUE: 17
PIF_VALUE: 16
PIF_VALUE: 16
PIF_VALUE: 17
PIF_VALUE: 17
PIF_VALUE: 18
PIF_VALUE: 16
PIF_VALUE: 17
PIF_VALUE: 18
PIF_VALUE: 17
PIF_VALUE: 0
PIF_VALUE: 18
PIF_VALUE: 16
PIF_VALUE: 17
PIF_VALUE: 16
PIF_VALUE: 17
PIF_VALUE: 35
PIF_VALUE: 16
PIF_VALUE: 16
PIF_VALUE: 0
PIF_VALUE: 18
PIF_VALUE: 17
PIF_VALUE: 9
PIF_VALUE: 19
PIF_VALUE: 19
PIF_VALUE: 17
PIF_VALUE: 17
PIF_VALUE: 16
PIF_VALUE: 19
PIF_VALUE: 17
PIF_VALUE: 0
PIF_VALUE: 16
PIF_VALUE: 16
PIF_VALUE: 19
PIF_VALUE: 18
PIF_VALUE: 17
PIF_VALUE: 18
PIF_VALUE: 17
PIF_VALUE: 18
PIF_VALUE: 20
PIF_VALUE: 18
PIF_VALUE: 17
PIF_VALUE: 17
PIF_VALUE: 20
PIF_VALUE: 20
PIF_VALUE: 0
PIF_VALUE: 16
PIF_VALUE: 18
PIF_VALUE: 16
PIF_VALUE: 18
PIF_VALUE: 17
PIF_VALUE: 18
PIF_VALUE: 3
PIF_VALUE: 3
PIF_VALUE: 18
PIF_VALUE: 17
PIF_VALUE: 16
PIF_VALUE: 17
PIF_VALUE: 18
PIF_VALUE: 19
PIF_VALUE: 6
PIF_VALUE: 17
PIF_VALUE: 17
PIF_VALUE: 9
PIF_VALUE: 17
PIF_VALUE: 16
PIF_VALUE: 41
PIF_VALUE: 9
PIF_VALUE: 17
PIF_VALUE: 16
PIF_VALUE: 1
PIF_VALUE: 8
PIF_VALUE: 16
PIF_VALUE: 2
PIF_VALUE: 17
PIF_VALUE: 16
PIF_VALUE: 6
PIF_VALUE: 18
PIF_VALUE: 28
PIF_VALUE: 16
PIF_VALUE: 17
PIF_VALUE: 17
PIF_VALUE: 16
PIF_VALUE: 17
PIF_VALUE: 10
PIF_VALUE: 17
PIF_VALUE: 17
PIF_VALUE: 18
PIF_VALUE: 18
PIF_VALUE: 17
PIF_VALUE: 17
PIF_VALUE: 0
PIF_VALUE: 16
PIF_VALUE: 0
PIF_VALUE: 16
PIF_VALUE: 17
PIF_VALUE: 16
PIF_VALUE: 17
PIF_VALUE: 22
PIF_VALUE: 19
PIF_VALUE: 17
PIF_VALUE: 1
PIF_VALUE: 17
PIF_VALUE: 16
PIF_VALUE: 19
PIF_VALUE: 17
PIF_VALUE: 16
PIF_VALUE: 16
PIF_VALUE: 17
PIF_VALUE: 19
PIF_VALUE: 16
PIF_VALUE: 16
PIF_VALUE: 18
PIF_VALUE: 17
PIF_VALUE: 17
PIF_VALUE: 12
PIF_VALUE: 18
PIF_VALUE: 17
PIF_VALUE: 16
PIF_VALUE: 17
PIF_VALUE: 18
PIF_VALUE: 17
PIF_VALUE: 18
PIF_VALUE: 16
PIF_VALUE: 17
PIF_VALUE: 19
PIF_VALUE: 17
PIF_VALUE: 16
PIF_VALUE: 17
PIF_VALUE: 19
PIF_VALUE: 24
PIF_VALUE: 16
PIF_VALUE: 17
PIF_VALUE: 18
PIF_VALUE: 18

## 2020-10-01 ASSESSMENT — PAIN DESCRIPTION - PAIN TYPE
TYPE: SURGICAL PAIN

## 2020-10-01 ASSESSMENT — PAIN - FUNCTIONAL ASSESSMENT
PAIN_FUNCTIONAL_ASSESSMENT: PREVENTS OR INTERFERES SOME ACTIVE ACTIVITIES AND ADLS
PAIN_FUNCTIONAL_ASSESSMENT: PREVENTS OR INTERFERES SOME ACTIVE ACTIVITIES AND ADLS
PAIN_FUNCTIONAL_ASSESSMENT: 0-10

## 2020-10-01 ASSESSMENT — PAIN DESCRIPTION - DESCRIPTORS
DESCRIPTORS: ACHING
DESCRIPTORS: ACHING

## 2020-10-01 ASSESSMENT — PAIN DESCRIPTION - LOCATION
LOCATION: NECK
LOCATION: NECK

## 2020-10-01 ASSESSMENT — PAIN SCALES - GENERAL
PAINLEVEL_OUTOF10: 3
PAINLEVEL_OUTOF10: 3
PAINLEVEL_OUTOF10: 2
PAINLEVEL_OUTOF10: 5
PAINLEVEL_OUTOF10: 5

## 2020-10-01 ASSESSMENT — PAIN DESCRIPTION - PROGRESSION
CLINICAL_PROGRESSION: GRADUALLY IMPROVING
CLINICAL_PROGRESSION: NOT CHANGED

## 2020-10-01 ASSESSMENT — PAIN DESCRIPTION - FREQUENCY
FREQUENCY: CONTINUOUS
FREQUENCY: CONTINUOUS

## 2020-10-01 ASSESSMENT — PAIN DESCRIPTION - ONSET
ONSET: ON-GOING
ONSET: ON-GOING

## 2020-10-01 ASSESSMENT — PAIN DESCRIPTION - ORIENTATION
ORIENTATION: MID
ORIENTATION: MID

## 2020-10-01 NOTE — CONSULTS
Hospitalist Consult Note        Patient:  Ronan Zimmerman  YOB: 1945  Date of Service: 10/1/2020  MRN: [id number]   Acct:  [de-identified]   Primary Care Physician: Tyesha Maloney DO    Chief Complaint:  S/p parathyroidectomy   Reason for consult  Medical management     Date of Service: Pt seen/examined in consultation on 10/1/2020     History Of Present Illness:      [place] y.o. female who we are asked to see/evaluate by Ann Bone MD for medical management of PAF, hypercalcemia, essential HTN, hypothyroidism, DM II, CAD and obesity who we were consulted for medical management s/p parathyroidectomy. Of note, patient was recently seen by Dr. Ginna Anaya in office (established cardiologist) for cardiac clearance regarding this procedure. At that time it was noted that patient failed amiodarone therapy and that rate control versus ablation would be considered. Pt reports that she has an appointment in October 21st with Dr. Ekta Bradley to discuss this. On telemetry monitor in room patient is in sinus rhythm, she denies having palpitations which patient states she feels when she is in A. fib. Patient denies chest pain. Denies fever/chills. Denies shortness of breath, cough or chest congestion. Patient reports that she is tolerating a clear liquid diet without difficulty, denies N/V. Patient states that her last bowel movement was this morning. Denies dysuria, increased urgency, increased frequency. Patient reports pain associated with procedure is 3/10 in severity, but tolerable. No further complaints at this time. Assessment and Plan:-  1. Parathyroid adenoma s/p parathyroidectomy: ENT primary and managing. 2. Paroxysmal A-fib on oral OAC: reported during procedure to be in a-fib. STAT EKG. Cardiology consulted. Failed Amiodarone per chart review, continue Metoprolol. Consider ablation, appointment at end of Oct for this.  Eliquis held for now, defer to Cardiology / ENT for when okay to resume. 3. Essential HTN / Procedural Hypotension: pt reportedly had hypotension during procedure, no charted hx of such. Continue to monitor. Resume antihypertensives with hold parameters. Currently BP stable. 4. Hypercalcemia: Calcium 10.6, hx of such, ical wnl  5. IDDM II: most recent Hgb A1c 7.4% 8/2020. Resume home Lantus, add SSI. Will hold home oral medications for now. ACCU, carb control diet when able to advance diet. 6. HLD: continue statin  7. Acquired hypothyroidism: continue home Synthroid   8. Obesity: BMI 31.95      Past Medical History:        Diagnosis Date    Arthritis     Atrial fibrillation ([address]     Cancer ([facility] 2000    NON HODGKINS LYMPHOMA    Diabetes mellitus ([facility]     Deepika filter in place     Homozygous for MTHFR gene mutation ([facility]     Hx of blood clots 2000    LEGS    Hyperlipidemia     Hypertension     TRACY on CPAP     Prolonged emergence from general anesthesia     Stroke (cerebrum) ([facility] [address]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    Thyroid disease        Past Surgical History:        Procedure Laterality Date    APPENDECTOMY      CARDIAC CATHETERIZATION      CHOLECYSTECTOMY      FRACTURE SURGERY      ankle surgery    FRACTURE SURGERY Right 09/2018    HUMERUS    HERNIA REPAIR      HYSTERECTOMY      SD OFFICE/OUTPT VISIT,PROCEDURE ONLY Right 9/19/2018    ORIF RIGHT PROXIMAL HUMERUS FX performed by Karl Tracy MD at [address] Right 6/27/2019    REVISION RT HUMERUS FX, HARDWARE REMOVAL, HUMERAL NAIL INSERTION performed by Marisol Blanton MD at [place] [address] Medications:   No current facility-administered medications on file prior to encounter.       Current Outpatient Medications on File Prior to Encounter   Medication Sig Dispense Refill    aspirin 81 MG EC tablet Take 81 mg by mouth daily      Multiple Vitamins-Minerals (THERAPEUTIC MULTIVITAMIN-MINERALS) tablet Take 1 tablet by mouth daily      Cholecalciferol (VITAMIN D3 PO) Take 1 tablet by mouth daily      levothyroxine (SYNTHROID) 112 MCG tablet Take 1 tablet by mouth Daily 90 tablet 1    simvastatin (ZOCOR) 40 MG tablet TAKE 1 TABLET NIGHTLY 90 tablet 3    insulin glargine (BASAGLAR KWIKPEN) 100 UNIT/ML injection pen Inject 15 Units into the skin every morning (before breakfast) 5 pen 2    apixaban (ELIQUIS) 5 MG TABS tablet Take 1 tablet by mouth 2 times daily 60 tablet 0    metoprolol tartrate (LOPRESSOR) 50 MG tablet Take 1 tablet by mouth 2 times daily 60 tablet 5    betamethasone valerate (VALISONE) 0.1 % cream Apply topically 2 times daily PRN. 1 Tube 1    glimepiride (AMARYL) 4 MG tablet Take 1 tablet by mouth 2 times daily 180 tablet 3    metFORMIN (GLUCOPHAGE-XR) 500 MG extended release tablet Take 2 tablets by mouth 2 times daily 360 tablet 3    mupirocin (BACTROBAN) 2 % ointment Apply 2 times daily PRN. 1 Tube 1    vitamin B-12 (CYANOCOBALAMIN) 1000 MCG tablet Take 1,000 mcg by mouth daily      folic acid (FOLVITE) 1 MG tablet Take 3 mg by mouth 2 times daily       Multiple Vitamins-Minerals (ICAPS AREDS 2) CAPS Take by mouth 2 times daily         Allergies:    Patient has no known allergies. Social History:    reports that she has never smoked. She has never used smokeless tobacco. She reports that she does not drink alcohol or use drugs. Family History:       Problem Relation Age of Onset    Heart Disease Mother     Heart Disease Father        Diet:  DIET CLEAR LIQUID;  DIET GENERAL;    Review of systems:   Pertinent positives as noted in the HPI. All other systems reviewed and negative. PHYSICAL EXAM:  BP (!) 158/76   Pulse 86   Temp 97.7 °F (36.5 °C) (Oral)   Resp 16   Ht 5' 5\" (1.651 m)   Wt 192 lb (87.1 kg)   SpO2 97%   BMI 31.95 kg/m²   General appearance: Obese, no apparent distress, appears stated age and cooperative. HEENT: Normal cephalic, atraumatic without obvious deformity.  Pupils equal, round,

## 2020-10-01 NOTE — OP NOTE
Operative Note      Patient: Patrizia Fernandez  YOB: 1945  MRN: [id number]    Date of Procedure: 10/1/2020    Pre-Op Diagnosis: PARATHYROID ADENOMA, HYPERCALCEMIA, OSTEOPOROSIS WITH PATHOLOGICAL FRACTURE, SEQUELA    Post-Op Diagnosis: Same       Procedure(s):  PARATHYROIDECTOMY, EXPLORATION OF PARATHYROIDS    Surgeon(s):  Germaine Gutiérrez MD    Assistant:   * No surgical staff found *    Anesthesia: General    Estimated Blood Loss (mL): Minimal    Complications: None    Specimens:   ID Type Source Tests Collected by Time Destination   A : LEFT INFERIOR MASS, RULE OUT PARATHYROID ADENOMA Tissue Thyroid SURGICAL PATHOLOGY Germaine Gutiérrez MD 10/1/2020 1414        Implants:  * No implants in log *      Drains:   Closed/Suction Drain Anterior Neck Bulb 15 Nepali (Active)   Drainage Appearance Bloody 10/01/20 1457   Status To bulb suction 10/01/20 1457       Urethral Catheter Straight-tip; Non-latex 16 fr (Active)   Urine Color Yellow 10/01/20 1457   Urine Appearance Clear 10/01/20 1457       Findings: 1. Very large left inferior pole parathyroid gland  2.  10 point drop in serum PTH post removal    Detailed Description of Procedure: The patient was taken to the operating awake and placed in supine position. General anesthesia was induced and the patient was intubated with a #7 [employer] recurrent laryngeal nerve sensing endotracheal tube appropriate for this case. This was done without difficulty or vital sign instability. I then performed a timeout verifying the patient's identity and planned procedure. Transferring her head to a Gr head lockwood and moving the table of foot and a half away from anesthesia I injected 8 cc of 1% lidocaine with 1 100,000 of epinephrine about the soft tissues of the neck skin crease overlying the anterior neck base. I then had her prepped and draped in the usual fashion for sterile approach to the deep neck and upper mediastinum.     Using a 15 blade I made a curvilinear incision through this neck skin crease into the subcutaneous tissue and then through the platysma into the subplatysmal space. I used a combination of monopolar bipolar cautery for hemostasis. I reflected the fatty tissues inferiorly and and superiorly. I then divided the soft tissues in the midline to identify the strap muscles. I divided the median raphae of the strap muscles and reflected them from the midline towards the left side where the likely adenoma was likely to be. With a combination of blunt sharp and bipolar dissection I began the process of reflecting soft tissues off of the thyroid body and the thyroid isthmus and body off of the trachea. I used fine bayonet bipolar forceps to do this reflecting the tissue away from the tracheoesophageal groove in preparation for identifying the recurrent laryngeal nerve. As I examined the area by palpation I could make out a distinct mass inferior to the left hemithyroid and its inferior pole. Gradually isolated this mass along its superficial and lateral aspects well away from where the recurrent laryngeal nerve could potentially be. As I did this I could see the color of the tissue was dark tan and that it was substantially larger than any normal parathyroid gland would be. As I gradually isolated along its lateral and superior aspects as well I began  from the inferior pole of the thyroid gland. I then reflected it laterally and began a more decided effort at identifying the recurrent laryngeal nerve. I ultimately identified it near to the tracheoesophageal groove and began to trace it cephalad in preparation for the separation and removal of this likely parathyroid adenoma. The sensory system of the larynx and the endotracheal tube was very useful in confirming the presence of the recurrent laryngeal nerve and allowing me to trace it cephalad while excluding the soft tissue holding the parathyroid gland.   Ultimately I  fully from its underlying soft tissue space placing clips on its vascular supply in the process. Handed off the specimen need to be sent as a frozen section to histopathology which ultimately read as parathyroid tissue with the designation of whether it qualifies as an adenoma pending permanent section. I then carefully examined the dissection bed and again stimulated the recurrent laryngeal nerve which clearly was picked up as activation within the larynx. I checked for hemostasis and found that it had been achieved. I irrigated out the wound with copious amounts of sterile saline and then turned my attention to the closure of the wound. Using combination of 3-0 Monocryl on SH needle for the deep soft tissues and 3-0 Monocryl on a PS 1 needle for the subcutaneous tissues, I close the deepest suture tissues with figure-of-eight sutures and the subcutaneous tissues with inverted figure-of-eight sutures to carefully oppose the cutaneous space. Once all of the superficial subcutaneous tissues have been applied, to vacuum was tested on the 15 [place] drain which had been placed through a separate stab incision inferior to the left edge of the wound, and the wound was found to be holding vacuum for the drain. As such I applied antibiotic ointment to the base of the drain and applied wound glue to the surface since superficial sutures were not necessary for the closure. Once done I consider the operation concluded. I turned the patient back to anesthesia for reversal and extubation in the operating room. This was carried out without incident and the patient was taken recovery in satisfactory condition. Estimated blood loss in the case was less than 20 cc the patient received 1200 cc of intravenous lactated Ringer's intraoperatively. No blood products were transfused. No intraoperative complications were detected. Counts were considered accurate x3.     Disposition: During the operation patient had episodes of converting from atrial fibrillation to atrial flutter with commensurate hypotension. Based on this it was recommended that we observe the patient overnight to make sure she has no additional problems. I will include a 12-lead EKG with a long rhythm strip for a cardiology consult. Hermann Ray.  Jen Apodaca MD, [facility]  Otolaryngology Head and Neck Surgery  Laryngology Bronchoesophagology  Cell: [phone]    Electronically signed by Monty Limon MD on 10/1/2020 at 3:32 PM

## 2020-10-01 NOTE — H&P
Yeison Brooke is an 70 y.o.  female with a history of hypercalcemia and pathologic bone fractures. Although she has not had a total PTH that has been very high, her NM scan was positive for a LEFT LOWER parathyroid gland adenoma that may benefit her from resection. Past Medical History:   Diagnosis Date    Arthritis     [facility] fibrillation ([facility]     Cancer ([facility] 2000    NON HODGKINS LYMPHOMA    Diabetes mellitus ([facility]     Lyman filter in place     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 *  Resolved Problems:    * No resolved hospital problems. *    Blood pressure (!) 171/71, pulse 81, temperature 97.4 °F (36.3 °C), resp. rate 18, height 5' 5\" (1.651 m), weight 192 lb (87.1 kg), SpO2 96 %. Review of Systems NC    Physical Exam: lungs: CTA  Heart: RRR s M or G    Assessment:  Hyperparathroidism with pathological fractures times several.    Plan: To OR for neck exploration and resection of involved gland or glands. Last note:  Kristina Castro MD    Physician    Specialty:  Otolaryngology    Progress Notes       Signed    Encounter Date:  8/24/2020                Signed         Expand All Collapse All      Show:Clear all  [x]Manual[x]Template[]Copied    Added by:  [x]Cali Carr MD    []Garrick for details    [facility]  [facility] NO[facility]  Aidan Rangel Cheo [phone] [place] [id number]  Dept: [phone]  Dept Fax: [phone]  Loc: [phone]     Yeison Brooke is a 70 y.o. female who was referred by No ref.  provider found for:       Chief Complaint   Patient presents with    Follow-up       Here to review nuclear medicine of parathyroid          HPI:      Colleen Dillon is a 70 y.o. female with a history of chronic hypercalcemia and of a series of pathologic fractures that include both humerus and her left scapula as well as her left ankle with minimal reported trauma. She is also had multiple nonunion problems and need for reoperations. Her calcium tends to run in the greater than 11 range. Her PTH has been within normal limits but her sestamibi scan demonstrated a low but discernible enlarged and hypermetabolic parathyroid gland below the inferior pole of her left thyroid. The patient comes today for a discussion of her current condition and to hear the risks and benefits of a parathyroidectomy to treat her hypercalcemia and hopefully to jose her tendency to resorb her definitively weakened bones. Her kidneys have also suffered with her chronic hypercalcemia and her GFR is abnormally low. Patient has chronic atrial fibrillation for which she is on Eliquis anticoagulation. She sees her cardiologist later this week.                 History:      No Known Allergies  Current Facility-Administered Medications          Current Outpatient Medications   Medication Sig Dispense Refill    levothyroxine (SYNTHROID) 112 MCG tablet Take 1 tablet by mouth Daily 90 tablet 1    simvastatin (ZOCOR) 40 MG tablet TAKE 1 TABLET NIGHTLY 90 tablet 3    losartan (COZAAR) 100 MG tablet Take 100 mg by mouth daily        KLOR-CON M10 10 MEQ extended release tablet Take 10 mg by mouth daily        insulin glargine (BASAGLAR KWIKPEN) 100 UNIT/ML injection pen Inject 15 Units into the skin every morning (before breakfast) 5 pen 2    apixaban (ELIQUIS) 5 MG TABS tablet Take 1 tablet by mouth 2 times daily 60 tablet 0    apixaban (ELIQUIS) 5 MG TABS tablet Take 1 tablet by mouth 2 times daily 180 tablet 3    metoprolol tartrate (LOPRESSOR) 50 MG tablet Take 1 tablet by mouth 2 times daily 60 tablet 5    betamethasone valerate (VALISONE) 0.1 % cream Apply topically 2 times daily PRN. 1 Tube 1    glimepiride (AMARYL) 4 MG tablet Take 1 tablet by mouth 2 times daily 180 tablet 3    CPAP Machine MISC by Does not apply route Please change CPAP pressure 8-10 auto cm H20. Turn off ramp 1 each 0    Respiratory Therapy Supplies (ADULT MASK) MISC Needs mask re-fit 1 each 0    metFORMIN (GLUCOPHAGE-XR) 500 MG extended release tablet Take 2 tablets by mouth 2 times daily 360 tablet 3    blood glucose test strips (CONTOUR NEXT TEST) strip Check blood sugar twice per day due to hyperglycemia Dx E11.65 200 each 5    hydrALAZINE (APRESOLINE) 50 MG tablet Take 50 mg by mouth 2 times daily         Potassium Chloride (KLOR-CON 10 PO) Take 10 mEq by mouth 2 times daily         mupirocin (BACTROBAN) 2 % ointment Apply 2 times daily PRN. 1 Tube 1    vitamin B-12 (CYANOCOBALAMIN) 1000 MCG tablet Take 1,000 mcg by mouth daily        losartan (COZAAR) 50 MG tablet Take 100 mg by mouth daily         folic acid (FOLVITE) 1 MG tablet Take 3 mg by mouth 2 times daily         aspirin 325 MG tablet Take 81 mg by mouth daily         Multiple Vitamins-Minerals (ICAPS AREDS 2) CAPS Take by mouth 2 times daily        Multiple Vitamin (THERA-PLUS) LIQD Take 5 mLs by mouth daily.          No current facility-administered medications for this visit.          Past Medical History        Past Medical History:   Diagnosis Date    Arthritis      [facility] ([facility]      Cancer ([facility] 2000     NON HODGKINS LYMPHOMA    Diabetes mellitus ([facility]      Deepika filter in place      Hx of blood clots 2000     LEGS    Hyperlipidemia      Hypertension      Prolonged emergence from general anesthesia      Stroke (cerebrum) ([facility] 3/9/15     affected right side    Thyroid disease           Past Surgical History         Past Surgical History:   Procedure Laterality Date    APPENDECTOMY        CARDIAC SURGERY        CHOLECYSTECTOMY        FRACTURE SURGERY         ankle surgery    FRACTURE SURGERY Right 09/2018     HUMERUS    HERNIA REPAIR        HYSTERECTOMY        CA OFFICE/OUTPT VISIT,PROCEDURE ONLY Right 9/19/2018     ORIF RIGHT PROXIMAL HUMERUS FX performed by Klaudia Menchaca MD at [address] Right 6/27/2019     REVISION RT HUMERUS FX, HARDWARE REMOVAL, HUMERAL NAIL INSERTION performed by Alyssa Dave MD at [address]            Family History         Family History   Problem Relation Age of Onset    Heart Disease Mother      Heart Disease Father          Social History           Tobacco Use    Smoking status: Never Smoker    Smokeless tobacco: Never Used   Substance Use Topics    Alcohol use: No                    Subjective:      Review of Systems  Rest of review of systems are negative, except as noted in HPI.      Objective:      /78 (Site: Right Upper Arm, Position: Sitting)   Pulse 70   Temp 97.2 °F (36.2 °C) (Infrared)   Resp 16   Wt 198 lb (89.8 kg)   BMI 32.95 kg/m²      Physical Exam      Vitals reviewed.     Nm Parathyroid W Spect/ct     Result Date: 8/4/2020  Indistinct focus of activity posterior to the left lower lobe of the thyroid gland suspicious for parathyroid adenoma.  Final report electronically signed by Dr. Servando Chery on 8/4/2020 3:23 PM     Lab Results   Component Value Date      06/15/2020      05/19/2020      12/05/2019     K 5.1 06/15/2020     K 4.9 05/19/2020     K 4.9 12/05/2019     K 3.9 09/20/2018     K 4.2 09/18/2018     CL 97 06/15/2020     CL 92 05/19/2020     CL 94 12/05/2019     CO2 24 06/15/2020     CO2 25 05/19/2020     CO2 25 12/05/2019     BUN 20 06/15/2020     BUN 14 05/19/2020     BUN 18 12/05/2019     CREATININE 0.7 06/15/2020     CREATININE 0.7 05/19/2020     CREATININE 0.9 12/05/2019     CALCIUM 11.3 06/15/2020     CALCIUM 10.8 05/19/2020     CALCIUM 10.6 12/05/2019     PROT 7.3 06/15/2020     PROT 7.8 06/20/2019     PROT 7.5 02/15/2019     LABALBU 4.7 06/15/2020     LABALBU 4.7 06/20/2019     LABALBU 4.6 02/15/2019     LABALBU 4.4 02/27/2012     BILITOT 0.6 06/15/2020     BILITOT 0.6 06/20/2019     BILITOT 0.4 02/15/2019     ALKPHOS 52 06/15/2020     ALKPHOS 53 06/20/2019     ALKPHOS 58 02/15/2019     AST 33 06/15/2020     AST 32 06/20/2019     AST 32 02/15/2019     ALT 35 06/15/2020     ALT 35 06/20/2019     ALT 40 02/15/2019         All of the past medical history, past surgical history, family history,social history, allergies and current medications were reviewed with the patient. Assessment & Plan   Diagnoses and all orders for this visit:       Diagnosis Orders   1. Parathyroid adenoma      2. Hypercalcemia      3. Osteoporosis with pathological fracture, sequela            Based on her history and her lab results, I believe that she is a reasonable candidate for a deep neck exploration with the removal of an apparent hyperactive parathyroid gland that may well represent a parathyroid adenoma and the removal of which may produce significant drop in her serum calcium with a commensurate drop in her leaching of her calcium stores from her bones. I discussed this at length with the patient and her  at length, specifically describing the potential that the removal of a single gland may not be enough to bring her into the mid range of serum calcium and reduce this abnormal calcium homeostasis. It may be necessary to additionally explore her neck either doing this first operation or in the future for the removal of 1 or 2 additional glands depending on how big change she needs to have produced. In the past the removal of \"3-1/2 glands\" was the treatment of choice but frequently produced symptomatic hypocalcemia, arguably a bigger problem at least in the acute sense than hypercalcemia. My preference would be to remove the suspicious gland and test her for her intact PTH.   If there is a sufficiently significant drop in her pre-removal PTH, I would stop the exploration. If on the other hand the drop is minimal, I may recommend and get her permission to allow me to explore the opposite inferior pole to look for a second gland or even a third gland, monitoring the change in her serum PTH in the process. While the risk to her recurrent laryngeal nerves would be increased by exploring the opposite side, I would be using nerve integrity monitoring the whole time to decrease the chances that significant recurrent laryngeal nerve injury would occur at all.     I explained all this to the patient and her  to their satisfaction. I reviewed the possibility of multiple glands being operated on during the primary operation on her first visit to see me. During this visit I primarily described it in the context of reoperation. After reviewing her labs over the last several years I have come to the conclusion that she may well need more than one gland removed and possibly 3 glands removed to get her significantly lower in her PTH so that she can benefit from this surgical approach. I now believe that she may need a more extensive dissection at the first operation to reduce the chances that she will need subsequent operations in the future. I will contact her by phone to discuss this with him and arrange a second meeting with them  if she wishes.        Return in about 4 weeks (around 9/21/2020) for 4-5 days postop for drain removal if her output is sufficiently low. .     I spent over 30 minutes reviewing all of these facets of her care and factors involved in decision making regarding the operation I have offered to her as well as answering her questions and addressing her and her 's concerns. They both reported understanding the basis of my recommendations and their willingness to proceed as such.     **This report has been created using voice recognition software.  It may contain minor errors which are inherent in voice recognition technology. **                                        Office Visit on 8/24/2020          Detailed Report         Note viewed by patient   Progress Notes Info     Author  Note Status  Last Update User    Regina Hutchison MD  Signed  Regina Hutchison MD    Last Update Date/Time: 8/24/2020  5:10 PM    Chart Review Routing History     No routing history on file.        Regina Hutchison MD  10/1/2020

## 2020-10-01 NOTE — PROGRESS NOTES
1457 Pt transferred to PACU, see flow sheet for assessment. 1500 JAMI Dunne at bedside, discussing case with pt.   1515 Pt tolerating ice chips. [id number] Message sent to JAMI Huff re: pt's glucose level 199.   1536 JAMI Huff wants D. Imm to address glucose level -message sent.    1537 No orders for glucose coverage per D.Imm.   1546 Pt transferred to ,  already in room

## 2020-10-01 NOTE — ANESTHESIA PROCEDURE NOTES
Arterial Line:    An arterial line was placed using surface landmarks, in the OR for the following indication(s): blood sampling needed. A 20 gauge (size), 1 inch (length), Arrow (type) catheter was placed, into the left radial artery, secured by Tegaderm. Anesthesia type: General    Events:  patient tolerated procedure well with no complications.   10/1/2020 12:38 PM10/1/2020 1:45 PM  Anesthesiologist: Claudette Edelman, MD  Performed: Anesthesiologist

## 2020-10-01 NOTE — PROGRESS NOTES
Pt admitted to  6K27 from surgery. Complaints: a flutter intraoperatively. IV site free of s/s of infection or infiltration. Vital signs obtained. Assessment and data collection initiated. Two nurse skin assessment performed by Lester Dalton RN and Kenrick Meredith. Oriented to room. Policies and procedures for  explained. Lester Dalton RN discussed hourly rounding with patient addressing 5 P's. Fall prevention and safety brochure discussed with patient. Bed alarm on. Call light in reach. The best day to schedule a follow up Dr appointment is:  Monday a.m. Explained patients right to have family, representative or physician notified of their admission. Patient has Declined for physician to be notified. Patient has Declined for family/representative to be notified. All questions answered with no further questions at this time.

## 2020-10-01 NOTE — BRIEF OP NOTE
Brief Postoperative Note      Patient: Kishore Mcguire  YOB: 1945  MRN: [id number]    Date of Procedure: 10/1/2020    Pre-Op Diagnosis: PARATHYROID ADENOMA, HYPERCALCEMIA, OSTEOPOROSIS WITH PATHOLOGICAL FRACTURE, SEQUELA    Post-Op Diagnosis: Same       Procedure(s):  PARATHYROIDECTOMY, EXPLORATION OF PARATHYROIDS    Surgeon(s):  King Foreman MD    Assistant:  * No surgical staff found *    Anesthesia: General    Estimated Blood Loss (mL): less than 50     Complications: None    Specimens:   ID Type Source Tests Collected by Time Destination   A : LEFT INFERIOR MASS, RULE OUT PARATHYROID ADENOMA Tissue Thyroid SURGICAL PATHOLOGY King Foreman MD 10/1/2020 1414        Implants:  * No implants in log *      Drains:   Closed/Suction Drain Anterior Neck Bulb 15 [place] (Active)       Urethral Catheter Straight-tip; Non-latex 16 fr (Active)       Findings: 1.   Very large left inferior pole parathyroid gland  2.  10 point drop in serum PTH post removal    Electronically signed by King Foreman MD on 10/1/2020 at 3:10 PM

## 2020-10-01 NOTE — ANESTHESIA POSTPROCEDURE EVALUATION
Department of Anesthesiology  Postprocedure Note    Patient: Jesi Alan  MRN: [id number]  YOB: 1945  Date of evaluation: 10/1/2020  Time:  4:18 PM     Procedure Summary     Date:  10/01/20 Room / Location:  [facility] OR [address] [place] [address] OR    Anesthesia Start:  0135 Anesthesia Stop:  4260    Procedure:  PARATHYROIDECTOMY, EXPLORATION OF PARATHYROIDS (N/A Neck) Diagnosis:  (PARATHYROID ADENOMA, HYPERCALCEMIA, OSTEOPOROSIS WITH PATHOLOGICAL FRACTURE, SEQUELA)    Surgeon:  Anita Jesus MD Responsible Provider:  Joaquín Ruiz MD    Anesthesia Type:  general ASA Status:  4          Anesthesia Type: general    Ochoa Phase I: Ochoa Score: 9    Ochoa Phase II:      Last vitals: Reviewed and per EMR flowsheets.        Anesthesia Post Evaluation    Patient location during evaluation: PACU  Patient participation: complete - patient participated  Level of consciousness: awake and alert  Airway patency: patent  Nausea & Vomiting: no nausea  Complications: no  Cardiovascular status: blood pressure returned to baseline and hemodynamically stable  Respiratory status: acceptable and spontaneous ventilation  Hydration status: euvolemic

## 2020-10-01 NOTE — ANESTHESIA PRE PROCEDURE
Department of Anesthesiology  Preprocedure Note       Name:  Roderick Brandt   Age:  70 y.o.  :  1945                                          MRN:  [id number]         Date:  10/1/2020      Surgeon: Kal Perez):  Alem Kenney MD    Procedure: Procedure(s):  PARATHYROIDECTOMY, EXPLORATION OF PARATHYROIDS    Medications prior to admission:   Prior to Admission medications    Medication Sig Start Date End Date Taking?  Authorizing Provider   aspirin 81 MG EC tablet Take 81 mg by mouth daily   Yes Historical Provider, MD   Multiple Vitamins-Minerals (THERAPEUTIC MULTIVITAMIN-MINERALS) tablet Take 1 tablet by mouth daily   Yes Historical Provider, MD   Cholecalciferol (VITAMIN D3 PO) Take 1 tablet by mouth daily   Yes Historical Provider, MD   hydrALAZINE (APRESOLINE) 50 MG tablet Take 1 tablet by mouth 2 times daily 20  Yes Franklin Macdonald MD   losartan (COZAAR) 100 MG tablet Take 1 tablet by mouth daily 20  Yes Franklin Macdonald MD   KLOR-CON M10 10 MEQ extended release tablet Take 1 tablet by mouth daily 20  Yes Franklin Macdonald MD   levothyroxine (SYNTHROID) 112 MCG tablet Take 1 tablet by mouth Daily 20  Yes Kurt Desai DO   simvastatin (ZOCOR) 40 MG tablet TAKE 1 TABLET NIGHTLY 20  Yes Santiago Harrison DO   insulin glargine (BASAGLAR KWIKPEN) 100 UNIT/ML injection pen Inject 15 Units into the skin every morning (before breakfast) 7/10/20  Yes Kurt Desai DO   apixaban (ELIQUIS) 5 MG TABS tablet Take 1 tablet by mouth 2 times daily 20  Yes Franklin Macdonald MD   metoprolol tartrate (LOPRESSOR) 50 MG tablet Take 1 tablet by mouth 2 times daily 20  Yes Santiago Harrison DO   betamethasone valerate (VALISONE) 0.1 % cream Apply topically 2 times daily PRN. 20  Yes Santiago Harrison DO   glimepiride (AMARYL) 4 MG tablet Take 1 tablet by mouth 2 times daily 3/9/20  Yes Santiago Harrison DO   metFORMIN (GLUCOPHAGE-XR) 500 MG extended release tablet Take 2 tablets by mouth 2 times daily 12/19/19  Yes Kaykay Graham DO   mupirocin (BACTROBAN) 2 % ointment Apply 2 times daily PRN. 10/24/19  Yes Kaykay Graham DO   vitamin B-12 (CYANOCOBALAMIN) 1000 MCG tablet Take 1,000 mcg by mouth daily   Yes Historical Provider, MD   folic acid (FOLVITE) 1 MG tablet Take 3 mg by mouth 2 times daily    Yes Historical Provider, MD   Multiple Vitamins-Minerals (ICAPS AREDS 2) CAPS Take by mouth 2 times daily   Yes Historical Provider, MD       Current medications:    Current Facility-Administered Medications   Medication Dose Route Frequency Provider Last Rate Last Dose    0.9 % sodium chloride infusion   Intravenous Continuous Cecilia Zhu MD           Allergies:  No Known Allergies    Problem List:    Patient Active Problem List   Diagnosis Code    Microalbuminuria due to type 2 diabetes mellitus ([facility] E11.29, R80.9    Chronic atrial fibrillation ([facility] I48.20    Diabetes mellitus ([facility] E11.9    Essential hypertension I10    Osteopenia of spine M85.88    Other specified hypothyroidism E03.8    History of non-Hodgkin's lymphoma Z85.72    Anticoagulant long-term use Z79.01    Hyponatremia E87.1    History of humerus fracture Z87.81    TRACY on CPAP G47.33, Z99.89    Obesity (BMI 30-39. 9) E66.9    Elevated parathyroid hormone E34.9    Parathyroid adenoma D35.1    Hypercalcemia E83.52    Osteoporosis with pathological fracture M80.00XA       Past Medical History:        Diagnosis Date    Arthritis     Atrial fibrillation ([facility]     Cancer ([facility] 2000    NON HODGKINS LYMPHOMA    Diabetes mellitus ([facility]     Deepika filter in place     Homozygous for MTHFR gene mutation ([facility]     Hx of blood clots 2000    LEGS    Hyperlipidemia     Hypertension     TRACY on CPAP     Prolonged emergence from general anesthesia     Stroke (cerebrum) ([facility]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    Thyroid disease        Past Surgical History:        Procedure Laterality Date    APPENDECTOMY      CARDIAC CATHETERIZATION      CHOLECYSTECTOMY      FRACTURE SURGERY      ankle surgery    FRACTURE SURGERY Right 09/2018    HUMERUS    HERNIA REPAIR      HYSTERECTOMY      MO OFFICE/OUTPT VISIT,PROCEDURE ONLY Right 9/19/2018    ORIF RIGHT PROXIMAL HUMERUS FX performed by Cynthia Luong MD at [address] Right 6/27/2019    REVISION RT HUMERUS FX, HARDWARE REMOVAL, HUMERAL NAIL INSERTION performed by Cory Alcantar MD at [facility] History:    Social History     Tobacco Use    Smoking status: Never Smoker    Smokeless tobacco: Never Used   Substance Use Topics    Alcohol use: No                                Counseling given: Not Answered      Vital Signs (Current):   Vitals:    09/24/20 1213 10/01/20 1035 10/01/20 1040   BP:  (!) 171/71    Pulse:  81    Resp:  18    Temp:  97.4 °F (36.3 °C)    SpO2:  96%    Weight: 193 lb (87.5 kg)  192 lb (87.1 kg)   Height: 5' 5\" (1.651 m)  5' 5\" (1.651 m)                                              BP Readings from Last 3 Encounters:   10/01/20 (!) 171/71   09/21/20 138/70   08/25/20 (!) 152/58       NPO Status: Time of last liquid consumption: 0900(sip with meds)                        Time of last solid consumption: 2200                        Date of last liquid consumption: 10/01/20                        Date of last solid food consumption: 09/30/20    BMI:   Wt Readings from Last 3 Encounters:   10/01/20 192 lb (87.1 kg)   09/21/20 193 lb 9.6 oz (87.8 kg)   08/25/20 196 lb (88.9 kg)     Body mass index is 31.95 kg/m².     CBC:   Lab Results   Component Value Date    WBC 4.4 09/25/2020    RBC 4.20 09/25/2020    RBC 3.94 02/27/2012    HGB 14.0 09/25/2020    HCT 42.6 09/25/2020    .4 09/25/2020    RDW 12.5 02/25/2020     09/25/2020       CMP:   Lab Results   Component Value Date     09/25/2020    K 5.2 09/25/2020    K 3.9 09/20/2018    CL 98 09/25/2020    CO2 25 09/25/2020    BUN 16 09/25/2020 CREATININE 0.7 09/25/2020    LABGLOM 82 09/25/2020    GLUCOSE 204 09/25/2020    GLUCOSE 167 02/27/2012    PROT 7.4 09/25/2020    CALCIUM 11.1 09/25/2020    BILITOT 0.6 09/25/2020    ALKPHOS 49 09/25/2020    AST 19 09/25/2020    ALT 21 09/25/2020       POC Tests: No results for input(s): POCGLU, POCNA, POCK, POCCL, POCBUN, POCHEMO, POCHCT in the last 72 hours. Coags:   Lab Results   Component Value Date    INR 1.13 09/19/2018       HCG (If Applicable): No results found for: PREGTESTUR, PREGSERUM, HCG, HCGQUANT     ABGs: No results found for: PHART, PO2ART, ZAE9SPX, DNZ6ZUI, BEART, T2HGGEFF     Type & Screen (If Applicable):  No results found for: LABABO, LABRH    Drug/Infectious Status (If Applicable):  No results found for: HIV, HEPCAB    COVID-19 Screening (If Applicable):   Lab Results   Component Value Date    COVID19 Not Detected 09/25/2020         Anesthesia Evaluation    Airway: Mallampati: II       Mouth opening: > = 3 FB Dental:          Pulmonary:   (+) sleep apnea:                             Cardiovascular:    (+) hypertension:, valvular problems/murmurs: AS,       ECG reviewed      Echocardiogram reviewed                  Neuro/Psych:   (+) CVA:,             GI/Hepatic/Renal:             Endo/Other:    (+) Diabetes, hypothyroidism::., .                 Abdominal:   (+) obese,         Vascular:                                        Anesthesia Plan      general     ASA 4       Induction: intravenous. Anesthetic plan and risks discussed with patient. Plan discussed with CRNA.                   Xu Valdivia MD   10/1/2020

## 2020-10-02 VITALS
RESPIRATION RATE: 18 BRPM | WEIGHT: 192 LBS | HEART RATE: 63 BPM | TEMPERATURE: 97.8 F | HEIGHT: 65 IN | OXYGEN SATURATION: 96 % | BODY MASS INDEX: 31.99 KG/M2 | SYSTOLIC BLOOD PRESSURE: 136 MMHG | DIASTOLIC BLOOD PRESSURE: 63 MMHG

## 2020-10-02 LAB
ANION GAP SERPL CALCULATED.3IONS-SCNC: 12 MEQ/L (ref 8–16)
BUN BLDV-MCNC: 18 MG/DL (ref 7–22)
CALCIUM IONIZED: 1.18 MMOL/L (ref 1.12–1.32)
CALCIUM SERPL-MCNC: 9.6 MG/DL (ref 8.5–10.5)
CHLORIDE BLD-SCNC: 99 MEQ/L (ref 98–111)
CO2: 24 MEQ/L (ref 23–33)
CREAT SERPL-MCNC: 0.8 MG/DL (ref 0.4–1.2)
ERYTHROCYTE [DISTWIDTH] IN BLOOD BY AUTOMATED COUNT: 12.7 % (ref 11.5–14.5)
ERYTHROCYTE [DISTWIDTH] IN BLOOD BY AUTOMATED COUNT: 47.6 FL (ref 35–45)
GFR SERPL CREATININE-BSD FRML MDRD: 70 ML/MIN/1.73M2
GLUCOSE BLD-MCNC: 207 MG/DL (ref 70–108)
GLUCOSE BLD-MCNC: 237 MG/DL (ref 70–108)
GLUCOSE BLD-MCNC: 276 MG/DL (ref 70–108)
HCT VFR BLD CALC: 37 % (ref 37–47)
HEMOGLOBIN: 11.8 GM/DL (ref 12–16)
MCH RBC QN AUTO: 32.8 PG (ref 26–33)
MCHC RBC AUTO-ENTMCNC: 31.9 GM/DL (ref 32.2–35.5)
MCV RBC AUTO: 102.8 FL (ref 81–99)
PLATELET # BLD: 216 THOU/MM3 (ref 130–400)
PMV BLD AUTO: 9.1 FL (ref 9.4–12.4)
POTASSIUM SERPL-SCNC: 4.7 MEQ/L (ref 3.5–5.2)
RBC # BLD: 3.6 MILL/MM3 (ref 4.2–5.4)
SODIUM BLD-SCNC: 135 MEQ/L (ref 135–145)
WBC # BLD: 6.7 THOU/MM3 (ref 4.8–10.8)

## 2020-10-02 PROCEDURE — 99215 OFFICE O/P EST HI 40 MIN: CPT | Performed by: NUCLEAR MEDICINE

## 2020-10-02 PROCEDURE — 82330 ASSAY OF CALCIUM: CPT

## 2020-10-02 PROCEDURE — 6370000000 HC RX 637 (ALT 250 FOR IP): Performed by: PHYSICIAN ASSISTANT

## 2020-10-02 PROCEDURE — 82948 REAGENT STRIP/BLOOD GLUCOSE: CPT

## 2020-10-02 PROCEDURE — 6370000000 HC RX 637 (ALT 250 FOR IP): Performed by: OTOLARYNGOLOGY

## 2020-10-02 PROCEDURE — 6360000002 HC RX W HCPCS: Performed by: OTOLARYNGOLOGY

## 2020-10-02 PROCEDURE — G0378 HOSPITAL OBSERVATION PER HR: HCPCS

## 2020-10-02 PROCEDURE — 96372 THER/PROPH/DIAG INJ SC/IM: CPT

## 2020-10-02 PROCEDURE — 2580000003 HC RX 258: Performed by: OTOLARYNGOLOGY

## 2020-10-02 PROCEDURE — 36415 COLL VENOUS BLD VENIPUNCTURE: CPT

## 2020-10-02 PROCEDURE — 80048 BASIC METABOLIC PNL TOTAL CA: CPT

## 2020-10-02 PROCEDURE — 93010 ELECTROCARDIOGRAM REPORT: CPT | Performed by: INTERNAL MEDICINE

## 2020-10-02 PROCEDURE — APPNB45 APP NON BILLABLE 31-45 MINUTES: Performed by: NURSE PRACTITIONER

## 2020-10-02 PROCEDURE — 99214 OFFICE O/P EST MOD 30 MIN: CPT | Performed by: FAMILY MEDICINE

## 2020-10-02 PROCEDURE — 96365 THER/PROPH/DIAG IV INF INIT: CPT

## 2020-10-02 PROCEDURE — 85027 COMPLETE CBC AUTOMATED: CPT

## 2020-10-02 RX ORDER — OXYCODONE HYDROCHLORIDE 5 MG/1
5 TABLET ORAL EVERY 6 HOURS PRN
Qty: 12 TABLET | Refills: 0 | Status: SHIPPED | OUTPATIENT
Start: 2020-10-02 | End: 2020-10-05

## 2020-10-02 RX ADMIN — Medication 10 ML: at 08:06

## 2020-10-02 RX ADMIN — CEFAZOLIN 2 G: 10 INJECTION, POWDER, FOR SOLUTION INTRAVENOUS at 04:35

## 2020-10-02 RX ADMIN — INSULIN GLARGINE 15 UNITS: 100 INJECTION, SOLUTION SUBCUTANEOUS at 07:59

## 2020-10-02 RX ADMIN — FOLIC ACID 3 MG: 1 TABLET ORAL at 08:07

## 2020-10-02 RX ADMIN — METOPROLOL TARTRATE 50 MG: 50 TABLET, FILM COATED ORAL at 08:06

## 2020-10-02 RX ADMIN — ENOXAPARIN SODIUM 40 MG: 40 INJECTION SUBCUTANEOUS at 08:05

## 2020-10-02 RX ADMIN — LEVOTHYROXINE SODIUM 112 MCG: 112 TABLET ORAL at 05:00

## 2020-10-02 RX ADMIN — HYDRALAZINE HYDROCHLORIDE 50 MG: 50 TABLET ORAL at 08:07

## 2020-10-02 RX ADMIN — OXYCODONE 5 MG: 5 TABLET ORAL at 09:41

## 2020-10-02 ASSESSMENT — PAIN DESCRIPTION - LOCATION: LOCATION: NECK

## 2020-10-02 ASSESSMENT — PAIN SCALES - GENERAL
PAINLEVEL_OUTOF10: 5
PAINLEVEL_OUTOF10: 3
PAINLEVEL_OUTOF10: 2

## 2020-10-02 ASSESSMENT — PAIN DESCRIPTION - DESCRIPTORS: DESCRIPTORS: ACHING

## 2020-10-02 ASSESSMENT — PAIN DESCRIPTION - ORIENTATION: ORIENTATION: MID

## 2020-10-02 ASSESSMENT — PAIN DESCRIPTION - PAIN TYPE: TYPE: SURGICAL PAIN

## 2020-10-02 ASSESSMENT — PAIN - FUNCTIONAL ASSESSMENT: PAIN_FUNCTIONAL_ASSESSMENT: PREVENTS OR INTERFERES WITH ALL ACTIVE AND SOME PASSIVE ACTIVITIES

## 2020-10-02 ASSESSMENT — PAIN DESCRIPTION - FREQUENCY: FREQUENCY: CONTINUOUS

## 2020-10-02 ASSESSMENT — PAIN DESCRIPTION - PROGRESSION: CLINICAL_PROGRESSION: NOT CHANGED

## 2020-10-02 ASSESSMENT — PAIN DESCRIPTION - ONSET: ONSET: ON-GOING

## 2020-10-02 NOTE — PLAN OF CARE
Problem: Falls - Risk of:  Goal: Will remain free from falls  Description: Will remain free from falls  Outcome: Ongoing  Note: No falls so far this shift, call light and bedside table within reach. Bed in lowest position and alarm on, nonskid socks on bilaterally. Problem: Pain:  Goal: Pain level will decrease  Description: Pain level will decrease  Outcome: Ongoing  Note: Pt resting quietly with eyes closed at this time, oxycodone prn given once this shift so far. Refer to [facility] Will continue to assess using 0-10 pain scale, pt states pain goal is 2-3/10. Problem: Discharge Planning:  Goal: Participates in care planning  Description: Participates in care planning  Outcome: Ongoing  Note: Pt is active in plan of care, continue to update as needed. Problem: Discharge Planning:  Goal: Discharged to appropriate level of care  Description: Discharged to appropriate level of care  Outcome: Ongoing  Note: Pt prefers to return home upon discharge. Problem: Airway Clearance - Ineffective:  Goal: Ability to maintain a clear airway will improve  Description: Ability to maintain a clear airway will improve  Outcome: Ongoing  Note: Airways remains clear after having a parathyroidectomy. Will continue to assess for airway clearance. Care plan reviewed with patient. Patient verbalize understanding of the plan of care and contribute to goal setting.

## 2020-10-02 NOTE — CARE COORDINATION
DISCHARGE PLANNING EVALUATION: OP/OBSERVATION        10/2/20, 7:06 AM EDT    Alex Lam       Admitted from: PACU 10/1/2020/ 1002   [facility] Reason for admit: S/P parathyroidectomy [Z98.890]  Atrial fibrillation/flutter ([facility] [I48.91, I48.92]   Admit order signed?: yes    Procedure: PARATHYROIDECTOMY, EXPLORATION OF PARATHYROIDS    Pertinent Info/Orders/Treatment Plan:  Pain control, telemetry, Cardiology consult, Hospitalist consult, diabetes management,     PCP: Nuno Done, DO    Patient Goals/Plan/Treatment Preferences: Met with Claudeen Fabry. She currently lives at home with her . Plan is to return at discharge. She denies need for DME and declines HH. Transportation/Food Security/Housekeeping Addressed:  No issues identified.

## 2020-10-02 NOTE — DISCHARGE SUMMARY
DISCHARGE SUMMARY    Pt Name: Colleen Dillon  MRN: [id number]  YOB: 1945  Primary Care Physician: Henry Foreman DO    Admit date:  10/1/2020 10:02 AM  Discharge date:  10/2/2020  Disposition: Home  Admitting Diagnosis: Parathyroid adenoma, hypercalcemia, osteoporosis with pathological fracture  Discharge Diagnosis: S/p parathyroidectomy with exploration of parathyroids  Patient Active Problem List   Diagnosis Code    Microalbuminuria due to type 2 diabetes mellitus ([address] E11.29, R80.9    Chronic atrial fibrillation ([address] I48.20    Diabetes mellitus ([address] E11.9    Essential hypertension I10    Osteopenia of spine M85.88    Other specified hypothyroidism E03.8    History of non-Hodgkin's lymphoma Z85.72    Anticoagulant long-term use Z79.01    Hyponatremia E87.1    History of humerus fracture Z87.81    TRACY on CPAP G47.33, Z99.89    Obesity (BMI 30-39. 9) E66.9    Elevated parathyroid hormone E34.9    Parathyroid adenoma D35.1    Hypercalcemia E83.52    Osteoporosis with pathological fracture M80.00XA    S/P parathyroidectomy Z98.890    Atrial fibrillation/flutter ([address] I48.91, I48.92     Consultants:  Cardiology and Hospitalist  Procedures/Diagnostic Test:  EKG    Hospital Course: Ozzie Atwood originally presented to the hospital on 10/1/2020 10:02 AM for parathyroidectomy. She was admitted overnight for observation secondary to intra-operative atrial fibrillation. At time of discharge, Ozzie Atwood was tolerating a regular diet, having bowel movements, ambulating on her own accord and had adequate analgesia on oral pain medications. Patient had no signs or symptoms of complications. PHYSICAL EXAMINATION     Discharge Vitals:  height is 5' 5\" (1.651 m) and weight is 192 lb (87.1 kg). Her oral temperature is 97.8 °F (36.6 °C). Her blood pressure is 136/63 and her pulse is 63. Her respiration is 18 and oxygen saturation is 96%. Patient seen by attending.     LABS     Recent Labs 10/01/20  1116 10/01/20  1701 10/02/20  0558   WBC  --  7.5 6.7   HGB  --  13.8 11.8*   HCT  --  43.5 37.0   PLT  --  214 216   NA  --  136 135   K 5.2 4.9 4.7   CL  --  98 99   CO2  --  26 24   BUN  --  16 18   CREATININE  --  0.6 0.8       DISCHARGE INSTRUCTIONS     Discharge Medications:      Medication List      START taking these medications    oxyCODONE 5 MG immediate release tablet  Commonly known as:  ROXICODONE  Take 1 tablet by mouth every 6 hours as needed for Pain for up to 3 days. CONTINUE taking these medications    apixaban 5 MG Tabs tablet  Commonly known as:  Eliquis  Take 1 tablet by mouth 2 times daily     aspirin 81 MG EC tablet     Basaglar KwikPen 100 UNIT/ML injection pen  Generic drug:  insulin glargine  Inject 15 Units into the skin every morning (before breakfast)     betamethasone valerate 0.1 % cream  Commonly known as:  VALISONE  Apply topically 2 times daily PRN. folic acid 1 MG tablet  Commonly known as:  FOLVITE     glimepiride 4 MG tablet  Commonly known as:  AMARYL  Take 1 tablet by mouth 2 times daily     hydrALAZINE 50 MG tablet  Commonly known as:  APRESOLINE  Take 1 tablet by mouth 2 times daily     * therapeutic multivitamin-minerals tablet     * ICaps Areds 2 Caps     Klor-Con M10 10 MEQ extended release tablet  Generic drug:  potassium chloride  Take 1 tablet by mouth daily     levothyroxine 112 MCG tablet  Commonly known as:  SYNTHROID  Take 1 tablet by mouth Daily     losartan 100 MG tablet  Commonly known as:  COZAAR  Take 1 tablet by mouth daily     metFORMIN 500 MG extended release tablet  Commonly known as:  GLUCOPHAGE-XR  Take 2 tablets by mouth 2 times daily     metoprolol tartrate 50 MG tablet  Commonly known as:  LOPRESSOR  Take 1 tablet by mouth 2 times daily     mupirocin 2 % ointment  Commonly known as: Bactroban  Apply 2 times daily PRN.      simvastatin 40 MG tablet  Commonly known as:  ZOCOR  TAKE 1 TABLET NIGHTLY     vitamin B-12 1000 MCG tablet  Commonly known as:  CYANOCOBALAMIN     VITAMIN D3 PO         * This list has 2 medication(s) that are the same as other medications prescribed for you. Read the directions carefully, and ask your doctor or other care provider to review them with you.                Where to Get Your Medications      These medications were sent to [facility] [address] [place] [address]., [place] [address]    Phone:  [phone]   · oxyCODONE 5 MG immediate release tablet       Diet: diet as tolerated  Activity: activity as tolerated  Wound Care: keep neck incision dry, LIZZ drain to remain in place until follow up visit next week - monitor output at home  Follow-up:  Follow up with next week as scheduled with Dr Jose Carnes    Electronically signed by EMMANUEL Dorsey CNP on 10/2/2020 at 11:49 AM

## 2020-10-02 NOTE — CONSULTS
[address] [place] [address]                                  CONSULTATION    PATIENT NAME: Patricia Toussaint                     :        1945  MED REC NO:   [id number]                           ROOM:       2734  ACCOUNT NO:   [de-identified]                           ADMIT DATE: 10/01/2020  PROVIDER:     GEORGES Hutchinson DATE:  10/02/2020    CARDIOLOGY CONSULTATION    REASON FOR CONSULTATION:  Atrial fibrillation with rapid ventricular  response. REQUESTING PROVIDER:  Hospitalist Service. HISTORY OF PRESENT ILLNESS:  This is a pleasant 70 old patient with a  history of known AFib which is very difficult to control, history of  nonobstructive coronary disease, who was supposed to be seen by Dr. Jeovanny Diaz for evaluation of possible ablation. The patient underwent sinus  surgery and apparently had some AFib with rapid ventricular response  during the operation. She was admitted for telemetry monitoring. We  were consulted to assist in management of the patient. The patient has  had intermittent episodes of AFib that we have been aware of. Right now  she is back in sinus rhythm and pretty much back to baseline. Denies  any chest pain and/or significant shortness of breath. REVIEW OF SYSTEMS:  No fever, no chills, no weight loss. No hematuria  or dysuria. No abdominal pain, nausea, vomiting, or diarrhea. No  obvious psych problems or suicidal ideation. No skin rashes. No  obvious dizziness, lightheadedness or loss of consciousness. No recent  trauma. No bleeding problem. No HEENT-related problem. PAST MEDICAL HISTORY:  1. Nonobstructive coronary artery disease. 2.  Hypertension. 3.  Hypothyroidism. 4.  Hyperlipidemia. 5.  AFib. ALLERGIES:  No known drug allergies. MEDICATIONS:  Metoprolol 50 twice a day, Humalog, Synthroid, Lipitor 20  a day, hydralazine 50 three times a day, insulin.     SOCIAL HISTORY:  No tobacco, no drugs, no alcohol. FAMILY HISTORY:  Noncontributory. PHYSICAL EXAMINATION:  VITAL SIGNS:  Showed a blood pressure of 130/80, heart rate of 70,  respiratory rate of 16. GENERAL APPEARANCE:  Pleasant lady, in no acute distress. EYES AND EARS:  No discharge. NECK:  No JVD. No bruits. No masses. LUNGS:  Decreased air entry. No crackles. No wheezes. HEART:  Normal S1, S2. Systolic murmur grade 2/6. ABDOMEN:  Soft, nontender. Positive bowel sounds. No organomegaly. EXTREMITIES:  No significant edema. NEUROLOGIC:  Grossly intact. Awake, alert. No focal deficits. PSYCH:  No evidence of active psychosis. SKIN:  No rashes. LABORATORY DATA:  Showed sodium 135, potassium 4.7, BUN 18, creatinine  0.8. White count 6.7, hemoglobin 11.8, hematocrit 37, platelets 818. EKG showed sinus rhythm. Nonspecific ST-T wave changes. IMPRESSION:  This is a patient who comes in with episode of AFib in the  OR with rapid ventricular response. This is expected given her  uncontrolled AFib for the last several months. Currently she is back in  sinus rhythm, doing well. RECOMMENDATIONS:  1. Continue with current treatment. 2.  Follow up with EP service for possible ablation. 3.  The patient should be able to be dismissed home later today if  continues to be stable. Thank you for allowing me to participate in the care of this patient.         Mike Gregorio M.D.    D: 10/02/2020 14:08:47       T: 10/02/2020 14:18:27     [email or web address]  Job#: [id number]     Doc#: [id number]    CC:

## 2020-10-02 NOTE — PROGRESS NOTES
Discharge teaching and instructions for diagnosis/procedure of s/p parathyroid surgery completed with patient using teachback method. AVS reviewed. Printed prescriptions given to patient. Patient voiced understanding regarding prescriptions, follow up appointments, and care of self at home.  Discharged in a wheelchair to  home with support per family

## 2020-10-02 NOTE — PROGRESS NOTES
PROGRESS NOTE      Patient:  Donna Macias      Unit/Bed:6K-27/027-A    YOB: 1945    MRN: [id number]       Acct: [de-identified]     PCP: Bryan Ratliff DO    Date of Admission: 10/1/2020      Assessment/Plan:    Anticipated Discharge in : Per ENT will be discharged today. Active Hospital Problems    Diagnosis Date Noted    S/P parathyroidectomy [Z98.890] 10/01/2020    Atrial fibrillation/flutter ([address] [I48.91, I48.92] 10/01/2020       1. Parathyroid adenoma s/p parathyroidectomy on 10/1/2020  -ENT is managing and is the primary attending  -Surgical site is healing well with drain present  -Patient to follow-up with Dr. Shey Jaimes as outpatient    2. Paroxsymal A-fib  -Known history of A. fib on anticoagulation with Eliquis which has been held for hospitalization and surgery.  -Patient has an appointment later in October with cardiologist for possible ablation  -Cardiology/ENT to resume Eliquis when appropriate    3. Essential Hypertension  -Hypotension during procedure  -Blood pressure controlled today   -Continue home blood pressure medications    4. Hypercalcemia  -Secondary to parathyroid adenoma  -Status post surgery  -ENT to manage    5. Insulin dependent Diabetes Mellitus type 2  -Elevated glucoses on admission  -Last hemoglobin A1c of 7.4 8/2020  -Continue home doses of insulin  -Continue oral anti-hyperglycemics at discharge    6. HLD  -Continue statin    7. Hypothyroidism  -Continue home Synthroid    8. Obesity  -BMI of 31.95 patient educated on diet and exercise. To be followed with PCP. Chief Complaint: Medical management of atrial fibrillation and hypotension during procedure. Hospital Course: Felicit[age] Tej[age] y.o. female who we are asked to see/evaluate by Marcus Berry MD for medical management of PAF, hypercalcemia, essential HTN, hypothyroidism, DM II, CAD and obesity who we were consulted for medical management s/p parathyroidectomy.   Of note, patient was recently seen by Dr. Yolanda Harrison in office (established cardiologist) for cardiac clearance regarding this procedure. At that time it was noted that patient failed amiodarone therapy and that rate control versus ablation would be considered. Pt reports that she has an appointment in October 21st with Dr. Jose A Levi to discuss this.      On telemetry monitor in room patient is in sinus rhythm, she denies having palpitations which patient states she feels when she is in A. fib. Patient denies chest pain. Denies fever/chills. Denies shortness of breath, cough or chest congestion. Patient reports that she is tolerating a clear liquid diet without difficulty, denies N/V. Patient states that her last bowel movement was this morning. Denies dysuria, increased urgency, increased frequency. Patient reports pain associated with procedure is 3/10 in severity, but tolerable. No further complaints at this time. Subjective: Today patient states that she is doing very well. She denies any shortness of breath or chest pain today. She states that she is having a little bit of neck pain where her surgery was performed however she is tolerating it well. Patient states that she is ready to go home today if possible.       Medications:  Reviewed    Infusion Medications    dextrose       Scheduled Medications    sodium chloride flush  10 mL Intravenous 2 times per day    enoxaparin  40 mg Subcutaneous Daily    folic acid  3 mg Oral BID    hydrALAZINE  50 mg Oral BID    insulin glargine  15 Units Subcutaneous QAM AC    levothyroxine  112 mcg Oral Daily    metoprolol tartrate  50 mg Oral BID    atorvastatin  20 mg Oral Daily    insulin lispro  0-12 Units Subcutaneous TID WC    insulin lispro  0-6 Units Subcutaneous Nightly    calcium replacement protocol   Other RX Placeholder     PRN Meds: sodium chloride flush, oxyCODONE, HYDROmorphone **OR** HYDROmorphone, ondansetron, glucose, dextrose, glucagon (rDNA), dextrose      Intake/Output Summary (Last 24 hours) at 10/2/2020 0936  Last data filed at 10/2/2020 0804  Gross per 24 hour   Intake 2074 ml   Output 2370 ml   Net -296 ml       Diet:  DIET GENERAL; Carb Control: 4 carb choices (60 gms)/meal    Exam:  BP (!) 148/63   Pulse 76   Temp 99 °F (37.2 °C) (Oral)   Resp 18   Ht 5' 5\" (1.651 m)   Wt 192 lb (87.1 kg)   SpO2 97%   BMI 31.95 kg/m²     General appearance: No apparent distress, appears stated age and cooperative. HEENT: Pupils equal, round, and reactive to light. Conjunctivae/corneas clear. Neck: Supple, with full range of motion. No jugular venous distention. Trachea midline. Midline transverse incision noted on the anterior portion of the patient's neck with associated drain placement. Minimal erythema. No purulent discharge. Respiratory:  Normal respiratory effort. Clear to auscultation, bilaterally without Rales/Wheezes/Rhonchi. Cardiovascular: Regular rate and rhythm with normal S1/S2 without murmurs, rubs or gallops. Abdomen: Soft, non-tender, non-distended with normal bowel sounds. Musculoskeletal: passive and active ROM x 4 extremities. Skin: Skin color, texture, turgor normal.  No rashes or lesions. Neurologic:  Neurovascularly intact without any focal sensory/motor deficits. Cranial nerves: II-XII intact, grossly non-focal.  Psychiatric: Alert and oriented, thought content appropriate, normal insight  Capillary Refill: Brisk,< 3 seconds   Peripheral Pulses: +2 palpable, equal bilaterally       Labs:   Recent Labs     10/01/20  1701 10/02/20  0558   WBC 7.5 6.7   HGB 13.8 11.8*   HCT 43.5 37.0    216     Recent Labs     10/01/20  1116 10/01/20  1701 10/02/20  0558   NA  --  136 135   K 5.2 4.9 4.7   CL  --  98 99   CO2  --  26 24   BUN  --  16 18   CREATININE  --  0.6 0.8   CALCIUM  --  10.6* 9.6     No results for input(s): AST, ALT, BILIDIR, BILITOT, ALKPHOS in the last 72 hours. No results for input(s): INR in the last 72 hours.   No results for input(s): Malvin Arciniega in the last 72 hours. Urinalysis:      Lab Results   Component Value Date    NITRU NEGATIVE 02/27/2012    SPECGRAV 1.017 02/27/2012       Radiology:  XR CHEST PORTABLE   Final Result   Mild linear atelectasis within the left lower lung.       This document has been electronically signed by: Rigo Gamez MD on    10/01/2020 10:29 PM             Diet: DIET GENERAL; Carb Control: 4 carb choices (60 gms)/meal    DVT prophylaxis: [] Lovenox                                 [] SCDs                                 [] SQ Heparin                                 [] Encourage ambulation           [] Already on Anticoagulation     Disposition:    [x] Home       [] TCU       [] Rehab       [] Psych       [] SNF       [] [place]       [] Other-    Code Status: Full Code    PT/OT Eval Status:      Electronically signed by Lizet Sanchez DO on 10/2/2020 at 9:36 AM

## 2020-10-03 PROBLEM — Z79.4 TYPE 2 DIABETES MELLITUS WITH INSULIN THERAPY (HCC): Status: ACTIVE | Noted: 2018-05-17

## 2020-10-03 PROBLEM — E03.9 HYPOTHYROIDISM: Status: ACTIVE | Noted: 2018-05-17

## 2020-10-05 ENCOUNTER — TELEPHONE (OUTPATIENT)
Dept: FAMILY MEDICINE CLINIC | Age: 75
End: 2020-10-05

## 2020-10-05 NOTE — TELEPHONE ENCOUNTER
[facility] Transitions Initial Follow Up Call    Outreach made within 2 business days of discharge: Yes    Patient: Janak Ratliff Patient : 1945   MRN: [id number]  Reason for Admission: There are no discharge diagnoses documented for the most recent discharge. Discharge Date: 10/2/20       Spoke with: [facility]    Discharge department/facility: [facility]    TCM Interactive Patient Contact:  Was patient able to fill all prescriptions: Yes  Was patient instructed to bring all medications to the follow-up visit: Yes  Is patient taking all medications as directed in the discharge summary?  Yes  Does patient understand their discharge instructions: Yes  Does patient have questions or concerns that need addressed prior to 7-14 day follow up office visit: no    Scheduled appointment with PCP within 7-14 days    Follow Up  Future Appointments   Date Time Provider Zach Pérez   10/6/2020  9:30 AM [facility] White [facility] [place]   10/21/2020 10:30 AM Katie Cordero MD  [facility] [place]   2021 10:30 AM Kenya White MD [facility] [place]   2021  9:40 AM DO GENESIS Carroll [facility] [place]   3/22/2021  8:15 AM Faustina Aguirre MD [facility] [place]   2021 10:45 AM Marcia Roldan, [address]  [address], CMA ([address] [place] [address])

## 2020-10-06 ENCOUNTER — NURSE ONLY (OUTPATIENT)
Dept: LAB | Age: 75
End: 2020-10-06

## 2020-10-06 ENCOUNTER — OFFICE VISIT (OUTPATIENT)
Dept: ENT CLINIC | Age: 75
End: 2020-10-06

## 2020-10-06 VITALS
TEMPERATURE: 97.5 F | BODY MASS INDEX: 32.78 KG/M2 | SYSTOLIC BLOOD PRESSURE: 122 MMHG | RESPIRATION RATE: 14 BRPM | HEART RATE: 70 BPM | WEIGHT: 197 LBS | DIASTOLIC BLOOD PRESSURE: 78 MMHG

## 2020-10-06 LAB
CALCIUM SERPL-MCNC: 9.8 MG/DL (ref 8.5–10.5)
PTH INTACT: 19.5 PG/ML (ref 15–65)

## 2020-10-06 PROCEDURE — 99024 POSTOP FOLLOW-UP VISIT: CPT | Performed by: OTOLARYNGOLOGY

## 2020-10-06 NOTE — PROGRESS NOTES
[facility]  [address] [place], [place] [address]  Dept: [phone]  Dept Fax: [phone]  Loc: [phone]    Bobby Gamez is a 70 y.o. female who was referred by No ref. provider found for:  Chief Complaint   Patient presents with    Post-Op Check     Patient is here for parathyroidectomy post-op check 10/1/20       HPI:     Bobby Gamez is a 70 y.o. female is status post a parathyroid adenoma resection approximately week ago. She has no complaints and reports minimal discharge in her wound drain which she has been emptying according to instructions. She is looking forward to its removal.  She describes no changes in her voice from her preop voice abilities. History:     No Known Allergies  Current Outpatient Medications   Medication Sig Dispense Refill    aspirin 81 MG EC tablet Take 81 mg by mouth daily      Multiple Vitamins-Minerals (THERAPEUTIC MULTIVITAMIN-MINERALS) tablet Take 1 tablet by mouth daily      Cholecalciferol (VITAMIN D3 PO) Take 1 tablet by mouth daily      hydrALAZINE (APRESOLINE) 50 MG tablet Take 1 tablet by mouth 2 times daily 180 tablet 3    losartan (COZAAR) 100 MG tablet Take 1 tablet by mouth daily 90 tablet 3    KLOR-CON M10 10 MEQ extended release tablet Take 1 tablet by mouth daily 90 tablet 3    levothyroxine (SYNTHROID) 112 MCG tablet Take 1 tablet by mouth Daily 90 tablet 1    simvastatin (ZOCOR) 40 MG tablet TAKE 1 TABLET NIGHTLY 90 tablet 3    insulin glargine (BASAGLAR KWIKPEN) 100 UNIT/ML injection pen Inject 15 Units into the skin every morning (before breakfast) 5 pen 2    apixaban (ELIQUIS) 5 MG TABS tablet Take 1 tablet by mouth 2 times daily 60 tablet 0    metoprolol tartrate (LOPRESSOR) 50 MG tablet Take 1 tablet by mouth 2 times daily 60 tablet 5    betamethasone valerate (VALISONE) 0.1 % cream Apply topically 2 times daily PRN.  1 Tube 1    glimepiride (AMARYL) 4 MG use: No        Subjective:      Review of Systems  Rest of review of systems are negative, except as noted in HPI. Objective:     /78 (Site: Left Upper Arm, Position: Sitting)   Pulse 70   Temp 97.5 °F (36.4 °C) (Infrared)   Resp 14   Wt 197 lb (89.4 kg)   BMI 32.78 kg/m²     Physical Exam       The patient's neck wound was intact and dry with the wound glue still fully covering the the incised edges. She had an appropriate fullness above the wound that did not appear to contain fluid. Her drain was in place with no meaningful discharge or signs of cellulitis about the base. After cutting the suture I released the drain and removed it along with a small amount of clot within the slots of the drain. I applied a Band-Aid with antibiotic ointment on it after the removal of the drain. The patient tolerated the procedure well. Vitals reviewed. Xr Chest Portable    Result Date: 10/1/2020  Mild linear atelectasis within the left lower lung.  This document has been electronically signed by: Codie Gao MD on 10/01/2020 10:29 PM      Lab Results   Component Value Date     10/02/2020     10/01/2020     09/25/2020    K 4.7 10/02/2020    K 4.9 10/01/2020    K 5.2 10/01/2020    K 3.9 09/20/2018    K 4.2 09/18/2018    CL 99 10/02/2020    CL 98 10/01/2020    CL 98 09/25/2020    CO2 24 10/02/2020    CO2 26 10/01/2020    CO2 25 09/25/2020    BUN 18 10/02/2020    BUN 16 10/01/2020    BUN 16 09/25/2020    CREATININE 0.8 10/02/2020    CREATININE 0.6 10/01/2020    CREATININE 0.7 09/25/2020    CALCIUM 9.6 10/02/2020    CALCIUM 10.6 10/01/2020    CALCIUM 11.1 09/25/2020    PROT 7.4 09/25/2020    PROT 7.3 06/15/2020    PROT 7.8 06/20/2019    LABALBU 4.4 09/25/2020    LABALBU 4.7 06/15/2020    LABALBU 4.7 06/20/2019    LABALBU 4.4 02/27/2012    BILITOT 0.6 09/25/2020    BILITOT 0.6 06/15/2020    BILITOT 0.6 06/20/2019    ALKPHOS 49 09/25/2020    ALKPHOS 52 06/15/2020    ALKPHOS 53 06/20/2019

## 2020-10-21 ENCOUNTER — OFFICE VISIT (OUTPATIENT)
Dept: CARDIOLOGY CLINIC | Age: 75
End: 2020-10-21
Payer: MEDICARE

## 2020-10-21 VITALS
HEIGHT: 65 IN | DIASTOLIC BLOOD PRESSURE: 86 MMHG | HEART RATE: 79 BPM | BODY MASS INDEX: 31.79 KG/M2 | SYSTOLIC BLOOD PRESSURE: 148 MMHG | WEIGHT: 190.8 LBS

## 2020-10-21 PROCEDURE — G8484 FLU IMMUNIZE NO ADMIN: HCPCS | Performed by: INTERNAL MEDICINE

## 2020-10-21 PROCEDURE — 1090F PRES/ABSN URINE INCON ASSESS: CPT | Performed by: INTERNAL MEDICINE

## 2020-10-21 PROCEDURE — 1123F ACP DISCUSS/DSCN MKR DOCD: CPT | Performed by: INTERNAL MEDICINE

## 2020-10-21 PROCEDURE — 4040F PNEUMOC VAC/ADMIN/RCVD: CPT | Performed by: INTERNAL MEDICINE

## 2020-10-21 PROCEDURE — 93000 ELECTROCARDIOGRAM COMPLETE: CPT | Performed by: INTERNAL MEDICINE

## 2020-10-21 PROCEDURE — 3017F COLORECTAL CA SCREEN DOC REV: CPT | Performed by: INTERNAL MEDICINE

## 2020-10-21 PROCEDURE — G8400 PT W/DXA NO RESULTS DOC: HCPCS | Performed by: INTERNAL MEDICINE

## 2020-10-21 PROCEDURE — G8427 DOCREV CUR MEDS BY ELIG CLIN: HCPCS | Performed by: INTERNAL MEDICINE

## 2020-10-21 PROCEDURE — G8417 CALC BMI ABV UP PARAM F/U: HCPCS | Performed by: INTERNAL MEDICINE

## 2020-10-21 PROCEDURE — 99204 OFFICE O/P NEW MOD 45 MIN: CPT | Performed by: INTERNAL MEDICINE

## 2020-10-21 PROCEDURE — 1036F TOBACCO NON-USER: CPT | Performed by: INTERNAL MEDICINE

## 2020-10-21 RX ORDER — FLECAINIDE ACETATE 50 MG/1
50 TABLET ORAL 2 TIMES DAILY
Qty: 120 TABLET | Refills: 3 | Status: SHIPPED | OUTPATIENT
Start: 2020-10-21 | End: 2021-08-13

## 2020-10-21 NOTE — PROGRESS NOTES
she was all day atrial fibrillation a week ago. Denies chest pain, orthopnea, proximal polyclinic, syncope or lower extremity swelling. REVIEW OF SYSTEMS:    Constitutional: Negative for chills and fever  HENT: Negative for congestion, sinus pressure, sneezing and sore throat. Eyes: Negative for pain, discharge, redness and itching. Respiratory: Negative for apnea, cough  Gastrointestinal: Negative for blood in stool, constipation, diarrhea   Endocrine: Negative for cold intolerance, heat intolerance, polydipsia. Genitourinary: Negative for dysuria, enuresis, flank pain and hematuria. Musculoskeletal: Negative for arthralgias, joint swelling and neck pain. Neurological: Negative for numbness and headaches. Psychiatric/Behavioral: Negative for agitation, confusion, decreased concentration and dysphoric mood. PAST MEDICAL HISTORY:  1.  Proximal atrial fibrillation and atrial flutter diagnosed in 2017, recurrences in April 2020. 2.  Hypertension. 3.  Diabetes mellitus, type II.  4.  History of DVT status post IVC filter placement. 5.  History of cerebrovascular accident, no neurological deficit. 6.  Homozygous for the MTHFR gene mutation. 7.  History of non-Hodgkin's lymph, in remission. 8.  Sleep apnea on CPAP. 9.  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     Past Medical History:   Diagnosis Date    Arthritis     Atrial fibrillation ([address]     Cancer ([address] 2000    NON HODGKINS LYMPHOMA    Diabetes mellitus ([address]     Deepika filter in place     Homozygous for MTHFR gene mutation ([address]     Hx of blood clots 2000    LEGS    Hyperlipidemia     Hypertension     TRACY on CPAP     Prolonged emergence from general anesthesia     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    Thyroid disease        PSH:  Past Surgical History:   Procedure Laterality Date    APPENDECTOMY      CARDIAC CATHETERIZATION      CHOLECYSTECTOMY      FRACTURE SURGERY      ankle surgery    FRACTURE SURGERY Right 09/2018    HUMERUS    HERNIA REPAIR      HYSTERECTOMY      PARATHYROID GLAND SURGERY N/A 10/1/2020    PARATHYROIDECTOMY, EXPLORATION OF PARATHYROIDS performed by Mary Garduno MD at [facility] OFFICE/OUTPT [address] Right 9/19/2018    ORIF RIGHT PROXIMAL HUMERUS FX performed by Rob Sales MD at [address] Right 6/27/2019    REVISION RT HUMERUS FX, HARDWARE REMOVAL, HUMERAL NAIL INSERTION performed by Bethel Jack MD [address]:  Family History   Problem Relation Age of Onset    Heart Disease Mother     Heart Disease Father         SOCIAL HISTORY:  The patient is  and currently lives with her . She is active and independent. Denies smoking or alcohol use.   Social History     Socioeconomic History    Marital status:      Spouse name: Not on file    Number of children: Not on file    Years of education: Not on file    Highest education level: Not on file   Occupational History    Not on file   Social Needs    Financial resource strain: Not on file    Food insecurity     Worry: Not on file     Inability: Not on file    Transportation needs     Medical: Not on file     Non-medical: Not on file   Tobacco Use    Smoking status: Never Smoker    Smokeless tobacco: Never Used   Substance and Sexual Activity    Alcohol use: No    Drug use: No    Sexual activity: Not on file   Lifestyle    Physical activity     Days per week: Not on file     Minutes per session: Not on file    Stress: Not on file   Relationships    Social connections     Talks on phone: Not on file     Gets together: Not on file     Attends Scientologist service: Not on file     Active member of club or organization: Not on file     Attends meetings of clubs or organizations: Not on file     Relationship status: Not on file    Intimate partner violence     Fear of current or ex partner: Not on file     Emotionally abused: Not on file     Physically abused: Not on file     Forced sexual activity: Not on file   Other Topics Concern    Not on file   Social History Narrative    Not on file        ALLERGY HISTORY:  No Known Allergies     MEDICATIONS:  Current Outpatient Medications   Medication Sig Dispense Refill    aspirin 81 MG EC tablet Take 81 mg by mouth daily      Multiple Vitamins-Minerals (THERAPEUTIC MULTIVITAMIN-MINERALS) tablet Take 1 tablet by mouth daily      Cholecalciferol (VITAMIN D3 PO) Take 1 tablet by mouth daily      hydrALAZINE (APRESOLINE) 50 MG tablet Take 1 tablet by mouth 2 times daily 180 tablet 3    losartan (COZAAR) 100 MG tablet Take 1 tablet by mouth daily 90 tablet 3    KLOR-CON M10 10 MEQ extended release tablet Take 1 tablet by mouth daily 90 tablet 3    levothyroxine (SYNTHROID) 112 MCG tablet Take 1 tablet by mouth Daily 90 tablet 1    simvastatin (ZOCOR) 40 MG tablet TAKE 1 TABLET NIGHTLY 90 tablet 3    insulin glargine (BASAGLAR KWIKPEN) 100 UNIT/ML injection pen Inject 15 Units into the skin every morning (before breakfast) 5 pen 2    apixaban (ELIQUIS) 5 MG TABS tablet Take 1 tablet by mouth 2 times daily 60 tablet 0    metoprolol tartrate (LOPRESSOR) 50 MG tablet Take 1 tablet by mouth 2 times daily 60 tablet 5    betamethasone valerate (VALISONE) 0.1 % cream Apply topically 2 times daily PRN. 1 Tube 1    glimepiride (AMARYL) 4 MG tablet Take 1 tablet by mouth 2 times daily 180 tablet 3    metFORMIN (GLUCOPHAGE-XR) 500 MG extended release tablet Take 2 tablets by mouth 2 times daily 360 tablet 3    mupirocin (BACTROBAN) 2 % ointment Apply 2 times daily PRN.  1 Tube 1    vitamin B-12 (CYANOCOBALAMIN) 1000 MCG tablet Take 1,000 mcg by mouth daily      folic acid (FOLVITE) 1 MG tablet Take 3 mg by mouth 2 times daily       Multiple Vitamins-Minerals (ICAPS AREDS 2) CAPS Take by mouth 2 times daily       No current facility-administered medications for for the MTHFR gene mutation. 6. History of cerebrovascular accident, no neurological deficit    The patient is markedly symptomatic from atrial fibrillation/atrial flutter. I discussed the management options with the patient including initiation of antiarrhythmic therapy versus catheter ablation. The patient wishes to try antiarrhythmic therapy at first.  She does not want to be on amiodarone because of side effects related to rates use. We will start her on flecainide 50 mg twice daily and escalate the dose per patient's response. If she fails flecainide therapy will consider ablation for both atrial fibrillation and atrial flutter. She will continue anticoagulation with apixaban for stroke prevention. PLAN:  Start flecainide 50 mg twice daily.   Follow-up in 3 months      Electronically signed by Erika Sherwood MD on 10/21/2020 at 10:21 AM

## 2020-11-23 RX ORDER — METOPROLOL TARTRATE 50 MG/1
50 TABLET, FILM COATED ORAL 2 TIMES DAILY
Qty: 180 TABLET | Refills: 3 | Status: SHIPPED | OUTPATIENT
Start: 2020-11-23 | End: 2021-03-22 | Stop reason: DRUGHIGH

## 2020-11-23 NOTE — TELEPHONE ENCOUNTER
Froilan Barahona \"Kristal Enamorado\"  Kristina Lara, DO 2 days ago          I need a new script for METOPROLOL TARTRATE TAB 50mg, 1 tablet twice a day. I order them from [facility] mail order. Could I get a 90 day supply this time please?   Thank you,  Ruth Pierre

## 2020-12-22 NOTE — PROGRESS NOTES
ADMITTED TO [facility] AND ORIENTED TO UNIT. SCDS ON. FALL  BANDS ON. PT VERBALIZED APPROVAL FOR FIRST NAME, LAST INITIAL AND PHYSICIAN NAME ON UNIT WHITEBOARD. No

## 2020-12-28 RX ORDER — LEVOTHYROXINE SODIUM 112 UG/1
112 TABLET ORAL DAILY
Qty: 90 TABLET | Refills: 3 | Status: SHIPPED | OUTPATIENT
Start: 2020-12-28 | End: 2021-12-10 | Stop reason: SDUPTHER

## 2020-12-28 RX ORDER — METFORMIN HYDROCHLORIDE 500 MG/1
1000 TABLET, EXTENDED RELEASE ORAL 2 TIMES DAILY
Qty: 360 TABLET | Refills: 3 | Status: SHIPPED | OUTPATIENT
Start: 2020-12-28 | End: 2021-12-10 | Stop reason: SDUPTHER

## 2021-01-13 ENCOUNTER — OFFICE VISIT (OUTPATIENT)
Dept: ENT CLINIC | Age: 76
End: 2021-01-13
Payer: MEDICARE

## 2021-01-13 VITALS
BODY MASS INDEX: 32.45 KG/M2 | TEMPERATURE: 96.6 F | DIASTOLIC BLOOD PRESSURE: 88 MMHG | WEIGHT: 195 LBS | SYSTOLIC BLOOD PRESSURE: 130 MMHG | RESPIRATION RATE: 12 BRPM | HEART RATE: 68 BPM

## 2021-01-13 DIAGNOSIS — E89.2 STATUS POST PARATHYROIDECTOMY (HCC): ICD-10-CM

## 2021-01-13 DIAGNOSIS — M80.829S: ICD-10-CM

## 2021-01-13 DIAGNOSIS — M81.8 OTHER OSTEOPOROSIS WITHOUT CURRENT PATHOLOGICAL FRACTURE: Primary | ICD-10-CM

## 2021-01-13 DIAGNOSIS — D35.1 PARATHYROID ADENOMA: ICD-10-CM

## 2021-01-13 PROBLEM — Z98.890 STATUS POST PARATHYROIDECTOMY: Status: ACTIVE | Noted: 2021-01-13

## 2021-01-13 PROBLEM — M80.029A: Status: ACTIVE | Noted: 2021-01-13

## 2021-01-13 PROBLEM — Z90.89 STATUS POST PARATHYROIDECTOMY: Status: ACTIVE | Noted: 2021-01-13

## 2021-01-13 PROCEDURE — 1090F PRES/ABSN URINE INCON ASSESS: CPT | Performed by: OTOLARYNGOLOGY

## 2021-01-13 PROCEDURE — 4040F PNEUMOC VAC/ADMIN/RCVD: CPT | Performed by: OTOLARYNGOLOGY

## 2021-01-13 PROCEDURE — G8427 DOCREV CUR MEDS BY ELIG CLIN: HCPCS | Performed by: OTOLARYNGOLOGY

## 2021-01-13 PROCEDURE — 3017F COLORECTAL CA SCREEN DOC REV: CPT | Performed by: OTOLARYNGOLOGY

## 2021-01-13 PROCEDURE — 1036F TOBACCO NON-USER: CPT | Performed by: OTOLARYNGOLOGY

## 2021-01-13 PROCEDURE — 1123F ACP DISCUSS/DSCN MKR DOCD: CPT | Performed by: OTOLARYNGOLOGY

## 2021-01-13 PROCEDURE — G8484 FLU IMMUNIZE NO ADMIN: HCPCS | Performed by: OTOLARYNGOLOGY

## 2021-01-13 PROCEDURE — 99214 OFFICE O/P EST MOD 30 MIN: CPT | Performed by: OTOLARYNGOLOGY

## 2021-01-13 PROCEDURE — G8417 CALC BMI ABV UP PARAM F/U: HCPCS | Performed by: OTOLARYNGOLOGY

## 2021-01-13 PROCEDURE — G8400 PT W/DXA NO RESULTS DOC: HCPCS | Performed by: OTOLARYNGOLOGY

## 2021-01-13 RX ORDER — B-COMPLEX WITH VITAMIN C
1 TABLET ORAL 2 TIMES DAILY
Qty: 180 TABLET | Refills: 5 | Status: SHIPPED | OUTPATIENT
Start: 2021-01-13 | End: 2022-09-21

## 2021-01-13 RX ORDER — MULTIVITAMIN WITH IRON
1 TABLET ORAL 2 TIMES DAILY
Qty: 180 CAPSULE | Refills: 5 | Status: ON HOLD | OUTPATIENT
Start: 2021-01-13 | End: 2021-02-11 | Stop reason: ALTCHOICE

## 2021-01-13 RX ORDER — MELATONIN
500 DAILY
Qty: 90 TABLET | Refills: 5 | Status: SHIPPED | OUTPATIENT
Start: 2021-01-13

## 2021-01-13 RX ORDER — ALENDRONATE SODIUM 70 MG/1
70 TABLET ORAL
Qty: 12 TABLET | Refills: 1 | Status: SHIPPED | OUTPATIENT
Start: 2021-01-13 | End: 2021-07-08 | Stop reason: SDUPTHER

## 2021-01-13 NOTE — PROGRESS NOTES
[facility] PHYSICIANS [place] [facility]  [facility] EAR, NOSE AND THROAT  [facility] [place]  Dept: [phone]  Dept Fax: [phone]  Loc: Dominique Merritt is a 70 y.o. female who was referred by No ref. provider found for:  Chief Complaint   Patient presents with    Follow-up     pt here for 6 month f/u        HPI:     Dalila Russ is a 70 y.o. female with severe osteoporosis and primary hyperparathyroidism status post parathyroid exploration with removal of parathyroid adenoma. She reports doing well and being very pleased with the appearance of her scar. She reports no tendency towards muscular cramping. She is on no additional calcium. She was on Fosamax for her osteoporosis but was taken off it several years ago. That was after and before some of her pathological fractures.     History:     No Known Allergies  Current Outpatient Medications   Medication Sig Dispense Refill    alendronate (FOSAMAX) 70 MG tablet Take 1 tablet by mouth every 7 days 12 tablet 1    Vitamins A & D (VITAMIN A & D) 8000-400 units CAPS Take 1 capsule by mouth 2 times daily 180 capsule 5    Calcium Carbonate-Vitamin D (OYSTER SHELL CALCIUM/D) 500-200 MG-UNIT TABS Take 1 tablet by mouth 2 times daily 180 tablet 5    metFORMIN (GLUCOPHAGE-XR) 500 MG extended release tablet Take 2 tablets by mouth 2 times daily 360 tablet 3    levothyroxine (SYNTHROID) 112 MCG tablet Take 1 tablet by mouth Daily 90 tablet 3    metoprolol tartrate (LOPRESSOR) 50 MG tablet Take 1 tablet by mouth 2 times daily 180 tablet 3    apixaban (ELIQUIS) 5 MG TABS tablet Take 1 tablet by mouth 2 times daily 180 tablet 3    flecainide (TAMBOCOR) 50 MG tablet Take 1 tablet by mouth 2 times daily 120 tablet 3    aspirin 81 MG EC tablet Take 81 mg by mouth daily      Multiple Vitamins-Minerals (THERAPEUTIC MULTIVITAMIN-MINERALS) tablet Take 1 tablet by mouth daily      Cholecalciferol (VITAMIN D3 PO) Take 1 tablet by mouth daily      hydrALAZINE (APRESOLINE) 50 MG tablet Take 1 tablet by mouth 2 times daily 180 tablet 3    losartan (COZAAR) 100 MG tablet Take 1 tablet by mouth daily 90 tablet 3    KLOR-CON M10 10 MEQ extended release tablet Take 1 tablet by mouth daily 90 tablet 3    simvastatin (ZOCOR) 40 MG tablet TAKE 1 TABLET NIGHTLY 90 tablet 3    insulin glargine (BASAGLAR KWIKPEN) 100 UNIT/ML injection pen Inject 15 Units into the skin every morning (before breakfast) 5 pen 2    glimepiride (AMARYL) 4 MG tablet Take 1 tablet by mouth 2 times daily 180 tablet 3    mupirocin (BACTROBAN) 2 % ointment Apply 2 times daily PRN. 1 Tube 1    vitamin B-12 (CYANOCOBALAMIN) 1000 MCG tablet Take 1,000 mcg by mouth daily      folic acid (FOLVITE) 1 MG tablet Take 3 mg by mouth 2 times daily       Multiple Vitamins-Minerals (ICAPS AREDS 2) CAPS Take by mouth 2 times daily       No current facility-administered medications for this visit.       Past Medical History:   Diagnosis Date    Arthritis     Atrial fibrillation [facility]     Cancer ([facility] 2000    NON HODGKINS LYMPHOMA    Diabetes mellitus ([facility]     [place] filter in place     Homozygous for MTHFR gene mutation ([facility]     Hx of blood clots 2000    LEGS    Hyperlipidemia     Hypertension     TRACY on CPAP     Prolonged emergence from general anesthesia     Stroke (cerebrum) ([facility] [facility]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    Thyroid disease       Past Surgical History:   Procedure Laterality Date    APPENDECTOMY      CARDIAC CATHETERIZATION      CHOLECYSTECTOMY      FRACTURE SURGERY      ankle surgery    FRACTURE SURGERY Right 09/2018    HUMERUS    HERNIA REPAIR      HYSTERECTOMY      PARATHYROID GLAND SURGERY N/A 10/1/2020    PARATHYROIDECTOMY, EXPLORATION OF PARATHYROIDS performed by Alexander Tsai MD at [address] [facility] OFFICE/OUTPT Julianna Callahan Right 9/19/2018    ORIF RIGHT PROXIMAL HUMERUS FX performed by Olivia Wagner MD at [address] REVISION OF TOTAL SHOULDER Right 6/27/2019    REVISION RT HUMERUS FX, HARDWARE REMOVAL, HUMERAL NAIL INSERTION performed by Chryl Lombard, MD at [facility]       Family History   Problem Relation Age of Onset    Heart Disease Mother     Heart Disease Father      Social History     Tobacco Use    Smoking status: Never Smoker    Smokeless tobacco: Never Used   Substance Use Topics    Alcohol use: No        Subjective:      Review of Systems  Rest of review of systems are negative, except as noted in HPI. Objective:     /88 (Site: Right Upper Arm, Position: Sitting)   Pulse 68   Temp 96.6 °F (35.9 °C) (Infrared)   Resp 12   Wt 195 lb (88.5 kg)   BMI 32.45 kg/m²     Physical Exam       The patient's neck scar was difficult initially to see. It was at or just below her neckline of her clothing. It was well-healed. I palpated her neck and felt no adenopathy thyromegaly or other masses. Vitals reviewed. No results found.    Lab Results   Component Value Date     10/02/2020     10/01/2020     09/25/2020    K 4.7 10/02/2020    K 4.9 10/01/2020    K 5.2 10/01/2020    K 3.9 09/20/2018    K 4.2 09/18/2018    CL 99 10/02/2020    CL 98 10/01/2020    CL 98 09/25/2020    CO2 24 10/02/2020    CO2 26 10/01/2020    CO2 25 09/25/2020    BUN 18 10/02/2020    BUN 16 10/01/2020    BUN 16 09/25/2020    CREATININE 0.8 10/02/2020    CREATININE 0.6 10/01/2020    CREATININE 0.7 09/25/2020    CALCIUM 9.8 10/06/2020    CALCIUM 9.6 10/02/2020    CALCIUM 10.6 10/01/2020    PROT 7.4 09/25/2020    PROT 7.3 06/15/2020    PROT 7.8 06/20/2019    LABALBU 4.4 09/25/2020    LABALBU 4.7 06/15/2020    LABALBU 4.7 06/20/2019    LABALBU 4.4 02/27/2012    BILITOT 0.6 09/25/2020    BILITOT 0.6 06/15/2020    BILITOT 0.6 06/20/2019    ALKPHOS 49 09/25/2020    ALKPHOS 52 06/15/2020    ALKPHOS 53 06/20/2019    AST 19 09/25/2020    AST 33 06/15/2020    AST 32 06/20/2019    ALT 21 09/25/2020    ALT 35 reported being very pleased with the outcome of her care thus far and being willing to proceed as such. I spent over 30 minutes of face-to-face time with the patient more than half of which was dedicated to the planning of her follow-up diagnostic care and osteoporosis treatment. No follow-ups on file. **This report has been created using voice recognition software. It may contain minor errors which are inherent in voice recognition technology. **

## 2021-01-18 NOTE — PROGRESS NOTES
[facility] ()  [address] [address]  Dept: [phone]         CARDIAC ELECTROPHYSIOLOGY: FOLLOW UP NOTE  PATIENT DEMOGRAPHICS:  Date:   1/20/2021  Patient name:              Vern You  YOB: 1945  Sex: female   MRN:   [id number]    PRIMARY CARE PHYSICIAN:   Jo Ann Moy DO    CARDIOLOGIST:  Dory Braga MD    REASON FOR FOLLOW UP:  Paroxysmal atrial fibrillation and atrial flutter (unknown morphology). HISTORY OF PRESENT ILLNESS:  Ms Sandro Holguin is a 70years old lady with history of proximal atrial fibrillation/atrial flutter, diagnosed in 2017. She remained well after initial presentation and her antiarrhythmic therapy (amiodarone) and apixaban were discontinued. She had symptomatic recurrences of atrial fibrillation following her parathyroidectomy in 10/20/2020 and spontaneously converted to sinus rhythm. I saw her on 10/21/2020 for evaluation of catheter ablation. At that time patient elected to try medical therapy first and was started on flecainide. She is here for follow-up visit. The patient continues to have intermittent palpitations exertional shortness of breath. She told me that she was back in atrial fibrillation after 2 days I saw her last time in 10/20/2020. Her heart rate varies between 90 beats 120 bpm.  She denies orthopnea, syncope or lower extremity swelling. REVIEW OF SYSTEMS:    Constitutional: Negative for chills and fever  HENT: Negative for congestion, sinus pressure, sneezing and sore throat. Eyes: Negative for pain, discharge, redness and itching. Respiratory: Negative for apnea, cough  Gastrointestinal: Negative for blood in stool, constipation, diarrhea   Endocrine: Negative for cold intolerance, heat intolerance, polydipsia. Genitourinary: Negative for dysuria, enuresis, flank pain and hematuria. Musculoskeletal: Negative for arthralgias, joint swelling and neck pain.    Neurological: is  and currently lives with her . She is active and independent.   Denies smoking or alcohol use  Social History     Socioeconomic History    Marital status:      Spouse name: Not on file    Number of children: Not on file    Years of education: Not on file    Highest education level: Not on file   Occupational History    Not on file   Social Needs    Financial resource strain: Not on file    Food insecurity     Worry: Not on file     Inability: Not on file    Transportation needs     Medical: Not on file     Non-medical: Not on file   Tobacco Use    Smoking status: Never Smoker    Smokeless tobacco: Never Used   Substance and Sexual Activity    Alcohol use: No    Drug use: No    Sexual activity: Not on file   Lifestyle    Physical activity     Days per week: Not on file     Minutes per session: Not on file    Stress: Not on file   Relationships    Social connections     Talks on phone: Not on file     Gets together: Not on file     Attends Congregational service: Not on file     Active member of club or organization: Not on file     Attends meetings of clubs or organizations: Not on file     Relationship status: Not on file    Intimate partner violence     Fear of current or ex partner: Not on file     Emotionally abused: Not on file     Physically abused: Not on file     Forced sexual activity: Not on file   Other Topics Concern    Not on file   Social History Narrative    Not on file        ALLERGY HISTORY:  No Known Allergies     MEDICATIONS:  Current Outpatient Medications   Medication Sig Dispense Refill    alendronate (FOSAMAX) 70 MG tablet Take 1 tablet by mouth every 7 days 12 tablet 1    Vitamins A & D (VITAMIN A & D) 8000-400 units CAPS Take 1 capsule by mouth 2 times daily 180 capsule 5    Calcium Carbonate-Vitamin D (OYSTER SHELL CALCIUM/D) 500-200 MG-UNIT TABS Take 1 tablet by mouth 2 times daily 180 tablet 5    vitamin D3 (CHOLECALCIFEROL) 25 MCG (1000 UT) TABS tablet Take 0.5 tablets by mouth daily 90 tablet 5    metFORMIN (GLUCOPHAGE-XR) 500 MG extended release tablet Take 2 tablets by mouth 2 times daily 360 tablet 3    levothyroxine (SYNTHROID) 112 MCG tablet Take 1 tablet by mouth Daily 90 tablet 3    metoprolol tartrate (LOPRESSOR) 50 MG tablet Take 1 tablet by mouth 2 times daily 180 tablet 3    apixaban (ELIQUIS) 5 MG TABS tablet Take 1 tablet by mouth 2 times daily 180 tablet 3    flecainide (TAMBOCOR) 50 MG tablet Take 1 tablet by mouth 2 times daily 120 tablet 3    aspirin 81 MG EC tablet Take 81 mg by mouth daily      Multiple Vitamins-Minerals (THERAPEUTIC MULTIVITAMIN-MINERALS) tablet Take 1 tablet by mouth daily      hydrALAZINE (APRESOLINE) 50 MG tablet Take 1 tablet by mouth 2 times daily 180 tablet 3    losartan (COZAAR) 100 MG tablet Take 1 tablet by mouth daily 90 tablet 3    KLOR-CON M10 10 MEQ extended release tablet Take 1 tablet by mouth daily 90 tablet 3    simvastatin (ZOCOR) 40 MG tablet TAKE 1 TABLET NIGHTLY 90 tablet 3    insulin glargine (BASAGLAR KWIKPEN) 100 UNIT/ML injection pen Inject 15 Units into the skin every morning (before breakfast) 5 pen 2    glimepiride (AMARYL) 4 MG tablet Take 1 tablet by mouth 2 times daily 180 tablet 3    mupirocin (BACTROBAN) 2 % ointment Apply 2 times daily PRN. 1 Tube 1    vitamin B-12 (CYANOCOBALAMIN) 1000 MCG tablet Take 1,000 mcg by mouth daily      folic acid (FOLVITE) 1 MG tablet Take 3 mg by mouth 2 times daily       Multiple Vitamins-Minerals (ICAPS AREDS 2) CAPS Take by mouth 2 times daily       No current facility-administered medications for this visit. PHYSICAL EXAM:  Vitals:    01/20/21 1103   BP: (!) 142/84   Pulse: 80     Body mass index is 33.15 kg/m². GENERAL: Alert and oriented. No distress. EYES: No conjunctival pallor or scleral icterus. ENT: Moist oral and nasal mucosae. No central cyanosis. VESSELS: No jugular venous distension. Normal carotid pulse.  No carotid bruits. HEART: Normal S1/S2. No murmur, rub or gallop. LUNGS: Clear to auscultation. No wheezes or crackles. ABDOMEN: Soft and non-tender. No organomegaly or shifting dullness. EXTREMITIES: No lower extremity edema, clubbing or cyanosis. 2+ peripheral pulses bilaterally. NEUROLOGICAL: Higher functions and cranial nerves are normal. Normal power both upper and lower extremities. LABORATORY DATA AND DIAGNOSTIC DATA:  I have personally reviewed and interpreted the results of the following diagnostic testing    Lab Results   Component Value Date    WBC 4.5 (L) 11/05/2020    HGB 11.6 (L) 11/05/2020    HCT 37.0 11/05/2020     11/05/2020    CHOL 153 06/15/2020    TRIG 185 06/15/2020    HDL 42 06/15/2020    ALT 21 09/25/2020    AST 19 09/25/2020     10/02/2020    K 4.7 10/02/2020    CL 99 10/02/2020    CREATININE 0.8 10/02/2020    BUN 18 10/02/2020    CO2 24 10/02/2020    TSH 2.680 05/19/2020    INR 1.13 09/19/2018    LABA1C 8.0 (H) 10/01/2020   ECHOCARDIOGRAM (7/20/2020): LVEF 60%, mild mitral regurgitation, mild aortic stenosis, mild concentric left ventricle hypertrophy and mild left atrium. ECG (10/21/2020): Sinus rhythm, rate 94, SD interval 240, QRS duration 86 and QTc interval 455 ms. EVENT MONITOR: (7/1/2020): Episodes of atrial flutter and atrial fibrillation. STRESS TEST (7/1/2020): Lexiscan: Negative for reversible ischemia.        IMPRESSION:  1. Persistent atrial fibrillation and atrial flutter (morphology unknown), [id number] score= 4 (age, gender hypertension diabetes mellitus). 2.  History of DVT status post IVC filter placement. 3.  Hypertension, controlled. 4.  Type 2 diabetes mellitus, no apparent complications. 5.  Homozygous for the MTHFR gene mutation. 6. History of cerebrovascular accident, no neurological deficit      ASSESSMENT AND PLAN:  The patient continues to have symptoms from atrial fibrillation. She has been in atrial fibrillation since 10/2020.  She

## 2021-01-20 ENCOUNTER — TELEPHONE (OUTPATIENT)
Dept: CARDIOLOGY CLINIC | Age: 76
End: 2021-01-20

## 2021-01-20 ENCOUNTER — OFFICE VISIT (OUTPATIENT)
Dept: CARDIOLOGY CLINIC | Age: 76
End: 2021-01-20
Payer: MEDICARE

## 2021-01-20 VITALS
HEIGHT: 65 IN | HEART RATE: 80 BPM | DIASTOLIC BLOOD PRESSURE: 84 MMHG | SYSTOLIC BLOOD PRESSURE: 142 MMHG | WEIGHT: 199.2 LBS | BODY MASS INDEX: 33.19 KG/M2

## 2021-01-20 DIAGNOSIS — Z13.9 SCREENING FOR CONDITION: ICD-10-CM

## 2021-01-20 DIAGNOSIS — R06.02 SOB (SHORTNESS OF BREATH): Primary | ICD-10-CM

## 2021-01-20 PROCEDURE — 4040F PNEUMOC VAC/ADMIN/RCVD: CPT | Performed by: INTERNAL MEDICINE

## 2021-01-20 PROCEDURE — G8427 DOCREV CUR MEDS BY ELIG CLIN: HCPCS | Performed by: INTERNAL MEDICINE

## 2021-01-20 PROCEDURE — 1036F TOBACCO NON-USER: CPT | Performed by: INTERNAL MEDICINE

## 2021-01-20 PROCEDURE — G8484 FLU IMMUNIZE NO ADMIN: HCPCS | Performed by: INTERNAL MEDICINE

## 2021-01-20 PROCEDURE — G8400 PT W/DXA NO RESULTS DOC: HCPCS | Performed by: INTERNAL MEDICINE

## 2021-01-20 PROCEDURE — 93000 ELECTROCARDIOGRAM COMPLETE: CPT | Performed by: INTERNAL MEDICINE

## 2021-01-20 PROCEDURE — 1123F ACP DISCUSS/DSCN MKR DOCD: CPT | Performed by: INTERNAL MEDICINE

## 2021-01-20 PROCEDURE — 1090F PRES/ABSN URINE INCON ASSESS: CPT | Performed by: INTERNAL MEDICINE

## 2021-01-20 PROCEDURE — 3017F COLORECTAL CA SCREEN DOC REV: CPT | Performed by: INTERNAL MEDICINE

## 2021-01-20 PROCEDURE — 99212 OFFICE O/P EST SF 10 MIN: CPT | Performed by: INTERNAL MEDICINE

## 2021-01-20 PROCEDURE — G8417 CALC BMI ABV UP PARAM F/U: HCPCS | Performed by: INTERNAL MEDICINE

## 2021-01-20 NOTE — TELEPHONE ENCOUNTER
Procedure: Afib/ Aflutter Ablation    Date:   Arrival Time:   Meds to Hold: Eliquis the evening prior  Metoprolol 3 days prior  Flecainide 4 days prior      Covid:  testing ordered, to be done: 02.04.2021  1 wk incision groin check - afib/ aflutter ablation -  Nurse check- 2.18.2021 at 115pm  1 mo f/u with Hakim- 03.10.2021 at 945am    Instructions verbalized to the patient. Instructions mailed to the patient.

## 2021-01-25 ENCOUNTER — TELEPHONE (OUTPATIENT)
Dept: CARDIOLOGY CLINIC | Age: 76
End: 2021-01-25

## 2021-01-25 NOTE — TELEPHONE ENCOUNTER
Annie Alegre \"Kristal Enamorado\"  Vishal Montero MD 1 hour ago (7:04 AM)        Is it [facility] for me to get the COVID vaccine before my heart ablation? I have an appointment to get the vaccine this Thursday. Thank you.

## 2021-01-26 ENCOUNTER — HOSPITAL ENCOUNTER (OUTPATIENT)
Dept: WOMENS IMAGING | Age: 76
Discharge: HOME | End: 2021-01-26
Payer: MEDICARE

## 2021-01-26 DIAGNOSIS — M81.8 OTHER OSTEOPOROSIS WITHOUT CURRENT PATHOLOGICAL FRACTURE: ICD-10-CM

## 2021-01-26 DIAGNOSIS — M80.829S: ICD-10-CM

## 2021-01-26 DIAGNOSIS — Z12.31 VISIT FOR SCREENING MAMMOGRAM: ICD-10-CM

## 2021-01-26 PROCEDURE — 77080 DXA BONE DENSITY AXIAL: CPT

## 2021-01-26 PROCEDURE — 77063 BREAST TOMOSYNTHESIS BI: CPT

## 2021-02-01 ENCOUNTER — OFFICE VISIT (OUTPATIENT)
Dept: FAMILY MEDICINE CLINIC | Age: 76
End: 2021-02-01
Payer: MEDICARE

## 2021-02-01 ENCOUNTER — PRE-PROCEDURE TELEPHONE (OUTPATIENT)
Dept: INPATIENT UNIT | Age: 76
End: 2021-02-01

## 2021-02-01 VITALS
HEART RATE: 58 BPM | DIASTOLIC BLOOD PRESSURE: 88 MMHG | HEIGHT: 65 IN | BODY MASS INDEX: 33.02 KG/M2 | TEMPERATURE: 97.6 F | SYSTOLIC BLOOD PRESSURE: 130 MMHG | WEIGHT: 198.2 LBS | OXYGEN SATURATION: 98 % | RESPIRATION RATE: 16 BRPM

## 2021-02-01 DIAGNOSIS — Z00.00 ROUTINE GENERAL MEDICAL EXAMINATION AT A HEALTH CARE FACILITY: Primary | ICD-10-CM

## 2021-02-01 DIAGNOSIS — E11.69 TYPE 2 DIABETES MELLITUS WITH OTHER SPECIFIED COMPLICATION, WITH LONG-TERM CURRENT USE OF INSULIN (HCC): ICD-10-CM

## 2021-02-01 DIAGNOSIS — M85.89 OSTEOPENIA OF MULTIPLE SITES: ICD-10-CM

## 2021-02-01 DIAGNOSIS — I10 ESSENTIAL HYPERTENSION: ICD-10-CM

## 2021-02-01 DIAGNOSIS — L57.0 ACTINIC KERATOSIS: ICD-10-CM

## 2021-02-01 DIAGNOSIS — Z79.4 TYPE 2 DIABETES MELLITUS WITH OTHER SPECIFIED COMPLICATION, WITH LONG-TERM CURRENT USE OF INSULIN (HCC): ICD-10-CM

## 2021-02-01 DIAGNOSIS — E21.3 HYPERPARATHYROIDISM, UNSPECIFIED (HCC): ICD-10-CM

## 2021-02-01 LAB — HBA1C MFR BLD: 7.6 % (ref 4.3–5.7)

## 2021-02-01 PROCEDURE — G8399 PT W/DXA RESULTS DOCUMENT: HCPCS | Performed by: FAMILY MEDICINE

## 2021-02-01 PROCEDURE — 1090F PRES/ABSN URINE INCON ASSESS: CPT | Performed by: FAMILY MEDICINE

## 2021-02-01 PROCEDURE — 3051F HG A1C>EQUAL 7.0%<8.0%: CPT | Performed by: FAMILY MEDICINE

## 2021-02-01 PROCEDURE — 1036F TOBACCO NON-USER: CPT | Performed by: FAMILY MEDICINE

## 2021-02-01 PROCEDURE — G0439 PPPS, SUBSEQ VISIT: HCPCS | Performed by: FAMILY MEDICINE

## 2021-02-01 PROCEDURE — G8484 FLU IMMUNIZE NO ADMIN: HCPCS | Performed by: FAMILY MEDICINE

## 2021-02-01 PROCEDURE — 1123F ACP DISCUSS/DSCN MKR DOCD: CPT | Performed by: FAMILY MEDICINE

## 2021-02-01 PROCEDURE — G8417 CALC BMI ABV UP PARAM F/U: HCPCS | Performed by: FAMILY MEDICINE

## 2021-02-01 PROCEDURE — G8427 DOCREV CUR MEDS BY ELIG CLIN: HCPCS | Performed by: FAMILY MEDICINE

## 2021-02-01 PROCEDURE — 99213 OFFICE O/P EST LOW 20 MIN: CPT | Performed by: FAMILY MEDICINE

## 2021-02-01 PROCEDURE — 4040F PNEUMOC VAC/ADMIN/RCVD: CPT | Performed by: FAMILY MEDICINE

## 2021-02-01 PROCEDURE — 3017F COLORECTAL CA SCREEN DOC REV: CPT | Performed by: FAMILY MEDICINE

## 2021-02-01 PROCEDURE — 2022F DILAT RTA XM EVC RTNOPTHY: CPT | Performed by: FAMILY MEDICINE

## 2021-02-01 ASSESSMENT — PATIENT HEALTH QUESTIONNAIRE - PHQ9
DEPRESSION UNABLE TO ASSESS: FUNCTIONAL CAPACITY MOTIVATION LIMITS ACCURACY
2. FEELING DOWN, DEPRESSED OR HOPELESS: 0
1. LITTLE INTEREST OR PLEASURE IN DOING THINGS: 0
SUM OF ALL RESPONSES TO PHQ QUESTIONS 1-9: 0
SUM OF ALL RESPONSES TO PHQ9 QUESTIONS 1 & 2: 0

## 2021-02-01 NOTE — PATIENT INSTRUCTIONS
Personalized Preventive Plan for Beulah Valdes - 2/1/2021  Medicare offers a range of preventive health benefits. Some of the tests and screenings are paid in full while other may be subject to a deductible, co-insurance, and/or copay. Some of these benefits include a comprehensive review of your medical history including lifestyle, illnesses that may run in your family, and various assessments and screenings as appropriate. After reviewing your medical record and screening and assessments performed today your provider may have ordered immunizations, labs, imaging, and/or referrals for you. A list of these orders (if applicable) as well as your Preventive Care list are included within your After Visit Summary for your review. Other Preventive Recommendations:    · A preventive eye exam performed by an eye specialist is recommended every 1-2 years to screen for glaucoma; cataracts, macular degeneration, and other eye disorders. · A preventive dental visit is recommended every 6 months. · Try to get at least 150 minutes of exercise per week or 10,000 steps per day on a pedometer . · Order or download the FREE \"Exercise & Physical Activity: Your Everyday Guide\" from [employer]. Call 0-898.744.4427 or search [employer] online. · You need 6997-7109 mg of calcium and 7300-5081 IU of vitamin D per day. It is possible to meet your calcium requirement with diet alone, but a vitamin D supplement is usually necessary to meet this goal.  · When exposed to the sun, use a sunscreen that protects against both UVA and UVB radiation with an SPF of 30 or greater. Reapply every 2 to 3 hours or after sweating, drying off with a towel, or swimming. · Always wear a seat belt when traveling in a car. Always wear a helmet when riding a bicycle or motorcycle.

## 2021-02-01 NOTE — PROGRESS NOTES
Left message for pt to call [phone] for preop atrial fib ablation instructions for February 11, 2021

## 2021-02-01 NOTE — PROGRESS NOTES
Medicare Annual Wellness Visit  Name: Imer Precise Date: 2021   MRN: [id number] Sex: Female   Age: 70 y.o. Ethnicity: Non-/Non    : 1945 Race: Erika Robles is here for Medicare AWV (A1C ) and 6 Month Follow-Up    Screenings for behavioral, psychosocial and functional/safety risks, and cognitive dysfunction are all negative except as indicated below. These results, as well as other patient data from the [address] form, are documented in Flowsheets linked to this Encounter. No Known Allergies      Prior to Visit Medications    Medication Sig Taking?  Authorizing Provider   alendronate (FOSAMAX) 70 MG tablet Take 1 tablet by mouth every 7 days Yes Jose Tom MD   Vitamins A & D (VITAMIN A & D) 8000-400 units CAPS Take 1 capsule by mouth 2 times daily Yes Jose Tom MD   Calcium Carbonate-Vitamin D (OYSTER SHELL CALCIUM/D) 500-200 MG-UNIT TABS Take 1 tablet by mouth 2 times daily Yes Jose Tom MD   vitamin D3 (CHOLECALCIFEROL) 25 MCG (1000 UT) TABS tablet Take 0.5 tablets by mouth daily Yes Jose Tom MD   metFORMIN (GLUCOPHAGE-XR) 500 MG extended release tablet Take 2 tablets by mouth 2 times daily Yes [place] Dense, DO   levothyroxine (SYNTHROID) 112 MCG tablet Take 1 tablet by mouth Daily Yes Luma Harrison DO   metoprolol tartrate (LOPRESSOR) 50 MG tablet Take 1 tablet by mouth 2 times daily Yes [place] Dense, DO   apixaban (ELIQUIS) 5 MG TABS tablet Take 1 tablet by mouth 2 times daily Yes [address], MD   flecainide (TAMBOCOR) 50 MG tablet Take 1 tablet by mouth 2 times daily Yes Flor Wiggins MD   aspirin 81 MG EC tablet Take 81 mg by mouth daily Yes Historical Provider, MD   Multiple Vitamins-Minerals (THERAPEUTIC MULTIVITAMIN-MINERALS) tablet Take 1 tablet by mouth daily Yes Aurea Provider, MD   hydrALAZINE (APRESOLINE) 50 MG tablet Take 1 tablet by mouth 2 times daily Yes [address], MD losartan (COZAAR) 100 MG tablet Take 1 tablet by mouth daily Yes Berta Zambrano MD   KLOR-CON M10 10 MEQ extended release tablet Take 1 tablet by mouth daily Yes Berta Zambrano MD   simvastatin (ZOCOR) 40 MG tablet TAKE 1 TABLET NIGHTLY Yes Santiago Harrison, DO   insulin glargine (BASAGLAR KWIKPEN) 100 UNIT/ML injection pen Inject 15 Units into the skin every morning (before breakfast) Yes Santiago Harrison, DO   glimepiride (AMARYL) 4 MG tablet Take 1 tablet by mouth 2 times daily Yes Santiago Harrison, DO   mupirocin (BACTROBAN) 2 % ointment Apply 2 times daily PRN.  Yes Sepideh Barrett, DO   vitamin B-12 (CYANOCOBALAMIN) 1000 MCG tablet Take 1,000 mcg by mouth daily Yes Historical Provider, MD   folic acid (FOLVITE) 1 MG tablet Take 3 mg by mouth 2 times daily  Yes Historical Provider, MD   Multiple Vitamins-Minerals (ICAPS AREDS 2) CAPS Take by mouth 2 times daily Yes Historical Provider, MD         Past Medical History:   Diagnosis Date    Arthritis     Atrial fibrillation ([address]     Cancer ([address] 2000    NON HODGKINS LYMPHOMA    Diabetes mellitus ([address]     [place] filter in place     Homozygous for MTHFR gene mutation [facility]     Hx of blood clots 2000    LEGS    Hyperlipidemia     Hypertension     TRACY on CPAP     Prolonged emergence from general anesthesia     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    Thyroid disease        Past Surgical History:   Procedure Laterality Date    APPENDECTOMY      CARDIAC CATHETERIZATION      CHOLECYSTECTOMY      FRACTURE SURGERY      ankle surgery    FRACTURE SURGERY Right 09/2018    HUMERUS    HERNIA REPAIR      HYSTERECTOMY      PARATHYROID GLAND SURGERY N/A 10/1/2020    PARATHYROIDECTOMY, EXPLORATION OF PARATHYROIDS performed by Angel Tang MD at [address] OFFICE/OUTPT VISIT,PROCEDURE ONLY Right 9/19/2018    ORIF RIGHT PROXIMAL HUMERUS FX performed by Yovani Godinez MD at [address] Right 6/27/2019 REVISION RT HUMERUS FX, HARDWARE REMOVAL, HUMERAL NAIL INSERTION performed by Gena Grant MD at [facility]           Family History   Problem Relation Age of Onset    Heart Disease Mother     Heart Disease Father        CareTeam (Including outside providers/suppliers regularly involved in providing care):   Patient Care Team:  Radha Avila DO as PCP - General (Family Medicine)  Radha Avila DO as PCP - [facility] Empaneled Provider    Wt Readings from Last 3 Encounters:   02/01/21 198 lb 3.2 oz (89.9 kg)   01/20/21 199 lb 3.2 oz (90.4 kg)   01/13/21 195 lb (88.5 kg)     Vitals:    02/01/21 0947   BP: 130/88   Pulse: 58   Resp: 16   Temp: 97.6 °F (36.4 °C)   SpO2: 98%   Weight: 198 lb 3.2 oz (89.9 kg)   Height: 5' 5\" (1.651 m)     Body mass index is 32.98 kg/m². Based upon direct observation of the patient, evaluation of cognition reveals recent and remote memory intact. Patient's complete Health Risk Assessment and screening values have been reviewed and are found in Flowsheets. The following problems were reviewed today and where indicated follow up appointments were made and/or referrals ordered. Positive Risk Factor Screenings with Interventions:       Depression:  Depression Unable to Assess: Functional capacity motivation limits accuracy  PHQ-2 Score: 0  PHQ-9 Total Score: 0    Severity:1-4 = minimal depression, 5-9 = mild depression, 10-14 = moderate depression, 15-19 = moderately severe depression, 20-27 = severe depression        General Health and ACP:  General  In general, how would you say your health is?: Fair  In the past 7 days, have you experienced any of the following?  New or Increased Pain, New or Increased Fatigue, Loneliness, Social Isolation, Stress or Anger?: None of These  Do you get the social and emotional support that you need?: Yes  Do you have a Living Will?: Yes  Advance Directives     Power of  Living Will ACP-Advance Directive ACP-Power of [employer] Not on File Not on File Not on File Not on File      General Health Risk Interventions:  · ACP updated today    Health Habits/Nutrition:  Health Habits/Nutrition  Do you exercise for at least 20 minutes 2-3 times per week?: (!) No  Have you lost any weight without trying in the past 3 months?: No  Do you eat fewer than 2 meals per day?: No  Have you seen a dentist within the past year?: (!) No  Body mass index: (!) 32.98  Health Habits/Nutrition Interventions:  · Inadequate physical activity:  patient attempting to get more activity, limited due to OA  · Dental exam overdue:  patient encouraged to make appointment with his/her dentist       Personalized Preventive Plan   Current Health Maintenance Status  Immunization History   Administered Date(s) Administered    Influenza Virus Vaccine 10/24/2017, 10/01/2020    Influenza, High Dose (Fluzone 65 yrs and older) 10/01/2018    Pneumococcal Polysaccharide ([id number]) 06/23/2017        Health Maintenance   Topic Date Due    Hepatitis C screen  1945    Diabetic foot exam  02/26/1957    DTaP/Tdap/Td vaccine (1 - Tdap) 02/26/1966    Shingles Vaccine (1 of 2) 02/26/1997    Annual Wellness Visit (AWV)  05/29/2019    Diabetic retinal exam  04/17/2020    COVID-19 Vaccine (2 of 2 - Moderna series) 02/25/2021    TSH testing  05/19/2021    Lipid screen  06/15/2021    Potassium monitoring  10/02/2021    Creatinine monitoring  10/02/2021    A1C test (Diabetic or Prediabetic)  02/01/2022    Breast cancer screen  01/26/2023    Colon cancer screen colonoscopy  07/02/2025    DEXA (modify frequency per FRAX score)  Completed    Flu vaccine  Completed    Pneumococcal 65+ years Vaccine  Completed    Hepatitis A vaccine  Aged Out    Hib vaccine  Aged Out    Meningococcal (ACWY) vaccine  Aged Out     Recommendations for [employer] Due: see orders and patient instructions/AVS.  .   Recommended screening schedule for the next 5-10 years is provided to the patient in written form: see Patient Ti Martinez was seen today for medicare awv and 6 month follow-up.     Diagnoses and all orders for this visit:    Routine general medical examination at a health care facility    Type 2 diabetes mellitus with other specified complication, with long-term current use of insulin (HCC)  -     POCT glycosylated hemoglobin (Hb A1C)    Hyperparathyroidism, unspecified (HCC)    Actinic keratosis  -     External Referral To Dermatology    Osteopenia of multiple sites    Essential hypertension

## 2021-02-01 NOTE — PROGRESS NOTES
Constantino Rene is a 70 y.o. female that presents for     Chief Complaint   Patient presents with    Medicare AW     A1C     6 Month Follow-Up       /88   Pulse 58   Temp 97.6 °F (36.4 °C)   Resp 16   Ht 5' 5\" (1.651 m)   Wt 198 lb 3.2 oz (89.9 kg)   SpO2 98%   BMI 32.98 kg/m²       HPI:    AFib:  Scheduled for a heart ablation with Dr Maria Guadalupe France. Continues to have palpitations and fatigue. Compliant with medications. Had parathyroid adenoma removed in October with Dr Yvon Roy. Had a DEXA scan last week that displayed osteopenia. Restarted on Fosamax. Skin Lesion    HPI:    Location - right forehead  Length of time it has been present - 1 year, has been treated with topical steroids with no improvement  Recent change in size, color or shape? - No  Pruritic? No  Bleeding or drainage? No  Painful? No      HTN    Does patient check BP regularly at home? - Yes - well controlled recently  Current Medication regimen - metoprolol, losartan, hydralazine  Tolerating medications well? - yes    Shortness of breath or chest pain? No  Headache or visual complaints? No  Neurologic changes like confusion? No  Extremity edema? No    BP Readings from Last 3 Encounters:   02/01/21 130/88   01/20/21 (!) 142/84   01/13/21 130/88         Diabetes Type 2    Glucose control:   Does patient check blood glucoses at home? Yes - FBG have been ok recently, noting some evening  Report of hypoglycemia: no  Lab Results   Component Value Date    LABA1C 7.6 (H) 02/01/2021     No results found for: EAG    Symptoms  Polyuria, Polydipsia or Polyphagia? No  Chest Pain, SOB, or Palpitations? -  No  New Vision complaints? No  Paresthesias of the extremities? No    Medications  Current medication were reviewed. Compliant with medications? yes  Medication side effects? No  On ACE-I or ARB? Yes  On antiplatelet therapy? Yes  On Statin? Yes      Exercise  Exercise?  No, but has been able to get more active since she got a steroid shot in her knee  Wt Readings from Last 3 Encounters:   02/01/21 198 lb 3.2 oz (89.9 kg)   01/20/21 199 lb 3.2 oz (90.4 kg)   01/13/21 195 lb (88.5 kg)       Diet discipline?:  Low salt, fat, sugar diet?  no    Blood pressure control:  BP Readings from Last 3 Encounters:   02/01/21 130/88   01/20/21 (!) 142/84   01/13/21 130/88       No results found for: Grace Cummings [id number]    Lab Results   Component Value Date    [address] 06/15/2020         Health Maintenance   Topic Date Due    Hepatitis C screen  1945    Diabetic foot exam  02/26/1957    DTaP/Tdap/Td vaccine (1 - Tdap) 02/26/1966    Shingles Vaccine (1 of 2) 02/26/1997    Annual Wellness Visit (AWV)  05/29/2019    Diabetic retinal exam  04/17/2020    COVID-19 Vaccine (2 of 2 - Moderna series) 02/25/2021    TSH testing  05/19/2021    Lipid screen  06/15/2021    Potassium monitoring  10/02/2021    Creatinine monitoring  10/02/2021    A1C test (Diabetic or Prediabetic)  02/01/2022    Breast cancer screen  01/26/2023    Colon cancer screen colonoscopy  07/02/2025    DEXA (modify frequency per FRAX score)  Completed    Flu vaccine  Completed    Pneumococcal 65+ years Vaccine  Completed    Hepatitis A vaccine  Aged Out    Hib vaccine  Aged Out    Meningococcal (ACWY) vaccine  Aged Out       Past Medical History:   Diagnosis Date    Arthritis     Atrial fibrillation ([address]     Cancer ([address] 2000    NON HODGKINS LYMPHOMA    Diabetes mellitus ([address]     Apex filter in place     Homozygous for MTHFR gene mutation ([address]     Hx of blood clots 2000    LEGS    Hyperlipidemia     Hypertension     TRACY on CPAP     Prolonged emergence from general anesthesia     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    Thyroid disease        Past Surgical History:   Procedure Laterality Date    APPENDECTOMY      CARDIAC CATHETERIZATION      CHOLECYSTECTOMY      FRACTURE SURGERY      ankle surgery    FRACTURE SURGERY Right 09/2018    HUMERUS    HERNIA REPAIR      HYSTERECTOMY      PARATHYROID GLAND SURGERY N/A 10/1/2020    PARATHYROIDECTOMY, EXPLORATION OF PARATHYROIDS performed by Christina Vasquez MD at [address] OFFICE/OUTPT [address] Right 9/19/2018    ORIF RIGHT PROXIMAL HUMERUS FX performed by Miles Ortega MD at [address] [employer] Right 6/27/2019    REVISION RT HUMERUS FX, HARDWARE REMOVAL, HUMERAL NAIL INSERTION performed by Jose Boswell MD at [facility] History     Tobacco Use    Smoking status: Never Smoker    Smokeless tobacco: Never Used   Substance Use Topics    Alcohol use: No    Drug use: No       Family History   Problem Relation Age of Onset    Heart Disease Mother     Heart Disease Father          I have reviewed the patient's past medical history, past surgical history, allergies, medications, social and family history and I have made updates where appropriate.     Review of Systems        PHYSICAL EXAM:  /88   Pulse 58   Temp 97.6 °F (36.4 °C)   Resp 16   Ht 5' 5\" (1.651 m)   Wt 198 lb 3.2 oz (89.9 kg)   SpO2 98%   BMI 32.98 kg/m²     General Appearance: well developed and well- nourished, in no acute distress  Head: normocephalic and atraumatic  ENT: external ear and ear canal normal bilaterally, nose without deformity, nasal mucosa and turbinates normal without polyps, oropharynx normal, dentition is normal for age, no lipor gum lesions noted  Neck: supple and non-tender without mass, no thyromegaly or thyroid nodules, no cervical lymphadenopathy  Pulmonary/Chest: clear to auscultation bilaterally- no wheezes, rales or rhonchi, normal air movement, no respiratory distress or retractions  Cardiovascular: normal rate, regular rhythm, normal S1 and S2, no murmurs, rubs, clicks, or gallops, distal pulses intact  Extremities: no cyanosis, clubbing or edema of the lower extremities  Psych:  Normal affect without evidence of depression oranxiety, insight and judgement are appropriate, memory appears intact  Skin: warm and dry, erythematous patch ~1cm in diameter with mild scaling on the right forehead      ASSESSMENT & PLAN  Aleksey Henderson was seen today for medicare awv and 6 month follow-up. Diagnoses and all orders for this visit:    Routine general medical examination at a health care facility    Type 2 diabetes mellitus with other specified complication, with long-term current use of insulin (HCC)  -     POCT glycosylated hemoglobin (Hb A1C)    Hyperparathyroidism, unspecified (HCC)    Actinic keratosis  -     External Referral To Dermatology    Osteopenia of multiple sites    Essential hypertension       -Will refer to derm for the AK  -DM2 reasonably controlled goal 7.5, continue current regimen  -Chronic issues stable, continue current medications  -Advised to call if any issues    Return in 6 months (on 8/1/2021), or if symptoms worsen or fail to improve, for Medicare Annual Wellness Visit in 1 year. Controlled Substance Monitoring:    Acute and Chronic Pain Monitoring:   No flowsheet data found. Kristal received counseling on the following healthy behaviors: medication adherence  Reviewed prior labs and health maintenance. Continue current medications, diet and exercise. Discussed use, benefit, and side effects of prescribed medications. Barriers to medication compliance addressed. Patient given educational materials - see patient instructions. All patient questions answered. Patient voiced understanding.

## 2021-02-04 ENCOUNTER — HOSPITAL ENCOUNTER (OUTPATIENT)
Age: 76
Setting detail: SPECIMEN
Discharge: HOME | End: 2021-02-04
Payer: MEDICARE

## 2021-02-04 PROCEDURE — U0003 INFECTIOUS AGENT DETECTION BY NUCLEIC ACID (DNA OR RNA); SEVERE ACUTE RESPIRATORY SYNDROME CORONAVIRUS 2 (SARS-COV-2) (CORONAVIRUS DISEASE [COVID-19]), AMPLIFIED PROBE TECHNIQUE, MAKING USE OF HIGH THROUGHPUT TECHNOLOGIES AS DESCRIBED BY CMS-2020-01-R: HCPCS

## 2021-02-06 LAB — SARS-COV-2: NOT DETECTED

## 2021-02-10 ENCOUNTER — PREP FOR PROCEDURE (OUTPATIENT)
Dept: CARDIOLOGY CLINIC | Age: 76
End: 2021-02-10

## 2021-02-10 RX ORDER — DIPHENHYDRAMINE HYDROCHLORIDE 50 MG/ML
50 INJECTION INTRAMUSCULAR; INTRAVENOUS ONCE
Status: CANCELLED | OUTPATIENT
Start: 2021-02-10 | End: 2021-02-10

## 2021-02-10 RX ORDER — SODIUM CHLORIDE 0.9 % (FLUSH) 0.9 %
10 SYRINGE (ML) INJECTION EVERY 12 HOURS SCHEDULED
Status: CANCELLED | OUTPATIENT
Start: 2021-02-10

## 2021-02-10 RX ORDER — SODIUM CHLORIDE 0.9 % (FLUSH) 0.9 %
10 SYRINGE (ML) INJECTION PRN
Status: CANCELLED | OUTPATIENT
Start: 2021-02-10

## 2021-02-10 RX ORDER — SODIUM CHLORIDE 9 MG/ML
INJECTION, SOLUTION INTRAVENOUS CONTINUOUS
Status: CANCELLED | OUTPATIENT
Start: 2021-02-10

## 2021-02-11 ENCOUNTER — APPOINTMENT (OUTPATIENT)
Dept: CARDIAC CATH/INVASIVE PROCEDURES | Age: 76
End: 2021-02-11
Payer: MEDICARE

## 2021-02-11 ENCOUNTER — ANESTHESIA EVENT (OUTPATIENT)
Dept: CARDIAC CATH/INVASIVE PROCEDURES | Age: 76
End: 2021-02-11
Payer: MEDICARE

## 2021-02-11 ENCOUNTER — ANESTHESIA (OUTPATIENT)
Dept: CARDIAC CATH/INVASIVE PROCEDURES | Age: 76
End: 2021-02-11
Payer: MEDICARE

## 2021-02-11 VITALS — TEMPERATURE: 97.7 F | OXYGEN SATURATION: 98 % | DIASTOLIC BLOOD PRESSURE: 58 MMHG | SYSTOLIC BLOOD PRESSURE: 127 MMHG

## 2021-02-11 PROBLEM — Z98.890 S/P ABLATION OF ATRIAL FIBRILLATION: Status: ACTIVE | Noted: 2021-02-11

## 2021-02-11 PROBLEM — Z86.79 S/P ABLATION OF ATRIAL FIBRILLATION: Status: ACTIVE | Noted: 2021-02-11

## 2021-02-11 PROCEDURE — C1730 CATH, EP, 19 OR FEW ELECT: HCPCS

## 2021-02-11 PROCEDURE — 6360000002 HC RX W HCPCS: Performed by: REGISTERED NURSE

## 2021-02-11 PROCEDURE — 3700000000 HC ANESTHESIA ATTENDED CARE

## 2021-02-11 PROCEDURE — 7100000001 HC PACU RECOVERY - ADDTL 15 MIN

## 2021-02-11 PROCEDURE — 6360000002 HC RX W HCPCS: Performed by: NURSE ANESTHETIST, CERTIFIED REGISTERED

## 2021-02-11 PROCEDURE — 3700000001 HC ADD 15 MINUTES (ANESTHESIA)

## 2021-02-11 PROCEDURE — 2580000003 HC RX 258: Performed by: NURSE PRACTITIONER

## 2021-02-11 PROCEDURE — 7100000000 HC PACU RECOVERY - FIRST 15 MIN

## 2021-02-11 RX ORDER — SUCCINYLCHOLINE CHLORIDE 20 MG/ML
INJECTION INTRAMUSCULAR; INTRAVENOUS PRN
Status: DISCONTINUED | OUTPATIENT
Start: 2021-02-11 | End: 2021-02-11 | Stop reason: SDUPTHER

## 2021-02-11 RX ORDER — HEPARIN SODIUM 1000 [USP'U]/ML
INJECTION, SOLUTION INTRAVENOUS; SUBCUTANEOUS PRN
Status: DISCONTINUED | OUTPATIENT
Start: 2021-02-11 | End: 2021-02-11 | Stop reason: SDUPTHER

## 2021-02-11 RX ORDER — FENTANYL CITRATE 50 UG/ML
INJECTION, SOLUTION INTRAMUSCULAR; INTRAVENOUS PRN
Status: DISCONTINUED | OUTPATIENT
Start: 2021-02-11 | End: 2021-02-11 | Stop reason: SDUPTHER

## 2021-02-11 RX ORDER — FUROSEMIDE 10 MG/ML
INJECTION INTRAMUSCULAR; INTRAVENOUS PRN
Status: DISCONTINUED | OUTPATIENT
Start: 2021-02-11 | End: 2021-02-11 | Stop reason: SDUPTHER

## 2021-02-11 RX ORDER — ONDANSETRON 2 MG/ML
INJECTION INTRAMUSCULAR; INTRAVENOUS PRN
Status: DISCONTINUED | OUTPATIENT
Start: 2021-02-11 | End: 2021-02-11 | Stop reason: SDUPTHER

## 2021-02-11 RX ORDER — PROPOFOL 10 MG/ML
INJECTION, EMULSION INTRAVENOUS PRN
Status: DISCONTINUED | OUTPATIENT
Start: 2021-02-11 | End: 2021-02-11 | Stop reason: SDUPTHER

## 2021-02-11 RX ORDER — PROTAMINE SULFATE 10 MG/ML
INJECTION, SOLUTION INTRAVENOUS PRN
Status: DISCONTINUED | OUTPATIENT
Start: 2021-02-11 | End: 2021-02-11 | Stop reason: SDUPTHER

## 2021-02-11 RX ORDER — MIDAZOLAM HYDROCHLORIDE 1 MG/ML
INJECTION INTRAMUSCULAR; INTRAVENOUS PRN
Status: DISCONTINUED | OUTPATIENT
Start: 2021-02-11 | End: 2021-02-11 | Stop reason: SDUPTHER

## 2021-02-11 RX ORDER — DEXAMETHASONE SODIUM PHOSPHATE 4 MG/ML
INJECTION, SOLUTION INTRA-ARTICULAR; INTRALESIONAL; INTRAMUSCULAR; INTRAVENOUS; SOFT TISSUE PRN
Status: DISCONTINUED | OUTPATIENT
Start: 2021-02-11 | End: 2021-02-11 | Stop reason: SDUPTHER

## 2021-02-11 RX ADMIN — HEPARIN SODIUM 3000 UNITS: 1000 INJECTION INTRAVENOUS; SUBCUTANEOUS at 12:40

## 2021-02-11 RX ADMIN — PROTAMINE SULFATE 20 MG: 10 INJECTION, SOLUTION INTRAVENOUS at 14:13

## 2021-02-11 RX ADMIN — PHENYLEPHRINE HYDROCHLORIDE 100 MCG: 10 INJECTION INTRAVENOUS at 11:31

## 2021-02-11 RX ADMIN — HEPARIN SODIUM 4000 UNITS: 1000 INJECTION INTRAVENOUS; SUBCUTANEOUS at 12:18

## 2021-02-11 RX ADMIN — PHENYLEPHRINE HYDROCHLORIDE 100 MCG: 10 INJECTION INTRAVENOUS at 10:52

## 2021-02-11 RX ADMIN — PHENYLEPHRINE HYDROCHLORIDE 100 MCG: 10 INJECTION INTRAVENOUS at 13:07

## 2021-02-11 RX ADMIN — PHENYLEPHRINE HYDROCHLORIDE 200 MCG: 10 INJECTION INTRAVENOUS at 12:03

## 2021-02-11 RX ADMIN — PHENYLEPHRINE HYDROCHLORIDE 200 MCG: 10 INJECTION INTRAVENOUS at 11:46

## 2021-02-11 RX ADMIN — DEXAMETHASONE SODIUM PHOSPHATE 10 MG: 4 INJECTION, SOLUTION INTRAMUSCULAR; INTRAVENOUS at 10:50

## 2021-02-11 RX ADMIN — FUROSEMIDE 20 MG: 10 INJECTION, SOLUTION INTRAMUSCULAR; INTRAVENOUS at 14:13

## 2021-02-11 RX ADMIN — PHENYLEPHRINE HYDROCHLORIDE 100 MCG: 10 INJECTION INTRAVENOUS at 11:29

## 2021-02-11 RX ADMIN — MIDAZOLAM HYDROCHLORIDE 2 MG: 1 INJECTION, SOLUTION INTRAMUSCULAR; INTRAVENOUS at 10:33

## 2021-02-11 RX ADMIN — PHENYLEPHRINE HYDROCHLORIDE 200 MCG: 10 INJECTION INTRAVENOUS at 12:18

## 2021-02-11 RX ADMIN — ONDANSETRON 4 MG: 2 INJECTION INTRAMUSCULAR; INTRAVENOUS at 14:18

## 2021-02-11 RX ADMIN — FENTANYL CITRATE 100 MCG: 50 INJECTION, SOLUTION INTRAMUSCULAR; INTRAVENOUS at 10:39

## 2021-02-11 RX ADMIN — PHENYLEPHRINE HYDROCHLORIDE 200 MCG: 10 INJECTION INTRAVENOUS at 11:40

## 2021-02-11 RX ADMIN — SODIUM CHLORIDE: 9 INJECTION, SOLUTION INTRAVENOUS at 13:29

## 2021-02-11 RX ADMIN — SUCCINYLCHOLINE CHLORIDE 140 MG: 20 INJECTION, SOLUTION INTRAMUSCULAR; INTRAVENOUS at 10:39

## 2021-02-11 RX ADMIN — HEPARIN SODIUM 10000 UNITS: 1000 INJECTION INTRAVENOUS; SUBCUTANEOUS at 11:53

## 2021-02-11 RX ADMIN — PROPOFOL 150 MG: 10 INJECTION, EMULSION INTRAVENOUS at 10:39

## 2021-02-11 RX ADMIN — PHENYLEPHRINE HYDROCHLORIDE 100 MCG: 10 INJECTION INTRAVENOUS at 11:14

## 2021-02-11 RX ADMIN — PHENYLEPHRINE HYDROCHLORIDE 100 MCG: 10 INJECTION INTRAVENOUS at 11:25

## 2021-02-11 RX ADMIN — FENTANYL CITRATE 100 MCG: 50 INJECTION, SOLUTION INTRAMUSCULAR; INTRAVENOUS at 11:23

## 2021-02-11 ASSESSMENT — PULMONARY FUNCTION TESTS
PIF_VALUE: 21
PIF_VALUE: 21
PIF_VALUE: 18
PIF_VALUE: 21
PIF_VALUE: 9
PIF_VALUE: 21
PIF_VALUE: 11
PIF_VALUE: 19
PIF_VALUE: 22
PIF_VALUE: 21
PIF_VALUE: 0
PIF_VALUE: 21
PIF_VALUE: 11
PIF_VALUE: 10
PIF_VALUE: 21
PIF_VALUE: 10
PIF_VALUE: 1
PIF_VALUE: 22
PIF_VALUE: 21
PIF_VALUE: 22
PIF_VALUE: 21
PIF_VALUE: 21
PIF_VALUE: 18
PIF_VALUE: 10
PIF_VALUE: 21
PIF_VALUE: 16
PIF_VALUE: 21
PIF_VALUE: 21
PIF_VALUE: 20
PIF_VALUE: 1
PIF_VALUE: 9
PIF_VALUE: 21
PIF_VALUE: 22
PIF_VALUE: 21
PIF_VALUE: 18
PIF_VALUE: 21
PIF_VALUE: 0
PIF_VALUE: 21
PIF_VALUE: 5
PIF_VALUE: 21
PIF_VALUE: 22
PIF_VALUE: 9
PIF_VALUE: 21
PIF_VALUE: 21
PIF_VALUE: 22
PIF_VALUE: 9
PIF_VALUE: 21
PIF_VALUE: 22
PIF_VALUE: 10
PIF_VALUE: 21
PIF_VALUE: 22
PIF_VALUE: 21
PIF_VALUE: 0
PIF_VALUE: 21
PIF_VALUE: 22
PIF_VALUE: 17
PIF_VALUE: 18
PIF_VALUE: 21
PIF_VALUE: 9
PIF_VALUE: 21
PIF_VALUE: 21
PIF_VALUE: 22
PIF_VALUE: 9
PIF_VALUE: 21
PIF_VALUE: 4
PIF_VALUE: 21
PIF_VALUE: 9
PIF_VALUE: 19
PIF_VALUE: 17
PIF_VALUE: 20
PIF_VALUE: 21
PIF_VALUE: 22
PIF_VALUE: 22
PIF_VALUE: 10
PIF_VALUE: 23
PIF_VALUE: 21
PIF_VALUE: 21
PIF_VALUE: 17
PIF_VALUE: 21
PIF_VALUE: 21
PIF_VALUE: 18
PIF_VALUE: 19
PIF_VALUE: 21
PIF_VALUE: 1
PIF_VALUE: 21
PIF_VALUE: 3
PIF_VALUE: 21
PIF_VALUE: 22
PIF_VALUE: 10
PIF_VALUE: 21
PIF_VALUE: 21
PIF_VALUE: 10
PIF_VALUE: 10
PIF_VALUE: 21
PIF_VALUE: 1
PIF_VALUE: 21
PIF_VALUE: 21
PIF_VALUE: 22
PIF_VALUE: 21

## 2021-02-11 NOTE — ANESTHESIA PRE PROCEDURE
Department of Anesthesiology  Preprocedure Note       Name:  Radha Barry   Age:  70 y.o.  :  1945                                          MRN:  [id number]         Date:  2021      Surgeon: * Surgery not found *    Procedure:     Medications prior to admission:   Prior to Admission medications    Medication Sig Start Date End Date Taking?  Authorizing Provider   alendronate (FOSAMAX) 70 MG tablet Take 1 tablet by mouth every 7 days 21   Christina Vasquez MD   Vitamins A & D (VITAMIN A & D) 8000-400 units CAPS Take 1 capsule by mouth 2 times daily 21  Christina Vasquez MD   Calcium Carbonate-Vitamin D (OYSTER SHELL CALCIUM/D) 500-200 MG-UNIT TABS Take 1 tablet by mouth 2 times daily 21  Christina Vasquez MD   vitamin D3 (CHOLECALCIFEROL) 25 MCG (1000 UT) TABS tablet Take 0.5 tablets by mouth daily 21   Christina Vasquez MD   metFORMIN (GLUCOPHAGE-XR) 500 MG extended release tablet Take 2 tablets by mouth 2 times daily 20   Niya Blinks, DO   levothyroxine (SYNTHROID) 112 MCG tablet Take 1 tablet by mouth Daily 20   Niya Blinks, DO   metoprolol tartrate (LOPRESSOR) 50 MG tablet Take 1 tablet by mouth 2 times daily 20   Niya Blinks, DO   apixaban (ELIQUIS) 5 MG TABS tablet Take 1 tablet by mouth 2 times daily 11/3/20   Shira Conde MD   flecainide (TAMBOCOR) 50 MG tablet Take 1 tablet by mouth 2 times daily 10/21/20   Gauri Dalton MD   aspirin 81 MG EC tablet Take 81 mg by mouth daily    Historical Provider, MD   Multiple Vitamins-Minerals (THERAPEUTIC MULTIVITAMIN-MINERALS) tablet Take 1 tablet by mouth daily    Historical Provider, MD   hydrALAZINE (APRESOLINE) 50 MG tablet Take 1 tablet by mouth 2 times daily 20   Shira Conde MD   losartan (COZAAR) 100 MG tablet Take 1 tablet by mouth daily 20   Shira Conde MD   KLOR-CON M10 10 MEQ extended release tablet Take 1 tablet by mouth daily 20   Zoheir A Isis Wheatley MD   simvastatin (ZOCOR) 40 MG tablet TAKE 1 TABLET NIGHTLY 7/13/20   Jo Ann Quails, DO   insulin glargine (BASAGLAR KWIKPEN) 100 UNIT/ML injection pen Inject 15 Units into the skin every morning (before breakfast) 7/10/20   Jo Ann Quails, DO   glimepiride (AMARYL) 4 MG tablet Take 1 tablet by mouth 2 times daily 3/9/20   Jo Ann Quails, DO   mupirocin (BACTROBAN) 2 % ointment Apply 2 times daily PRN. 10/24/19   Jo Ann Quails, DO   vitamin B-12 (CYANOCOBALAMIN) 1000 MCG tablet Take 1,000 mcg by mouth daily    Historical Provider, MD   folic acid (FOLVITE) 1 MG tablet Take 3 mg by mouth 2 times daily     Historical Provider, MD   Multiple Vitamins-Minerals (ICAPS AREDS 2) CAPS Take by mouth 2 times daily    Historical Provider, MD       Current medications:    Current Facility-Administered Medications   Medication Dose Route Frequency Provider Last Rate Last Admin    0.9 % sodium chloride infusion   Intravenous Continuous Starling Bal, APRN - CNP 75 mL/hr at 02/11/21 0943 New Bag at 02/11/21 0943    sodium chloride flush 0.9 % injection 10 mL  10 mL Intravenous PRN Starling Bal, APRN - CNP           Allergies:  No Known Allergies    Problem List:    Patient Active Problem List   Diagnosis Code    Microalbuminuria due to type 2 diabetes mellitus ([facility] E11.29, R80.9    Chronic atrial fibrillation ([facility] I48.20    Type 2 diabetes mellitus with insulin therapy ([facility] E11.9, Z79.4    Essential hypertension I10    Osteopenia of spine M85.88    Hypothyroidism E03.9    History of non-Hodgkin's lymphoma Z85.72    Anticoagulant long-term use Z79.01    Hyponatremia E87.1    History of humerus fracture Z87.81    TRACY on CPAP G47.33, Z99.89    Obesity (BMI 30-39. 9) E66.9    Elevated parathyroid hormone E34.9    Parathyroid adenoma D35.1    Hypercalcemia E83.52    Osteoporosis with pathological fracture M80.00XA    S/P parathyroidectomy Z98.890    Atrial fibrillation/flutter (HCC) I48.91, I48.92    PAF (paroxysmal atrial fibrillation) ([facility]) I48.0    Hypotension I95.9    Hyperlipidemia E78.5    Status post parathyroidectomy Z98.890    Pathological fracture of humerus due to osteoporosis M80.029A    Other osteoporosis without current pathological fracture M81.8    Hyperparathyroidism, unspecified ([facility] E21.3       Past Medical History:        Diagnosis Date    Arthritis     Atrial fibrillation ([facility]     Cancer ([facility] 2000    NON HODGKINS LYMPHOMA    Diabetes mellitus ([facility]     [place] filter in place     Homozygous for MTHFR gene mutation ([facility]     Hx of blood clots 2000    LEGS    Hyperlipidemia     Hypertension     TRACY on CPAP     Prolonged emergence from general anesthesia     Stroke (cerebrum) ([facility]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    Thyroid disease        Past Surgical History:        Procedure Laterality Date    APPENDECTOMY      CARDIAC CATHETERIZATION      CHOLECYSTECTOMY      FRACTURE SURGERY      ankle surgery    FRACTURE SURGERY Right 09/2018    HUMERUS    HERNIA REPAIR      HYSTERECTOMY      PARATHYROID GLAND SURGERY N/A 10/1/2020    PARATHYROIDECTOMY, EXPLORATION OF PARATHYROIDS performed by Romel Aguirre MD at [address] [facility] OFFICE/OUTPT [facility] Right 9/19/2018    ORIF RIGHT PROXIMAL HUMERUS FX performed by Presley Gaytan MD at [address] [employer] Right 6/27/2019    REVISION RT HUMERUS FX, HARDWARE REMOVAL, HUMERAL NAIL INSERTION performed by Nicole Ledezma MD at [facility]         Social History:    Social History     Tobacco Use    Smoking status: Never Smoker    Smokeless tobacco: Never Used   Substance Use Topics    Alcohol use:  No                                Counseling given: Not Answered      Vital Signs (Current):   Vitals:    02/11/21 0915 02/11/21 0945   BP: (!) 151/87    Pulse: 61    Resp: 24    Temp: 98.8 °F (37.1 °C)    TempSrc: Oral    SpO2: 96%    Weight:  195 lb (88.5 kg) Height:  5' 5\" (1.651 m)                                              BP Readings from Last 3 Encounters:   02/11/21 (!) 151/87   02/01/21 130/88   01/20/21 (!) 142/84       NPO Status:                                                                                 BMI:   Wt Readings from Last 3 Encounters:   02/11/21 195 lb (88.5 kg)   02/01/21 198 lb 3.2 oz (89.9 kg)   01/20/21 199 lb 3.2 oz (90.4 kg)     Body mass index is 32.45 kg/m². CBC:   Lab Results   Component Value Date    WBC 5.2 02/11/2021    RBC 4.21 02/11/2021    RBC 3.94 02/27/2012    HGB 14.1 02/11/2021    HCT 43.5 02/11/2021    .3 02/11/2021    RDW 12.5 02/25/2020     02/11/2021       CMP:   Lab Results   Component Value Date     10/02/2020    K 4.7 10/02/2020    K 3.9 09/20/2018    CL 99 10/02/2020    CO2 24 10/02/2020    BUN 18 10/02/2020    CREATININE 0.8 10/02/2020    LABGLOM 70 10/02/2020    GLUCOSE 237 10/02/2020    GLUCOSE 167 02/27/2012    PROT 7.4 09/25/2020    CALCIUM 9.8 10/06/2020    BILITOT 0.6 09/25/2020    ALKPHOS 49 09/25/2020    AST 19 09/25/2020    ALT 21 09/25/2020       POC Tests: No results for input(s): POCGLU, POCNA, POCK, POCCL, POCBUN, POCHEMO, POCHCT in the last 72 hours.     Coags:   Lab Results   Component Value Date    INR 1.11 02/11/2021    APTT 33.6 02/11/2021       HCG (If Applicable): No results found for: PREGTESTUR, PREGSERUM, HCG, HCGQUANT     ABGs: No results found for: PHART, PO2ART, [id number], CVE3KPF, BEART, [id number]     Type & Screen (If Applicable):  No results found for: LABABO, LABRH    Drug/Infectious Status (If Applicable):  No results found for: HIV, HEPCAB    COVID-19 Screening (If Applicable):   Lab Results   Component Value Date    COVID19 Not Detected 02/04/2021         Anesthesia Evaluation  Patient summary reviewed and Nursing notes reviewed no history of anesthetic complications:   Airway: Mallampati: III        Dental:          Pulmonary:   (+) sleep apnea: on CPAP,

## 2021-02-12 NOTE — ANESTHESIA POSTPROCEDURE EVALUATION
02/11/21 1540 130/61 -- -- 92 13 99 %   02/11/21 1535 131/62 -- -- 91 14 98 %   02/11/21 1530 (!) 128/59 -- -- 91 13 99 %   02/11/21 1525 (!) 130/59 -- -- 91 14 99 %   02/11/21 1520 135/61 -- -- 92 13 99 %       Level of Consciousness:  Awake    Respiratory:  Stable    Oxygen Saturation:  Stable    Cardiovascular:  Stable    Hydration:  Adequate    PONV:  Stable    Post-op Pain:  Adequate analgesia    Post-op Assessment:  No apparent anesthetic complications    Additional Follow-Up / Treatment / Comment:  Taylor Lynn MD  February 11, 2021   7:19 PM

## 2021-02-15 ENCOUNTER — TELEPHONE (OUTPATIENT)
Dept: FAMILY MEDICINE CLINIC | Age: 76
End: 2021-02-15

## 2021-02-15 NOTE — TELEPHONE ENCOUNTER
[facility] Transitions Initial Follow Up Call    Outreach made within 2 business days of discharge: Yes    Patient: Radha Barry Patient : 1945   MRN: [id number]  Reason for Admission: There are no discharge diagnoses documented for the most recent discharge. Discharge Date: 21       Spoke with: Magalie Hayward    Discharge department/facility: [facility]    TCM Interactive Patient Contact:  Was patient able to fill all prescriptions: Yes  Was patient instructed to bring all medications to the follow-up visit: Yes  Is patient taking all medications as directed in the discharge summary? Yes  Does patient understand their discharge instructions: Yes  Does patient have questions or concerns that need addressed prior to 7-14 day follow up office visit: no    Scheduled appointment with PCP within 7-14 days  Pt declines f/u appt @ this time.     Follow Up  Future Appointments   Date Time Provider Zach Pérez   2021  1:15 PM SCHEDULE, [facility] PACER NURSE N [facility] PACER [facility] [place]   3/10/2021  9:45 AM Gauri Dalton MD N [facility] Heart [facility] [place]   3/22/2021  8:15 AM MD ELIZA Jensen [facility] Heart [facility] [place]   2021  9:30 AM Christina Vasquez MD N ENT [facility] [place]   2021  9:00 AM DO [place] Galo PCT [facility] [place]   2021 10:45 AM Kimberly Barnett 47       Colletta Cinnamon, LPN

## 2021-02-18 ENCOUNTER — NURSE ONLY (OUTPATIENT)
Dept: CARDIOLOGY CLINIC | Age: 76
End: 2021-02-18
Payer: MEDICARE

## 2021-02-18 PROCEDURE — 99211 OFF/OP EST MAY X REQ PHY/QHP: CPT | Performed by: INTERNAL MEDICINE

## 2021-02-18 NOTE — PROGRESS NOTES
Pt came to the office today for a bilat groin check both sides of the groin are soft. There are no hard knots and no drainage and very minimal yellow bruising to both sites. Told pt we are looking for any hard knots to the groin, usually larger than quarter sized, if they have good pulses in their feet and no cool to the touch, no cyanosis, and that they have feeling in their legs. Pt has a f/u with dr Deep Vargas on 3/10/21. Said she is somewhat sob and tired after the ablation but does not have the anymore symptoms of afib. Said she occasional feels an abnormal beet. I did auscultate her chest and her heart rate was regular.  Told pt if she notices any of the symptoms I mentioned to call the office or go to ER    I also checked the incision to the rt side of her neck and the area was soft with no hard knots

## 2021-03-05 RX ORDER — GLIMEPIRIDE 4 MG/1
4 TABLET ORAL 2 TIMES DAILY
Qty: 180 TABLET | Refills: 3 | Status: SHIPPED | OUTPATIENT
Start: 2021-03-05 | End: 2022-03-04 | Stop reason: SDUPTHER

## 2021-03-09 NOTE — PROGRESS NOTES
[address] [facility]  Dept: [phone]       CARDIAC ELECTROPHYSIOLOGY: FOLLOW UP NOTE  PATIENT DEMOGRAPHICS:  Date:   3/9/2021  Patient name:              Radha Barry  YOB: 1945  Sex: female   MRN:   [id number]    PRIMARY CARE PHYSICIAN:   Niya Cutler DO    CARDIOLOGIST:  Yadira Paz MD    REASON FOR FOLLOW UP:  Follow up s/p AF/AFL ablation. HISTORY OF PRESENT ILLNESS:  Ms Rambo Francisco is a 70years old lady with history of drug refractory symptomatic persistent atrial fibrillation and typical atrial flutter who failed amiodarone and flecainide. She underwent atrial fibrillation/atrial flutter ablation on 2/11/2021. She is here for a follow-up visit. The patient reports to be feeling well. She had no palpitations after ablation. Denies chest pain, shortness of breath, syncope, pain swelling or bleeding from the groins. She denies nausea, vomiting or postprandial tension. She was gifted iWatch by her son on her birthday. She has noted 2 brief episodes of irregular heart rate. I reviewed with them, short AF episodes during night, common after atrial fibrillation ablation. The patient was asymptomatic at that time. No new issues otherwise. REVIEW OF SYSTEMS:    Constitutional: Negative for chills and fever  HENT: Negative for congestion, sinus pressure, sneezing and sore throat. Eyes: Negative for pain, discharge, redness and itching. Respiratory: Negative for apnea, cough  Gastrointestinal: Negative for blood in stool, constipation, diarrhea   Endocrine: Negative for cold intolerance, heat intolerance, polydipsia. Genitourinary: Negative for dysuria, enuresis, flank pain and hematuria. Musculoskeletal: Negative for arthralgias, joint swelling and neck pain. Neurological: Negative for numbness and headaches.    Psychiatric/Behavioral: Negative for agitation, confusion, decreased Heart Disease Father     Cancer Sister     Other Sister         SOCIAL HISTORY:  The patient is  and currently lives with her . Zaire Whitten is active and independent.  Denies smoking or alcohol use  Social History     Socioeconomic History    Marital status:      Spouse name: Virginie Cristina Number of children: 3    Years of education: Not on file    Highest education level: Not on file   Occupational History    Not on file   Social Needs    Financial resource strain: Not on file    Food insecurity     Worry: Not on file     Inability: Not on file   [employer] needs     Medical: Not on file     Non-medical: Not on file   Tobacco Use    Smoking status: Never Smoker    Smokeless tobacco: Never Used   Substance and Sexual Activity    Alcohol use: No    Drug use: No    Sexual activity: Not Currently   Lifestyle    Physical activity     Days per week: Not on file     Minutes per session: Not on file    Stress: Not on file   Relationships    Social connections     Talks on phone: Not on file     Gets together: Not on file     Attends Muslim service: Not on file     Active member of club or organization: Not on file     Attends meetings of clubs or organizations: Not on file     Relationship status: Not on file    Intimate partner violence     Fear of current or ex partner: Not on file     Emotionally abused: Not on file     Physically abused: Not on file     Forced sexual activity: Not on file   Other Topics Concern    Not on file   Social History Narrative    Not on file        ALLERGY HISTORY:  No Known Allergies     MEDICATIONS:  Current Outpatient Medications   Medication Sig Dispense Refill    glimepiride (AMARYL) 4 MG tablet Take 1 tablet by mouth 2 times daily 180 tablet 3    pantoprazole (PROTONIX) 40 MG tablet Take 1 tablet by mouth every morning (before breakfast) 30 tablet 3    alendronate (FOSAMAX) 70 MG tablet Take 1 tablet by mouth every 7 days 12 tablet 1    Calcium or icterus. ENT: No central cyanosis. VESSELS: No jugular venous distension or carotid bruits. HEART: Normal S1/S2. No murmur, rub or gallop. LUNGS: Clear to auscultation. ABDOMEN: Soft and non-tender. EXTREMITIES: No lower extremity edema. Feet are warm. Groins are soft and nontender. No swelling bleeding or bruit. NEUROLOGICAL: Grossly intact. LABORATORY DATA AND DIAGNOSTIC DATA:  I have personally reviewed and interpreted the results of the following diagnostic testing    Lab Results   Component Value Date    WBC 5.2 02/11/2021    HGB 14.1 02/11/2021    HCT 43.5 02/11/2021     02/11/2021    CHOL 153 06/15/2020    TRIG 185 06/15/2020    HDL 42 06/15/2020    ALT 21 09/25/2020    AST 19 09/25/2020     (L) 02/11/2021    K 5.5 (H) 02/11/2021    CL 95 (L) 02/11/2021    CREATININE 0.8 02/11/2021    BUN 19 02/11/2021    CO2 27 02/11/2021    TSH 2.680 05/19/2020    INR 1.11 02/11/2021    LABA1C 7.6 (H) 02/01/2021   Echocardiogram (7/20/2020): LVEF 60%, mild mitral regurgitation, mild aortic stenosis, mild concentric left ventricle hypertrophy and mild left atrium. ECG today sinus rhythm. Normal NV interval, QRS duration QTc interval.  Stress test (7/1/2020): Lexiscan: Negative for reversible ischemia. Event monitor (7/1/2020): Episodes of atrial flutter and atrial fibrillation. IMPRESSION:  1.  Drug refractory and symptomatic persistent atrial fibrillation and atrial flutter s/p RF catheter ablation. [id number] score= 4 (age, gender hypertension diabetes mellitus). 2.  History of DVT status post IVC filter placement. 3.  Hypertension, controlled. 4.  Type 2 diabetes mellitus, no apparent complications. 5.  Homozygous for the MTHFR gene mutation. 6. History of cerebrovascular accident, no neurological deficit    ASSESSMENT AND PLAN:  The patient is doing well. She is currently asymptomatic. No recurrent palpitations. Groins are soft and nontender.   She had 2 brief episodes of atrial fibrillation during sleep, common following A. fib ablation. She will continue flecainide and metoprolol for another 2 months and then discontinue. She will continue anticoagulation lifelong for stroke prevention. Follow-up with Dr. Xavier Niño as scheduled. Thank you for allowing me to participate in the care of your patient. Please call me if you have any questions. **This report has been created using voice recognition software. It may contain minor errors which are inherent in voice recognition technology. **    Joseph Crocker MD, [facility], [facility], [facility] on 3/10/2021 at 10:00 AM

## 2021-03-10 ENCOUNTER — OFFICE VISIT (OUTPATIENT)
Dept: CARDIOLOGY CLINIC | Age: 76
End: 2021-03-10
Payer: MEDICARE

## 2021-03-10 VITALS
HEART RATE: 66 BPM | HEIGHT: 65 IN | SYSTOLIC BLOOD PRESSURE: 140 MMHG | OXYGEN SATURATION: 98 % | DIASTOLIC BLOOD PRESSURE: 71 MMHG | BODY MASS INDEX: 32.86 KG/M2 | WEIGHT: 197.2 LBS

## 2021-03-10 PROCEDURE — 99213 OFFICE O/P EST LOW 20 MIN: CPT | Performed by: INTERNAL MEDICINE

## 2021-03-10 PROCEDURE — 4040F PNEUMOC VAC/ADMIN/RCVD: CPT | Performed by: INTERNAL MEDICINE

## 2021-03-10 PROCEDURE — 3017F COLORECTAL CA SCREEN DOC REV: CPT | Performed by: INTERNAL MEDICINE

## 2021-03-10 PROCEDURE — 93000 ELECTROCARDIOGRAM COMPLETE: CPT | Performed by: INTERNAL MEDICINE

## 2021-03-10 PROCEDURE — G8484 FLU IMMUNIZE NO ADMIN: HCPCS | Performed by: INTERNAL MEDICINE

## 2021-03-10 PROCEDURE — 1036F TOBACCO NON-USER: CPT | Performed by: INTERNAL MEDICINE

## 2021-03-10 PROCEDURE — 1123F ACP DISCUSS/DSCN MKR DOCD: CPT | Performed by: INTERNAL MEDICINE

## 2021-03-10 PROCEDURE — G8417 CALC BMI ABV UP PARAM F/U: HCPCS | Performed by: INTERNAL MEDICINE

## 2021-03-10 PROCEDURE — G8399 PT W/DXA RESULTS DOCUMENT: HCPCS | Performed by: INTERNAL MEDICINE

## 2021-03-10 PROCEDURE — 1090F PRES/ABSN URINE INCON ASSESS: CPT | Performed by: INTERNAL MEDICINE

## 2021-03-10 PROCEDURE — G8427 DOCREV CUR MEDS BY ELIG CLIN: HCPCS | Performed by: INTERNAL MEDICINE

## 2021-03-22 ENCOUNTER — OFFICE VISIT (OUTPATIENT)
Dept: CARDIOLOGY CLINIC | Age: 76
End: 2021-03-22
Payer: MEDICARE

## 2021-03-22 VITALS
BODY MASS INDEX: 33.49 KG/M2 | DIASTOLIC BLOOD PRESSURE: 76 MMHG | HEART RATE: 62 BPM | WEIGHT: 201 LBS | HEIGHT: 65 IN | SYSTOLIC BLOOD PRESSURE: 128 MMHG

## 2021-03-22 DIAGNOSIS — E78.01 FAMILIAL HYPERCHOLESTEROLEMIA: ICD-10-CM

## 2021-03-22 DIAGNOSIS — I10 ESSENTIAL HYPERTENSION: Primary | ICD-10-CM

## 2021-03-22 DIAGNOSIS — I48.0 PAROXYSMAL ATRIAL FIBRILLATION (HCC): ICD-10-CM

## 2021-03-22 PROCEDURE — 99214 OFFICE O/P EST MOD 30 MIN: CPT | Performed by: NUCLEAR MEDICINE

## 2021-03-22 PROCEDURE — G8417 CALC BMI ABV UP PARAM F/U: HCPCS | Performed by: NUCLEAR MEDICINE

## 2021-03-22 PROCEDURE — 4040F PNEUMOC VAC/ADMIN/RCVD: CPT | Performed by: NUCLEAR MEDICINE

## 2021-03-22 PROCEDURE — G8484 FLU IMMUNIZE NO ADMIN: HCPCS | Performed by: NUCLEAR MEDICINE

## 2021-03-22 PROCEDURE — 1090F PRES/ABSN URINE INCON ASSESS: CPT | Performed by: NUCLEAR MEDICINE

## 2021-03-22 PROCEDURE — G8399 PT W/DXA RESULTS DOCUMENT: HCPCS | Performed by: NUCLEAR MEDICINE

## 2021-03-22 PROCEDURE — G8427 DOCREV CUR MEDS BY ELIG CLIN: HCPCS | Performed by: NUCLEAR MEDICINE

## 2021-03-22 PROCEDURE — 1123F ACP DISCUSS/DSCN MKR DOCD: CPT | Performed by: NUCLEAR MEDICINE

## 2021-03-22 PROCEDURE — 1036F TOBACCO NON-USER: CPT | Performed by: NUCLEAR MEDICINE

## 2021-03-22 PROCEDURE — 3017F COLORECTAL CA SCREEN DOC REV: CPT | Performed by: NUCLEAR MEDICINE

## 2021-03-22 NOTE — PROGRESS NOTES
[address]  [place] [address]  Dept: [phone]  Dept Fax: [phone]  Loc: [phone]    Visit Date: 3/22/2021    Fei Pride is a 78 y.o. female who presents todayfor:  Chief Complaint   Patient presents with    Check-Up    Atrial Fibrillation    Hypertension    Hyperlipidemia   had A fib ablation lately   Did well  Seeing hakim   Some pauses now  I think it is PACs  Some dizziness  No syncope  No chest pain   No changes in breathing        HPI:  HPI  Past Medical History:   Diagnosis Date    Arthritis     Atrial fibrillation ([address]     Cancer ([address] 2000    NON HODGKINS LYMPHOMA    Diabetes mellitus ([address]     Dixon filter in place     Homozygous for MTHFR gene mutation [facility]     Hx of blood clots 2000    LEGS    Hyperlipidemia     Hypertension     TRACY on CPAP     Prolonged emergence from general anesthesia     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    Thyroid disease       Past Surgical History:   Procedure Laterality Date    APPENDECTOMY      CARDIAC CATHETERIZATION      CHOLECYSTECTOMY      FRACTURE SURGERY      ankle surgery    FRACTURE SURGERY Right 09/2018    HUMERUS    HERNIA REPAIR      HYSTERECTOMY      PARATHYROID GLAND SURGERY N/A 10/1/2020    PARATHYROIDECTOMY, EXPLORATION OF PARATHYROIDS performed by Romel Aguirre MD at [address] OFFICE/OUTPT [address] [facility] Right 9/19/2018    ORIF RIGHT PROXIMAL HUMERUS FX performed by Presley Gaytan MD at [address] Right 6/27/2019    REVISION RT HUMERUS FX, HARDWARE REMOVAL, HUMERAL NAIL INSERTION performed by Nicole Ledezma MD at [address] [facility]       Family History   Problem Relation Age of Onset    Heart Disease Mother     Heart Disease Father     Cancer Sister     Other Sister      Social History     Tobacco Use    Smoking status: Never Smoker  Smokeless tobacco: Never Used   Substance Use Topics    Alcohol use: No      Current Outpatient Medications   Medication Sig Dispense Refill    glimepiride (AMARYL) 4 MG tablet Take 1 tablet by mouth 2 times daily 180 tablet 3    pantoprazole (PROTONIX) 40 MG tablet Take 1 tablet by mouth every morning (before breakfast) 30 tablet 3    alendronate (FOSAMAX) 70 MG tablet Take 1 tablet by mouth every 7 days 12 tablet 1    Calcium Carbonate-Vitamin D (OYSTER SHELL CALCIUM/D) 500-200 MG-UNIT TABS Take 1 tablet by mouth 2 times daily 180 tablet 5    vitamin D3 (CHOLECALCIFEROL) 25 MCG (1000 UT) TABS tablet Take 0.5 tablets by mouth daily (Patient taking differently: Take 1,000 Units by mouth daily ) 90 tablet 5    metFORMIN (GLUCOPHAGE-XR) 500 MG extended release tablet Take 2 tablets by mouth 2 times daily 360 tablet 3    levothyroxine (SYNTHROID) 112 MCG tablet Take 1 tablet by mouth Daily 90 tablet 3    metoprolol tartrate (LOPRESSOR) 50 MG tablet Take 1 tablet by mouth 2 times daily 180 tablet 3    apixaban (ELIQUIS) 5 MG TABS tablet Take 1 tablet by mouth 2 times daily 180 tablet 3    flecainide (TAMBOCOR) 50 MG tablet Take 1 tablet by mouth 2 times daily 120 tablet 3    aspirin 81 MG EC tablet Take 81 mg by mouth daily      Multiple Vitamins-Minerals (THERAPEUTIC MULTIVITAMIN-MINERALS) tablet Take 1 tablet by mouth daily      hydrALAZINE (APRESOLINE) 50 MG tablet Take 1 tablet by mouth 2 times daily 180 tablet 3    losartan (COZAAR) 100 MG tablet Take 1 tablet by mouth daily 90 tablet 3    KLOR-CON M10 10 MEQ extended release tablet Take 1 tablet by mouth daily 90 tablet 3    simvastatin (ZOCOR) 40 MG tablet TAKE 1 TABLET NIGHTLY 90 tablet 3    insulin glargine (BASAGLAR KWIKPEN) 100 UNIT/ML injection pen Inject 15 Units into the skin every morning (before breakfast) 5 pen 2    mupirocin (BACTROBAN) 2 % ointment Apply 2 times daily PRN.  1 Tube 1    vitamin B-12 (CYANOCOBALAMIN) 1000 MCG tablet Take 1,000 mcg by mouth daily      folic acid (FOLVITE) 1 MG tablet Take 3 mg by mouth 2 times daily       Multiple Vitamins-Minerals (ICAPS AREDS 2) CAPS Take by mouth 2 times daily       No current facility-administered medications for this visit. No Known Allergies  Health Maintenance   Topic Date Due    Hepatitis C screen  Never done    Diabetic foot exam  Never done    DTaP/Tdap/Td vaccine (1 - Tdap) 02/26/1966    Shingles Vaccine (1 of 2) Never done    Diabetic retinal exam  04/17/2020    TSH testing  05/19/2021    Lipid screen  06/15/2021    A1C test (Diabetic or Prediabetic)  02/01/2022    Annual Wellness Visit (AWV)  02/02/2022    Potassium monitoring  02/11/2022    Creatinine monitoring  02/11/2022    Breast cancer screen  01/26/2023    Colon cancer screen colonoscopy  07/02/2025    DEXA (modify frequency per FRAX score)  Completed    Flu vaccine  Completed    Pneumococcal 65+ years Vaccine  Completed    COVID-19 Vaccine  Completed    Hepatitis A vaccine  Aged Out    Hib vaccine  Aged Out    Meningococcal (ACWY) vaccine  Aged Out       Subjective:  Review of Systems  General:   No fever, no chills, No fatigue or weight loss  Pulmonary:    No dyspnea, no wheezing  Cardiac:    Denies recent chest pain,   GI:     No nausea or vomiting, no abdominal pain  Neuro:    No dizziness or light headedness,   Musculoskeletal:  No recent active issues  Extremities:   No edema, no obvious claudication       Objective:  Physical Exam  /76   Pulse 62   Ht 5' 5\" (1.651 m)   Wt 201 lb (91.2 kg)   BMI 33.45 kg/m²   General:   Well developed, well nourished  Lungs:   Clear to auscultation  Heart:    Normal S1 S2, Slight murmur. no rubs, no gallops  Abdomen:   Soft, non tender, no organomegalies, positive bowel sounds  Extremities:   No edema, no cyanosis, good peripheral pulses  Neurological:   Awake, alert, oriented.  No obvious focal deficits  Musculoskelatal:  No obvious deformities    Assessment:      Diagnosis Orders   1. Essential hypertension     2. Paroxysmal atrial fibrillation (HCC)     3. Familial hypercholesterolemia     as above  Possible PACs PVcs   S/p a fib ablation   Plan:  No follow-ups on file. Change metoprolol 25 bid  Consider a follow up holter or event   Continue risk factor modification and medical management  Thank you for allowing me to participate in the care of your patient. Please don't hesitate to contact me regarding any further issues related to the patient care    Orders Placed:  No orders of the defined types were placed in this encounter. Medications Prescribed:  No orders of the defined types were placed in this encounter. Discussed use, benefit, and side effects of prescribed medications. All patient questions answered. Pt voicedunderstanding. Instructed to continue current medications, diet and exercise. Continue risk factor modification and medical management. Patient agreed with treatment plan. Follow up as directed.     Electronically signedby Adile Whitlock MD on 3/22/2021 at 8:16 AM

## 2021-04-19 ENCOUNTER — OFFICE VISIT (OUTPATIENT)
Dept: ENT CLINIC | Age: 76
End: 2021-04-19
Payer: MEDICARE

## 2021-04-19 ENCOUNTER — NURSE ONLY (OUTPATIENT)
Dept: LAB | Age: 76
End: 2021-04-19

## 2021-04-19 VITALS
BODY MASS INDEX: 32.84 KG/M2 | WEIGHT: 197.1 LBS | RESPIRATION RATE: 14 BRPM | HEIGHT: 65 IN | SYSTOLIC BLOOD PRESSURE: 128 MMHG | TEMPERATURE: 96.4 F | DIASTOLIC BLOOD PRESSURE: 74 MMHG | HEART RATE: 76 BPM

## 2021-04-19 DIAGNOSIS — D35.1: ICD-10-CM

## 2021-04-19 DIAGNOSIS — M85.88 OSTEOPENIA OF SPINE: Primary | ICD-10-CM

## 2021-04-19 DIAGNOSIS — M85.88 OSTEOPENIA OF SPINE: ICD-10-CM

## 2021-04-19 LAB
CALCIUM SERPL-MCNC: 10.2 MG/DL (ref 8.5–10.5)
PTH INTACT: 15.7 PG/ML (ref 15–65)

## 2021-04-19 PROCEDURE — 1123F ACP DISCUSS/DSCN MKR DOCD: CPT | Performed by: OTOLARYNGOLOGY

## 2021-04-19 PROCEDURE — 4040F PNEUMOC VAC/ADMIN/RCVD: CPT | Performed by: OTOLARYNGOLOGY

## 2021-04-19 PROCEDURE — 99213 OFFICE O/P EST LOW 20 MIN: CPT | Performed by: OTOLARYNGOLOGY

## 2021-04-19 PROCEDURE — 1036F TOBACCO NON-USER: CPT | Performed by: OTOLARYNGOLOGY

## 2021-04-19 PROCEDURE — G8399 PT W/DXA RESULTS DOCUMENT: HCPCS | Performed by: OTOLARYNGOLOGY

## 2021-04-19 PROCEDURE — G8427 DOCREV CUR MEDS BY ELIG CLIN: HCPCS | Performed by: OTOLARYNGOLOGY

## 2021-04-19 PROCEDURE — G8417 CALC BMI ABV UP PARAM F/U: HCPCS | Performed by: OTOLARYNGOLOGY

## 2021-04-19 PROCEDURE — 1090F PRES/ABSN URINE INCON ASSESS: CPT | Performed by: OTOLARYNGOLOGY

## 2021-04-19 PROCEDURE — 3017F COLORECTAL CA SCREEN DOC REV: CPT | Performed by: OTOLARYNGOLOGY

## 2021-04-19 NOTE — PROGRESS NOTES
HYSTERECTOMY      PARATHYROID GLAND SURGERY N/A 10/1/2020    PARATHYROIDECTOMY, EXPLORATION OF PARATHYROIDS performed by Leta Heimlich, MD at [address] [facility] OFFICE/OUTPT [address] Right 9/19/2018    ORIF RIGHT PROXIMAL HUMERUS FX performed by Sydell Carrel, MD at [address] Right 6/27/2019    REVISION RT HUMERUS FX, HARDWARE REMOVAL, HUMERAL NAIL INSERTION performed by Espinoza Vera MD at [address] [facility]       Family History   Problem Relation Age of Onset    Heart Disease Mother     Heart Disease Father     Cancer Sister     Other Sister      Social History     Tobacco Use    Smoking status: Never Smoker    Smokeless tobacco: Never Used   Substance Use Topics    Alcohol use: No        Subjective:      Review of Systems  Rest of review of systems are negative, except as noted in HPI. Objective:     /74 (Site: Left Upper Arm, Position: Sitting)   Pulse 76   Temp 96.4 °F (35.8 °C) (Infrared)   Resp 14   Ht 5' 5\" (1.651 m)   Wt 197 lb 1.6 oz (89.4 kg)   BMI 32.80 kg/m²     Physical Exam       General physical exam the patient is a pleasant alert well oriented and cooperative older adult female in no acute distress. Her neck is essentially within normal limits for her age and gender. Only on careful examination could I see where her scar was and it was virtually invisible. Vitals reviewed. No results found.    Lab Results   Component Value Date     02/11/2021     10/02/2020     10/01/2020    K 5.5 02/11/2021    K 4.7 10/02/2020    K 4.9 10/01/2020    K 5.2 10/01/2020    K 3.9 09/20/2018    K 4.2 09/18/2018    CL 95 02/11/2021    CL 99 10/02/2020    CL 98 10/01/2020    CO2 27 02/11/2021    CO2 24 10/02/2020    CO2 26 10/01/2020    BUN 19 02/11/2021    BUN 18 10/02/2020    BUN 16 10/01/2020    CREATININE 0.8 02/11/2021    CREATININE 0.8 10/02/2020    CREATININE 0.6 10/01/2020    CALCIUM 10.8 02/11/2021    CALCIUM 9.8 10/06/2020    CALCIUM 9.6 10/02/2020    PROT 7.4 09/25/2020    PROT 7.3 06/15/2020    PROT 7.8 06/20/2019    LABALBU 4.4 09/25/2020    LABALBU 4.7 06/15/2020    LABALBU 4.7 06/20/2019    LABALBU 4.4 02/27/2012    BILITOT 0.6 09/25/2020    BILITOT 0.6 06/15/2020    BILITOT 0.6 06/20/2019    ALKPHOS 49 09/25/2020    ALKPHOS 52 06/15/2020    ALKPHOS 53 06/20/2019    AST 19 09/25/2020    AST 33 06/15/2020    AST 32 06/20/2019    ALT 21 09/25/2020    ALT 35 06/15/2020    ALT 35 06/20/2019       All of the past medical history, past surgical history, family history,social history, allergies and current medications were reviewed with the patient. Assessment & Plan   Diagnoses and all orders for this visit:     Diagnosis Orders   1. Osteopenia of spine  Calcium    PTH, Intact   2. 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 She does need an interval calcium and PTH to make sure that she is not developing a new adenoma. This is unlikely but not unheard of. Assuming these tests are within normal limits, the patient can change to as needed follow-up for me either for the same problem or any new ENT problem that she might have. I answered her questions and addressed her concerns. She reported being pleased with the outcome of her care thus far and being willing to proceed as such. Return if symptoms worsen or fail to improve; or new ENT problems. **This report has been created using voice recognition software. It may contain minor errors which are inherent in voice recognition technology. **

## 2021-06-08 ENCOUNTER — TELEPHONE (OUTPATIENT)
Dept: CARDIOLOGY CLINIC | Age: 76
End: 2021-06-08

## 2021-06-08 NOTE — TELEPHONE ENCOUNTER
Pre op Risk Assessment    Procedure colonoscopy  Physician Gastro inte  Date of surgery/procedure   Last OV 3-  Last Stress 7/1/2020  Last Echo 7/1/2020   Last Cath 2-  Last Stent   Is patient on blood thinners Asa  Hold Meds/how many days

## 2021-07-22 ENCOUNTER — PATIENT MESSAGE (OUTPATIENT)
Dept: FAMILY MEDICINE CLINIC | Age: 76
End: 2021-07-22

## 2021-07-22 DIAGNOSIS — E11.69 TYPE 2 DIABETES MELLITUS WITH OTHER SPECIFIED COMPLICATION, WITH LONG-TERM CURRENT USE OF INSULIN (HCC): Primary | ICD-10-CM

## 2021-07-22 DIAGNOSIS — Z79.4 TYPE 2 DIABETES MELLITUS WITH OTHER SPECIFIED COMPLICATION, WITH LONG-TERM CURRENT USE OF INSULIN (HCC): Primary | ICD-10-CM

## 2021-07-26 ENCOUNTER — TELEPHONE (OUTPATIENT)
Dept: CARDIOLOGY CLINIC | Age: 76
End: 2021-07-26

## 2021-07-26 NOTE — TELEPHONE ENCOUNTER
Left msg for pt to call office to reschedule follow up scheduled 03-28-22 due to dr Paty Lozada having a mtg scheduled

## 2021-07-29 ENCOUNTER — NURSE ONLY (OUTPATIENT)
Dept: LAB | Age: 76
End: 2021-07-29

## 2021-07-29 DIAGNOSIS — Z79.4 TYPE 2 DIABETES MELLITUS WITH OTHER SPECIFIED COMPLICATION, WITH LONG-TERM CURRENT USE OF INSULIN (HCC): ICD-10-CM

## 2021-07-29 DIAGNOSIS — E11.69 TYPE 2 DIABETES MELLITUS WITH OTHER SPECIFIED COMPLICATION, WITH LONG-TERM CURRENT USE OF INSULIN (HCC): ICD-10-CM

## 2021-07-29 LAB
ALBUMIN SERPL-MCNC: 4.5 G/DL (ref 3.5–5.1)
ALP BLD-CCNC: 45 U/L (ref 38–126)
ALT SERPL-CCNC: 26 U/L (ref 11–66)
ANION GAP SERPL CALCULATED.3IONS-SCNC: 16 MEQ/L (ref 8–16)
AST SERPL-CCNC: 25 U/L (ref 5–40)
AVERAGE GLUCOSE: 168 MG/DL (ref 70–126)
BILIRUB SERPL-MCNC: 0.5 MG/DL (ref 0.3–1.2)
BUN BLDV-MCNC: 20 MG/DL (ref 7–22)
CALCIUM SERPL-MCNC: 10.2 MG/DL (ref 8.5–10.5)
CHLORIDE BLD-SCNC: 91 MEQ/L (ref 98–111)
CHOLESTEROL, TOTAL: 146 MG/DL (ref 100–199)
CO2: 23 MEQ/L (ref 23–33)
CREAT SERPL-MCNC: 0.7 MG/DL (ref 0.4–1.2)
GFR SERPL CREATININE-BSD FRML MDRD: 82 ML/MIN/1.73M2
GLUCOSE BLD-MCNC: 199 MG/DL (ref 70–108)
HBA1C MFR BLD: 7.6 % (ref 4.4–6.4)
HDLC SERPL-MCNC: 44 MG/DL
LDL CHOLESTEROL CALCULATED: 58 MG/DL
POTASSIUM SERPL-SCNC: 5.1 MEQ/L (ref 3.5–5.2)
SODIUM BLD-SCNC: 130 MEQ/L (ref 135–145)
TOTAL PROTEIN: 7 G/DL (ref 6.1–8)
TRIGL SERPL-MCNC: 219 MG/DL (ref 0–199)

## 2021-08-05 ENCOUNTER — OFFICE VISIT (OUTPATIENT)
Dept: FAMILY MEDICINE CLINIC | Age: 76
End: 2021-08-05
Payer: MEDICARE

## 2021-08-05 VITALS
BODY MASS INDEX: 33.22 KG/M2 | SYSTOLIC BLOOD PRESSURE: 151 MMHG | HEIGHT: 65 IN | TEMPERATURE: 97.5 F | RESPIRATION RATE: 16 BRPM | WEIGHT: 199.4 LBS | OXYGEN SATURATION: 98 % | HEART RATE: 86 BPM | DIASTOLIC BLOOD PRESSURE: 78 MMHG

## 2021-08-05 DIAGNOSIS — E03.8 OTHER SPECIFIED HYPOTHYROIDISM: ICD-10-CM

## 2021-08-05 DIAGNOSIS — I10 ESSENTIAL HYPERTENSION: ICD-10-CM

## 2021-08-05 DIAGNOSIS — E11.69 TYPE 2 DIABETES MELLITUS WITH OTHER SPECIFIED COMPLICATION, WITH LONG-TERM CURRENT USE OF INSULIN (HCC): Primary | ICD-10-CM

## 2021-08-05 DIAGNOSIS — I48.20 CHRONIC ATRIAL FIBRILLATION (HCC): ICD-10-CM

## 2021-08-05 DIAGNOSIS — Z79.4 TYPE 2 DIABETES MELLITUS WITH OTHER SPECIFIED COMPLICATION, WITH LONG-TERM CURRENT USE OF INSULIN (HCC): Primary | ICD-10-CM

## 2021-08-05 PROCEDURE — G8427 DOCREV CUR MEDS BY ELIG CLIN: HCPCS | Performed by: FAMILY MEDICINE

## 2021-08-05 PROCEDURE — 1090F PRES/ABSN URINE INCON ASSESS: CPT | Performed by: FAMILY MEDICINE

## 2021-08-05 PROCEDURE — 3017F COLORECTAL CA SCREEN DOC REV: CPT | Performed by: FAMILY MEDICINE

## 2021-08-05 PROCEDURE — 99214 OFFICE O/P EST MOD 30 MIN: CPT | Performed by: FAMILY MEDICINE

## 2021-08-05 PROCEDURE — 2022F DILAT RTA XM EVC RTNOPTHY: CPT | Performed by: FAMILY MEDICINE

## 2021-08-05 PROCEDURE — 1036F TOBACCO NON-USER: CPT | Performed by: FAMILY MEDICINE

## 2021-08-05 PROCEDURE — 1123F ACP DISCUSS/DSCN MKR DOCD: CPT | Performed by: FAMILY MEDICINE

## 2021-08-05 PROCEDURE — G8399 PT W/DXA RESULTS DOCUMENT: HCPCS | Performed by: FAMILY MEDICINE

## 2021-08-05 PROCEDURE — G8417 CALC BMI ABV UP PARAM F/U: HCPCS | Performed by: FAMILY MEDICINE

## 2021-08-05 PROCEDURE — 3051F HG A1C>EQUAL 7.0%<8.0%: CPT | Performed by: FAMILY MEDICINE

## 2021-08-05 PROCEDURE — 4040F PNEUMOC VAC/ADMIN/RCVD: CPT | Performed by: FAMILY MEDICINE

## 2021-08-05 RX ORDER — METOPROLOL TARTRATE 50 MG/1
50 TABLET, FILM COATED ORAL 2 TIMES DAILY
Qty: 180 TABLET | Refills: 3 | Status: SHIPPED | OUTPATIENT
Start: 2021-08-05 | End: 2022-03-04 | Stop reason: SDUPTHER

## 2021-08-05 SDOH — ECONOMIC STABILITY: FOOD INSECURITY: WITHIN THE PAST 12 MONTHS, THE FOOD YOU BOUGHT JUST DIDN'T LAST AND YOU DIDN'T HAVE MONEY TO GET MORE.: NEVER TRUE

## 2021-08-05 SDOH — ECONOMIC STABILITY: FOOD INSECURITY: WITHIN THE PAST 12 MONTHS, YOU WORRIED THAT YOUR FOOD WOULD RUN OUT BEFORE YOU GOT MONEY TO BUY MORE.: NEVER TRUE

## 2021-08-05 ASSESSMENT — SOCIAL DETERMINANTS OF HEALTH (SDOH): HOW HARD IS IT FOR YOU TO PAY FOR THE VERY BASICS LIKE FOOD, HOUSING, MEDICAL CARE, AND HEATING?: NOT HARD AT ALL

## 2021-08-05 NOTE — PROGRESS NOTES
Yeison Brooke is a 78 y.o. female that presents for     Chief Complaint   Patient presents with    6 Month Follow-Up    Arm Pain     right arm       BP (!) 151/78   Pulse 86   Temp 97.5 °F (36.4 °C) (Infrared)   Resp 16   Ht 5' 5\" (1.651 m)   Wt 199 lb 6.4 oz (90.4 kg)   SpO2 98%   BMI 33.18 kg/m²       HPI:    Shoulder Pain    HPI:    Symptoms have been present for 5+ month(s) in the right shoulder. The pain is intermittent. The patient describes the pain as sharp / stabbing. Inciting injury or history of trauma? Yes - started after she fractured her arm  Pain is relieved by - movement at the shoulder  Radiation of the pain? No  Paresthesias of the extremities? No  Decreased ROM? No      HTN    Does patient check BP regularly at home? - Not recently  Current Medication regimen - metoprolol, losartan, hydralazine  Tolerating medications well? - yes    Shortness of breath or chest pain? No  Headache or visual complaints? No  Neurologic changes like confusion? No  Extremity edema? No    BP Readings from Last 3 Encounters:   08/05/21 (!) 151/78   04/19/21 128/74   03/22/21 128/76         Diabetes Type 2    Glucose control:   Does patient check blood glucoses at home? Yes - FBG stable  Report of hypoglycemia: no  Lab Results   Component Value Date    LABA1C 7.6 (H) 07/29/2021     No results found for: EAG    Symptoms  Polyuria, Polydipsia or Polyphagia? No  Chest Pain, SOB, or Palpitations? -  No  New Vision complaints? No  Paresthesias of the extremities? No    Medications  Current medication were reviewed. Compliant with medications? yes  Medication side effects? No  On ACE-I or ARB? Yes  On antiplatelet therapy? Yes  On Statin? Yes      Exercise  Exercise?  Trying to increase, walking regularly  Wt Readings from Last 3 Encounters:   08/05/21 199 lb 6.4 oz (90.4 kg)   04/19/21 197 lb 1.6 oz (89.4 kg)   03/22/21 201 lb (91.2 kg)       Diet discipline?:  Low salt, fat, sugar diet?  no    Blood pressure control:  BP Readings from Last 3 Encounters:   08/05/21 (!) 151/78   04/19/21 128/74   03/22/21 128/76       No results found for: Lisseth Hewitt [id number]    Lab Results   Component Value Date    [facility] 07/29/2021         Health Maintenance   Topic Date Due    Hepatitis C screen  Never done    Diabetic foot exam  Never done    Shingles Vaccine (1 of 2) Never done    DTaP/Tdap/Td vaccine (1 - Tdap) 08/22/2002    Diabetic retinal exam  04/17/2020    TSH testing  05/19/2021    Flu vaccine (1) 09/01/2021    Annual Wellness Visit (AWV)  02/02/2022    A1C test (Diabetic or Prediabetic)  07/29/2022    Lipid screen  07/29/2022    Potassium monitoring  07/29/2022    Creatinine monitoring  07/29/2022    Breast cancer screen  01/26/2023    Colon cancer screen colonoscopy  06/25/2031    DEXA (modify frequency per FRAX score)  Completed    Pneumococcal 65+ years Vaccine  Completed    COVID-19 Vaccine  Completed    Hepatitis A vaccine  Aged Out    Hib vaccine  Aged Out    Meningococcal (ACWY) vaccine  Aged Out       Past Medical History:   Diagnosis Date    Arthritis     Atrial fibrillation ([address]     Cancer ([address] 2000    NON HODGKINS LYMPHOMA    Diabetes mellitus ([address]     Deepika filter in place     Homozygous for MTHFR gene mutation     Hx of blood clots 2000    LEGS    Hyperlipidemia     Hypertension     TRACY on CPAP     Prolonged emergence from general anesthesia     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    Thyroid disease        Past Surgical History:   Procedure Laterality Date    APPENDECTOMY      CARDIAC CATHETERIZATION      CARDIAC SURGERY  02/2021    ablation     CHOLECYSTECTOMY      FRACTURE SURGERY      ankle surgery    FRACTURE SURGERY Right 09/2018    HUMERUS    HERNIA REPAIR      HYSTERECTOMY      PARATHYROID GLAND SURGERY N/A 10/1/2020    PARATHYROIDECTOMY, EXPLORATION OF PARATHYROIDS performed by Germaine Gutiérrez MD at [address] OFFICE/OUTPT VISIT,PROCEDURE ONLY Right 9/19/2018    ORIF RIGHT PROXIMAL HUMERUS FX performed by Katelynn Penny MD at [address] Right 6/27/2019    REVISION RT HUMERUS FX, HARDWARE REMOVAL, HUMERAL NAIL INSERTION performed by Daja Tidwell MD at [address] [facility]         Social History     Tobacco Use    Smoking status: Never Smoker    Smokeless tobacco: Never Used   Vaping Use    Vaping Use: Never used   Substance Use Topics    Alcohol use: No    Drug use: No       Family History   Problem Relation Age of Onset    Heart Disease Mother     Heart Disease Father     Cancer Sister     Other Sister          I have reviewed the patient's past medical history, past surgical history, allergies, medications, social and family history and I have made updates where appropriate.     Review of Systems        PHYSICAL EXAM:  BP (!) 151/78   Pulse 86   Temp 97.5 °F (36.4 °C) (Infrared)   Resp 16   Ht 5' 5\" (1.651 m)   Wt 199 lb 6.4 oz (90.4 kg)   SpO2 98%   BMI 33.18 kg/m²     General Appearance: well developed and well- nourished, in no acute distress  Head: normocephalic and atraumatic  ENT: external ear and ear canal normal bilaterally, nose without deformity, nasal mucosa and turbinates normal without polyps, oropharynx normal, dentition is normal for age, no lipor gum lesions noted  Neck: supple and non-tender without mass, no thyromegaly or thyroid nodules, no cervical lymphadenopathy  Pulmonary/Chest: clear to auscultation bilaterally- no wheezes, rales or rhonchi, normal air movement, no respiratory distress or retractions  Cardiovascular: normal rate, regular rhythm, normal S1 and S2, no murmurs, rubs, clicks, or gallops, distal pulses intact  Extremities: no cyanosis, clubbing or edema of the lower extremities  Psych:  Normal affect without evidence of depression or anxiety, insight and judgement are appropriate, memory appears intact  Skin: warm and dry      ASSESSMENT & PLAN  Joseph Guevara was seen today for 6 month follow-up and arm pain. Diagnoses and all orders for this visit:    Type 2 diabetes mellitus with other specified complication, with long-term current use of insulin (HCC)    Essential hypertension  -     metoprolol tartrate (LOPRESSOR) 50 MG tablet; Take 1 tablet by mouth 2 times daily    Other specified hypothyroidism    Chronic atrial fibrillation (HCC)  -     metoprolol tartrate (LOPRESSOR) 50 MG tablet; Take 1 tablet by mouth 2 times daily         -Increase metoprolol to 50mg BID for uncontrolled BP  -Follow up with Ortho for right shoulder pain  -DM2 reasonably controlled goal 7.5, continue current regimen  -Chronic issues stable, continue current medications  -Advised to call if any issues    Return in about 6 months (around 2/5/2022). Controlled Substance Monitoring:    Acute and Chronic Pain Monitoring:   No flowsheet data found. Kristal received counseling on the following healthy behaviors: medication adherence  Reviewed prior labs and health maintenance. Continue current medications, diet and exercise. Discussed use, benefit, and side effects of prescribed medications. Barriers to medication compliance addressed. Patient given educational materials - see patient instructions. All patient questions answered. Patient voiced understanding.

## 2021-08-13 ENCOUNTER — OFFICE VISIT (OUTPATIENT)
Dept: FAMILY MEDICINE CLINIC | Age: 76
End: 2021-08-13
Payer: MEDICARE

## 2021-08-13 ENCOUNTER — CARE COORDINATION (OUTPATIENT)
Dept: CARE COORDINATION | Age: 76
End: 2021-08-13

## 2021-08-13 DIAGNOSIS — Z79.4 TYPE 2 DIABETES MELLITUS WITH OTHER SPECIFIED COMPLICATION, WITH LONG-TERM CURRENT USE OF INSULIN (HCC): Primary | ICD-10-CM

## 2021-08-13 DIAGNOSIS — I10 ESSENTIAL HYPERTENSION: ICD-10-CM

## 2021-08-13 DIAGNOSIS — E11.69 TYPE 2 DIABETES MELLITUS WITH OTHER SPECIFIED COMPLICATION, WITH LONG-TERM CURRENT USE OF INSULIN (HCC): Primary | ICD-10-CM

## 2021-08-13 DIAGNOSIS — I48.20 CHRONIC ATRIAL FIBRILLATION (HCC): ICD-10-CM

## 2021-08-13 PROCEDURE — 99215 OFFICE O/P EST HI 40 MIN: CPT | Performed by: FAMILY MEDICINE

## 2021-08-13 PROCEDURE — 1123F ACP DISCUSS/DSCN MKR DOCD: CPT | Performed by: FAMILY MEDICINE

## 2021-08-13 PROCEDURE — 3017F COLORECTAL CA SCREEN DOC REV: CPT | Performed by: FAMILY MEDICINE

## 2021-08-13 PROCEDURE — G8399 PT W/DXA RESULTS DOCUMENT: HCPCS | Performed by: FAMILY MEDICINE

## 2021-08-13 PROCEDURE — 1036F TOBACCO NON-USER: CPT | Performed by: FAMILY MEDICINE

## 2021-08-13 PROCEDURE — 4040F PNEUMOC VAC/ADMIN/RCVD: CPT | Performed by: FAMILY MEDICINE

## 2021-08-13 PROCEDURE — 2022F DILAT RTA XM EVC RTNOPTHY: CPT | Performed by: FAMILY MEDICINE

## 2021-08-13 PROCEDURE — G8417 CALC BMI ABV UP PARAM F/U: HCPCS | Performed by: FAMILY MEDICINE

## 2021-08-13 PROCEDURE — 3051F HG A1C>EQUAL 7.0%<8.0%: CPT | Performed by: FAMILY MEDICINE

## 2021-08-13 PROCEDURE — G8428 CUR MEDS NOT DOCUMENT: HCPCS | Performed by: FAMILY MEDICINE

## 2021-08-13 PROCEDURE — 1090F PRES/ABSN URINE INCON ASSESS: CPT | Performed by: FAMILY MEDICINE

## 2021-08-13 SDOH — ECONOMIC STABILITY: INCOME INSECURITY: IN THE LAST 12 MONTHS, WAS THERE A TIME WHEN YOU WERE NOT ABLE TO PAY THE MORTGAGE OR RENT ON TIME?: NO

## 2021-08-13 SDOH — HEALTH STABILITY: PHYSICAL HEALTH: ON AVERAGE, HOW MANY MINUTES DO YOU ENGAGE IN EXERCISE AT THIS LEVEL?: 0 MIN

## 2021-08-13 SDOH — ECONOMIC STABILITY: TRANSPORTATION INSECURITY
IN THE PAST 12 MONTHS, HAS THE LACK OF TRANSPORTATION KEPT YOU FROM MEDICAL APPOINTMENTS OR FROM GETTING MEDICATIONS?: NO

## 2021-08-13 SDOH — ECONOMIC STABILITY: TRANSPORTATION INSECURITY
IN THE PAST 12 MONTHS, HAS LACK OF TRANSPORTATION KEPT YOU FROM MEETINGS, WORK, OR FROM GETTING THINGS NEEDED FOR DAILY LIVING?: NO

## 2021-08-13 SDOH — HEALTH STABILITY: PHYSICAL HEALTH: ON AVERAGE, HOW MANY DAYS PER WEEK DO YOU ENGAGE IN MODERATE TO STRENUOUS EXERCISE (LIKE A BRISK WALK)?: 0 DAYS

## 2021-08-13 SDOH — ECONOMIC STABILITY: HOUSING INSECURITY
IN THE LAST 12 MONTHS, WAS THERE A TIME WHEN YOU DID NOT HAVE A STEADY PLACE TO SLEEP OR SLEPT IN A SHELTER (INCLUDING NOW)?: NO

## 2021-08-13 ASSESSMENT — PATIENT HEALTH QUESTIONNAIRE - PHQ9
2. FEELING DOWN, DEPRESSED OR HOPELESS: 0
SUM OF ALL RESPONSES TO PHQ9 QUESTIONS 1 & 2: 0
SUM OF ALL RESPONSES TO PHQ QUESTIONS 1-9: 0
1. LITTLE INTEREST OR PLEASURE IN DOING THINGS: 0

## 2021-08-13 ASSESSMENT — SOCIAL DETERMINANTS OF HEALTH (SDOH)
DO YOU BELONG TO ANY CLUBS OR ORGANIZATIONS SUCH AS CHURCH GROUPS UNIONS, FRATERNAL OR ATHLETIC GROUPS, OR SCHOOL GROUPS?: NO
IN A TYPICAL WEEK, HOW MANY TIMES DO YOU TALK ON THE PHONE WITH FAMILY, FRIENDS, OR NEIGHBORS?: MORE THAN THREE TIMES A WEEK
HOW OFTEN DO YOU ATTEND CHURCH OR RELIGIOUS SERVICES?: MORE THAN 4 TIMES PER YEAR
HOW OFTEN DO YOU GET TOGETHER WITH FRIENDS OR RELATIVES?: MORE THAN THREE TIMES A WEEK
HOW OFTEN DO YOU ATTENT MEETINGS OF THE CLUB OR ORGANIZATION YOU BELONG TO?: NEVER

## 2021-08-13 ASSESSMENT — LIFESTYLE VARIABLES: HOW OFTEN DO YOU HAVE A DRINK CONTAINING ALCOHOL: NEVER

## 2021-08-13 NOTE — PROGRESS NOTES
F[facility] ON YOU VISIT  08/13/21      Chief Complaint   Patient presents with    Other     Nettie         Participants:  Talitha Baumgarten DO, Charmaine Hernandez (Care Coordinator), Jesi Alan      Patient presents for a comprehensive review of their care. Issues addressed today include Social Determinants of Health, Advanced Care Planning, recent utilization and review of their health issues. What are your goals for your health in the next year? Thena Hockey out of the hospital.  No more falls. Keeping my sugar under control\"    What is your biggest concern for your health in the next year? \"\"I'm afraid of falling\", does note some imbalance and decreased strength      HPI    Jesi Alan reports the following acute issues today:  Concerns for falling    Has patient had any ER visits in the past 12 months?  no    Has patient had any hospital admissions in the past 12 months?  no    Patient was provided guidance on reducing unnecessary utilization and an ER avoidance plan.         Medications    Current Outpatient Medications   Medication Sig Dispense Refill    metoprolol tartrate (LOPRESSOR) 50 MG tablet Take 1 tablet by mouth 2 times daily 180 tablet 3    simvastatin (ZOCOR) 40 MG tablet TAKE 1 TABLET NIGHTLY 90 tablet 3    alendronate (FOSAMAX) 70 MG tablet Take 1 tablet by mouth every 7 days 12 tablet 3    insulin glargine (BASAGLAR KWIKPEN) 100 UNIT/ML injection pen Inject 15 Units into the skin every morning (before breakfast) 5 pen 5    glimepiride (AMARYL) 4 MG tablet Take 1 tablet by mouth 2 times daily 180 tablet 3    Calcium Carbonate-Vitamin D (OYSTER SHELL CALCIUM/D) 500-200 MG-UNIT TABS Take 1 tablet by mouth 2 times daily 180 tablet 5    vitamin D3 (CHOLECALCIFEROL) 25 MCG (1000 UT) TABS tablet Take 0.5 tablets by mouth daily (Patient taking differently: Take 1,000 Units by mouth daily ) 90 tablet 5    metFORMIN (GLUCOPHAGE-XR) 500 MG extended release tablet Take 2 tablets by mouth 2 times daily 360 tablet 3    levothyroxine (SYNTHROID) 112 MCG tablet Take 1 tablet by mouth Daily 90 tablet 3    apixaban (ELIQUIS) 5 MG TABS tablet Take 1 tablet by mouth 2 times daily 180 tablet 3    aspirin 81 MG EC tablet Take 81 mg by mouth daily      hydrALAZINE (APRESOLINE) 50 MG tablet Take 1 tablet by mouth 2 times daily 180 tablet 3    losartan (COZAAR) 100 MG tablet Take 1 tablet by mouth daily 90 tablet 3    vitamin B-12 (CYANOCOBALAMIN) 1000 MCG tablet Take 1,000 mcg by mouth daily      folic acid (FOLVITE) 1 MG tablet Take 3 mg by mouth 2 times daily        No current facility-administered medications for this visit. Patients medications were reviewed with them today. Are you having any difficulty with the cost of your medications? Yes, eliquis    Are you taking all of your medications? Yes    Has patient had any recent medication changes? reviewed today    Do you understand why you are on each of your medications? Yes    Has patient had a pharmacy referral in the previous year? No        Social Determinants of Health    PHQ2/9 completed?  normal    SBIRT Screening completed?  normal    Mental Health needs identified - none    Does patient use tobacco?  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     What is the highest level of school you have completed or the highest degree you have received? Some college    How hard is it for you to pay for the very basics like food, housing, medical care, and heating? Not hard at all    Within the past 12 months, how often were you concerned that your food would run out before you had money to buy more? Never    Within the past 12 months, how often has the food you bought not last and you didn't have money to get more? Never    In the past 12 months, how often has lack of transportation kept you from medical appointments or from getting medication?   Never    In the past 12 months, has lack of transportation kept you from meetings, work, or getting things needed for daily living? Never    How often do you feel stressed, tense, restless, nervous, anxious, or unable to sleep at night?  'occasionally'    In a typical week, how often do you meet or talk with friends or family?  more than three times per week    How many times per month do you attend religous services? Every week    Do you belong to any clubs or organizations such as Restorationism groups, unions, fraternal or athletic groups, or school groups? Yes    Within the last year, have you been afraid of anyone in your life? No    Within the last year, have you been humiliated or emotionally abused in other ways by anyone in your life? No    Within the last year, have you been kicked, hit, slapped, or otherwise physically hurt by anyone in your life? No        ACP     Code status:  Full Code     Living Will:  Patient does have a living will. Healthcare decision maker was updated or verified in chart today. CPR:  In the event sudden cardiac or respiratory arrest, she does wish to have CPR performed. Intubation:  If patient were to be in a situation in which mechanical ventilation may be considered, she  does wish to be intubated and have mechanical ventilation. Has patient been referred to palliative care? No     Time spent today discussing advanced care planning:  <16 minutes (Non-Billable)      Ambulatory Sensitive Conditions    Does patient have a history of COPD, CHF or Diabetes? yes - DM    DM2    Lab Results   Component Value Date    LABA1C 7.6 (H) 07/29/2021       Does patient check blood sugars at home? Yes  Does patient follow with the DM2 clinic or had diabetes education? Yes  Would patient benefit from a pharmacy referral?  No    Is the patient on the following medications?     ASA -  Yes  ACE/ARB -  Yes  Statin -  Yes        Experience of Care    'Do you feel that all of your health care needs are being addressed?'  - yes    'Is there anything that we can do to better address your health?' - No    Has patient completed initial survey for the [place] [id number] program?  Given today        Aristeo Hammonds was seen today for other. Diagnoses and all orders for this visit:    Type 2 diabetes mellitus with other specified complication, with long-term current use of insulin ([facility]    Essential hypertension    Chronic atrial fibrillation ([facility]  -     Full code        Return in about 6 months (around 2/13/2022).     Goals for Patient:    #1 - We will have Gracy Hagen reach out to you by phone for your insulin and eliquis  #2 - Watch closely for low blood sugar and let me know if you have this  #3 - If you would like to update your living will, please let me know      -On the date of the visit, I have spent 46 minutes in face to face time with patient as well as reviewing previous notes and test results.  -Time spent today discussing advanced care planning outside of the time used for E/M code billing:  <16 minutes (Non-Billable)

## 2021-08-13 NOTE — PATIENT INSTRUCTIONS
We are excited to have you a part of our Focus on You Program.  I have summarized our visit from 08/13/21  :    You met today with Dr Greg Nixon, [address] coordinator [address] can use the following steps if you are having a medical issue and think that you may need to go to the ER:    1. Please call our office at [phone] and press option #3 to speak to one of our nurses  2. If you are unable to get ahold of one of our nurses or if it is 'after hours', you can call Dr Vishal Whitehead cell phone at [phone]    -------    If you are admitted to the hospital or seen in the ER, we would like to see you in the office on the next business day. You can walk in and be seen by myself or our nurse practitioner, Lesly Jha, Monday to Friday 8AM to 4PM.    -------    We have agreed on the following goals for your health:    #1 - We will have Wilder Tavera reach out to you by phone for your insulin and eliquis  #2 - Watch closely for low blood sugar and let me know if you have this  #3 - If you would like to update your living will, please let me know    ------    [address]!   You can walk in and be seen any time Monday - Friday, 8AM - 4PM.

## 2021-08-13 NOTE — CARE COORDINATION
Dwaine Braun with Enma Sánchez today for [facility] meeting. She discussed high cost medications including her insulin and Eliquis. Vivi Razo could you please reach out to her early next week sometime on her cell phone at [phone] to see if you can assist her. Thank you!

## 2021-08-13 NOTE — CARE COORDINATION
Ambulatory Care Coordination Note  8/13/2021  CM Risk Score: 5  Charlson 10 Year Mortality Risk Score: 98%     ACC: Meenu Pratt, AMA    Summary Note: Met with [place] face to face with PCP visit for [address] [facility] visit. Introduced self and role. Detailed Med Rec  Completed. Has hx of falls with fractures, hx of osteopenia with moving hardware from surgery 3 years ago. OIO, Dr. Trisha Fowler would like to schedule surgery at the end of this month if possible for shoulder replacement. Discussed high cost medications, she is in donut hole and has high cost medications. Will make referral to [facility] to assist. Lab work discussed and will stop potassium at this time. Detailed ACP discussed. I offered RD referral which she declined at this time. Discussed hypo and hyper glycemia and when to report sx's. Plan-  -reinforce education completed at [facility] visit  -collaborate with PAUO as needed  -encourage blood sugar tracking and reporting  -referral placed with [facility] for medication assistance      Ambulatory Care Coordination Assessment    Care Coordination Protocol  Program Enrollment: Rising Risk  Referral from Primary Care Provider: Yes  Week 1 - Initial Assessment        Do you have Home O2 Therapy?: No      Ability to seek help/take action for Emergent Urgent situations i.e. fire, crime, inclement weather or health crisis. : Independent  Ability to ambulate to restroom: Independent  Ability handle personal hygeine needs (bathing/dressing/grooming): Independent  Ability to manage Medications: Independent  Ability to prepare Food Preparation: Independent  Ability to maintain home (clean home, laundry): Independent  Ability to drive and/or has transportation: Independent  Ability to do shopping: Independent  Ability to manage finances:  Independent  Is patient able to live independently?: Yes     Current Housing: Private Residence        Per the [facility], did the patient have 2 or more falls or 1 fall with injury in the past year?: Yes  How often do you think you are about to fall and you do NOT fall? For example, you grab something to stabilize yourself or hold onto a wall/furniture?: Frequently  Use of a Mobility Aid: No  Difficulty walking/impaired gait: Yes  Issues with feet or shoes like numbness, edema, shoes not fitting: No  Changes in vision, poor vision or poor lighting in environment: No  Dizziness: No  Other Fall Risk: Yes  What other fall risks does the patient have?: hx of falls with fractures     Frequent urination at night?: No  Do you use rails/bars?: No  Do you have a non-slip tub mat?: No     Are you experiencing loss of meaning?: No  Are you experiencing loss of hope and peace?: No     Thinking about your patient's physical health needs, are there any symptoms or problems (risk indicators) you are unsure about that require further investigation?: No identified areas of uncertainly or problems already being investigated   Are the patients physical health problems impacting on their mental well-being?: No identified areas of concern   Are there any problems with your patients lifestyle behaviors (alcohol, drugs, diet, exercise) that are impacting on physical or mental well-being?: No identified areas of concern   Do you have any other concerns about your patients mental well-being?  How would you rate their severity and impact on the patient?: No identified areas of concern   How would you rate their home environment in terms of safety and stability (including domestic violence, insecure housing, neighbor harassment)?: Consistently safe, supportive, stable, no identified problems   How do daily activities impact on the patient's well-being? (include current or anticipated unemployment, work, caregiving, access to transportation or other): No identified problems or perceived positive benefits   How would you rate their social network (family, work, friends)?: Good participation with social networks How would you rate their financial resources (including ability to afford all required medical care)?: Financially secure, resources adequate, no identified problems   How wells does the patient now understand their health and well-being (symptoms, signs or risk factors) and what they need to do to manage their health?: Reasonable to good understanding and already engages in managing health or is willing to undertake better management   How well do you think your patient can engage in healthcare discussions? (Barriers include language, deafness, aphasia, alcohol or drug problems, learning difficulties, concentration): Clear and open communication, no identified barriers   Do other services need to be involved to help this patient?: Other care/services not required at this time   Are current services involved with this patient well-coordinated? (Include coordination with other services you are now recommendation): All required care/services in place and well-coordinated   Suggested Interventions and Community Resources                  Prior to Admission medications    Medication Sig Start Date End Date Taking?  Authorizing Provider   metoprolol tartrate (LOPRESSOR) 50 MG tablet Take 1 tablet by mouth 2 times daily 8/5/21   Magui Rajan, DO   simvastatin (ZOCOR) 40 MG tablet TAKE 1 TABLET NIGHTLY 7/8/21   Magui Rajan, DO   alendronate (FOSAMAX) 70 MG tablet Take 1 tablet by mouth every 7 days 7/8/21   Magui Rajan, DO   insulin glargine (BASAGLAR KWIKPEN) 100 UNIT/ML injection pen Inject 15 Units into the skin every morning (before breakfast) 4/16/21   Magui Rajan, DO   glimepiride (AMARYL) 4 MG tablet Take 1 tablet by mouth 2 times daily 3/5/21   Magui Rajan, DO   Calcium Carbonate-Vitamin D (OYSTER SHELL CALCIUM/D) 500-200 MG-UNIT TABS Take 1 tablet by mouth 2 times daily 1/13/21 7/7/22  Stanislav Carty MD   vitamin D3 (CHOLECALCIFEROL) 25 MCG (1000 UT) TABS tablet Take 0.5 tablets by mouth daily  Patient taking differently: Take 1,000 Units by mouth daily  1/13/21   Kenya White MD   metFORMIN (GLUCOPHAGE-XR) 500 MG extended release tablet Take 2 tablets by mouth 2 times daily 12/28/20   Angel Jane,    levothyroxine (SYNTHROID) 112 MCG tablet Take 1 tablet by mouth Daily 12/28/20   Angel Harness, DO   apixaban (ELIQUIS) 5 MG TABS tablet Take 1 tablet by mouth 2 times daily 11/3/20   Odette Lenz MD   flecainide (TAMBOCOR) 50 MG tablet Take 1 tablet by mouth 2 times daily 10/21/20   Katie Cordero MD   aspirin 81 MG EC tablet Take 81 mg by mouth daily    Historical Provider, MD   Multiple Vitamins-Minerals (THERAPEUTIC MULTIVITAMIN-MINERALS) tablet Take 1 tablet by mouth daily  Patient not taking: Reported on 8/5/2021    Historical Provider, MD   hydrALAZINE (APRESOLINE) 50 MG tablet Take 1 tablet by mouth 2 times daily 9/21/20   [address], MD   losartan (COZAAR) 100 MG tablet Take 1 tablet by mouth daily 9/21/20   [address], MD   KLOR-CON M10 10 MEQ extended release tablet Take 1 tablet by mouth daily 9/21/20   [address], MD   vitamin B-12 (CYANOCOBALAMIN) 1000 MCG tablet Take 1,000 mcg by mouth daily    Historical Provider, MD   folic acid (FOLVITE) 1 MG tablet Take 3 mg by mouth 2 times daily     Historical Provider, MD   Multiple Vitamins-Minerals (ICAPS AREDS 2) CAPS Take by mouth 2 times daily    Historical Provider, MD       Future Appointments   Date Time Provider Zach Pérez   8/27/2021 10:45 AM Marcia Roldan PA-C [place] [facility]   2/17/2022  9:00 AM Angel Jane DO [facility] [place]   3/31/2022 10:00 AM [address], MD [facility]

## 2021-08-16 ENCOUNTER — CARE COORDINATION (OUTPATIENT)
Dept: CARE COORDINATION | Age: 76
End: 2021-08-16

## 2021-08-16 NOTE — CARE COORDINATION
I called and spoke with Carlos Hamilton in regards to getting her some assistance on her Basaglar and Eliquis. I went over the guidelines to both programs. I will be sending her some information that she will need to return to me.        Tricia Greene [facility]  [address] [facility]   Medication Assistance  Chinmay Finney and [facility]    (P)[phone]  (A)[phone]

## 2021-08-17 ENCOUNTER — TELEPHONE (OUTPATIENT)
Dept: CARDIOLOGY CLINIC | Age: 76
End: 2021-08-17

## 2021-08-17 NOTE — TELEPHONE ENCOUNTER
Pre op Risk Assessment    Procedure Reverse total shoulder  Physician Dr. Bill Living  Date of surgery/procedure TBD    Last OV 3/22/2021  Last Stress 7/1/2020  Last Echo 7/1/2020  Last Cath 2/11/2021 ablation   Last Stent Nothing in EPIC  Is patient on blood thinners Eliquis  Hold Meds/how many days     Notify Kendy at [phone] or [phone]

## 2021-08-18 ENCOUNTER — CARE COORDINATION (OUTPATIENT)
Dept: CARE COORDINATION | Age: 76
End: 2021-08-18

## 2021-08-18 NOTE — CARE COORDINATION
I sent the patient information for re enrollment into the PAP program for 2021. I also sent the provider their portion of the application.        Katie Tamez [facility]  [address] [facility]   Medication Assistance  ASHLEY SHOOK II.[place]    (S)[phone]  (M)[phone]

## 2021-08-19 ENCOUNTER — HOSPITAL ENCOUNTER (OUTPATIENT)
Age: 76
Discharge: HOME | End: 2021-08-19
Payer: MEDICARE

## 2021-08-19 ENCOUNTER — HOSPITAL ENCOUNTER (OUTPATIENT)
Dept: GENERAL RADIOLOGY | Age: 76
Discharge: HOME | End: 2021-08-19
Payer: MEDICARE

## 2021-08-19 DIAGNOSIS — M25.511 ACUTE PAIN OF RIGHT SHOULDER: ICD-10-CM

## 2021-08-19 LAB
BACTERIA: ABNORMAL /HPF
BILIRUBIN URINE: NEGATIVE
BLOOD, URINE: NEGATIVE
CASTS 2: ABNORMAL /LPF
CASTS UA: ABNORMAL /LPF
CHARACTER, URINE: CLEAR
COLOR: YELLOW
CRYSTALS, UA: ABNORMAL
EPITHELIAL CELLS, UA: ABNORMAL /HPF
ERYTHROCYTE [DISTWIDTH] IN BLOOD BY AUTOMATED COUNT: 12.5 % (ref 11.5–14.5)
ERYTHROCYTE [DISTWIDTH] IN BLOOD BY AUTOMATED COUNT: 48.3 FL (ref 35–45)
GLUCOSE URINE: NEGATIVE MG/DL
HCT VFR BLD CALC: 39.6 % (ref 37–47)
HEMOGLOBIN: 12.8 GM/DL (ref 12–16)
KETONES, URINE: NEGATIVE
LEUKOCYTE ESTERASE, URINE: NEGATIVE
MCH RBC QN AUTO: 33.8 PG (ref 26–33)
MCHC RBC AUTO-ENTMCNC: 32.3 GM/DL (ref 32.2–35.5)
MCV RBC AUTO: 104.5 FL (ref 81–99)
MISCELLANEOUS 2: ABNORMAL
MRSA NASAL SCREEN RT-PCR: NEGATIVE
NITRITE, URINE: NEGATIVE
PH UA: 7 (ref 5–9)
PLATELET # BLD: 181 THOU/MM3 (ref 130–400)
PMV BLD AUTO: 8.9 FL (ref 9.4–12.4)
PROTEIN UA: 30
RBC # BLD: 3.79 MILL/MM3 (ref 4.2–5.4)
RBC URINE: ABNORMAL /HPF
RENAL EPITHELIAL, UA: ABNORMAL
SPECIFIC GRAVITY, URINE: 1.01 (ref 1–1.03)
STAPH AUREUS SCREEN RT-PCR: NEGATIVE
UROBILINOGEN, URINE: 0.2 EU/DL (ref 0–1)
WBC # BLD: 4.5 THOU/MM3 (ref 4.8–10.8)
WBC UA: ABNORMAL /HPF
YEAST: ABNORMAL

## 2021-08-19 PROCEDURE — 71046 X-RAY EXAM CHEST 2 VIEWS: CPT

## 2021-08-19 PROCEDURE — 87640 STAPH A DNA AMP PROBE: CPT

## 2021-08-19 PROCEDURE — 36415 COLL VENOUS BLD VENIPUNCTURE: CPT

## 2021-08-19 PROCEDURE — 81001 URINALYSIS AUTO W/SCOPE: CPT

## 2021-08-19 PROCEDURE — 87641 MR-STAPH DNA AMP PROBE: CPT

## 2021-08-19 PROCEDURE — 85027 COMPLETE CBC AUTOMATED: CPT

## 2021-08-19 NOTE — PROGRESS NOTES
Following instructions given to patient, who states understanding:     NPO after midnight  [employer] and 's license  Wear comfortable clean clothing  Do not bring jewelry   Shower night before and morning of surgery with a liquid antibacterial soap  Bring medications in original bottles and CPAP  Follow all instructions given by your physician   needed at discharge  Call [phone] for any questions  Report to SDS on 2nd floor  If you would become ill prior to surgery, please call the surgeon  Please bring and wear mask

## 2021-08-25 ENCOUNTER — CARE COORDINATION (OUTPATIENT)
Dept: CARE COORDINATION | Age: 76
End: 2021-08-25

## 2021-08-25 NOTE — CARE COORDINATION
Sent in renewal applications to the  for assistance on their high cost medications for 2021.       Wilder Tavera [facility]  CC   Medication Assistance  ASHLEY SHOOK II.Alina    C)[phone]  (W)[phone]'

## 2021-08-26 ENCOUNTER — APPOINTMENT (OUTPATIENT)
Dept: GENERAL RADIOLOGY | Age: 76
End: 2021-08-26
Attending: ORTHOPAEDIC SURGERY
Payer: MEDICARE

## 2021-08-26 ENCOUNTER — ANESTHESIA (OUTPATIENT)
Dept: OPERATING ROOM | Age: 76
End: 2021-08-26
Payer: MEDICARE

## 2021-08-26 ENCOUNTER — HOSPITAL ENCOUNTER (OUTPATIENT)
Age: 76
Discharge: HOME | End: 2021-08-27
Attending: ORTHOPAEDIC SURGERY | Admitting: ORTHOPAEDIC SURGERY
Payer: MEDICARE

## 2021-08-26 ENCOUNTER — ANESTHESIA EVENT (OUTPATIENT)
Dept: OPERATING ROOM | Age: 76
End: 2021-08-26
Payer: MEDICARE

## 2021-08-26 VITALS — SYSTOLIC BLOOD PRESSURE: 186 MMHG | TEMPERATURE: 96.3 F | DIASTOLIC BLOOD PRESSURE: 77 MMHG | OXYGEN SATURATION: 100 %

## 2021-08-26 DIAGNOSIS — Z96.611 STATUS POST REPLACEMENT OF RIGHT SHOULDER JOINT: Primary | ICD-10-CM

## 2021-08-26 PROBLEM — S42.291K: Status: ACTIVE | Noted: 2021-08-26

## 2021-08-26 LAB
GLUCOSE BLD-MCNC: 230 MG/DL (ref 70–108)
GLUCOSE BLD-MCNC: 258 MG/DL (ref 70–108)
GLUCOSE BLD-MCNC: 312 MG/DL (ref 70–108)

## 2021-08-26 PROCEDURE — C1713 ANCHOR/SCREW BN/BN,TIS/BN: HCPCS | Performed by: ORTHOPAEDIC SURGERY

## 2021-08-26 PROCEDURE — 3600000015 HC SURGERY LEVEL 5 ADDTL 15MIN: Performed by: ORTHOPAEDIC SURGERY

## 2021-08-26 PROCEDURE — 73060 X-RAY EXAM OF HUMERUS: CPT

## 2021-08-26 PROCEDURE — 3700000001 HC ADD 15 MINUTES (ANESTHESIA): Performed by: ORTHOPAEDIC SURGERY

## 2021-08-26 PROCEDURE — 87077 CULTURE AEROBIC IDENTIFY: CPT

## 2021-08-26 PROCEDURE — C1776 JOINT DEVICE (IMPLANTABLE): HCPCS | Performed by: ORTHOPAEDIC SURGERY

## 2021-08-26 PROCEDURE — 2500000003 HC RX 250 WO HCPCS: Performed by: ORTHOPAEDIC SURGERY

## 2021-08-26 PROCEDURE — 6370000000 HC RX 637 (ALT 250 FOR IP): Performed by: NURSE PRACTITIONER

## 2021-08-26 PROCEDURE — 87186 SC STD MICRODIL/AGAR DIL: CPT

## 2021-08-26 PROCEDURE — 87205 SMEAR GRAM STAIN: CPT

## 2021-08-26 PROCEDURE — 3700000000 HC ANESTHESIA ATTENDED CARE: Performed by: ORTHOPAEDIC SURGERY

## 2021-08-26 PROCEDURE — 2580000003 HC RX 258: Performed by: NURSE PRACTITIONER

## 2021-08-26 PROCEDURE — 7100000000 HC PACU RECOVERY - FIRST 15 MIN: Performed by: ORTHOPAEDIC SURGERY

## 2021-08-26 PROCEDURE — 6360000002 HC RX W HCPCS: Performed by: ORTHOPAEDIC SURGERY

## 2021-08-26 PROCEDURE — 2720000010 HC SURG SUPPLY STERILE: Performed by: ORTHOPAEDIC SURGERY

## 2021-08-26 PROCEDURE — 2500000003 HC RX 250 WO HCPCS: Performed by: ANESTHESIOLOGY

## 2021-08-26 PROCEDURE — 7100000011 HC PHASE II RECOVERY - ADDTL 15 MIN: Performed by: ORTHOPAEDIC SURGERY

## 2021-08-26 PROCEDURE — 2709999900 HC NON-CHARGEABLE SUPPLY: Performed by: ORTHOPAEDIC SURGERY

## 2021-08-26 PROCEDURE — 6360000002 HC RX W HCPCS: Performed by: NURSE ANESTHETIST, CERTIFIED REGISTERED

## 2021-08-26 PROCEDURE — 7100000010 HC PHASE II RECOVERY - FIRST 15 MIN: Performed by: ORTHOPAEDIC SURGERY

## 2021-08-26 PROCEDURE — 3600000005 HC SURGERY LEVEL 5 BASE: Performed by: ORTHOPAEDIC SURGERY

## 2021-08-26 PROCEDURE — 2580000003 HC RX 258: Performed by: ORTHOPAEDIC SURGERY

## 2021-08-26 PROCEDURE — 87070 CULTURE OTHR SPECIMN AEROBIC: CPT

## 2021-08-26 PROCEDURE — 99221 1ST HOSP IP/OBS SF/LOW 40: CPT | Performed by: NURSE PRACTITIONER

## 2021-08-26 PROCEDURE — 2500000003 HC RX 250 WO HCPCS: Performed by: NURSE ANESTHETIST, CERTIFIED REGISTERED

## 2021-08-26 PROCEDURE — 82948 REAGENT STRIP/BLOOD GLUCOSE: CPT

## 2021-08-26 PROCEDURE — 87075 CULTR BACTERIA EXCEPT BLOOD: CPT

## 2021-08-26 PROCEDURE — 7100000001 HC PACU RECOVERY - ADDTL 15 MIN: Performed by: ORTHOPAEDIC SURGERY

## 2021-08-26 PROCEDURE — 6360000002 HC RX W HCPCS: Performed by: NURSE PRACTITIONER

## 2021-08-26 PROCEDURE — 87147 CULTURE TYPE IMMUNOLOGIC: CPT

## 2021-08-26 DEVICE — SCREW BNE L25MM DIA6.5MM HEX HD DIA3.5MM FOR COMPHSVE CONV: Type: IMPLANTABLE DEVICE | Status: FUNCTIONAL

## 2021-08-26 DEVICE — IMPLANTABLE DEVICE: Type: IMPLANTABLE DEVICE | Status: FUNCTIONAL

## 2021-08-26 DEVICE — CEMENT BNE 40GM FULL DOSE PMMA W/O ANTIBIO M VISC RADPQ: Type: IMPLANTABLE DEVICE | Status: FUNCTIONAL

## 2021-08-26 DEVICE — SCREW BNE L20MM DIA4.75MM HD DIA3.5MM CORT FIX ANG: Type: IMPLANTABLE DEVICE | Status: FUNCTIONAL

## 2021-08-26 DEVICE — BEARING HUM STD 36 MM SHLDR VIVACIT-E PROLONG COMPHSVE: Type: IMPLANTABLE DEVICE | Status: FUNCTIONAL

## 2021-08-26 DEVICE — SHOULDR S3 TOT ADV REVRS IMPL CAPPED S3: Type: IMPLANTABLE DEVICE | Status: FUNCTIONAL

## 2021-08-26 DEVICE — SCREW BNE L25MM DIA4.75MM HD DIA3.5MM CORT FIX ANG: Type: IMPLANTABLE DEVICE | Status: FUNCTIONAL

## 2021-08-26 DEVICE — STEM HUM 8MM MINI SHLDR CO CHROM COMPHSVE REV PRI CEM: Type: IMPLANTABLE DEVICE | Status: FUNCTIONAL

## 2021-08-26 RX ORDER — ATORVASTATIN CALCIUM 20 MG/1
20 TABLET, FILM COATED ORAL DAILY
Status: DISCONTINUED | OUTPATIENT
Start: 2021-08-26 | End: 2021-08-26 | Stop reason: ALTCHOICE

## 2021-08-26 RX ORDER — LABETALOL 20 MG/4 ML (5 MG/ML) INTRAVENOUS SYRINGE
5 EVERY 10 MIN PRN
Status: DISCONTINUED | OUTPATIENT
Start: 2021-08-26 | End: 2021-08-26

## 2021-08-26 RX ORDER — DEXTROSE MONOHYDRATE 25 G/50ML
12.5 INJECTION, SOLUTION INTRAVENOUS PRN
Status: DISCONTINUED | OUTPATIENT
Start: 2021-08-26 | End: 2021-08-27 | Stop reason: HOSPADM

## 2021-08-26 RX ORDER — SODIUM CHLORIDE 0.9 % (FLUSH) 0.9 %
5-40 SYRINGE (ML) INJECTION EVERY 12 HOURS SCHEDULED
Status: DISCONTINUED | OUTPATIENT
Start: 2021-08-26 | End: 2021-08-26

## 2021-08-26 RX ORDER — ASPIRIN 81 MG/1
81 TABLET ORAL DAILY
Status: DISCONTINUED | OUTPATIENT
Start: 2021-08-27 | End: 2021-08-27 | Stop reason: HOSPADM

## 2021-08-26 RX ORDER — MORPHINE SULFATE 10 MG/ML
INJECTION, SOLUTION INTRAMUSCULAR; INTRAVENOUS PRN
Status: DISCONTINUED | OUTPATIENT
Start: 2021-08-26 | End: 2021-08-26 | Stop reason: HOSPADM

## 2021-08-26 RX ORDER — SODIUM CHLORIDE 0.9 % (FLUSH) 0.9 %
5-40 SYRINGE (ML) INJECTION PRN
Status: DISCONTINUED | OUTPATIENT
Start: 2021-08-26 | End: 2021-08-26

## 2021-08-26 RX ORDER — DEXTROSE MONOHYDRATE 50 MG/ML
100 INJECTION, SOLUTION INTRAVENOUS PRN
Status: DISCONTINUED | OUTPATIENT
Start: 2021-08-26 | End: 2021-08-27 | Stop reason: HOSPADM

## 2021-08-26 RX ORDER — BUPIVACAINE HYDROCHLORIDE 5 MG/ML
INJECTION, SOLUTION EPIDURAL; INTRACAUDAL PRN
Status: DISCONTINUED | OUTPATIENT
Start: 2021-08-26 | End: 2021-08-26 | Stop reason: HOSPADM

## 2021-08-26 RX ORDER — PROPOFOL 10 MG/ML
INJECTION, EMULSION INTRAVENOUS PRN
Status: DISCONTINUED | OUTPATIENT
Start: 2021-08-26 | End: 2021-08-26 | Stop reason: SDUPTHER

## 2021-08-26 RX ORDER — HYDRALAZINE HYDROCHLORIDE 50 MG/1
50 TABLET, FILM COATED ORAL 2 TIMES DAILY
Status: DISCONTINUED | OUTPATIENT
Start: 2021-08-26 | End: 2021-08-27 | Stop reason: HOSPADM

## 2021-08-26 RX ORDER — MEPERIDINE HYDROCHLORIDE 25 MG/ML
12.5 INJECTION INTRAMUSCULAR; INTRAVENOUS; SUBCUTANEOUS EVERY 5 MIN PRN
Status: DISCONTINUED | OUTPATIENT
Start: 2021-08-26 | End: 2021-08-26

## 2021-08-26 RX ORDER — VANCOMYCIN HYDROCHLORIDE 1 G/20ML
INJECTION, POWDER, LYOPHILIZED, FOR SOLUTION INTRAVENOUS PRN
Status: DISCONTINUED | OUTPATIENT
Start: 2021-08-26 | End: 2021-08-26 | Stop reason: HOSPADM

## 2021-08-26 RX ORDER — HYDROCODONE BITARTRATE AND ACETAMINOPHEN 5; 325 MG/1; MG/1
1-2 TABLET ORAL
Qty: 42 TABLET | Refills: 0 | Status: SHIPPED | OUTPATIENT
Start: 2021-08-26 | End: 2021-09-02

## 2021-08-26 RX ORDER — GLIMEPIRIDE 4 MG/1
4 TABLET ORAL
Status: DISCONTINUED | OUTPATIENT
Start: 2021-08-27 | End: 2021-08-27 | Stop reason: HOSPADM

## 2021-08-26 RX ORDER — CYCLOBENZAPRINE HCL 10 MG
10 TABLET ORAL 3 TIMES DAILY PRN
Status: DISCONTINUED | OUTPATIENT
Start: 2021-08-26 | End: 2021-08-27 | Stop reason: HOSPADM

## 2021-08-26 RX ORDER — NICOTINE POLACRILEX 4 MG
15 LOZENGE BUCCAL PRN
Status: DISCONTINUED | OUTPATIENT
Start: 2021-08-26 | End: 2021-08-27 | Stop reason: HOSPADM

## 2021-08-26 RX ORDER — ONDANSETRON 2 MG/ML
4 INJECTION INTRAMUSCULAR; INTRAVENOUS EVERY 6 HOURS PRN
Status: DISCONTINUED | OUTPATIENT
Start: 2021-08-26 | End: 2021-08-27 | Stop reason: HOSPADM

## 2021-08-26 RX ORDER — ALENDRONATE SODIUM 70 MG/1
70 TABLET ORAL
Status: DISCONTINUED | OUTPATIENT
Start: 2021-08-26 | End: 2021-08-26

## 2021-08-26 RX ORDER — DOCUSATE SODIUM 100 MG/1
100 CAPSULE, LIQUID FILLED ORAL DAILY
Status: DISCONTINUED | OUTPATIENT
Start: 2021-08-26 | End: 2021-08-27 | Stop reason: HOSPADM

## 2021-08-26 RX ORDER — LOSARTAN POTASSIUM 100 MG/1
100 TABLET ORAL DAILY
Status: DISCONTINUED | OUTPATIENT
Start: 2021-08-27 | End: 2021-08-27 | Stop reason: HOSPADM

## 2021-08-26 RX ORDER — HYDROCODONE BITARTRATE AND ACETAMINOPHEN 5; 325 MG/1; MG/1
1 TABLET ORAL EVERY 4 HOURS PRN
Status: DISCONTINUED | OUTPATIENT
Start: 2021-08-26 | End: 2021-08-27 | Stop reason: HOSPADM

## 2021-08-26 RX ORDER — EPHEDRINE SULFATE/0.9% NACL/PF 50 MG/5 ML
SYRINGE (ML) INTRAVENOUS PRN
Status: DISCONTINUED | OUTPATIENT
Start: 2021-08-26 | End: 2021-08-26 | Stop reason: SDUPTHER

## 2021-08-26 RX ORDER — METFORMIN HYDROCHLORIDE 500 MG/1
1000 TABLET, EXTENDED RELEASE ORAL 2 TIMES DAILY WITH MEALS
Status: DISCONTINUED | OUTPATIENT
Start: 2021-08-26 | End: 2021-08-27 | Stop reason: HOSPADM

## 2021-08-26 RX ORDER — GLIPIZIDE 10 MG/1
10 TABLET ORAL
Status: DISCONTINUED | OUTPATIENT
Start: 2021-08-27 | End: 2021-08-26 | Stop reason: ALTCHOICE

## 2021-08-26 RX ORDER — SIMVASTATIN 40 MG
40 TABLET ORAL NIGHTLY
Status: DISCONTINUED | OUTPATIENT
Start: 2021-08-26 | End: 2021-08-27 | Stop reason: HOSPADM

## 2021-08-26 RX ORDER — HYDROCODONE BITARTRATE AND ACETAMINOPHEN 5; 325 MG/1; MG/1
2 TABLET ORAL EVERY 4 HOURS PRN
Status: DISCONTINUED | OUTPATIENT
Start: 2021-08-26 | End: 2021-08-27 | Stop reason: HOSPADM

## 2021-08-26 RX ORDER — MORPHINE SULFATE 2 MG/ML
2 INJECTION, SOLUTION INTRAMUSCULAR; INTRAVENOUS
Status: DISCONTINUED | OUTPATIENT
Start: 2021-08-26 | End: 2021-08-27 | Stop reason: HOSPADM

## 2021-08-26 RX ORDER — SODIUM CHLORIDE 9 MG/ML
INJECTION, SOLUTION INTRAVENOUS CONTINUOUS
Status: DISCONTINUED | OUTPATIENT
Start: 2021-08-26 | End: 2021-08-27

## 2021-08-26 RX ORDER — FENTANYL CITRATE 50 UG/ML
50 INJECTION, SOLUTION INTRAMUSCULAR; INTRAVENOUS EVERY 5 MIN PRN
Status: DISCONTINUED | OUTPATIENT
Start: 2021-08-26 | End: 2021-08-26

## 2021-08-26 RX ORDER — HYDROCODONE BITARTRATE AND ACETAMINOPHEN 5; 325 MG/1; MG/1
TABLET ORAL
Status: DISPENSED
Start: 2021-08-26 | End: 2021-08-26

## 2021-08-26 RX ORDER — KETOROLAC TROMETHAMINE 30 MG/ML
INJECTION, SOLUTION INTRAMUSCULAR; INTRAVENOUS PRN
Status: DISCONTINUED | OUTPATIENT
Start: 2021-08-26 | End: 2021-08-26 | Stop reason: HOSPADM

## 2021-08-26 RX ORDER — BACTERIOSTATIC SODIUM CHLORIDE 0.9 %
VIAL (ML) INJECTION PRN
Status: DISCONTINUED | OUTPATIENT
Start: 2021-08-26 | End: 2021-08-26 | Stop reason: HOSPADM

## 2021-08-26 RX ORDER — POLYETHYLENE GLYCOL 3350 17 G/17G
17 POWDER, FOR SOLUTION ORAL DAILY
Status: DISCONTINUED | OUTPATIENT
Start: 2021-08-26 | End: 2021-08-27 | Stop reason: HOSPADM

## 2021-08-26 RX ORDER — FENTANYL CITRATE 50 UG/ML
INJECTION, SOLUTION INTRAMUSCULAR; INTRAVENOUS PRN
Status: DISCONTINUED | OUTPATIENT
Start: 2021-08-26 | End: 2021-08-26 | Stop reason: SDUPTHER

## 2021-08-26 RX ORDER — HYDROMORPHONE HCL 110MG/55ML
PATIENT CONTROLLED ANALGESIA SYRINGE INTRAVENOUS PRN
Status: DISCONTINUED | OUTPATIENT
Start: 2021-08-26 | End: 2021-08-26 | Stop reason: SDUPTHER

## 2021-08-26 RX ORDER — ACETAMINOPHEN 325 MG/1
650 TABLET ORAL EVERY 4 HOURS PRN
Status: DISCONTINUED | OUTPATIENT
Start: 2021-08-26 | End: 2021-08-27 | Stop reason: HOSPADM

## 2021-08-26 RX ORDER — ONDANSETRON 2 MG/ML
4 INJECTION INTRAMUSCULAR; INTRAVENOUS
Status: DISCONTINUED | OUTPATIENT
Start: 2021-08-26 | End: 2021-08-26

## 2021-08-26 RX ORDER — MORPHINE SULFATE 2 MG/ML
4 INJECTION, SOLUTION INTRAMUSCULAR; INTRAVENOUS
Status: DISCONTINUED | OUTPATIENT
Start: 2021-08-26 | End: 2021-08-27 | Stop reason: HOSPADM

## 2021-08-26 RX ORDER — KETOROLAC TROMETHAMINE 30 MG/ML
15 INJECTION, SOLUTION INTRAMUSCULAR; INTRAVENOUS EVERY 6 HOURS
Status: DISCONTINUED | OUTPATIENT
Start: 2021-08-26 | End: 2021-08-27 | Stop reason: HOSPADM

## 2021-08-26 RX ORDER — ONDANSETRON 2 MG/ML
INJECTION INTRAMUSCULAR; INTRAVENOUS PRN
Status: DISCONTINUED | OUTPATIENT
Start: 2021-08-26 | End: 2021-08-26 | Stop reason: SDUPTHER

## 2021-08-26 RX ORDER — FOLIC ACID 1 MG/1
3 TABLET ORAL 2 TIMES DAILY
Status: DISCONTINUED | OUTPATIENT
Start: 2021-08-26 | End: 2021-08-27 | Stop reason: HOSPADM

## 2021-08-26 RX ORDER — MIDAZOLAM HYDROCHLORIDE 1 MG/ML
INJECTION INTRAMUSCULAR; INTRAVENOUS PRN
Status: DISCONTINUED | OUTPATIENT
Start: 2021-08-26 | End: 2021-08-26 | Stop reason: SDUPTHER

## 2021-08-26 RX ORDER — SODIUM CHLORIDE 9 MG/ML
25 INJECTION, SOLUTION INTRAVENOUS PRN
Status: DISCONTINUED | OUTPATIENT
Start: 2021-08-26 | End: 2021-08-26

## 2021-08-26 RX ORDER — LEVOTHYROXINE SODIUM 112 UG/1
112 TABLET ORAL DAILY
Status: DISCONTINUED | OUTPATIENT
Start: 2021-08-26 | End: 2021-08-27 | Stop reason: HOSPADM

## 2021-08-26 RX ORDER — SODIUM CHLORIDE 9 MG/ML
INJECTION, SOLUTION INTRAVENOUS CONTINUOUS
Status: DISCONTINUED | OUTPATIENT
Start: 2021-08-26 | End: 2021-08-26

## 2021-08-26 RX ORDER — ROCURONIUM BROMIDE 10 MG/ML
INJECTION, SOLUTION INTRAVENOUS PRN
Status: DISCONTINUED | OUTPATIENT
Start: 2021-08-26 | End: 2021-08-26 | Stop reason: SDUPTHER

## 2021-08-26 RX ORDER — LANOLIN ALCOHOL/MO/W.PET/CERES
1000 CREAM (GRAM) TOPICAL DAILY
Status: DISCONTINUED | OUTPATIENT
Start: 2021-08-26 | End: 2021-08-27 | Stop reason: HOSPADM

## 2021-08-26 RX ADMIN — HYDROCODONE BITARTRATE AND ACETAMINOPHEN 2 TABLET: 5; 325 TABLET ORAL at 20:39

## 2021-08-26 RX ADMIN — HYDRALAZINE HYDROCHLORIDE 50 MG: 50 TABLET, FILM COATED ORAL at 20:40

## 2021-08-26 RX ADMIN — FOLIC ACID 3 MG: 1 TABLET ORAL at 13:25

## 2021-08-26 RX ADMIN — CEFAZOLIN 2000 MG: 10 INJECTION, POWDER, FOR SOLUTION INTRAVENOUS at 20:38

## 2021-08-26 RX ADMIN — ONDANSETRON HYDROCHLORIDE 4 MG: 4 INJECTION, SOLUTION INTRAMUSCULAR; INTRAVENOUS at 07:32

## 2021-08-26 RX ADMIN — HYDROCODONE BITARTRATE AND ACETAMINOPHEN 1 TABLET: 5; 325 TABLET ORAL at 10:52

## 2021-08-26 RX ADMIN — KETOROLAC TROMETHAMINE 15 MG: 30 INJECTION, SOLUTION INTRAMUSCULAR; INTRAVENOUS at 20:38

## 2021-08-26 RX ADMIN — HYDRALAZINE HYDROCHLORIDE 50 MG: 50 TABLET, FILM COATED ORAL at 13:24

## 2021-08-26 RX ADMIN — FENTANYL CITRATE 50 MCG: 50 INJECTION, SOLUTION INTRAMUSCULAR; INTRAVENOUS at 07:45

## 2021-08-26 RX ADMIN — HYDROMORPHONE HYDROCHLORIDE 0.25 MG: 2 INJECTION INTRAMUSCULAR; INTRAVENOUS; SUBCUTANEOUS at 08:20

## 2021-08-26 RX ADMIN — POLYETHYLENE GLYCOL 3350 17 G: 17 POWDER, FOR SOLUTION ORAL at 13:29

## 2021-08-26 RX ADMIN — PROPOFOL 140 MG: 10 INJECTION, EMULSION INTRAVENOUS at 07:26

## 2021-08-26 RX ADMIN — FENTANYL CITRATE 50 MCG: 50 INJECTION, SOLUTION INTRAMUSCULAR; INTRAVENOUS at 07:26

## 2021-08-26 RX ADMIN — DOCUSATE SODIUM 100 MG: 100 CAPSULE ORAL at 13:29

## 2021-08-26 RX ADMIN — INSULIN LISPRO 4 UNITS: 100 INJECTION, SOLUTION INTRAVENOUS; SUBCUTANEOUS at 21:38

## 2021-08-26 RX ADMIN — SODIUM CHLORIDE: 9 INJECTION, SOLUTION INTRAVENOUS at 06:37

## 2021-08-26 RX ADMIN — CYCLOBENZAPRINE 10 MG: 10 TABLET, FILM COATED ORAL at 20:38

## 2021-08-26 RX ADMIN — Medication 20 MG: at 07:35

## 2021-08-26 RX ADMIN — Medication 1000 MCG: at 13:26

## 2021-08-26 RX ADMIN — FENTANYL CITRATE 50 MCG: 50 INJECTION, SOLUTION INTRAMUSCULAR; INTRAVENOUS at 07:55

## 2021-08-26 RX ADMIN — Medication 10 MG: at 08:42

## 2021-08-26 RX ADMIN — LABETALOL 20 MG/4 ML (5 MG/ML) INTRAVENOUS SYRINGE 5 MG: at 09:42

## 2021-08-26 RX ADMIN — ROCURONIUM BROMIDE 50 MG: 10 INJECTION INTRAVENOUS at 07:27

## 2021-08-26 RX ADMIN — FENTANYL CITRATE 50 MCG: 50 INJECTION, SOLUTION INTRAMUSCULAR; INTRAVENOUS at 08:00

## 2021-08-26 RX ADMIN — ROCURONIUM BROMIDE 10 MG: 10 INJECTION INTRAVENOUS at 08:17

## 2021-08-26 RX ADMIN — SUGAMMADEX 200 MG: 100 INJECTION, SOLUTION INTRAVENOUS at 09:04

## 2021-08-26 RX ADMIN — Medication 40 MG: at 20:41

## 2021-08-26 RX ADMIN — MIDAZOLAM 1 MG: 1 INJECTION INTRAMUSCULAR; INTRAVENOUS at 07:23

## 2021-08-26 RX ADMIN — KETOROLAC TROMETHAMINE 15 MG: 30 INJECTION, SOLUTION INTRAMUSCULAR; INTRAVENOUS at 13:28

## 2021-08-26 RX ADMIN — FOLIC ACID 3 MG: 1 TABLET ORAL at 20:40

## 2021-08-26 RX ADMIN — HYDROMORPHONE HYDROCHLORIDE 0.25 MG: 2 INJECTION INTRAMUSCULAR; INTRAVENOUS; SUBCUTANEOUS at 09:22

## 2021-08-26 RX ADMIN — Medication 50 MG: at 20:40

## 2021-08-26 RX ADMIN — SODIUM CHLORIDE: 9 INJECTION, SOLUTION INTRAVENOUS at 12:54

## 2021-08-26 RX ADMIN — LEVOTHYROXINE SODIUM 112 MCG: 112 TABLET ORAL at 13:25

## 2021-08-26 RX ADMIN — CEFAZOLIN 2000 MG: 10 INJECTION, POWDER, FOR SOLUTION INTRAVENOUS at 07:32

## 2021-08-26 RX ADMIN — CEFAZOLIN 2000 MG: 10 INJECTION, POWDER, FOR SOLUTION INTRAVENOUS at 13:24

## 2021-08-26 RX ADMIN — LABETALOL 20 MG/4 ML (5 MG/ML) INTRAVENOUS SYRINGE 5 MG: at 09:57

## 2021-08-26 ASSESSMENT — PULMONARY FUNCTION TESTS
PIF_VALUE: 18
PIF_VALUE: 12
PIF_VALUE: 18
PIF_VALUE: 3
PIF_VALUE: 4
PIF_VALUE: 9
PIF_VALUE: 20
PIF_VALUE: 20
PIF_VALUE: 18
PIF_VALUE: 19
PIF_VALUE: 18
PIF_VALUE: 19
PIF_VALUE: 16
PIF_VALUE: 19
PIF_VALUE: 20
PIF_VALUE: 19
PIF_VALUE: 20
PIF_VALUE: 18
PIF_VALUE: 19
PIF_VALUE: 1
PIF_VALUE: 0
PIF_VALUE: 19
PIF_VALUE: 19
PIF_VALUE: 18
PIF_VALUE: 16
PIF_VALUE: 19
PIF_VALUE: 13
PIF_VALUE: 19
PIF_VALUE: 20
PIF_VALUE: 23
PIF_VALUE: 18
PIF_VALUE: 18
PIF_VALUE: 20
PIF_VALUE: 18
PIF_VALUE: 19
PIF_VALUE: 19
PIF_VALUE: 9
PIF_VALUE: 13
PIF_VALUE: 18
PIF_VALUE: 18
PIF_VALUE: 19
PIF_VALUE: 20
PIF_VALUE: 18
PIF_VALUE: 19
PIF_VALUE: 18
PIF_VALUE: 19
PIF_VALUE: 26
PIF_VALUE: 16
PIF_VALUE: 2
PIF_VALUE: 18
PIF_VALUE: 20
PIF_VALUE: 20
PIF_VALUE: 19
PIF_VALUE: 18
PIF_VALUE: 19
PIF_VALUE: 13
PIF_VALUE: 16
PIF_VALUE: 19
PIF_VALUE: 18
PIF_VALUE: 16
PIF_VALUE: 18
PIF_VALUE: 19
PIF_VALUE: 19
PIF_VALUE: 17
PIF_VALUE: 14
PIF_VALUE: 19
PIF_VALUE: 17
PIF_VALUE: 20
PIF_VALUE: 19
PIF_VALUE: 18
PIF_VALUE: 19
PIF_VALUE: 20
PIF_VALUE: 19
PIF_VALUE: 1
PIF_VALUE: 4
PIF_VALUE: 0
PIF_VALUE: 19
PIF_VALUE: 18
PIF_VALUE: 19
PIF_VALUE: 2
PIF_VALUE: 0
PIF_VALUE: 19
PIF_VALUE: 16
PIF_VALUE: 20
PIF_VALUE: 20
PIF_VALUE: 16
PIF_VALUE: 19

## 2021-08-26 ASSESSMENT — PAIN SCALES - GENERAL
PAINLEVEL_OUTOF10: 8
PAINLEVEL_OUTOF10: 4
PAINLEVEL_OUTOF10: 5
PAINLEVEL_OUTOF10: 6
PAINLEVEL_OUTOF10: 6
PAINLEVEL_OUTOF10: 3
PAINLEVEL_OUTOF10: 5
PAINLEVEL_OUTOF10: 8
PAINLEVEL_OUTOF10: 6

## 2021-08-26 ASSESSMENT — PAIN DESCRIPTION - DESCRIPTORS
DESCRIPTORS: CONSTANT
DESCRIPTORS: ACHING

## 2021-08-26 ASSESSMENT — PAIN DESCRIPTION - LOCATION: LOCATION: SHOULDER

## 2021-08-26 ASSESSMENT — PAIN DESCRIPTION - PAIN TYPE: TYPE: CHRONIC PAIN

## 2021-08-26 ASSESSMENT — PAIN DESCRIPTION - ORIENTATION: ORIENTATION: RIGHT

## 2021-08-26 ASSESSMENT — PAIN - FUNCTIONAL ASSESSMENT: PAIN_FUNCTIONAL_ASSESSMENT: 0-10

## 2021-08-26 NOTE — ANESTHESIA PRE PROCEDURE
Department of Anesthesiology  Preprocedure Note       Name:  Rivera Jennings   Age:  78 y.o.  :  1945                                          MRN:  [id number]         Date:  2021      Surgeon: Summer Callejas):  Thomas Baltazar MD    Procedure: Procedure(s):  HARDWARE REMOVAL RIGHT HUMERUS, CONVERSION TO RIGHT REVERSE TOTAL SHOULDER REPLACEMENT    Medications prior to admission:   Prior to Admission medications    Medication Sig Start Date End Date Taking?  Authorizing Provider   Multiple Vitamins-Minerals (EYE VITAMINS & MINERALS PO) Take by mouth daily   Yes Historical Provider, MD   metoprolol tartrate (LOPRESSOR) 50 MG tablet Take 1 tablet by mouth 2 times daily 21  Yes Michael Yu DO   simvastatin (ZOCOR) 40 MG tablet TAKE 1 TABLET NIGHTLY 21  Yes Michael Yu DO   alendronate (FOSAMAX) 70 MG tablet Take 1 tablet by mouth every 7 days 21  Yes Michael Yu DO   insulin glargine (BASAGLAR KWIKPEN) 100 UNIT/ML injection pen Inject 15 Units into the skin every morning (before breakfast) 21  Yes Michael Yu DO   glimepiride (AMARYL) 4 MG tablet Take 1 tablet by mouth 2 times daily 3/5/21  Yes Michael Yu DO   Calcium Carbonate-Vitamin D (OYSTER SHELL CALCIUM/D) 500-200 MG-UNIT TABS Take 1 tablet by mouth 2 times daily 21 Yes Jesi Robert MD   vitamin D3 (CHOLECALCIFEROL) 25 MCG (1000 UT) TABS tablet Take 0.5 tablets by mouth daily  Patient taking differently: Take 1,000 Units by mouth daily  21  Yes Jesi Robert MD   metFORMIN (GLUCOPHAGE-XR) 500 MG extended release tablet Take 2 tablets by mouth 2 times daily 20  Yes Michael Yu DO   levothyroxine (SYNTHROID) 112 MCG tablet Take 1 tablet by mouth Daily 20  Yes Michael Yu DO   apixaban (ELIQUIS) 5 MG TABS tablet Take 1 tablet by mouth 2 times daily 11/3/20  Yes Lincolnquiana Guaman MD   aspirin 81 MG EC tablet Take 81 mg by mouth daily   Yes Historical Provider, MD   hydrALAZINE (APRESOLINE) 50 Hypotension I95.9    Hyperlipidemia E78.5    Status post parathyroidectomy ([facility] E89.2    Pathological fracture of humerus due to osteoporosis M80.029A    Other osteoporosis without current pathological fracture M81.8    Hyperparathyroidism, unspecified ([facility] E21.3    S/P ablation of atrial fibrillation Z98.890, Z86.79    Persistent atrial fibrillation (HCC) I48.19       Past Medical History:        Diagnosis Date    Arthritis     Atrial fibrillation ([facility]     Cancer ([facility] 2000    NON HODGKINS LYMPHOMA    Diabetes mellitus ([facility]     Deepika filter in place     Homozygous for MTHFR gene mutation     Hx of blood clots 2000    LEGS    Hyperlipidemia     Hypertension     TRACY on CPAP     Prolonged emergence from general anesthesia     Stroke (cerebrum) ([facility]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    Thyroid disease        Past Surgical History:        Procedure Laterality Date    APPENDECTOMY      CARDIAC CATHETERIZATION      CARDIAC SURGERY  02/2021    ablation     CHOLECYSTECTOMY      FRACTURE SURGERY      ankle surgery    FRACTURE SURGERY Right 09/2018    HUMERUS    HERNIA REPAIR      HYSTERECTOMY      PARATHYROID GLAND SURGERY N/A 10/1/2020    PARATHYROIDECTOMY, EXPLORATION OF PARATHYROIDS performed by Mary Garduno MD at [facility] OFFICE/OUTPT [facility] Right 9/19/2018    ORIF RIGHT PROXIMAL HUMERUS FX performed by Rob Sales MD at [address] Right 6/27/2019    REVISION RT HUMERUS FX, HARDWARE REMOVAL, HUMERAL NAIL INSERTION performed by Bethel Jack MD at [address]         Social History:    Social History     Tobacco Use    Smoking status: Never Smoker    Smokeless tobacco: Never Used   Substance Use Topics    Alcohol use:  No                                Counseling given: Not Answered      Vital Signs (Current):   Vitals:    08/19/21 1512 08/26/21 0549   BP:  (!) 189/79   Pulse:  61   Resp:  18 Temp:  96.8 °F (36 °C)   TempSrc:  Temporal   SpO2:  97%   Weight: 195 lb (88.5 kg) 195 lb 12.8 oz (88.8 kg)   Height: 5' 5\" (1.651 m) 5' 5\" (1.651 m)                                              BP Readings from Last 3 Encounters:   08/26/21 (!) 189/79   08/05/21 (!) 151/78   04/19/21 128/74       NPO Status: Time of last liquid consumption: 2000                        Time of last solid consumption: 2000                        Date of last liquid consumption: 08/25/21                        Date of last solid food consumption: 08/25/21    BMI:   Wt Readings from Last 3 Encounters:   08/26/21 195 lb 12.8 oz (88.8 kg)   08/05/21 199 lb 6.4 oz (90.4 kg)   04/19/21 197 lb 1.6 oz (89.4 kg)     Body mass index is 32.58 kg/m².     CBC:   Lab Results   Component Value Date    WBC 4.5 08/19/2021    RBC 3.79 08/19/2021    RBC 3.94 02/27/2012    HGB 12.8 08/19/2021    HCT 39.6 08/19/2021    .5 08/19/2021    RDW 12.5 02/25/2020     08/19/2021       CMP:   Lab Results   Component Value Date     07/29/2021    K 5.1 07/29/2021    K 5.5 02/11/2021    CL 91 07/29/2021    CO2 23 07/29/2021    BUN 20 07/29/2021    CREATININE 0.7 07/29/2021    LABGLOM 82 07/29/2021    GLUCOSE 199 07/29/2021    GLUCOSE 167 02/27/2012    PROT 7.0 07/29/2021    CALCIUM 10.2 07/29/2021    BILITOT 0.5 07/29/2021    ALKPHOS 45 07/29/2021    AST 25 07/29/2021    ALT 26 07/29/2021       POC Tests:   Recent Labs     08/26/21  0653   POCGLU 230*       Coags:   Lab Results   Component Value Date    INR 1.11 02/11/2021    APTT 33.6 02/11/2021       HCG (If Applicable): No results found for: PREGTESTUR, PREGSERUM, HCG, HCGQUANT     ABGs: No results found for: PHART, PO2ART, PVQ9QRS, KZA7AAO, BEART, G3XKMVRU     Type & Screen (If Applicable):  Lab Results   Component Value Date    LAB POS 02/11/2021       Drug/Infectious Status (If Applicable):  No results found for: HIV, HEPCAB    COVID-19 Screening (If Applicable):   Lab Results Component Value Date    COVID19 Not Detected 02/04/2021           Anesthesia Evaluation  Patient summary reviewed and Nursing notes reviewed no history of anesthetic complications:   Airway: Mallampati: III  TM distance: >3 FB   Neck ROM: full  Mouth opening: > = 3 FB Dental:          Pulmonary:normal exam  breath sounds clear to auscultation  (+) sleep apnea: on CPAP,                             Cardiovascular:  Exercise tolerance: good (>4 METS),   (+) hypertension:, dysrhythmias (s/p ablation): atrial fibrillation,       ECG reviewed      Echocardiogram reviewed                  Neuro/Psych:   (+) CVA:,             GI/Hepatic/Renal:            ROS comment: obesity. Endo/Other:    (+) DiabetesType II DM, , hypothyroidism::., malignancy/cancer. Abdominal:   (+) obese,     Abdomen: soft. Vascular: negative vascular ROS. Other Findings:             Anesthesia Plan      general     ASA 3       Induction: intravenous. MIPS: Postoperative opioids intended and Prophylactic antiemetics administered. Anesthetic plan and risks discussed with patient and spouse. Plan discussed with CRNA.                   333 Jocelynn Brewer, DO   8/26/2021

## 2021-08-26 NOTE — PROGRESS NOTES
ADMITTED TO [facility] AND ORIENTED TO UNIT. SCDS ON. FALL AND ALLERGY BANDS ON. PT VERBALIZED APPROVAL FOR FIRST NAME, LAST INITIAL AND PHYSICIAN NAME ON UNIT WHITEBOARD. Spouse, Bennie with the patient.

## 2021-08-26 NOTE — PROGRESS NOTES
The patient returned to [facility]. Awake. Pain 6/10. Fingers are warm and move easily on the right side. Incision intact without drainage. The patient has tolerated sips of diet chris mist. The spouse is in the room.

## 2021-08-26 NOTE — PROGRESS NOTES
2663 pt arrives to pacu alert to verbal stimulation. Pt arrives on 8 L simple mask. Pt respirations are even and unlabored. Pt VSS.  0920 CRNA at bedside medicated pt for pain. Pt decreased from 8L to 6L. Pt o2 sat 100%  0925 Pt decreased from 6L to 4L nasal canula. Pt o2 sat 95%  0930 Pt decreased to 2 L nasal canula. PT o2 sat 97%  0930 Pt resting in bed with even and unlabored respirations. Pt VSS. Pt states her pain is a 3/10 at this time. 5464 pt medicated for high blood pressure per mar. Pt remains alert to vernal stimulation. Pt States her pain is a 4/10 and tolerable. Pt respirations are even and unlabored. Pt VSS  0958 Pt resting with eyes closed. Pt alert to verbal stimulation. Pt states her pain is tolerable at a 5/10 at this time. Pt medicated per mar for blood pressure. Pt VSS  1008 pt blood pressure has decreased. PT resting with eyes closed. Pt respirations are even and unlabored. PT VSS  1018 Pt meets criteria from discharge from pacu. PT respirations are even and unlabored. Pt VSS.  Pt states her pain is a 5/10 and tolerable

## 2021-08-26 NOTE — CARE COORDINATION
8/26/21, 1:13 PM EDT  DISCHARGE PLANNING EVALUATION:    Abdulaziz Lentz       Admitted: 8/26/2021/ 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 Procedure: 8/26/21 surgery by Dr Jacoby Ashton: Tricia Koenig, CONVERSION TO RIGHT REVERSE TOTAL SHOULDER REPLACEMENT  Barriers to Discharge:  Surgery today. Hospitalist consulted: medical managament DM, HLD, A-fib, HTN, hx DVT  Right arm sling. N/V checks. Pain management. PT/OT   PCP: Ney Carrera, DO    %  Patient Goals/Plan/Treatment Preferences: I spoke with Pia Sellers. Planning home with . Has needed equipment for home use. Will follow with PT/OT. Transportation/Food Security/Housekeeping Addressed:  No issues identified.

## 2021-08-26 NOTE — PROGRESS NOTES
The patient was transferred to the unit by cart with transport team, RN and personal belongings. The patient spouse is present also.

## 2021-08-26 NOTE — CARE COORDINATION
8/26/21, 12:28 PM EDT    DISCHARGE PLANNING EVALUATION      Spoke with Maite Curiel and her . She is planning home at discharge,  is able to assist with her care. She denies need for home health, and shares that she has any needed equipment.

## 2021-08-26 NOTE — PROGRESS NOTES
PHARMACY NOTE  Chadd Ceja was ordered Alendronate. Per the [address], this medication is non-formulary and not stocked by pharmacy. The medication can be reordered at discharge.      Hector Spaulding PharmD 8/26/2021 11:14 AM

## 2021-08-26 NOTE — ANESTHESIA POSTPROCEDURE EVALUATION
Department of Anesthesiology  Postprocedure Note    Patient: Phoebe Tesfaye  MRN: [id number]  YOB: 1945  Date of evaluation: 8/26/2021  Time:  11:01 AM     Procedure Summary     Date: 08/26/21 Room / Location: [facility] OR [address] [place] [address] OR    Anesthesia Start: 0720 Anesthesia Stop: 8745    Procedure: HARDWARE REMOVAL RIGHT HUMERUS, CONVERSION TO RIGHT REVERSE TOTAL SHOULDER REPLACEMENT (Right Shoulder) Diagnosis: (FAILED HARDWARE RIGHT HUMERUS)    Surgeons: Román Godoy MD Responsible Provider: Negro Carlin DO    Anesthesia Type: general ASA Status: 3          Anesthesia Type: general    Ochoa Phase I: Ochoa Score: 9    Ochoa Phase II: Ochoa Score: 10    Last vitals: Reviewed and per EMR flowsheets.        Anesthesia Post Evaluation    Patient location during evaluation: PACU  Patient participation: complete - patient participated  Level of consciousness: awake and alert  Pain score: 4  Airway patency: patent  Nausea & Vomiting: no nausea and no vomiting  Complications: no  Cardiovascular status: hemodynamically stable and blood pressure returned to baseline  Respiratory status: spontaneous ventilation, room air and acceptable  Hydration status: stable

## 2021-08-26 NOTE — OP NOTE
Operative Note      Patient: Yoni Dixon  YOB: 1945  MRN: [id number]    Date of Procedure: 8/26/2021    Pre-Op Diagnosis: Nonunion right proximal humerus    Post-Op Diagnosis: Same       Procedure(s):  HARDWARE REMOVAL RIGHT HUMERUS, CONVERSION TO RIGHT REVERSE TOTAL SHOULDER REPLACEMENT    Surgeon(s):  Roberto Wood MD    Assistant:   Physician Assistant: Nuno Rice PA-C; Javier Koch PA-C    Anesthesia: General    Estimated Blood Loss (mL): less than 302     Complications: None    Specimens:   ID Type Source Tests Collected by Time Destination   1 : tissue and bone from right shoulder for cultures a&a Tissue Bone CULTURE, ANAEROBIC AND 2707 AVI Enciso MD 8/26/2021 7059        Implants:  Implant Name Type Inv. Item Serial No.  Lot No. LRB No. Used Action   CEMENT BNE 40GM FULL DOSE PMMA W/O ANTIBIO M VISC RADPQ  CEMENT BNE 40GM FULL DOSE PMMA W/O ANTIBIO M VISC RADPQ  [employer] [facility] [id number] Right 1 Implanted   SCREW BNE L20MM DIA4. 75MM HD DIA3.5MM ZIGGY FIX ANG  SCREW BNE L20MM DIA4. 75MM HD DIA3.5MM ZIGGY FIX ANG  [facility] [id number] Right 1 Implanted   BASEPLATE ALICIA [id number] MINI SHLDR REV TAPR COMPHSVE  BASEPLATE ALICIA [id number] MINI SHLDR REV TAPR COMPHSVE  [facility] [id number] Right 1 Implanted   SPHERE ALICIA [id number] +3MM OFFSET CO CHROM SHLDR REV SYS  SPHERE ALICIA [id number] +3MM OFFSET CO CHROM SHLDR REV SYS  [facility] [id number] Right 1 Implanted   SCREW BNE L25MM DIA6. 5MM HEX HD DIA3. [address] CONV  SCREW BNE L25MM DIA6. 5MM HEX HD DIA3. 5MM FOR COMPHSVE CONV  [facility] [id number] Right 1 Implanted   SCREW BNE L25MM DIA4. 75MM HD DIA3.5MM ZIGGY FIX ANG  SCREW BNE L25MM DIA4. 75MM HD DIA3.5MM ZIGGY FIX ANG  [facility] [id number] Right 1 Implanted   STEM HUM 8MM MINI SHLDR CO CHROM COMPHSVE REV MATEUSZ BRIAN  STEM HUM 8MM MINI MOSES JAIN COMPHSVE REV MATEUSZ BRIAN  [employer]- [id number] Right 1 Implanted   BEARING HUM STD 39 MM SHLDR VIVACIT-E PROLONG COMPHSVE  BEARING HUM STD 39 MM SHLDR VIVACIT-E PROLONG COMPHSVE  TY [employer]WD [id number] Right 1 Implanted   TRAY HUM STD FOR COMPHSVE REV SHLDR SYS  TRAY HUM STD FOR COMPHSVE REV SHLDR SYS  TY [employer]WD [id number] Right 1 Implanted         Drains:   Closed/Suction Drain Anterior Neck Bulb 15 [place] (Active)       Findings: Confirmed    Detailed Description of Procedure: Indications  This a 81-year-old female history of proximal humerus fracture initially treated with a plate. Went on to nonunion we then attempted using nail which is 1 on to a nonunion. Felt she would benefit from a revision procedure. This would be in the form of a reverse total shoulder. Patient agreed. Narrative  Patient was taken the operating room underwent a general anesthetic. Placed in the captain's chair position. Right upper extremity was prepped draped in a sterile fashion. 2 g of Ancef were administered. Start with a lateral post proximal humerus skin knife electrocautery's taking a sleeve off the acromion and splitting the deltoid. Axillary nerve is protected. Worked her way distally identified the 2 distal locking screws. These were removed. 1 was broken and has worked its way into the medial side of the arm we elected to leave this. The remove the 3 proximal locking screws and the nail was removed. We then remove the proximal humerus protecting the axillary nerve the fracture is just about the level of the axillary nerve. We then prepped the glenoid removing labrum placing the guide for a mini baseplate drilled and reamed. Implanted a mini baseplate with the 25 mm central screw and 1 screw superiorly and inferiorly. We then addressed the humerus. Reamed up sequentially elected to go with a mini stem size 8. Trialed out with a +3 lateralized glenosphere. This was appropriate.   Implanted the glenosphere. We then placed the mini stem with some cement proximally to aid in its fixation. A standard tray was trialed and was felt be satisfactory. The implantable tray was in place. Injected local anesthetic. Repaired the deltoid rent as well as to the acromion to bone tunnels. We did send bone off for culture. The skin was repaired with 0 Quill suture. The skin was sealed with Prineo. Dry dressings were applied. Patient was then awakened turned to cover room in good condition. Postoperative plan  Weightbearing as tolerated in a sling. Dry dressings as necessary. We will see her in the office in 6 weeks for clinical exam and get her into physical therapy at that time.     Electronically signed by Troy Little MD on 8/26/2021 at 8:53 AM

## 2021-08-26 NOTE — H&P
History and Physical Update    Pt Name: Janet Zepeda  MRN: [id number]  YOB: 1945  Date of evaluation: 8/26/2021    [x] I have examined the patient and reviewed the H&P/Consult and there are no changes to the patient or plans.     [] I have examined the patient and reviewed the H&P/Consult and have noted the following changes:        EMMANUEL Somers - CNP   Electronically signed 8/26/2021

## 2021-08-26 NOTE — CONSULTS
Hospitalist of [facility]      Patient:  King Sisi  YOB: 1945    MRN: [id number]     Acct: [de-identified]    Primary Care Physician: Taylor Ling DO    Reason for consult: Medical management of hypertension, atrial fibrillation and diabetes    Assessment/Plan:  1. Essential hypertension, uncontrolled--on hydralazine, Cozaar, Lopressor; monitor  2. History atrial fibrillation--had an ablation February 2021; follows with Dr. Sherrill Mosher  3. Diabetes mellitus type 2, uncontrolled--on Glucotrol that is to start tomorrow, will be started on her Glucophage tonight and will add medium dose sliding scale along with hypoglycemia protocol; hemoglobin A1c was noted to be 7.6 on July 29, 2021; change diet to diabetic; monitor  4. Hyperlipidemia--treated  5. History non-Hodgkin's lymphoma  6. History of DVTs--on Eliquis 5 mg twice a day to start on 8/27      HISTORY OF PRESENT ILLNESS:   History obtained from chart review and the patient. The patient is a 78 y.o. female whom I have been requested to see by Dr. Mauricio Esquivel for evaluation of hypertension, diabetes and atrial fibrillation; patient had a nonunion right proximal humerus and underwent hardware removal right humerus, conversion to right reverse total shoulder replacement on 8/26/2021; patient is currently fully alert and oriented, spouse at bedside; currently she denies any lightheadedness or dizziness, chest pain, shortness of breath, nausea, states she is drinking water however food \"is not appealing at this time\"; she takes Eliquis 5 mg twice daily at home; she is currently hemodynamically stable and offers no complaints.     Past Medical History:        Diagnosis Date    Arthritis     Atrial fibrillation ([address]     Cancer ([address] 2000    NON HODGKINS LYMPHOMA    [facility] mellitus [facility]     Deepika filter in place     Homozygous for MTHFR gene mutation     Hx of blood clots 2000    LEGS    Hyperlipidemia     Hypertension     TRACY on CPAP     Prolonged emergence from general anesthesia     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    Thyroid disease        Past Surgical History:        Procedure Laterality Date    APPENDECTOMY      CARDIAC CATHETERIZATION      CARDIAC SURGERY  02/2021    ablation     CHOLECYSTECTOMY      FRACTURE SURGERY      ankle surgery    FRACTURE SURGERY Right 09/2018    HUMERUS    HERNIA REPAIR      HYSTERECTOMY      PARATHYROID GLAND SURGERY N/A 10/1/2020    PARATHYROIDECTOMY, EXPLORATION OF PARATHYROIDS performed by Naty Hernandez MD at [facility] OFFICE/OUTPT VISIT,PROCEDURE ONLY Right 9/19/2018    ORIF RIGHT PROXIMAL HUMERUS FX performed by Klaudia Menchaca MD at [address] Right 6/27/2019    REVISION RT HUMERUS FX, HARDWARE REMOVAL, HUMERAL NAIL INSERTION [facility]         Medications: Scheduled Meds:   [START ON 8/27/2021] apixaban  5 mg Oral BID    [START ON 8/27/2021] aspirin  81 mg Oral Daily    folic acid  3 mg Oral BID    [START ON 8/27/2021] glipiZIDE  10 mg Oral QAM AC    hydrALAZINE  50 mg Oral BID    levothyroxine  112 mcg Oral Daily    [START ON 8/27/2021] losartan  100 mg Oral Daily    metFORMIN  1,000 mg Oral BID WC    metoprolol tartrate  50 mg Oral BID    atorvastatin  20 mg Oral Daily    vitamin B-12  1,000 mcg Oral Daily    ketorolac  15 mg IntraVENous Q6H    docusate sodium  100 mg Oral Daily    polyethylene glycol  17 g Oral Daily    ceFAZolin  2,000 mg IntraVENous Q8H    HYDROcodone-acetaminophen         Continuous Infusions:   sodium chloride 100 mL/hr at 08/26/21 1254     PRN Meds:.cyclobenzaprine, ondansetron, morphine **OR** morphine, HYDROcodone 5 mg - acetaminophen **OR** HYDROcodone 5 mg - acetaminophen, magnesium hydroxide, acetaminophen    Allergies:  Patient has no known allergies. Social History:    reports that she has never smoked.  She has never used smokeless tobacco. She reports that she does not drink alcohol and does not use drugs.     Family History:       Problem Relation Age of Onset    Heart Disease Mother     Heart Disease Father     Cancer Sister     Other Sister      Review of Systems:  Constitutional: ROS: negative for - chills or fever  Head: no headache, no head injury, no migraine   Eyes ROS: denies blurred/double vision  Ears ROS: no hearing difficulty, no tinnitus  Mouth and Throat ROS: no ulceration, dysphagia, dental caries  Psychological ROS: no depression, no anxiety, no panic attacks, denies suicide/homicide ideation  Endocrine ROS: denies polyuria, polydypsia, no heat or cold intolerance  Respiratory ROS: no cough, shortness of breath, or wheezing  Cardiovascular ROS: no chest pain or dyspnea on exertion  Gastrointestinal ROS: no abdominal pain, change in bowel habits, or black or bloody stools  Genito-Urinary ROS: denies dysuria, frequency, urgency; denies hematuria  Musculoskeletal ROS: positive for - pain in right shoulder  Neurological ROS: no syncope, no seizures, no numbness or tingling of hands, no numbness or tingling of feet, no paresis  Dermatology: no skin rash, no eczema  Endocrine: no polyuria, polydypsia, no heat/cold intolerance  Hematology: denies bruising easily, denies bleeding problems, denies clotting disorders    Physical Exam:    Vitals:  Patient Vitals for the past 24 hrs:   BP Temp Temp src Pulse Resp SpO2 Height Weight   08/26/21 1155 (!) 164/70 -- -- 78 -- 93 % -- --   08/26/21 1140 (!) 169/73 -- -- 82 -- 95 % -- --   08/26/21 1125 (!) 175/77 -- -- 75 -- 94 % -- --   08/26/21 1110 (!) 199/83 97.7 °F (36.5 °C) Oral 76 13 96 % -- --   08/26/21 1023 (!) 176/78 96.1 °F (35.6 °C) Temporal 76 12 94 % -- --   08/26/21 1015 (!) 158/69 -- -- 75 12 95 % -- --   08/26/21 1010 (!) 160/70 -- -- 74 12 93 % -- --   08/26/21 1005 (!) 161/70 -- -- 73 12 99 % -- --   08/26/21 1000 (!) 180/73 -- -- 75 13 98 % -- -- 08/26/21 0955 (!) 183/76 -- -- 74 15 99 % -- --   08/26/21 0950 (!) 171/74 -- -- 76 12 99 % -- --   08/26/21 0945 (!) 196/74 -- -- 81 11 99 % -- --   08/26/21 0940 (!) 183/76 -- -- 83 16 99 % -- --   08/26/21 0935 (!) 182/76 -- -- 81 12 98 % -- --   08/26/21 0930 (!) 172/73 -- -- 80 18 97 % -- --   08/26/21 0925 (!) 167/73 -- -- 80 16 95 % -- --   08/26/21 0920 (!) 198/81 -- -- 84 10 100 % -- --   08/26/21 0915 (!) 216/83 -- -- 87 18 100 % -- --   08/26/21 0914 (!) 216/83 97.1 °F (36.2 °C) Temporal 84 13 100 % -- --   08/26/21 0549 (!) 189/79 96.8 °F (36 °C) Temporal 61 18 97 % 5' 5\" (1.651 m) 195 lb 12.8 oz (88.8 kg)     Weight: Weight: 195 lb 12.8 oz (88.8 kg)     24 hour intake/output:    Intake/Output Summary (Last 24 hours) at 8/26/2021 1408  Last data filed at 8/26/2021 1032  Gross per 24 hour   Intake 750 ml   Output 850 ml   Net -100 ml       General appearance - alert, well appearing, and in no distress and oriented to person, place, and time; appears stated age  HEENT-atraumatic, normocephalic; PERRL; conjunctiva pink; sclera anicteric; oral mucosa pink and moist; trachea midline; neck supple; no carotid bruit auscultated; no JVD  Respiratory - clear to auscultation, no wheezes, rales or rhonchi, symmetric air entry  Cardiovascular - normal rate and regular rhythm  Gastrointestinal - soft, nontender, nondistended, active bowel sounds x 4  Obese: No  Neurological - alert and oriented to person, place, time; cranial nerves 2-12 grossly intact; speech is clear   Musculoskeletal - abnormal exam of right shoulder as sutures noted, movement of extremities x 4 intact~wiggles fingers of the right arm; no lower extremity edema; pedal and posterior tibialis pulses 2+  Integumentary - pink, warm, dry    Review of Labs and Diagnostic Testing:    Glucose:  Recent Labs     08/26/21  0653 08/26/21  1032   POCGLU 230* 258*      XR CHEST (2 VW)    Result Date: 8/19/2021  PROCEDURE: XR CHEST (2 VW) CLINICAL INFORMATION:

## 2021-08-27 ENCOUNTER — CARE COORDINATION (OUTPATIENT)
Dept: CARE COORDINATION | Age: 76
End: 2021-08-27

## 2021-08-27 ENCOUNTER — TELEPHONE (OUTPATIENT)
Dept: FAMILY MEDICINE CLINIC | Age: 76
End: 2021-08-27

## 2021-08-27 VITALS
RESPIRATION RATE: 16 BRPM | HEART RATE: 68 BPM | OXYGEN SATURATION: 95 % | SYSTOLIC BLOOD PRESSURE: 121 MMHG | DIASTOLIC BLOOD PRESSURE: 58 MMHG | HEIGHT: 65 IN | TEMPERATURE: 97.6 F | WEIGHT: 195.8 LBS | BODY MASS INDEX: 32.62 KG/M2

## 2021-08-27 LAB
ANION GAP SERPL CALCULATED.3IONS-SCNC: 11 MEQ/L (ref 8–16)
BASOPHILS # BLD: 0.3 %
BASOPHILS ABSOLUTE: 0 THOU/MM3 (ref 0–0.1)
BUN BLDV-MCNC: 16 MG/DL (ref 7–22)
CALCIUM SERPL-MCNC: 7.9 MG/DL (ref 8.5–10.5)
CHLORIDE BLD-SCNC: 100 MEQ/L (ref 98–111)
CO2: 21 MEQ/L (ref 23–33)
CREAT SERPL-MCNC: 0.8 MG/DL (ref 0.4–1.2)
EOSINOPHIL # BLD: 2.2 %
EOSINOPHILS ABSOLUTE: 0.1 THOU/MM3 (ref 0–0.4)
ERYTHROCYTE [DISTWIDTH] IN BLOOD BY AUTOMATED COUNT: 12.8 % (ref 11.5–14.5)
ERYTHROCYTE [DISTWIDTH] IN BLOOD BY AUTOMATED COUNT: 50.4 FL (ref 35–45)
GFR SERPL CREATININE-BSD FRML MDRD: 70 ML/MIN/1.73M2
GLUCOSE BLD-MCNC: 199 MG/DL (ref 70–108)
GLUCOSE BLD-MCNC: 212 MG/DL (ref 70–108)
GLUCOSE BLD-MCNC: 289 MG/DL (ref 70–108)
HCT VFR BLD CALC: 31.7 % (ref 37–47)
HEMOGLOBIN: 10.2 GM/DL (ref 12–16)
IMMATURE GRANS (ABS): 0.01 THOU/MM3 (ref 0–0.07)
IMMATURE GRANULOCYTES: 0.3 %
LYMPHOCYTES # BLD: 17 %
LYMPHOCYTES ABSOLUTE: 0.5 THOU/MM3 (ref 1–4.8)
MCH RBC QN AUTO: 34.3 PG (ref 26–33)
MCHC RBC AUTO-ENTMCNC: 32.2 GM/DL (ref 32.2–35.5)
MCV RBC AUTO: 106.7 FL (ref 81–99)
MONOCYTES # BLD: 9.7 %
MONOCYTES ABSOLUTE: 0.3 THOU/MM3 (ref 0.4–1.3)
NUCLEATED RED BLOOD CELLS: 0 /100 WBC
PLATELET # BLD: 177 THOU/MM3 (ref 130–400)
PMV BLD AUTO: 9.2 FL (ref 9.4–12.4)
POTASSIUM SERPL-SCNC: 4.2 MEQ/L (ref 3.5–5.2)
RBC # BLD: 2.97 MILL/MM3 (ref 4.2–5.4)
SEG NEUTROPHILS: 70.5 %
SEGMENTED NEUTROPHILS ABSOLUTE COUNT: 2.3 THOU/MM3 (ref 1.8–7.7)
SODIUM BLD-SCNC: 132 MEQ/L (ref 135–145)
WBC # BLD: 3.2 THOU/MM3 (ref 4.8–10.8)

## 2021-08-27 PROCEDURE — 80048 BASIC METABOLIC PNL TOTAL CA: CPT

## 2021-08-27 PROCEDURE — 6370000000 HC RX 637 (ALT 250 FOR IP): Performed by: NURSE PRACTITIONER

## 2021-08-27 PROCEDURE — 36415 COLL VENOUS BLD VENIPUNCTURE: CPT

## 2021-08-27 PROCEDURE — 6360000002 HC RX W HCPCS: Performed by: NURSE PRACTITIONER

## 2021-08-27 PROCEDURE — 97530 THERAPEUTIC ACTIVITIES: CPT

## 2021-08-27 PROCEDURE — 97110 THERAPEUTIC EXERCISES: CPT

## 2021-08-27 PROCEDURE — 82948 REAGENT STRIP/BLOOD GLUCOSE: CPT

## 2021-08-27 PROCEDURE — 85025 COMPLETE CBC W/AUTO DIFF WBC: CPT

## 2021-08-27 PROCEDURE — 97166 OT EVAL MOD COMPLEX 45 MIN: CPT

## 2021-08-27 PROCEDURE — 99232 SBSQ HOSP IP/OBS MODERATE 35: CPT | Performed by: NURSE PRACTITIONER

## 2021-08-27 RX ORDER — INSULIN GLARGINE 100 [IU]/ML
15 INJECTION, SOLUTION SUBCUTANEOUS
Status: DISCONTINUED | OUTPATIENT
Start: 2021-08-27 | End: 2021-08-27 | Stop reason: HOSPADM

## 2021-08-27 RX ADMIN — HYDRALAZINE HYDROCHLORIDE 50 MG: 50 TABLET, FILM COATED ORAL at 09:17

## 2021-08-27 RX ADMIN — KETOROLAC TROMETHAMINE 15 MG: 30 INJECTION, SOLUTION INTRAMUSCULAR; INTRAVENOUS at 02:09

## 2021-08-27 RX ADMIN — POLYETHYLENE GLYCOL 3350 17 G: 17 POWDER, FOR SOLUTION ORAL at 09:13

## 2021-08-27 RX ADMIN — GLIMEPIRIDE 4 MG: 4 TABLET ORAL at 09:17

## 2021-08-27 RX ADMIN — FOLIC ACID 3 MG: 1 TABLET ORAL at 09:17

## 2021-08-27 RX ADMIN — LOSARTAN POTASSIUM 100 MG: 100 TABLET ORAL at 09:17

## 2021-08-27 RX ADMIN — KETOROLAC TROMETHAMINE 15 MG: 30 INJECTION, SOLUTION INTRAMUSCULAR; INTRAVENOUS at 14:03

## 2021-08-27 RX ADMIN — Medication 50 MG: at 09:17

## 2021-08-27 RX ADMIN — METFORMIN HYDROCHLORIDE 1000 MG: 500 TABLET, EXTENDED RELEASE ORAL at 09:17

## 2021-08-27 RX ADMIN — INSULIN GLARGINE 15 UNITS: 100 INJECTION, SOLUTION SUBCUTANEOUS at 09:14

## 2021-08-27 RX ADMIN — HYDROCODONE BITARTRATE AND ACETAMINOPHEN 1 TABLET: 5; 325 TABLET ORAL at 08:30

## 2021-08-27 RX ADMIN — INSULIN LISPRO 6 UNITS: 100 INJECTION, SOLUTION INTRAVENOUS; SUBCUTANEOUS at 11:48

## 2021-08-27 RX ADMIN — Medication 1000 MCG: at 11:46

## 2021-08-27 RX ADMIN — ASPIRIN 81 MG: 81 TABLET, COATED ORAL at 09:13

## 2021-08-27 RX ADMIN — INSULIN LISPRO 2 UNITS: 100 INJECTION, SOLUTION INTRAVENOUS; SUBCUTANEOUS at 09:14

## 2021-08-27 RX ADMIN — DOCUSATE SODIUM 100 MG: 100 CAPSULE ORAL at 09:13

## 2021-08-27 RX ADMIN — LEVOTHYROXINE SODIUM 112 MCG: 112 TABLET ORAL at 06:20

## 2021-08-27 RX ADMIN — KETOROLAC TROMETHAMINE 15 MG: 30 INJECTION, SOLUTION INTRAMUSCULAR; INTRAVENOUS at 09:13

## 2021-08-27 ASSESSMENT — PAIN SCALES - GENERAL
PAINLEVEL_OUTOF10: 1
PAINLEVEL_OUTOF10: 4

## 2021-08-27 ASSESSMENT — PAIN DESCRIPTION - ORIENTATION: ORIENTATION: RIGHT

## 2021-08-27 ASSESSMENT — PAIN DESCRIPTION - PAIN TYPE: TYPE: SURGICAL PAIN

## 2021-08-27 ASSESSMENT — PAIN DESCRIPTION - LOCATION: LOCATION: SHOULDER

## 2021-08-27 NOTE — CARE COORDINATION
I received the approval for Audrey Araya through the PAP program until 12/31/21. I called and spoke with her  who informed me she is in the hospital but is coming home today and I can call her on her cell. I called and spoke with Carlos Hamilton and let her know about the program. I told her to call me this time next week if she has not connected with [facility] to set up delivery.        Tricia Greene [address]   Medication Assistance  [facility] and [employer]    (M)[phone]  (B)[phone]

## 2021-08-27 NOTE — DISCHARGE INSTR - DIET

## 2021-08-27 NOTE — PROGRESS NOTES
Hospitalist Progress Note    Patient:  Yeison Brooke      Unit/Bed:7K-05/005-A    YOB: 1945    MRN: [id number]       Acct: [de-identified]     PCP: Libra Burger DO    Date of Admission: 8/26/2021    Assessment/Plan:    1. Essential hypertension, uncontrolled--on hydralazine, Cozaar, Lopressor; stable   2. History atrial fibrillation--had an ablation February 2021; follows with Dr. Jimmy Valentine; stable  3. Diabetes mellitus type 2, uncontrolled--on Glucotrol, Glucophage; on medium dose sliding scale along with hypoglycemia protocol; hemoglobin A1c was noted to be 7.6 on July 29, 2021; on ADA diet;  will add her home dose of Lantus 15 units in the morning  4. Hyponatremia--corrected value for glucose 212 equals 134  5. Anemia, macrocytic and hyperchromic--stable; on supplementation with vitamin B12 and folate  6. Hyperlipidemia--treated  7. History non-Hodgkin's lymphoma  8. History of DVTs--on Eliquis 5 mg twice a day     Expected discharge date: Per primary; hospitalist will sign off and okay with discharge, medication reconciliation completed    Disposition:    [x] Home       [] TCU       [] Rehab       [] Psych       [] SNF       [] [place]       [] Other-    Chief Complaint: Medical management of hypertension, atrial fibrillation and diabetes    Hospital Course:  History obtained from chart review and the patient.   The patient is a 78 y.o. female whom I have been requested to see by Dr. Diamond Padilla for evaluation of hypertension, diabetes and atrial fibrillation; patient had a nonunion right proximal humerus and underwent hardware removal right humerus, conversion to right reverse total shoulder replacement on 8/26/2021; patient is currently fully alert and oriented, spouse at bedside; currently she denies any lightheadedness or dizziness, chest pain, shortness of breath, nausea, states she is drinking water however food \"is not appealing at this time\"; she takes Eliquis 5 mg twice daily at home; she is currently hemodynamically stable and offers no complaints.     8/27--> hemodynamically stable, glucose was at 312 last night and this morning at 199     Subjective (past 24 hours): Rates pain to right shoulder 4 out of 10, sitting up in the chair eating, offers no other complaints, hoping to be able to be going home today    Medications:  Reviewed    Infusion Medications    dextrose       Scheduled Medications    insulin glargine  15 Units Subcutaneous QAM AC    apixaban  5 mg Oral BID    aspirin  81 mg Oral Daily    folic acid  3 mg Oral BID    hydrALAZINE  50 mg Oral BID    levothyroxine  112 mcg Oral Daily    losartan  100 mg Oral Daily    metFORMIN  1,000 mg Oral BID WC    metoprolol tartrate  50 mg Oral BID    vitamin B-12  1,000 mcg Oral Daily    ketorolac  15 mg IntraVENous Q6H    docusate sodium  100 mg Oral Daily    polyethylene glycol  17 g Oral Daily    insulin lispro  0-12 Units Subcutaneous TID WC    insulin lispro  0-6 Units Subcutaneous Nightly    simvastatin  40 mg Oral Nightly    glimepiride  4 mg Oral Daily with breakfast     PRN Meds: cyclobenzaprine, ondansetron, morphine **OR** morphine, HYDROcodone 5 mg - acetaminophen **OR** HYDROcodone 5 mg - acetaminophen, magnesium hydroxide, acetaminophen, glucose, dextrose, glucagon (rDNA), dextrose      Intake/Output Summary (Last 24 hours) at 8/27/2021 0843  Last data filed at 8/27/2021 0746  Gross per 24 hour   Intake 2785.98 ml   Output 850 ml   Net 1935.98 ml       Diet:  ADULT DIET; Regular; 4 carb choices (60 gm/meal)    Exam:  /63   Pulse 76   Temp 99.2 °F (37.3 °C) (Oral)   Resp 16   Ht 5' 5\" (1.651 m)   Wt 195 lb 12.8 oz (88.8 kg)   SpO2 93%   BMI 32.58 kg/m²     General appearance: No apparent distress, appears stated age and cooperative. HEENT: Pupils equal, round, and reactive to light. Conjunctivae/corneas clear. Neck: Supple, with full range of motion. No jugular venous distention.  Trachea midline. Respiratory:  Normal respiratory effort. Clear to auscultation, bilaterally without Rales/Wheezes/Rhonchi. Cardiovascular: Regular rate and rhythm with normal S1/S2 without murmurs, rubs or gallops. Abdomen: Soft, non-tender, non-distended with normal bowel sounds. Musculoskeletal: passive and active ROM x 4 extremities. Skin: Skin color, texture, turgor normal.    Neurologic:  Neurovascularly intact without any focal sensory/motor deficits. Cranial nerves: II-XII intact, grossly non-focal.  Psychiatric: Alert and oriented, thought content appropriate  Capillary Refill: Brisk,< 3 seconds   Peripheral Pulses: +2 palpable, equal bilaterally       Labs:   Recent Labs     08/27/21  0621   WBC 3.2*   HGB 10.2*   HCT 31.7*        Recent Labs     08/27/21  0621   *   K 4.2      CO2 21*   BUN 16   CREATININE 0.8   CALCIUM 7.9*     No results for input(s): AST, ALT, BILIDIR, BILITOT, ALKPHOS in the last 72 hours. No results for input(s): INR in the last 72 hours. No results for input(s): Reyne Welsh in the last 72 hours. No results for input(s): PROCAL in the last 72 hours. Microbiology:    Anaerobic and aerobic culture from right shoulder: Rare segmented neutrophils observed, rare epithelial cells observed, no bacteria seen    Radiology:  XR CHEST (2 VW)    Result Date: 8/19/2021  PROCEDURE: XR CHEST (2 VW) CLINICAL INFORMATION: Acute pain of right shoulder COMPARISON: 10/1/2020 TECHNIQUE: PA and lateral views of the chest were obtained. 1. Normal heart size. Evidence for old, healed granulomatous disease. Several old healed rib fractures right side. Metallic hardware in the proximal right humerus for stabilization of the fracture. . Deformity of the left scapula, apparently related to an  old fracture. 2. No acute findings. No infiltrates or effusions are seen. 3. Moderate degenerative spurring scattered in the thoracic spine.  **This report has been created using voice recognition software. It may contain minor errors which are inherent in voice recognition technology. ** Final report electronically signed by Dr. 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

## 2021-08-27 NOTE — PROGRESS NOTES
Discharge instructions given to patient and spouse at this time. All questions answered. All belongings packed and ready for discharge. Transport called to assist to private car via wheelchair. Script given and filled by spouse.

## 2021-08-27 NOTE — CARE COORDINATION
8/27/21, 12:00 PM EDT    Patient goals/plan/ treatment preferences discussed by  and . Patient goals/plan/ treatment preferences reviewed with patient/ family. Patient/ family verbalize understanding of discharge plan and are in agreement with goal/plan/treatment preferences. Understanding was demonstrated using the teach back method. AVS provided by RN at time of discharge, which includes all necessary medical information pertaining to the patients current course of illness, treatment, post-discharge goals of care, and treatment preferences.            Home today with

## 2021-08-27 NOTE — TELEPHONE ENCOUNTER
Patrick Read informed patient can come in to walk in care to be seen for her hospital up
for work, school, or travel and   not based on symptoms, answer no)? No  Do you currently have flu-like symptoms including fever or chills, cough,   shortness of breath, difficulty breathing, or new loss of taste or smell? No  Have you had close contact with someone with COVID-19 in the last 14 days? No  (Service Expert  click yes below to proceed with [facility] As Usual   Scheduling)?  Yes

## 2021-08-27 NOTE — DISCHARGE SUMMARY
Patient ID:  Robby Jones  [id number]  24 y.o.  1945    Admit date: 8/26/2021    Discharge date and time: No discharge date for patient encounter. 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 Thought she would benefit from a reverse right total shoulder arthroplasty and she agreed. She underwent the procedure without complications. Post-operatively she worked with therapy and her pain was well controlled. She is ready for DC. Consults:  IM      Disposition: Stable/good    Patient Instructions:      Medication List      START taking these medications    HYDROcodone-acetaminophen 5-325 MG per tablet  Commonly known as: Norco  Take 1-2 tablets by mouth every 4-6 hours as needed for Pain for up to 7 days.         CHANGE how you take these medications    vitamin D3 25 MCG (1000 UT) Tabs tablet  Commonly known as: CHOLECALCIFEROL  Take 0.5 tablets by mouth daily  What changed: how much to take        CONTINUE taking these medications    alendronate 70 MG tablet  Commonly known as: FOSAMAX  Take 1 tablet by mouth every 7 days     apixaban 5 MG Tabs tablet  Commonly known as: Eliquis  Take 1 tablet by mouth 2 times daily     aspirin 81 MG EC tablet     Basaglar KwikPen 100 UNIT/ML injection pen  Generic drug: insulin glargine  Inject 15 Units into the skin every morning (before breakfast)     EYE VITAMINS & MINERALS PO     folic acid 1 MG tablet  Commonly known as: FOLVITE     glimepiride 4 MG tablet  Commonly known as: AMARYL  Take 1 tablet by mouth 2 times daily     hydrALAZINE 50 MG tablet  Commonly known as: APRESOLINE  Take 1 tablet by mouth 2 times daily     levothyroxine 112 MCG tablet  Commonly known as: SYNTHROID  Take 1 tablet by mouth Daily     losartan 100 MG tablet  Commonly known as: COZAAR  Take 1 tablet by mouth daily     metFORMIN 500 MG extended release tablet  Commonly known as: GLUCOPHAGE-XR  Take 2 tablets by mouth 2 times daily     metoprolol tartrate 50 MG tablet  Commonly known as: LOPRESSOR  Take 1 tablet by mouth 2 times daily     Oyster Shell Calcium/D 500-200 MG-UNIT Tabs  Take 1 tablet by mouth 2 times daily     simvastatin 40 MG tablet  Commonly known as: ZOCOR  TAKE 1 TABLET NIGHTLY     vitamin B-12 1000 MCG tablet  Commonly known as: CYANOCOBALAMIN           Where to Get Your Medications      You can get these medications from any pharmacy    Bring a paper prescription for each of these medications  · HYDROcodone-acetaminophen 5-325 MG per tablet       Activity: ROSA ISELA NELSON  Diet: regular  Wound Care: dry dressings as needed    Follow-up FU with Dr. Carl Black in 6 weeks.      Signed:  EMMANUEL Merrill CNP  8/27/2021  9:24 AM

## 2021-08-30 ENCOUNTER — TELEPHONE (OUTPATIENT)
Dept: FAMILY MEDICINE CLINIC | Age: 76
End: 2021-08-30

## 2021-08-30 LAB
AEROBIC CULTURE: ABNORMAL
ANAEROBIC CULTURE: ABNORMAL
GRAM STAIN RESULT: ABNORMAL
ORGANISM: ABNORMAL

## 2021-08-31 ENCOUNTER — CARE COORDINATION (OUTPATIENT)
Dept: CARE COORDINATION | Age: 76
End: 2021-08-31

## 2021-08-31 NOTE — CARE COORDINATION
Ambulatory Care Coordination Note  8/31/2021  CM Risk Score: 5  Charlson 10 Year Mortality Risk Score: 98%     ACC: Stacia Maloney, RN    Summary Note: Spoke with Long Starr for continued NURA followup. She recently had shoulder surgery per Dr. Buzz Bhat at [facility] and has been discharged home. States her pain is being managed by Preet Brunson.  She is taking one in the morning and evening and 2 before bed. She has follow up with [facility] on 9-15. States she is a little upset today because she received a call from [facility] stating they would like her to see an infection control doctor at [facility]. She is waiting to hear from them to get the appt set up. She has been working with Anibal Iron and has been approved for the PAP program which is great news. States her blood sugars are stable. Denies the need for [place] at this time. Plan  -reinforce education completed  -collaborate with [facility]  -collaborate with Anibalmary beth Garcia  -ensure infection control appt has been made  -reinforce education completed at [address] visit to increase utilization of PCP office and reduce unnecessary utilization  Diabetes Assessment    Medic Alert ID: No  Meal Planning: Avoidance of concentrated sweets   How often do you test your blood sugar?: Meals   Do you have barriers with adherence to non-pharmacologic self-management interventions? (Nutrition/Exercise/Self-Monitoring): No   Have you ever had to go to the ED for symptoms of low blood sugar?: No           and   General Assessment    Do you have any symptoms that are causing concern?: Yes  Progression since Onset: Unchanged  Reported Symptoms: Other (Comment: recent shoulder sugery)               Care Coordination Interventions    Program Enrollment: Rising Risk  Referral from Primary Care Provider: Yes  Suggested Interventions and Community Resources  Diabetes Education:  In Process  Home Health Services: Not Started  Meals on Wheels: Declined  Medication Assistance Program: In Process  Occupational Therapy: Not Started  Physical Therapy: Not Started  Registered Dietician: Declined  Social Work: Not Started  Transportation Support: Declined  Zone Management Tools: In Process         Goals Addressed                    This Visit's Progress      Conditions and Symptoms (pt-stated)   On track      I will schedule office visits, as directed by my provider. I will keep my appointment or reschedule if I have to cancel. I will notify my provider of any barriers to my plan of care. I will follow my Zone Management tool to seek urgent or emergent care. I will notify my provider of any symptoms that indicate a worsening of my condition. DM  Barriers: need for additional support and education  Plan for overcoming my barriers: family and ACM support  Confidence: 10/10  Anticipated Goal Completion Date: 11/13/21          Reduce Falls  (pt-stated)   On track      I will reduce my risk of falls by the following:  be proactive with fall interventions and prevention  DM/ hx of falls  Barriers: need for additional support and education  Plan for overcoming my barriers: family and ACM  Confidence: 9/10  Anticipated Goal Completion Date: 11/13/21        Self Monitoring (pt-stated)   On track      Self-Monitored Blood Glucose - I will notify my provider if I have any blood sugar readings less than 70 more than 2 times a month. DM  None Recently Recorded    Barriers: need for additional support and education  Plan for overcoming my barriers: family and ACM support  Confidence: 9/10  Anticipated Goal Completion Date: 11/13/21              Prior to Admission medications    Medication Sig Start Date End Date Taking? Authorizing Provider   HYDROcodone-acetaminophen (NORCO) 5-325 MG per tablet Take 1-2 tablets by mouth every 4-6 hours as needed for Pain for up to 7 days.  8/26/21 9/2/21 Yes Linh Burton APRN - CNP   Multiple Vitamins-Minerals (EYE VITAMINS & MINERALS PO) Take by mouth daily   Yes Historical Provider, MD   metoprolol tartrate (LOPRESSOR) 50 MG tablet Take 1 tablet by mouth 2 times daily 8/5/21  Yes Kaykay Graham DO   simvastatin (ZOCOR) 40 MG tablet TAKE 1 TABLET NIGHTLY 7/8/21  Yes Kaykay Graham DO   alendronate (FOSAMAX) 70 MG tablet Take 1 tablet by mouth every 7 days 7/8/21  Yes Kaykay Graham DO   insulin glargine (BASAGLAR KWIKPEN) 100 UNIT/ML injection pen Inject 15 Units into the skin every morning (before breakfast) 4/16/21  Yes Kaykay Graham DO   glimepiride (AMARYL) 4 MG tablet Take 1 tablet by mouth 2 times daily 3/5/21  Yes Kaykay Graham DO   Calcium Carbonate-Vitamin D (OYSTER SHELL CALCIUM/D) 500-200 MG-UNIT TABS Take 1 tablet by mouth 2 times daily 1/13/21 7/7/22 Yes Cecilia Zhu MD   vitamin D3 (CHOLECALCIFEROL) 25 MCG (1000 UT) TABS tablet Take 0.5 tablets by mouth daily  Patient taking differently: Take 1,000 Units by mouth daily  1/13/21  Yes Cecilia Zhu MD   metFORMIN (GLUCOPHAGE-XR) 500 MG extended release tablet Take 2 tablets by mouth 2 times daily 12/28/20  Yes Kaykay Graham DO   levothyroxine (SYNTHROID) 112 MCG tablet Take 1 tablet by mouth Daily 12/28/20  Yes Kaykay Graham DO   apixaban (ELIQUIS) 5 MG TABS tablet Take 1 tablet by mouth 2 times daily 11/3/20  Yes Yovana Gale MD   aspirin 81 MG EC tablet Take 81 mg by mouth daily   Yes Historical Provider, MD   hydrALAZINE (APRESOLINE) 50 MG tablet Take 1 tablet by mouth 2 times daily 9/21/20  Yes Yovana Gale MD   losartan (COZAAR) 100 MG tablet Take 1 tablet by mouth daily 9/21/20  Yes Yovana Gale MD   vitamin B-12 (CYANOCOBALAMIN) 1000 MCG tablet Take 1,000 mcg by mouth daily   Yes Historical Provider, MD   folic acid (FOLVITE) 1 MG tablet Take 3 mg by mouth 2 times daily    Yes Historical Provider, MD       Future Appointments   Date Time Provider Zach Pérez   10/13/2021  1:45 PM KATELYN Pena [facility] [address]   2/17/2022  9:00 AM DO GENESIS Conti [facility]   3/31/2022 10:00 AM Christy Nirav Lorenzo MD [address] [facility]

## 2021-09-02 NOTE — H&P
History and Physical Update    Pt Name: Sujatha Campos  MRN: [id number]  YOB: 1945  Date of evaluation: 9/2/2021    [x] I have examined the patient and reviewed the H&P/Consult and there are no changes to the patient or plans.     [] I have examined the patient and reviewed the H&P/Consult and have noted the following changes:        Troy Little MD   Electronically signed 9/2/2021 at 8:19 AM

## 2021-09-03 ENCOUNTER — OFFICE VISIT (OUTPATIENT)
Dept: FAMILY MEDICINE CLINIC | Age: 76
End: 2021-09-03
Payer: MEDICARE

## 2021-09-03 VITALS
HEIGHT: 65 IN | OXYGEN SATURATION: 96 % | TEMPERATURE: 98.3 F | HEART RATE: 78 BPM | DIASTOLIC BLOOD PRESSURE: 60 MMHG | BODY MASS INDEX: 33.15 KG/M2 | SYSTOLIC BLOOD PRESSURE: 120 MMHG | WEIGHT: 199 LBS | RESPIRATION RATE: 16 BRPM

## 2021-09-03 DIAGNOSIS — M80.829D: ICD-10-CM

## 2021-09-03 DIAGNOSIS — M81.8 OTHER OSTEOPOROSIS WITHOUT CURRENT PATHOLOGICAL FRACTURE: Primary | ICD-10-CM

## 2021-09-03 PROCEDURE — 1111F DSCHRG MED/CURRENT MED MERGE: CPT | Performed by: NURSE PRACTITIONER

## 2021-09-03 PROCEDURE — 99495 TRANSJ CARE MGMT MOD F2F 14D: CPT | Performed by: NURSE PRACTITIONER

## 2021-09-03 RX ORDER — HYDROCODONE BITARTRATE AND ACETAMINOPHEN 5; 325 MG/1; MG/1
1 TABLET ORAL EVERY 6 HOURS PRN
COMMUNITY
End: 2022-03-04

## 2021-09-03 RX ORDER — SULFAMETHOXAZOLE AND TRIMETHOPRIM 800; 160 MG/1; MG/1
TABLET ORAL
COMMUNITY
Start: 2021-09-02 | End: 2022-03-31 | Stop reason: ALTCHOICE

## 2021-09-03 NOTE — PROGRESS NOTES
Post-Discharge Transitional Care Management Services or Hospital Follow Up      Charles Macdonald   YOB: 1945    Date of Office Visit:  9/3/2021  Date of Hospital Admission: 8/26/21  Date of Hospital Discharge: 8/27/21  Risk of hospital readmission (high >=14%. Medium >=10%) :No data recorded    Care management risk score Rising risk (score 2-5) and Complex Care (Scores >=6): 5     Non face to face  following discharge, date last encounter closed (first attempt may have been earlier): 8/30/2021  1:39 PM    Call initiated 2 business days of discharge: Yes    Patient Active Problem List   Diagnosis    Microalbuminuria due to type 2 diabetes mellitus ([address]    Chronic atrial fibrillation ([address]    Type 2 diabetes mellitus with insulin therapy ([address]    Essential hypertension    Osteopenia of spine    Hypothyroidism    History of non-Hodgkin's lymphoma    Anticoagulant long-term use    Hyponatremia    History of humerus fracture    TRACY on CPAP    Obesity (BMI 30-39. 9)    Elevated parathyroid hormone    Parathyroid adenoma    Hypercalcemia    Osteoporosis with pathological fracture    [facility]ctomy [facility])    Atrial fibrillation/flutter ([facility])    PAF (paroxysmal atrial fibrillation) ([facility])    Hypotension    Hyperlipidemia    Status post parathyroidectomy ([address]    Pathological fracture of humerus due to osteoporosis    Other osteoporosis without current pathological fracture    Hyperparathyroidism, unspecified ([address]    S/P ablation of atrial fibrillation    Persistent atrial fibrillation ([address]    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, right       No Known Allergies    Medications listed as ordered at the time of discharge from hospital   Lyn MEJÍA \"Kristal Enamorado\"   Home Medication Instructions JASPAL:    Printed on:09/03/21 4448   Medication Information                      alendronate (FOSAMAX) 70 MG tablet  Take 1 tablet by mouth every 7 days apixaban (ELIQUIS) 5 MG TABS tablet  Take 1 tablet by mouth 2 times daily             aspirin 81 MG EC tablet  Take 81 mg by mouth daily             Calcium Carbonate-Vitamin D (OYSTER SHELL CALCIUM/D) 500-200 MG-UNIT TABS  Take 1 tablet by mouth 2 times daily             folic acid (FOLVITE) 1 MG tablet  Take 3 mg by mouth 2 times daily              glimepiride (AMARYL) 4 MG tablet  Take 1 tablet by mouth 2 times daily             hydrALAZINE (APRESOLINE) 50 MG tablet  Take 1 tablet by mouth 2 times daily             HYDROcodone-acetaminophen (NORCO) 5-325 MG per tablet  Take 1 tablet by mouth every 6 hours as needed for Pain.              insulin glargine (BASAGLAR KWIKPEN) 100 UNIT/ML injection pen  Inject 15 Units into the skin every morning (before breakfast)             levothyroxine (SYNTHROID) 112 MCG tablet  Take 1 tablet by mouth Daily             losartan (COZAAR) 100 MG tablet  Take 1 tablet by mouth daily             metFORMIN (GLUCOPHAGE-XR) 500 MG extended release tablet  Take 2 tablets by mouth 2 times daily             metoprolol tartrate (LOPRESSOR) 50 MG tablet  Take 1 tablet by mouth 2 times daily             Multiple Vitamins-Minerals (EYE VITAMINS & MINERALS PO)  Take by mouth daily             simvastatin (ZOCOR) 40 MG tablet  TAKE 1 TABLET NIGHTLY             sulfamethoxazole-trimethoprim (BACTRIM DS;SEPTRA DS) 800-160 MG per tablet               vitamin B-12 (CYANOCOBALAMIN) 1000 MCG tablet  Take 1,000 mcg by mouth daily             vitamin D3 (CHOLECALCIFEROL) 25 MCG (1000 UT) TABS tablet  Take 0.5 tablets by mouth daily                   Medications marked \"taking\" at this time  Outpatient Medications Marked as Taking for the 9/3/21 encounter (Office Visit) with EMMANUEL Cunningham - CNP   Medication Sig Dispense Refill    sulfamethoxazole-trimethoprim (BACTRIM DS;SEPTRA DS) 800-160 MG per tablet       HYDROcodone-acetaminophen (NORCO) 5-325 MG per tablet Take 1 tablet by mouth every 6 hours as needed for Pain.       Multiple Vitamins-Minerals (EYE VITAMINS & MINERALS PO) Take by mouth daily      metoprolol tartrate (LOPRESSOR) 50 MG tablet Take 1 tablet by mouth 2 times daily 180 tablet 3    simvastatin (ZOCOR) 40 MG tablet TAKE 1 TABLET NIGHTLY 90 tablet 3    alendronate (FOSAMAX) 70 MG tablet Take 1 tablet by mouth every 7 days 12 tablet 3    insulin glargine (BASAGLAR KWIKPEN) 100 UNIT/ML injection pen Inject 15 Units into the skin every morning (before breakfast) 5 pen 5    glimepiride (AMARYL) 4 MG tablet Take 1 tablet by mouth 2 times daily 180 tablet 3    Calcium Carbonate-Vitamin D (OYSTER SHELL CALCIUM/D) 500-200 MG-UNIT TABS Take 1 tablet by mouth 2 times daily 180 tablet 5    vitamin D3 (CHOLECALCIFEROL) 25 MCG (1000 UT) TABS tablet Take 0.5 tablets by mouth daily (Patient taking differently: Take 1,000 Units by mouth daily ) 90 tablet 5    metFORMIN (GLUCOPHAGE-XR) 500 MG extended release tablet Take 2 tablets by mouth 2 times daily 360 tablet 3    levothyroxine (SYNTHROID) 112 MCG tablet Take 1 tablet by mouth Daily 90 tablet 3    apixaban (ELIQUIS) 5 MG TABS tablet Take 1 tablet by mouth 2 times daily 180 tablet 3    aspirin 81 MG EC tablet Take 81 mg by mouth daily      hydrALAZINE (APRESOLINE) 50 MG tablet Take 1 tablet by mouth 2 times daily 180 tablet 3    losartan (COZAAR) 100 MG tablet Take 1 tablet by mouth daily 90 tablet 3    vitamin B-12 (CYANOCOBALAMIN) 1000 MCG tablet Take 1,000 mcg by mouth daily      folic acid (FOLVITE) 1 MG tablet Take 3 mg by mouth 2 times daily           Medications patient taking as of now reconciled against medications ordered at time of hospital discharge: Yes    Chief Complaint   Patient presents with    Post-Op Check     shoulder replacement, possible infection     Follow-Up from Hospital       History of Present illness - Follow up of Hospital diagnosis(es): s/p right shoulder surgery    Inpatient course: Discharge summary reviewed- see chart. Interval history/Current status: doing well since being home with the exception of being told her surgical culture was positive. Wanting more info about this today. Started on Bactrim yesterday. Denies fever or chills. Denies wound drainage. Pain is controlled with Serjio Fragmin  [facility] next week. Following with Dr. Fly Kelley. A comprehensive review of systems was negative except for what was noted in the HPI. Vitals:    09/03/21 0812   BP: 120/60   Pulse: 78   Resp: 16   Temp: 98.3 °F (36.8 °C)   TempSrc: Oral   SpO2: 96%   Weight: 199 lb (90.3 kg)   Height: 5' 5\" (1.651 m)     Body mass index is 33.12 kg/m².    Wt Readings from Last 3 Encounters:   09/03/21 199 lb (90.3 kg)   08/26/21 195 lb 12.8 oz (88.8 kg)   08/05/21 199 lb 6.4 oz (90.4 kg)     BP Readings from Last 3 Encounters:   09/03/21 120/60   08/27/21 (!) 121/58   08/26/21 (!) 186/77        Physical Exam:  General Appearance: alert and oriented to person, place and time, well developed and well- nourished, in no acute distress  Skin: warm and dry, no rash or erythema, right upper arm surgical incision healing, well approximated, no redness, no drainage  Head: normocephalic and atraumatic  Eyes: pupils equal, round, and reactive to light, extraocular eye movements intact, conjunctivae normal  ENT: tympanic membrane, external ear and ear canal normal bilaterally, nose without deformity, nasal mucosa and turbinates normal without polyps  Neck: supple and non-tender without mass, no thyromegaly or thyroid nodules, no cervical lymphadenopathy  Pulmonary/Chest: clear to auscultation bilaterally- no wheezes, rales or rhonchi, normal air movement, no respiratory distress  Cardiovascular: normal rate, regular rhythm, normal S1 and S2, no murmurs, rubs, clicks, or gallops, distal pulses intact, no carotid bruits  Abdomen: soft, non-tender, non-distended, normal bowel sounds, no masses or organomegaly  Extremities: no cyanosis, clubbing or edema  Musculoskeletal: hand grasp equal and strong bilaterally, good pulses and cap refill   Neurologic: reflexes normal and symmetric, no cranial nerve deficit, gait, coordination and speech normal    Assessment/Plan:  1. Other osteoporosis without current pathological fracture  Continue to follow with ortho    2.  Pathological fracture of humerus due to other osteoporosis with routine healing, unspecified laterality, subsequent encounter  Continue to follow with ortho  Call with any fever, chills, uncontrolled pain, wound drainage  Continue Bactrim  - NV DISCHARGE MEDS RECONCILED W/ CURRENT OUTPATIENT MED LIST        Medical Decision Making: moderate complexity      Electronically signed by EMMANUEL Bravo CNP on 9/3/2021 at 5:35 PM

## 2021-09-03 NOTE — PATIENT INSTRUCTIONS
Patient Education        Osteoporosis: Care Instructions  Overview     Osteoporosis causes bones to become thin and weak. It is much more common in women than in men. Your chances of getting this disease depend on several things. These factors include the thickness of your bones (bone density), as well as health, diet, and physical activity. This disease may be very advanced before you know you have it. Sometimes the first sign is a broken bone in the hip, spine, or wrist. Or you may have sudden pain in your middle or lower back. Follow-up care is a key part of your treatment and safety. Be sure to make and go to all appointments, and call your doctor if you are having problems. It's also a good idea to know your test results and keep a list of the medicines you take. How can you care for yourself at home? · Your doctor may prescribe a bisphosphonate, such as risedronate (Actonel) or alendronate (Fosamax), for osteoporosis. If you are taking one of these medicines by mouth:  ? Take your medicine with a full glass of water when you first get up in the morning. ? Do not lie down, eat, drink a beverage, or take any other medicine for at least 30 minutes after taking the drug. This helps prevent stomach problems. ? Do not take your medicine late in the day if you forgot to take it in the morning. Skip it, and take the usual dose the next morning. ? If you have side effects, tell your doctor. Your doctor may prescribe another medicine. · Get enough calcium and vitamin D. The recommended daily allowances (RDAs) for adults younger than age 46 are 1,000 mg of calcium and 600 IU of vitamin D each day. Women ages 46 to 79 need 1,200 mg of calcium and 600 IU of vitamin D each day. Men ages 46 to 79 need 1,000 mg of calcium and 600 IU of vitamin D each day. Adults 71 and older need 1,200 mg of calcium and 800 IU of vitamin D each day.  It's not clear if people who already have osteoporosis need more calcium and vitamin D than this. Talk to your doctor about what's right for you.  ? Eat foods rich in calcium, like yogurt, cheese, milk, and dark green vegetables. This is a good way to get the calcium you need. You can get vitamin D from eggs, fatty fish, cereal, and milk. ? Ask your doctor if you need to take a calcium plus vitamin D supplement. You may be able to get enough calcium and vitamin D through your diet. Be careful with supplements. Adults ages 23 to 48 should not get more than 2,500 mg of calcium and 4,000 IU of vitamin D each day, whether it is from supplements and/or food. Adults ages 46 and older should not get more than 2,000 mg of calcium and 4,000 IU of vitamin D each day from supplements and/or food. · Limit alcohol to 2 drinks a day for men and 1 drink a day for women. Too much alcohol can cause health problems. · Do not smoke. Smoking puts you at a much higher risk for osteoporosis. If you need help quitting, talk to your doctor about stop-smoking programs and medicines. These can increase your chances of quitting for good. · Get regular bone-building exercise. Weight-bearing and resistance exercises keep bones healthy by working the muscles and bones against gravity. Start out at an exercise level that feels right for you. Add a little at a time until you can do the following:  ? Do 30 minutes of weight-bearing exercise on most days of the week. Walking, jogging, stair climbing, and dancing are good choices. ? Do resistance exercises with weights or elastic bands 2 to 3 days a week. · Reduce your risk of falls:  ? Wear supportive shoes with low heels and nonslip soles. ? Use a cane or walker, if you need it. Use shower chairs and bath benches. Put in handrails on stairways, around your shower or tub area, and near the toilet. ? Keep stairs, porches, and walkways well lit. Use night-lights. ? Remove throw rugs and other objects that are in the way. ? Avoid icy, wet, or slippery surfaces. ?  Keep a cordless phone and a flashlight with new batteries by your bed. When should you call for help? Watch closely for changes in your health, and be sure to contact your doctor if you have any problems. Where can you learn more? Go to https://[employer]web[employer]. org and sign in to your [employer] account. Enter K100 in the Kiio box to learn more about \"Osteoporosis: Care Instructions. \"     If you do not have an account, please click on the \"Sign Up Now\" link. Current as of: December 7, 2020               Content Version: 12.9  © 1751-4850 [employer]wise, Incorporated. Care instructions adapted under license by [facility] ([facility]). If you have questions about a medical condition or this instruction, always ask your healthcare professional. [address] any warranty or liability for your use of this information.

## 2021-09-07 ENCOUNTER — CARE COORDINATION (OUTPATIENT)
Dept: CARE COORDINATION | Age: 76
End: 2021-09-07

## 2021-09-07 NOTE — CARE COORDINATION
Ambulatory Care Coordination Note  9/7/2021  CM Risk Score: 5  Charlson 10 Year Mortality Risk Score: 98%     ACC: Dodie Orr, AMA    Summary Note: Spoke with Selena Doyle for NURA follow up call. States she does have infection in her shoulder and has been placed on antibiotic. She has an appt with infection control, Dr. Naima Gonsalves for tomorrow and also has follow up with [facility], Dr. Reji Humphries. States she does have some redness around her incision site. Incision was assessed by PCP office at appt last week. States pain is controlled. Discussed blood sugars which are running a bit higher than normal since infection. Encouraged to watch diet closely and report continued high readings to PCP. Plan  -collaborate with infection control provider  -collaborate with [facility] as needed  -encourage monitoring diet during infection process for higher than normal blood sugars  -encourage blood sugar tracking and reporting  -collaborate with Chelle Whipple as needed  -reinforce education completed at [address] visit to prevent unnecessary utilization  Diabetes Assessment    Medic Alert ID: No  Meal Planning: Avoidance of concentrated sweets   How often do you test your blood sugar?: Meals   Do you have barriers with adherence to non-pharmacologic self-management interventions? (Nutrition/Exercise/Self-Monitoring): No   Have you ever had to go to the ED for symptoms of low blood sugar?: No       Do you have hyperglycemia symptoms?: No   Do you have hypoglycemia symptoms?: No   Blood Sugar Trends: Fluctuating       and   General Assessment    Do you have any symptoms that are causing concern?: Yes  Progression since Onset: Gradually Improving  Reported Symptoms: Other (Comment: infection in shoulder)               Care Coordination Interventions    Program Enrollment: Rising Risk  Referral from Primary Care Provider: Yes  Suggested Interventions and Community Resources  Diabetes Education:  In Process  Home Health Services: Not Started  Meals on Wheels: Declined  Medication Assistance Program: In Process  Occupational Therapy: Not Started  Physical Therapy: Not Started  Registered Dietician: [facility]  Social Work: Not Started  Transportation Support: Declined  Zone Management Tools: In Process         Goals Addressed    None         Prior to Admission medications    Medication Sig Start Date End Date Taking? Authorizing Provider   sulfamethoxazole-trimethoprim (BACTRIM DS;SEPTRA DS) 800-160 MG per tablet  9/2/21  Yes Historical Provider, MD   HYDROcodone-acetaminophen (NORCO) 5-325 MG per tablet Take 1 tablet by mouth every 6 hours as needed for Pain.    Yes Historical Provider, MD   Multiple Vitamins-Minerals (EYE VITAMINS & MINERALS PO) Take by mouth daily   Yes Historical Provider, MD   metoprolol tartrate (LOPRESSOR) 50 MG tablet Take 1 tablet by mouth 2 times daily 8/5/21  Yes Angel Harness, DO   simvastatin (ZOCOR) 40 MG tablet TAKE 1 TABLET NIGHTLY 7/8/21  Yes Angel Harness, DO   alendronate (FOSAMAX) 70 MG tablet Take 1 tablet by mouth every 7 days 7/8/21  Yes Angel Harness, DO   insulin glargine (BASAGLAR KWIKPEN) 100 UNIT/ML injection pen Inject 15 Units into the skin every morning (before breakfast) 4/16/21  Yes Angel Harness, DO   glimepiride (AMARYL) 4 MG tablet Take 1 tablet by mouth 2 times daily 3/5/21  Yes Angel Harness, DO   Calcium Carbonate-Vitamin D (OYSTER SHELL CALCIUM/D) 500-200 MG-UNIT TABS Take 1 tablet by mouth 2 times daily 1/13/21 7/7/22 Yes Kenya White MD   vitamin D3 (CHOLECALCIFEROL) 25 MCG (1000 UT) TABS tablet Take 0.5 tablets by mouth daily  Patient taking differently: Take 1,000 Units by mouth daily  1/13/21  Yes Kenya White MD   metFORMIN (GLUCOPHAGE-XR) 500 MG extended release tablet Take 2 tablets by mouth 2 times daily 12/28/20  Yes Angel Harness, DO   levothyroxine (SYNTHROID) 112 MCG tablet Take 1 tablet by mouth Daily 12/28/20  Yes Angel Harness, DO   apixaban (ELIQUIS) 5 MG TABS tablet Take 1 tablet by mouth 2 times daily 11/3/20  Yes Yovana Gale MD   aspirin 81 MG EC tablet Take 81 mg by mouth daily   Yes Historical Provider, MD   hydrALAZINE (APRESOLINE) 50 MG tablet Take 1 tablet by mouth 2 times daily 9/21/20  Yes Yovana Gale MD   losartan (COZAAR) 100 MG tablet Take 1 tablet by mouth daily 9/21/20  Yes Yovana Gale MD   vitamin B-12 (CYANOCOBALAMIN) 1000 MCG tablet Take 1,000 mcg by mouth daily   Yes Historical Provider, MD   folic acid (FOLVITE) 1 MG tablet Take 3 mg by mouth 2 times daily    Yes Historical Provider, MD       Future Appointments   Date Time Provider Zach Elisa   9/8/2021  8:30 AM Jourdan Bowden  [address] HOD   10/13/2021  1:45 PM Kimberly Pena   2/17/2022  9:00 AM DO GENESIS Conti [facility]   3/31/2022 10:00 AM Yovana Gale MD [facility]

## 2021-09-08 ENCOUNTER — HOSPITAL ENCOUNTER (OUTPATIENT)
Dept: WOUND CARE | Age: 76
Discharge: HOME | End: 2021-09-08
Payer: MEDICARE

## 2021-09-08 VITALS — HEART RATE: 66 BPM | TEMPERATURE: 96.3 F | OXYGEN SATURATION: 99 % | RESPIRATION RATE: 16 BRPM

## 2021-09-08 DIAGNOSIS — Z22.322 MRSA (METHICILLIN RESISTANT STAPH AUREUS) CULTURE POSITIVE: Primary | ICD-10-CM

## 2021-09-08 PROCEDURE — 99212 OFFICE O/P EST SF 10 MIN: CPT

## 2021-09-08 RX ORDER — SULFAMETHOXAZOLE AND TRIMETHOPRIM 800; 160 MG/1; MG/1
1 TABLET ORAL 2 TIMES DAILY
Qty: 42 TABLET | Refills: 0 | Status: SHIPPED | OUTPATIENT
Start: 2021-09-08 | End: 2021-09-29

## 2021-09-08 NOTE — CONSULTS
[address] and Procedure Note      [address] RECORD NUMBER:  [id number]  AGE: 78 y.o. 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 She had open reduction and internal fixation unfortunately there was nonunion and loosening of the hardware. Her culture was positive for MRSA. The culture was available after she had her right shoulder replacement she was started on oral Bactrim. She was referred for further recommendation the wound is healing as expected she denies any fever or chills there is no any redness or purulent drainage. She has history of osteoarthritis history of non-Hodgkin's lymphoma diabetes and hyperlipidemia.     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    Thyroid disease      PAST SURGICAL HISTORY     PAST SURGICAL HISTORY    Past Surgical History:   Procedure Laterality Date    APPENDECTOMY      CARDIAC CATHETERIZATION      CARDIAC SURGERY  2021    ablation     CHOLECYSTECTOMY      FRACTURE SURGERY      ankle surgery    FRACTURE SURGERY Right 2018    HUMERUS    HERNIA REPAIR      HYSTERECTOMY      PARATHYROID GLAND SURGERY N/A 10/1/2020    PARATHYROIDECTOMY, EXPLORATION OF PARATHYROIDS performed by Lissett Delaney MD at [address] [place] [address] OFFICE/OUTPT VISIT,PROCEDURE ONLY Right 9/19/2018    ORIF RIGHT PROXIMAL HUMERUS FX performed by Ailyn Child MD at [address] [facility] [place] Right 6/27/2019    REVISION RT HUMERUS FX, HARDWARE REMOVAL, HUMERAL NAIL INSERTION performed by Jessica Chan MD at [address] Right 8/26/2021    HARDWARE REMOVAL RIGHT HUMERUS, CONVERSION TO RIGHT REVERSE TOTAL SHOULDER REPLACEMENT performed by Jessica Chan MD at [address] HISTORY         Family History   Problem Relation Age of Onset    Heart Disease Mother     Heart Disease Father     Cancer Sister     Other Sister        SOCIAL HISTORY       Social History     Tobacco Use    Smoking status: Never Smoker    Smokeless tobacco: Never Used   Vaping Use    Vaping Use: Never used   Substance Use Topics    Alcohol use: No    Drug use: No       ALLERGIES       No Known Allergies      MEDICATIONS       Current Outpatient Medications on File Prior to Encounter   Medication Sig Dispense Refill    sulfamethoxazole-trimethoprim (BACTRIM DS;SEPTRA DS) 800-160 MG per tablet       HYDROcodone-acetaminophen (NORCO) 5-325 MG per tablet Take 1 tablet by mouth every 6 hours as needed for Pain.       Multiple Vitamins-Minerals (EYE VITAMINS & MINERALS PO) Take by mouth daily      metoprolol tartrate (LOPRESSOR) 50 MG tablet Take 1 tablet by mouth 2 times daily 180 tablet 3    simvastatin (ZOCOR) 40 MG tablet TAKE 1 TABLET NIGHTLY 90 tablet 3    alendronate (FOSAMAX) 70 MG tablet Take 1 tablet by mouth every 7 days 12 tablet 3    insulin glargine (BASAGLAR KWIKPEN) 100 UNIT/ML injection pen Inject 15 Units into the skin every morning (before breakfast) 5 pen 5    glimepiride (AMARYL) 4 MG tablet Take 1 tablet by mouth 2 times daily 180 tablet 3    Calcium Carbonate-Vitamin D (OYSTER SHELL CALCIUM/D) 500-200 MG-UNIT TABS Take 1 tablet by mouth 2 times daily 180 tablet 5    vitamin D3 (CHOLECALCIFEROL) 25 MCG (1000 UT) TABS tablet Take 0.5 tablets by mouth daily (Patient taking differently: Take 1,000 Units by mouth daily ) 90 tablet 5    metFORMIN (GLUCOPHAGE-XR) 500 MG extended release tablet Take 2 tablets by mouth 2 times daily 360 tablet 3    levothyroxine (SYNTHROID) 112 MCG tablet Take 1 tablet by mouth Daily 90 tablet 3    apixaban (ELIQUIS) 5 MG TABS tablet Take 1 tablet by mouth 2 times daily 180 tablet 3    aspirin 81 MG EC tablet Take 81 mg by mouth daily      hydrALAZINE (APRESOLINE) 50 MG tablet Take 1 tablet by mouth 2 times daily 180 tablet 3    losartan (COZAAR) 100 MG tablet Take 1 tablet by mouth daily 90 tablet 3    vitamin B-12 (CYANOCOBALAMIN) 1000 MCG tablet Take 1,000 mcg by mouth daily      folic acid (FOLVITE) 1 MG tablet Take 3 mg by mouth 2 times daily        No current facility-administered medications on file prior to encounter. REVIEW OF SYSTEMS:     Constitutional: no fever, no night sweats, no fatigue. Head: no head ache , no head injury, no migranes. Eye: no blurring of vision, no double vision.   Ears: no hearing difficulty, no tinnitus  Mouth/throat: no ulceration, dental caries , dysphagia  Lungs: no cough, no shortness of breath, no wheeze  CVS: no palpitation, no chest pain, no shortness of breath  GI: no abdominal pain, no nausea , no vomiting, no constipation  LIDIA: no dysuria, frequency and urgency, no hematuria, no kidney stones  Musculoskeletal: +joint pain, swelling , stiffness,  Endocrine: no polyuria, polydipsia, no cold or heat intolerance  Hematology: no anemia, no easy brusing or bleeding, no hx of clotting disorder  Dermatology: no skin rash, no eczema, no pruritis,  Psychiatry: no depression, no anxiety,no panic attacks, no suicide ideation        PHYSICAL EXAM:     Pulse 66   Temp 96.3 °F (35.7 °C) (Infrared)   Resp 16   SpO2 99%   Wt Readings from Last 3 Encounters:   09/03/21 199 lb (90.3 kg)   08/26/21 195 lb 12.8 oz (88.8 kg)   08/05/21 199 lb 6.4 oz (90.4 kg)     General:  Awake, alert, not in distress. HEENT: pink conjunctiva, unicteric sclera, moist oral mucosa. Chest: Bilateral air entry  Cardiovascular:  RRR ,S1S2, no murmur or gallop. Abdomen:  Soft, non tender to palpation. Extremities: Clean surgical wound on her right shoulder no redness no purulent  Skin:  Warm and dry. 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 Patient Active Problem List   Diagnosis Code    Microalbuminuria due to type 2 diabetes mellitus ([facility] E11.29, R80.9    Chronic atrial fibrillation ([facility] I48.20    Type 2 diabetes mellitus with insulin therapy ([facility] E11.9, Z79.4    Essential hypertension I10    Osteopenia of spine M85.88    Hypothyroidism E03.9    History of non-Hodgkin's lymphoma Z85.72    Anticoagulant long-term use Z79.01    Hyponatremia E87.1    History of humerus fracture Z87.81    TRACY on CPAP G47.33, Z99.89    Obesity (BMI 30-39. 9) E66.9    Elevated parathyroid hormone E34.9    Parathyroid adenoma D35.1    Hypercalcemia E83.52    Osteoporosis with pathological fracture M80.00XA    S/P parathyroidectomy ([facility]) E89.2    Atrial fibrillation/flutter ([facility]) I48.91, I48.92    PAF (paroxysmal atrial fibrillation) ([facility]) I48.0    Hypotension I95.9    Hyperlipidemia E78.5    Status post parathyroidectomy ([facility] E89.2    Pathological fracture of humerus due to osteoporosis M80.029A    Other osteoporosis without current pathological fracture M81.8    Hyperparathyroidism, unspecified ([facility]) E21.3    S/P ablation of atrial fibrillation Z98.890, Z86.79    Persistent atrial fibrillation ([facility]) I48.19    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, right Y84.178D

## 2021-09-08 NOTE — PLAN OF CARE
Problem: Wound:  Goal: Will show signs of wound healing; wound closure and no evidence of infection  Description: Will show signs of wound healing; wound closure and no evidence of infection  Outcome: Ongoing   Patient presents to wound clinic for follow up appointment for right shoulder infection. Continue antibiotics 4-6 weeks. Additional antibiotics sent to [facility] per Dr. Kai Bates. Patient to have labs drawn before next appointment. Follow up visit: 3 Weeks September 29 at 8:15 am.    Care plan reviewed with patient. Patient verbalize understanding of the plan of care and contribute to goal setting.

## 2021-09-14 ENCOUNTER — CARE COORDINATION (OUTPATIENT)
Dept: CARE COORDINATION | Age: 76
End: 2021-09-14

## 2021-09-14 NOTE — CARE COORDINATION
Ambulatory Care Coordination Note  9/14/2021  CM Risk Score: 5  Charlson 10 Year Mortality Risk Score: 98%     ACC: Roosevelt Ibrahim RN    Summary Note: Completed NURA follow call. States she is doing better. Pain has improved. Taking one Norco at night for pain from shoulder surgery. Following up with Dr. Oliver Gutiérrez with infection control. Continues to antibiotics and has follow up appt with ID in 2 weeks. She also has followup appt with [facility] scheduled with Dr. Mirna Hendricks. Blood sugars are running great with readings of 130 which is lower than they have been running. No lows reported. Plan  -reinforce education completed at [address] visit  -collaborate with [facility] and ID as needed  -reinforce importance of early symptom recognition and reporting to prevent exacerbation and possible hospitalization  -reinforce use of PCP office, Walk in clinic, Same Day appts versus seeking ED treatment for issues that can be resolved in office  Diabetes Assessment    Medic Alert ID: No  Meal Planning: Avoidance of concentrated sweets   How often do you test your blood sugar?: Meals   Do you have barriers with adherence to non-pharmacologic self-management interventions? (Nutrition/Exercise/Self-Monitoring): No   Have you ever had to go to the ED for symptoms of low blood sugar?: No       No patient-reported symptoms       and   General Assessment    Do you have any symptoms that are causing concern?: Yes  Progression since Onset: Gradually Improving  Reported Symptoms: Other (Comment: infection in shoulder )               Care Coordination Interventions    Program Enrollment: Rising Risk  Referral from Primary Care Provider: Yes  Suggested Interventions and Community Resources  Diabetes Education:  In Process  Home Health Services: Not Started  Meals on Wheels: Declined  Medication Assistance Program: In Process  Occupational Therapy: Not Started  Physical Therapy: Not Started  Registered Dietician: Cortney Dinh Work: Not Started  Transportation Support: Declined  Zone Management Tools: In Process         Goals Addressed                    This Visit's Progress      Conditions and Symptoms (pt-stated)   On track      I will schedule office visits, as directed by my provider. I will keep my appointment or reschedule if I have to cancel. I will notify my provider of any barriers to my plan of care. I will follow my Zone Management tool to seek urgent or emergent care. I will notify my provider of any symptoms that indicate a worsening of my condition. DM  Barriers: need for additional support and education  Plan for overcoming my barriers: family and ACM support  Confidence: 10/10  Anticipated Goal Completion Date: 11/13/21          Reduce Falls  (pt-stated)   On track      I will reduce my risk of falls by the following:  be proactive with fall interventions and prevention  DM/ hx of falls  Barriers: need for additional support and education  Plan for overcoming my barriers: family and ACM  Confidence: 9/10  Anticipated Goal Completion Date: 11/13/21        Self Monitoring (pt-stated)   On track      Self-Monitored Blood Glucose - I will notify my provider if I have any blood sugar readings less than 70 more than 2 times a month. DM  None Recently Recorded    Barriers: need for additional support and education  Plan for overcoming my barriers: family and ACM support  Confidence: 9/10  Anticipated Goal Completion Date: 11/13/21              Prior to Admission medications    Medication Sig Start Date End Date Taking? Authorizing Provider   sulfamethoxazole-trimethoprim (BACTRIM DS;SEPTRA DS) 800-160 MG per tablet Take 1 tablet by mouth 2 times daily for 21 days 9/8/21 9/29/21 Yes Carol Ann Catherine MD   sulfamethoxazole-trimethoprim (BACTRIM DS;SEPTRA DS) 800-160 MG per tablet  9/2/21  Yes Historical Provider, MD   HYDROcodone-acetaminophen (NORCO) 5-325 MG per tablet Take 1 tablet by mouth every 6 hours as needed for Pain. Yes Historical Provider, MD   Multiple Vitamins-Minerals (EYE VITAMINS & MINERALS PO) Take by mouth daily   Yes Historical Provider, MD   metoprolol tartrate (LOPRESSOR) 50 MG tablet Take 1 tablet by mouth 2 times daily 8/5/21  Yes Xiomara Gooden DO   simvastatin (ZOCOR) 40 MG tablet TAKE 1 TABLET NIGHTLY 7/8/21  Yes Xiomara Gooden DO   alendronate (FOSAMAX) 70 MG tablet Take 1 tablet by mouth every 7 days 7/8/21  Yes Xiomara Gooden DO   insulin glargine (BASAGLAR KWIKPEN) 100 UNIT/ML injection pen Inject 15 Units into the skin every morning (before breakfast) 4/16/21  Yes Xiomara Gooden DO   glimepiride (AMARYL) 4 MG tablet Take 1 tablet by mouth 2 times daily 3/5/21  Yes Xiomara Gooden DO   Calcium Carbonate-Vitamin D (OYSTER SHELL CALCIUM/D) 500-200 MG-UNIT TABS Take 1 tablet by mouth 2 times daily 1/13/21 7/7/22 Yes Analia Ospina MD   vitamin D3 (CHOLECALCIFEROL) 25 MCG (1000 UT) TABS tablet Take 0.5 tablets by mouth daily  Patient taking differently: Take 1,000 Units by mouth daily  1/13/21  Yes Analai Ospina MD   metFORMIN (GLUCOPHAGE-XR) 500 MG extended release tablet Take 2 tablets by mouth 2 times daily 12/28/20  Yes Xiomara Gooden DO   levothyroxine (SYNTHROID) 112 MCG tablet Take 1 tablet by mouth Daily 12/28/20  Yes Xiomara Gooden DO   apixaban (ELIQUIS) 5 MG TABS tablet Take 1 tablet by mouth 2 times daily 11/3/20  Yes Delmis Malave MD   aspirin 81 MG EC tablet Take 81 mg by mouth daily   Yes Historical Provider, MD   hydrALAZINE (APRESOLINE) 50 MG tablet Take 1 tablet by mouth 2 times daily 9/21/20  Yes Delmis Malave MD   losartan (COZAAR) 100 MG tablet Take 1 tablet by mouth daily 9/21/20  Yes Delmis Malave MD   vitamin B-12 (CYANOCOBALAMIN) 1000 MCG tablet Take 1,000 mcg by mouth daily   Yes Historical Provider, MD   folic acid (FOLVITE) 1 MG tablet Take 3 mg by mouth 2 times daily    Yes Historical Provider, MD       Future Appointments   Date Time Provider Zach Pérez 9/29/2021  8:15 AM Laquita Jaed  [address] [facility]   10/13/2021  1:45 PM [address]   2/17/2022  9:00 AM [facility]   3/31/2022 10:00 AM Christy Avitia MD [facility] [facility]

## 2021-09-23 ENCOUNTER — NURSE ONLY (OUTPATIENT)
Dept: LAB | Age: 76
End: 2021-09-23

## 2021-09-23 DIAGNOSIS — Z22.322 MRSA (METHICILLIN RESISTANT STAPH AUREUS) CULTURE POSITIVE: ICD-10-CM

## 2021-09-23 LAB
ANION GAP SERPL CALCULATED.3IONS-SCNC: 15 MEQ/L (ref 8–16)
BUN BLDV-MCNC: 20 MG/DL (ref 7–22)
CALCIUM SERPL-MCNC: 9.2 MG/DL (ref 8.5–10.5)
CHLORIDE BLD-SCNC: 94 MEQ/L (ref 98–111)
CO2: 22 MEQ/L (ref 23–33)
CREAT SERPL-MCNC: 0.9 MG/DL (ref 0.4–1.2)
ERYTHROCYTE [DISTWIDTH] IN BLOOD BY AUTOMATED COUNT: 13.9 % (ref 11.5–14.5)
ERYTHROCYTE [DISTWIDTH] IN BLOOD BY AUTOMATED COUNT: 60.3 FL (ref 35–45)
GFR SERPL CREATININE-BSD FRML MDRD: 61 ML/MIN/1.73M2
GLUCOSE BLD-MCNC: 162 MG/DL (ref 70–108)
HCT VFR BLD CALC: 36.8 % (ref 37–47)
HEMOGLOBIN: 10.9 GM/DL (ref 12–16)
MCH RBC QN AUTO: 35 PG (ref 26–33)
MCHC RBC AUTO-ENTMCNC: 29.6 GM/DL (ref 32.2–35.5)
MCV RBC AUTO: 118.3 FL (ref 81–99)
PLATELET # BLD: 148 THOU/MM3 (ref 130–400)
PMV BLD AUTO: 9.2 FL (ref 9.4–12.4)
POTASSIUM SERPL-SCNC: 5.6 MEQ/L (ref 3.5–5.2)
RBC # BLD: 3.11 MILL/MM3 (ref 4.2–5.4)
SCAN OF BLOOD SMEAR: NORMAL
SODIUM BLD-SCNC: 131 MEQ/L (ref 135–145)
WBC # BLD: 4.2 THOU/MM3 (ref 4.8–10.8)

## 2021-09-28 ENCOUNTER — CARE COORDINATION (OUTPATIENT)
Dept: CARE COORDINATION | Age: 76
End: 2021-09-28

## 2021-09-28 NOTE — CARE COORDINATION
Ambulatory Care Coordination Note  9/28/2021  CM Risk Score: 5  Charlson 10 Year Mortality Risk Score: 98%     ACC: Cheli Holm, RN    Summary Note: Spoke with Salvatore Whiting for continued NURA follow up. States she is doing well for this review. Currently working with therapy to increase her mobility of her shoulder. Has followed up with [facility] and has another followup appt scheduled Oct 6th. Has appt with Dr. Melanie Berry office tomorrow for infection control. States pain has resolved. Blood sugars are running good. No additional barriers at this time. Plan  -reinforce education completed at [address] visit  -encourage use of PCP office and walk in clinic for issues that can be resolved in office  -work towards graduation if no new barriers and all education completed  Diabetes Assessment    Medic Alert ID: No  Meal Planning: Avoidance of concentrated sweets   How often do you test your blood sugar?: Meals   Do you have barriers with adherence to non-pharmacologic self-management interventions? (Nutrition/Exercise/Self-Monitoring): No   Have you ever had to go to the ED for symptoms of low blood sugar?: No                    Care Coordination Interventions    Program Enrollment: Rising Risk  Referral from Primary Care Provider: Yes  Suggested Interventions and Community Resources  Diabetes Education: In Process  Home Health Services: Not Started  Meals on Wheels: Declined  Medication Assistance Program: In Process  Occupational Therapy: Not Started  Physical Therapy: Not Started  Registered Dietician: Cortney Dinh Work: Not Started  Transportation Support: Declined  Zone Management Tools: In Process         Goals Addressed                    This Visit's Progress      Conditions and Symptoms (pt-stated)   On track      I will schedule office visits, as directed by my provider. I will keep my appointment or reschedule if I have to cancel. I will notify my provider of any barriers to my plan of care.   I will follow my Zone Management tool to seek urgent or emergent care. I will notify my provider of any symptoms that indicate a worsening of my condition. DM  Barriers: need for additional support and education  Plan for overcoming my barriers: family and ACM support  Confidence: 10/10  Anticipated Goal Completion Date: 11/13/21          Reduce Falls  (pt-stated)   On track      I will reduce my risk of falls by the following:  be proactive with fall interventions and prevention  DM/ hx of falls  Barriers: need for additional support and education  Plan for overcoming my barriers: family and ACM  Confidence: 9/10  Anticipated Goal Completion Date: 11/13/21        Self Monitoring (pt-stated)   On track      Self-Monitored Blood Glucose - I will notify my provider if I have any blood sugar readings less than 70 more than 2 times a month. DM  None Recently Recorded    Barriers: need for additional support and education  Plan for overcoming my barriers: family and ACM support  Confidence: 9/10  Anticipated Goal Completion Date: 11/13/21              Prior to Admission medications    Medication Sig Start Date End Date Taking? Authorizing Provider   sulfamethoxazole-trimethoprim (BACTRIM DS;SEPTRA DS) 800-160 MG per tablet Take 1 tablet by mouth 2 times daily for 21 days 9/8/21 9/29/21  Tri Holliday MD   sulfamethoxazole-trimethoprim (BACTRIM DS;SEPTRA DS) 800-160 MG per tablet  9/2/21   Historical Provider, MD   HYDROcodone-acetaminophen (NORCO) 5-325 MG per tablet Take 1 tablet by mouth every 6 hours as needed for Pain.     Historical Provider, MD   Multiple Vitamins-Minerals (EYE VITAMINS & MINERALS PO) Take by mouth daily    Historical Provider, MD   metoprolol tartrate (LOPRESSOR) 50 MG tablet Take 1 tablet by mouth 2 times daily 8/5/21   Bryan Ratliff DO   simvastatin (ZOCOR) 40 MG tablet TAKE 1 TABLET NIGHTLY 7/8/21   Bryan Ratliff DO   alendronate (FOSAMAX) 70 MG tablet Take 1 tablet by mouth every 7 days 7/8/21 Angel Jane, DO   insulin glargine (BASAGLAR KWIKPEN) 100 UNIT/ML injection pen Inject 15 Units into the skin every morning (before breakfast) 4/16/21   Angel Jane, DO   glimepiride (AMARYL) 4 MG tablet Take 1 tablet by mouth 2 times daily 3/5/21   Angel Jane, DO   Calcium Carbonate-Vitamin D (OYSTER SHELL CALCIUM/D) 500-200 MG-UNIT TABS Take 1 tablet by mouth 2 times daily 1/13/21 7/7/22  Kenya White MD   vitamin D3 (CHOLECALCIFEROL) 25 MCG (1000 UT) TABS tablet Take 0.5 tablets by mouth daily  Patient taking differently: Take 1,000 Units by mouth daily  1/13/21   Kenya White MD   metFORMIN (GLUCOPHAGE-XR) 500 MG extended release tablet Take 2 tablets by mouth 2 times daily 12/28/20   Angel Jane, DO   levothyroxine (SYNTHROID) 112 MCG tablet Take 1 tablet by mouth Daily 12/28/20   Angel Jane, DO   apixaban (ELIQUIS) 5 MG TABS tablet Take 1 tablet by mouth 2 times daily 11/3/20   Faustina Aguirre MD   aspirin 81 MG EC tablet Take 81 mg by mouth daily    Historical Provider, MD   hydrALAZINE (APRESOLINE) 50 MG tablet Take 1 tablet by mouth 2 times daily 9/21/20   Faustina Aguirre MD   losartan (COZAAR) 100 MG tablet Take 1 tablet by mouth daily 9/21/20   Faustina Aguirre MD   vitamin B-12 (CYANOCOBALAMIN) 1000 MCG tablet Take 1,000 mcg by mouth daily    Historical Provider, MD   folic acid (FOLVITE) 1 MG tablet Take 3 mg by mouth 2 times daily     Historical Provider, MD       Future Appointments   Date Time Provider Zach Pérez   9/29/2021  8:15 AM Unk Sandhoff, [address]   10/13/2021  1:45 PM Kimberly [address]   2/17/2022  9:00 AM Angel Jane DO [facility]   3/31/2022 10:00 AM Faustina Aguirre MD [facility] [address] [facility]

## 2021-09-29 ENCOUNTER — HOSPITAL ENCOUNTER (OUTPATIENT)
Dept: WOUND CARE | Age: 76
Discharge: HOME | End: 2021-09-29
Payer: MEDICARE

## 2021-09-29 VITALS
HEIGHT: 65 IN | HEART RATE: 61 BPM | SYSTOLIC BLOOD PRESSURE: 196 MMHG | OXYGEN SATURATION: 98 % | TEMPERATURE: 97.3 F | DIASTOLIC BLOOD PRESSURE: 75 MMHG | RESPIRATION RATE: 16 BRPM | BODY MASS INDEX: 33.15 KG/M2 | WEIGHT: 199 LBS

## 2021-09-29 PROCEDURE — 99212 OFFICE O/P EST SF 10 MIN: CPT

## 2021-09-29 RX ORDER — SULFAMETHOXAZOLE AND TRIMETHOPRIM 800; 160 MG/1; MG/1
1 TABLET ORAL 2 TIMES DAILY
Qty: 28 TABLET | Refills: 0 | Status: SHIPPED | OUTPATIENT
Start: 2021-09-29 | End: 2021-10-13

## 2021-09-29 ASSESSMENT — PAIN SCALES - GENERAL: PAINLEVEL_OUTOF10: 0

## 2021-09-29 NOTE — PROGRESS NOTES
Cedrick BARRETO has reviewed and agrees with Jacqueline MCMILLAN's shift  Assessment.     Ben Sauceda RN  9/29/2021

## 2021-09-29 NOTE — PLAN OF CARE
Problem: Wound:  Goal: Will show signs of wound healing; wound closure and no evidence of infection  Description: Will show signs of wound healing; wound closure and no evidence of infection  Outcome: Ongoing   Patient presents to wound clinic for follow up appointment for right shoulder infection. Continue antibiotics 2 weeks. Additional antibiotics sent to [facility] per Dr. Schneider Signs. Patient to have labs drawn before next appointment. Follow up visit: As needed     Care plan reviewed with patient. Patient verbalize understanding of the plan of care and contribute to goal setting.

## 2021-09-29 NOTE — PROGRESS NOTES
performed by Troy Little MD at [address] HISTORY         Family History   Problem Relation Age of Onset    Heart Disease Mother     Heart Disease Father     Cancer Sister     Other Sister      SOCIAL HISTORY       Social History     Tobacco Use    Smoking status: Never Smoker    Smokeless tobacco: Never Used   Vaping Use    Vaping Use: Never used   Substance Use Topics    Alcohol use: No    Drug use: No     ALLERGIES         No Known Allergies  MEDICATIONS         Current Outpatient Medications on File Prior to Encounter   Medication Sig Dispense Refill    sulfamethoxazole-trimethoprim (BACTRIM DS;SEPTRA DS) 800-160 MG per tablet Take 1 tablet by mouth 2 times daily for 21 days 42 tablet 0    sulfamethoxazole-trimethoprim (BACTRIM DS;SEPTRA DS) 800-160 MG per tablet       HYDROcodone-acetaminophen (NORCO) 5-325 MG per tablet Take 1 tablet by mouth every 6 hours as needed for Pain.       Multiple Vitamins-Minerals (EYE VITAMINS & MINERALS PO) Take by mouth daily      metoprolol tartrate (LOPRESSOR) 50 MG tablet Take 1 tablet by mouth 2 times daily 180 tablet 3    simvastatin (ZOCOR) 40 MG tablet TAKE 1 TABLET NIGHTLY 90 tablet 3    alendronate (FOSAMAX) 70 MG tablet Take 1 tablet by mouth every 7 days 12 tablet 3    insulin glargine (BASAGLAR KWIKPEN) 100 UNIT/ML injection pen Inject 15 Units into the skin every morning (before breakfast) 5 pen 5    glimepiride (AMARYL) 4 MG tablet Take 1 tablet by mouth 2 times daily 180 tablet 3    Calcium Carbonate-Vitamin D (OYSTER SHELL CALCIUM/D) 500-200 MG-UNIT TABS Take 1 tablet by mouth 2 times daily 180 tablet 5    vitamin D3 (CHOLECALCIFEROL) 25 MCG (1000 UT) TABS tablet Take 0.5 tablets by mouth daily (Patient taking differently: Take 1,000 Units by mouth daily ) 90 tablet 5    metFORMIN (GLUCOPHAGE-XR) 500 MG extended release tablet Take 2 tablets by mouth 2 times daily 360 tablet 3    levothyroxine (SYNTHROID) 112 MCG tablet Take 1 tablet by mouth Daily 90 tablet 3    apixaban (ELIQUIS) 5 MG TABS tablet Take 1 tablet by mouth 2 times daily 180 tablet 3    aspirin 81 MG EC tablet Take 81 mg by mouth daily      hydrALAZINE (APRESOLINE) 50 MG tablet Take 1 tablet by mouth 2 times daily 180 tablet 3    losartan (COZAAR) 100 MG tablet Take 1 tablet by mouth daily 90 tablet 3    vitamin B-12 (CYANOCOBALAMIN) 1000 MCG tablet Take 1,000 mcg by mouth daily      folic acid (FOLVITE) 1 MG tablet Take 3 mg by mouth 2 times daily        No current facility-administered medications on file prior to encounter. REVIEW OF SYSTEMS:     Constitutional: no fever, no night sweats, no fatigue. Head: no head ache , no head injury, no migranes. Eye: no blurring of vision, no double vision. Ears: no hearing difficulty, no tinnitus  Mouth/throat: no ulceration, dental caries , dysphagia  Lungs: no cough, no shortness of breath, no wheeze  CVS: no palpitation, no chest pain, no shortness of breath  GI: no abdominal pain, no nausea , no vomiting, no constipation  LIDIA: no dysuria, frequency and urgency, no hematuria, no kidney stones  Musculoskeletal: no joint pain, swelling , stiffness,  Endocrine: no polyuria, polydipsia, no cold or heat intolerance  Hematology: no anemia, no easy brusing or bleeding, no hx of clotting disorder  Dermatology: no skin rash, no eczema, no pruritis,  Psychiatry: no depression, no anxiety,no panic attacks, no suicide ideation        PHYSICAL EXAM      height is 5' 5\" (1.651 m) and weight is 199 lb (90.3 kg). Her infrared temperature is 97.3 °F (36.3 °C). Her blood pressure is 196/75 (abnormal) and her pulse is 61. Her respiration is 16 and oxygen saturation is 98%.        Wt Readings from Last 3 Encounters:   09/29/21 199 lb (90.3 kg)   09/03/21 199 lb (90.3 kg)   08/26/21 195 lb 12.8 oz (88.8 kg)          General Appearance  Awake, alert, oriented,  not  In acute distress  HEENT - normocephalic, atraumatic, pink conjunctiva,  anicteric sclera  Neck - Supple, no mass    Abdomen - soft, not distended, nontender, no organomegally,  Neurologic -oriented  Skin - No bruising or bleeding  Extremities -well-healed surgical scar over the right shoulder she has superficial open wound on the proximal aspect of the shoulder no redness was noted             Assessment:   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  Positive culture for MRSA: We will continue oral Bactrim for at least 4 weeks  We will continue 2 more weeks of oral antibiotic  Patient Active Problem List   Diagnosis Code    Microalbuminuria due to type 2 diabetes mellitus ([facility]) E11.29, R80.9    Chronic atrial fibrillation ([facility]) I48.20    Type 2 diabetes mellitus with insulin therapy ([facility] [address] E11.9, Z79.4    Essential hypertension I10    Osteopenia of spine M85.88    Hypothyroidism E03.9    History of non-Hodgkin's lymphoma Z85.72    Anticoagulant long-term use Z79.01    Hyponatremia E87.1    History of humerus fracture Z87.81    TRACY on CPAP G47.33, Z99.89    Obesity (BMI 30-39. 9) E66.9    Elevated parathyroid hormone E34.9    Parathyroid adenoma D35.1    Hypercalcemia E83.52    Osteoporosis with pathological fracture M80.00XA    S/P parathyroidectomy ([facility]) E89.2    Atrial fibrillation/flutter ([facility]) I48.91, I48.92    PAF (paroxysmal atrial fibrillation) ([facility]) I48.0    Hypotension I95.9    Hyperlipidemia E78.5    Status post parathyroidectomy ([facility] [address] E89.2    Pathological fracture of humerus due to osteoporosis M80.029A    Other osteoporosis without current pathological fracture M81.8    Hyperparathyroidism, unspecified ([facility]) E21.3    S/P ablation of atrial fibrillation Z98.890, Z86.79    Persistent atrial fibrillation ([facility]) I48.19    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, right S42.291K         Plan:     Patient examined and evaluated      Antibiotics: Bactrim DS    Please see attached Discharge Instructions    Written patient dismissal instructions given to patient and signed by patient or POA. Discharge Instructions       Visit Discharge/Physician Orders:  - Right shoulder infection  - Continue antibiotics 2 more weeks  - Additional antibiotics sent to [facility]   - Okay to have flu shot after anitibiotics  - You can take probiotic or eat yogurt to help with upset stomach due to antibiotics  - Buy hibiclens once or twice a week  - Have labs drawn before next appointment cbc, bmp   - Make sure to participate in therapy once surgeon oks              Wound Location: Incision right shoulder         Follow up visit: As needed     Keep next scheduled appointment. Please give 24 hour notice if unable to keep appointment. [phone]     If you experience any of the following, please call the Wound Care Service during business hours: Monday through Friday 8:00 am - 4:30 pm  ([phone]).               *Increase in pain              *Temperature over 101              *Increase in drainage from your wound or a foul odor              *Uncontrolled swelling              *Need for compression bandage changes due to slippage, breakthrough drainage     If you need medical attention outside of business hours, please contact your Primary Care Doctor or go to the nearest emergency room.            Electronically signed by Stacey Frankel MD on 9/29/2021 at 9:07 AM

## 2021-10-13 ENCOUNTER — OFFICE VISIT (OUTPATIENT)
Dept: PULMONOLOGY | Age: 76
End: 2021-10-13
Payer: MEDICARE

## 2021-10-13 VITALS
SYSTOLIC BLOOD PRESSURE: 128 MMHG | HEART RATE: 67 BPM | TEMPERATURE: 97.8 F | HEIGHT: 65 IN | OXYGEN SATURATION: 98 % | BODY MASS INDEX: 33.55 KG/M2 | DIASTOLIC BLOOD PRESSURE: 80 MMHG | WEIGHT: 201.4 LBS

## 2021-10-13 DIAGNOSIS — E66.9 OBESITY (BMI 30-39.9): ICD-10-CM

## 2021-10-13 DIAGNOSIS — G47.33 OSA ON CPAP: Primary | ICD-10-CM

## 2021-10-13 DIAGNOSIS — Z99.89 OSA ON CPAP: Primary | ICD-10-CM

## 2021-10-13 PROCEDURE — 3017F COLORECTAL CA SCREEN DOC REV: CPT | Performed by: PHYSICIAN ASSISTANT

## 2021-10-13 PROCEDURE — G8399 PT W/DXA RESULTS DOCUMENT: HCPCS | Performed by: PHYSICIAN ASSISTANT

## 2021-10-13 PROCEDURE — 4040F PNEUMOC VAC/ADMIN/RCVD: CPT | Performed by: PHYSICIAN ASSISTANT

## 2021-10-13 PROCEDURE — G8427 DOCREV CUR MEDS BY ELIG CLIN: HCPCS | Performed by: PHYSICIAN ASSISTANT

## 2021-10-13 PROCEDURE — 1036F TOBACCO NON-USER: CPT | Performed by: PHYSICIAN ASSISTANT

## 2021-10-13 PROCEDURE — 1123F ACP DISCUSS/DSCN MKR DOCD: CPT | Performed by: PHYSICIAN ASSISTANT

## 2021-10-13 PROCEDURE — 99213 OFFICE O/P EST LOW 20 MIN: CPT | Performed by: PHYSICIAN ASSISTANT

## 2021-10-13 PROCEDURE — 1090F PRES/ABSN URINE INCON ASSESS: CPT | Performed by: PHYSICIAN ASSISTANT

## 2021-10-13 PROCEDURE — G8417 CALC BMI ABV UP PARAM F/U: HCPCS | Performed by: PHYSICIAN ASSISTANT

## 2021-10-13 PROCEDURE — G8484 FLU IMMUNIZE NO ADMIN: HCPCS | Performed by: PHYSICIAN ASSISTANT

## 2021-10-13 ASSESSMENT — ENCOUNTER SYMPTOMS
WHEEZING: 0
STRIDOR: 0
NAUSEA: 0
ALLERGIC/IMMUNOLOGIC NEGATIVE: 1
EYES NEGATIVE: 1
DIARRHEA: 0
CHEST TIGHTNESS: 0
SHORTNESS OF BREATH: 0
BACK PAIN: 0
COUGH: 0

## 2021-10-13 NOTE — PROGRESS NOTES
[facility]      Jacqui Willson         [id number]  10/13/2021   Chief Complaint   Patient presents with    Follow-up     TRACY 1 year with download         Pt of Dr. Jatin SIMS Download:   Esther Bruins or initial AHI: 22.2     Date of initial study: 8/20/2019      Compliant  100%     Noncompliant 0 %     PAP Type Airsense 10 autoset Level  8/10   Avg Hrs/Day 6 hr  AHI: 2.7   Recorded compliance dates , 9/4/2021  to 10/3/2021   Machine/Mfg:   [x] ResMed    [] Respironics/Dreamstation   Interface:   [] Nasal    [] Nasal pillows   [x] FFM      Provider:      [x] [facility]     [][employer]     [] [employer]    [] Naliniudcyndee Hewitt    [] [facility]               [] [employer] [facility]      [] [employer]    [] [address]      [] Other    Neck Size: 17.75  Mallampati Mallampati 4  ESS:  6  SAQLI: 84    Here is a scan of the most recent download:            Presentation:   Tejas Almaguer presents for sleep medicine follow up for obstructive sleep apnea  Since the last visit, Tejas Almaguer is doing well with PAP. She is sleeping but occ wakes from shoulder pain. She feels rested. Equipment issues: The pressure is  acceptable, the mask is acceptable     Sleep issues:  Do you feel better? Yes  More rested? Yes   Better concentration? yes    Progress History:   Since last visit any new medical issues? Yes shoulder surgery, ablation  New ER or hospital visits? Yes   Any new or changes in medicines? No  Any new sleep medicines? No    Review of Systems -   Review of Systems   Constitutional: Negative for activity change, appetite change, chills and fever. HENT: Negative for congestion and postnasal drip. Eyes: Negative. Respiratory: Negative for cough, chest tightness, shortness of breath, wheezing and stridor. Cardiovascular: Negative for chest pain and leg swelling. Gastrointestinal: Negative for diarrhea and nausea. Endocrine: Negative. Genitourinary: Negative. Musculoskeletal: Negative.   Negative for arthralgias and back pain. Skin: Negative. Allergic/Immunologic: Negative. Neurological: Negative. Negative for dizziness and light-headedness. Psychiatric/Behavioral: Negative. All other systems reviewed and are negative. Physical Exam:    BMI:  Body mass index is 33.51 kg/m². Wt Readings from Last 3 Encounters:   10/13/21 201 lb 6.4 oz (91.4 kg)   09/29/21 199 lb (90.3 kg)   09/03/21 199 lb (90.3 kg)     Weight stable / unchanged  Vitals: /80 (Site: Left Upper Arm, Position: Sitting, Cuff Size: Large Adult)   Pulse 67   Temp 97.8 °F (36.6 °C) (Temporal)   Ht 5' 5\" (1.651 m)   Wt 201 lb 6.4 oz (91.4 kg)   SpO2 98%   BMI 33.51 kg/m²       Physical Exam  Constitutional:       Appearance: She is well-developed. HENT:      Head: Normocephalic and atraumatic. Right Ear: External ear normal.      Left Ear: External ear normal.   Eyes:      Conjunctiva/sclera: Conjunctivae normal.      Pupils: Pupils are equal, round, and reactive to light. Cardiovascular:      Rate and Rhythm: Normal rate and regular rhythm. Heart sounds: Normal heart sounds. Pulmonary:      Effort: Pulmonary effort is normal.      Breath sounds: Normal breath sounds. Musculoskeletal:         General: Normal range of motion. Cervical back: Normal range of motion and neck supple. Skin:     General: Skin is warm and dry. Neurological:      Mental Status: She is alert and oriented to person, place, and time. Psychiatric:         Behavior: Behavior normal.         Thought Content: Thought content normal.         Judgment: Judgment normal.           ASSESSMENT/DIAGNOSIS     Diagnosis Orders   1. TRACY on CPAP     2. Obesity (BMI 30-39. 9)              Plan   Do you need any equipment today? Yes update supplies  - Download reviewed and discussed with patient  - She  was advised to continue current positive airway pressure therapy with above described pressure.    - She  advised to keep good compliance with current recommended pressure to get optimal results and clinical improvement  - Recommend 7-9 hours of sleep with PAP  - She was advised to call [employer] company regarding supplies if needed.   -She call my office for earlier appointment if needed for worsening of sleep symptoms.   - She was instructed on weight loss  - Tiffanie Saez was educated about my impression and plan. Patient verbalizesunderstanding.   We will see Jaime Bradley back in: 1 year with download    Information added by my medical assistant/LPN was reviewed today         Mike Gillespie PA-C, MPAS  10/13/2021

## 2021-10-22 ENCOUNTER — CARE COORDINATION (OUTPATIENT)
Dept: CARE COORDINATION | Age: 76
End: 2021-10-22

## 2021-10-29 ENCOUNTER — CARE COORDINATION (OUTPATIENT)
Dept: CARE COORDINATION | Age: 76
End: 2021-10-29

## 2021-10-29 NOTE — CARE COORDINATION
Ambulatory Care Coordination Note  10/29/2021  CM Risk Score: 5  Charlson 10 Year Mortality Risk Score: 98%     ACC: Jose Armando Benson, RN    Summary Note: Received a detailed voicemail back from Charity Tomlinson from message left. States she was on vacation during call and is now back home and doing well. Continues to work with therapy for her shoulder. No date as to when that will be completed. States she received flu shot as well. No other changes or barriers presented. Will continue to follow up and work towards discharge if no new barriers or concerns noted. Plan  -reinforce education completed at [address] visit  -collaborate with therapy as needed  -encourage blood sugar tracking and reporting  -encourage use of PCP, myself, walk in clinic to address issues that can treated by office to reduce unnecessary utilization  Diabetes Assessment    Medic Alert ID: No  Meal Planning: Avoidance of concentrated sweets   How often do you test your blood sugar?: Meals   Do you have barriers with adherence to non-pharmacologic self-management interventions? (Nutrition/Exercise/Self-Monitoring): No   Have you ever had to go to the ED for symptoms of low blood sugar?: No                    Care Coordination Interventions    Program Enrollment: Rising Risk  Referral from Primary Care Provider: Yes  Suggested Interventions and Community Resources  Diabetes Education: In Process  Home Health Services: Not Started  Meals on Wheels: Declined  Medication Assistance Program: In Process  Occupational Therapy: Not Started  Physical Therapy: Not Started  Registered Dietician: Cortney Dinh Work: Not Started  Transportation Support: Declined  Zone Management Tools: In Process         Goals Addressed                    This Visit's Progress      Conditions and Symptoms (pt-stated)   On track      I will schedule office visits, as directed by my provider. I will keep my appointment or reschedule if I have to cancel.   I will notify my provider of any barriers to my plan of care. I will follow my Zone Management tool to seek urgent or emergent care. I will notify my provider of any symptoms that indicate a worsening of my condition. DM  Barriers: need for additional support and education  Plan for overcoming my barriers: family and ACM support  Confidence: 10/10  Anticipated Goal Completion Date: 11/13/21          Reduce Falls  (pt-stated)   On track      I will reduce my risk of falls by the following:  be proactive with fall interventions and prevention  DM/ hx of falls  Barriers: need for additional support and education  Plan for overcoming my barriers: family and ACM  Confidence: 9/10  Anticipated Goal Completion Date: 11/13/21        Self Monitoring (pt-stated)   On track      Self-Monitored Blood Glucose - I will notify my provider if I have any blood sugar readings less than 70 more than 2 times a month. DM  None Recently Recorded    Barriers: need for additional support and education  Plan for overcoming my barriers: family and ACM support  Confidence: 9/10  Anticipated Goal Completion Date: 11/13/21              Prior to Admission medications    Medication Sig Start Date End Date Taking? Authorizing Provider   sulfamethoxazole-trimethoprim (BACTRIM DS;SEPTRA DS) 800-160 MG per tablet  9/2/21   Historical Provider, MD   HYDROcodone-acetaminophen (NORCO) 5-325 MG per tablet Take 1 tablet by mouth every 6 hours as needed for Pain.     Historical Provider, MD   Multiple Vitamins-Minerals (EYE VITAMINS & MINERALS PO) Take by mouth daily    Historical Provider, MD   metoprolol tartrate (LOPRESSOR) 50 MG tablet Take 1 tablet by mouth 2 times daily 8/5/21   Carey Girard DO   simvastatin (ZOCOR) 40 MG tablet TAKE 1 TABLET NIGHTLY 7/8/21   Carey Girard DO   alendronate (FOSAMAX) 70 MG tablet Take 1 tablet by mouth every 7 days 7/8/21   Carey Girard DO   insulin glargine (BASAGLAR KWIKPEN) 100 UNIT/ML injection pen Inject 15 Units into the skin every morning (before breakfast) 4/16/21   Lasha Nelson DO   glimepiride (AMARYL) 4 MG tablet Take 1 tablet by mouth 2 times daily 3/5/21   Lasha Nelson DO   Calcium Carbonate-Vitamin D (OYSTER SHELL CALCIUM/D) 500-200 MG-UNIT TABS Take 1 tablet by mouth 2 times daily 1/13/21 7/7/22  Enio Mcfarland MD   vitamin D3 (CHOLECALCIFEROL) 25 MCG (1000 UT) TABS tablet Take 0.5 tablets by mouth daily  Patient taking differently: Take 1,000 Units by mouth daily  1/13/21   Enio Mcfarland MD   metFORMIN (GLUCOPHAGE-XR) 500 MG extended release tablet Take 2 tablets by mouth 2 times daily 12/28/20   Lasha Nelson DO   levothyroxine (SYNTHROID) 112 MCG tablet Take 1 tablet by mouth Daily 12/28/20   Lasha Nelson, DO   apixaban (ELIQUIS) 5 MG TABS tablet Take 1 tablet by mouth 2 times daily 11/3/20   Gumaro Hardin MD   aspirin 81 MG EC tablet Take 81 mg by mouth daily    Historical Provider, MD   hydrALAZINE (APRESOLINE) 50 MG tablet Take 1 tablet by mouth 2 times daily 9/21/20   Gumaro Hardin MD   losartan (COZAAR) 100 MG tablet Take 1 tablet by mouth daily 9/21/20   Gumaro Hardin MD   vitamin B-12 (CYANOCOBALAMIN) 1000 MCG tablet Take 1,000 mcg by mouth daily    Historical Provider, MD   folic acid (FOLVITE) 1 MG tablet Take 3 mg by mouth 2 times daily     Historical Provider, MD       Future Appointments   Date Time Provider Zach Pérez   11/3/2021  1:30 PM Erasmo Yip RCP [address] [facility]   2/17/2022  9:00 AM Lasha Nelson DO [place] [facility]   3/31/2022 10:00 AM Christy Arnold Mai, MD N [facility]   10/5/2022 10:15 AM Kimberly Arce

## 2021-11-03 RX ORDER — APIXABAN 5 MG/1
TABLET, FILM COATED ORAL
Qty: 60 TABLET | Refills: 5 | Status: SHIPPED | OUTPATIENT
Start: 2021-11-03 | End: 2022-03-31 | Stop reason: SDUPTHER

## 2021-11-08 RX ORDER — HYDRALAZINE HYDROCHLORIDE 50 MG/1
50 TABLET, FILM COATED ORAL 2 TIMES DAILY
Qty: 180 TABLET | Refills: 1 | Status: SHIPPED | OUTPATIENT
Start: 2021-11-08 | End: 2022-02-25 | Stop reason: SDUPTHER

## 2021-11-08 RX ORDER — LOSARTAN POTASSIUM 100 MG/1
100 TABLET ORAL DAILY
Qty: 90 TABLET | Refills: 1 | Status: SHIPPED | OUTPATIENT
Start: 2021-11-08 | End: 2022-02-25 | Stop reason: SDUPTHER

## 2021-11-22 ENCOUNTER — CARE COORDINATION (OUTPATIENT)
Dept: CARE COORDINATION | Age: 76
End: 2021-11-22

## 2021-11-22 NOTE — CARE COORDINATION
Ambulatory Care Coordination Note  11/22/2021  CM Risk Score: 5  Charlson 10 Year Mortality Risk Score: 98%     ACC: Sudheer Stock, RN    Summary Note: Spoke with Liliam Frost for NURA follow up. She continues to work with therapy several times weekly. States her arm is having some pain with therapy and talking about the limitations still in her muscle strength when trying to complete ROM exercises on her own. States she will be in therapy until [place]. Taking arthritis medication to help manage pain. Discussed blood sugars. States they are running around 200's right above and below. Feels this is due to stress and the slow progression of her arm. She would like to monitor them and make changes in her diet before any PCP intervention. Informed her to keep track and will report blood sugar readings during next call which she would like 2 weeks to get through holidays before reporting. No additional follow up scheduled with infection control or orthopedist    Plan  -reinforce education completed at [address] visit  -encourage diet as ordered  -encourage blood sugar tracking and reporting  -reinforce use of PCP, myself, office and walk in clinic for issues that can be addressed in office to prevent unnecessary utilization  Diabetes Assessment    Medic Alert ID: No  Meal Planning: Avoidance of concentrated sweets   How often do you test your blood sugar?: Meals   Do you have barriers with adherence to non-pharmacologic self-management interventions? (Nutrition/Exercise/Self-Monitoring): No   Have you ever had to go to the ED for symptoms of low blood sugar?: No       No patient-reported symptoms                  Care Coordination Interventions    Program Enrollment: Rising Risk  Referral from Primary Care Provider: Yes  Suggested Interventions and Community Resources  Diabetes Education:  In Process  Home Health Services: Not Started  Meals on Wheels: Declined  Medication Assistance Program: In Process  Occupational Therapy: Not Started  Physical Therapy: Not Started  Registered Dietician: [facility]  Social Work: Not Started  Transportation Support: Declined  Zone Management Tools: In Process         Goals Addressed                    This Visit's Progress      Conditions and Symptoms (pt-stated)   On track      I will schedule office visits, as directed by my provider. I will keep my appointment or reschedule if I have to cancel. I will notify my provider of any barriers to my plan of care. I will follow my Zone Management tool to seek urgent or emergent care. I will notify my provider of any symptoms that indicate a worsening of my condition. DM  Barriers: need for additional support and education  Plan for overcoming my barriers: family and ACM support  Confidence: 10/10  Anticipated Goal Completion Date: 11/13/21 Update 12/22/21          Reduce Falls  (pt-stated)   On track      I will reduce my risk of falls by the following:  be proactive with fall interventions and prevention  DM/ hx of falls  Barriers: need for additional support and education  Plan for overcoming my barriers: family and ACM  Confidence: 9/10  Anticipated Goal Completion Date: 11/13/21 Update 12/22/21        Self Monitoring (pt-stated)   On track      Self-Monitored Blood Glucose - I will notify my provider if I have any blood sugar readings less than 70 more than 2 times a month. DM  None Recently Recorded    Barriers: need for additional support and education  Plan for overcoming my barriers: family and ACM support  Confidence: 9/10  Anticipated Goal Completion Date: 11/13/21 Update 12/22/21              Prior to Admission medications    Medication Sig Start Date End Date Taking?  Authorizing Provider   hydrALAZINE (APRESOLINE) 50 MG tablet Take 1 tablet by mouth 2 times daily 11/8/21  Yes Ethan Rob MD   losartan (COZAAR) 100 MG tablet Take 1 tablet by mouth daily 11/8/21  Yes Ethan Rob MD   IGGBRTJ 1 MG TABS tablet TAKE 1 TABLET BY MOUTH TWICE A DAY 11/3/21  Yes Kecia Moyer MD   sulfamethoxazole-trimethoprim (BACTRIM DS;SEPTRA DS) 800-160 MG per tablet  9/2/21  Yes Historical Provider, MD   Multiple Vitamins-Minerals (EYE VITAMINS & MINERALS PO) Take by mouth daily   Yes Historical Provider, MD   metoprolol tartrate (LOPRESSOR) 50 MG tablet Take 1 tablet by mouth 2 times daily 8/5/21  Yes Anthony Leos, DO   simvastatin (ZOCOR) 40 MG tablet TAKE 1 TABLET NIGHTLY 7/8/21  Yes Anthony Leos DO   alendronate (FOSAMAX) 70 MG tablet Take 1 tablet by mouth every 7 days 7/8/21  Yes Anthony Leos DO   insulin glargine (BASAGLAR KWIKPEN) 100 UNIT/ML injection pen Inject 15 Units into the skin every morning (before breakfast) 4/16/21  Yes Anthony Leos DO   glimepiride (AMARYL) 4 MG tablet Take 1 tablet by mouth 2 times daily 3/5/21  Yes Anthony Leos DO   Calcium Carbonate-Vitamin D (OYSTER SHELL CALCIUM/D) 500-200 MG-UNIT TABS Take 1 tablet by mouth 2 times daily 1/13/21 7/7/22 Yes Latricia Fregoso MD   vitamin D3 (CHOLECALCIFEROL) 25 MCG (1000 UT) TABS tablet Take 0.5 tablets by mouth daily  Patient taking differently: Take 1,000 Units by mouth daily  1/13/21  Yes Latricia Fregoso MD   metFORMIN (GLUCOPHAGE-XR) 500 MG extended release tablet Take 2 tablets by mouth 2 times daily 12/28/20  Yes Anthony Leos DO   levothyroxine (SYNTHROID) 112 MCG tablet Take 1 tablet by mouth Daily 12/28/20  Yes Anthony Leos DO   aspirin 81 MG EC tablet Take 81 mg by mouth daily   Yes Historical Provider, MD   vitamin B-12 (CYANOCOBALAMIN) 1000 MCG tablet Take 1,000 mcg by mouth daily   Yes Historical Provider, MD   folic acid (FOLVITE) 1 MG tablet Take 3 mg by mouth 2 times daily    Yes Historical Provider, MD   HYDROcodone-acetaminophen (NORCO) 5-325 MG per tablet Take 1 tablet by mouth every 6 hours as needed for Pain.   Patient not taking: Reported on 11/22/2021    Historical Provider, MD       Future Appointments   Date Time

## 2021-12-07 ENCOUNTER — CARE COORDINATION (OUTPATIENT)
Dept: CARE COORDINATION | Age: 76
End: 2021-12-07

## 2021-12-10 DIAGNOSIS — E11.65 TYPE 2 DIABETES MELLITUS WITH HYPERGLYCEMIA, WITHOUT LONG-TERM CURRENT USE OF INSULIN (HCC): ICD-10-CM

## 2021-12-10 DIAGNOSIS — E03.8 OTHER SPECIFIED HYPOTHYROIDISM: ICD-10-CM

## 2021-12-13 ENCOUNTER — CARE COORDINATION (OUTPATIENT)
Dept: CARE COORDINATION | Age: 76
End: 2021-12-13

## 2021-12-13 RX ORDER — LEVOTHYROXINE SODIUM 112 UG/1
112 TABLET ORAL DAILY
Qty: 90 TABLET | Refills: 3 | Status: SHIPPED | OUTPATIENT
Start: 2021-12-13 | End: 2022-03-28 | Stop reason: SDUPTHER

## 2021-12-13 RX ORDER — METFORMIN HYDROCHLORIDE 500 MG/1
1000 TABLET, EXTENDED RELEASE ORAL 2 TIMES DAILY
Qty: 360 TABLET | Refills: 3 | Status: SHIPPED | OUTPATIENT
Start: 2021-12-13 | End: 2022-03-28 | Stop reason: SDUPTHER

## 2021-12-13 NOTE — TELEPHONE ENCOUNTER
Recent Visits  Date Type Provider Dept   09/03/21 Office Visit Janette Michaels APRN - CNP [facility] [place] [facility]   08/13/21 Office Visit Crissyshayne Jan, [address] [facility]   08/05/21 Office Visit Marcin Montoya [address]   02/01/21 Office Visit Marcin Montoya [address]   08/25/20 Office Visit EMMANUEL Barton - CNP [facility] [place] [facility]   08/03/20 Office Visit Marcin Montoya, [address] recent visits within past 540 days with a meds authorizing provider and meeting all other requirements  Future Appointments  Date Type Provider Dept   03/04/22 Appointment Marcin Montoya, [address] future appointments within next 150 days with a meds authorizing provider and meeting all other requirements      Future Appointments   Date Time Provider Zach Pérez   3/4/2022  8:40 AM [facility] [place]   3/31/2022 10:00 AM Mee Araujo MD N [facility] [place]   10/5/2022 10:15 AM Kimberly De La Cruz

## 2021-12-13 NOTE — CARE COORDINATION
I spoke with Wing Martínez and I am going to work on getting her application renewed through [facility]. She has a copy of the application so she is going to work on taking that to the office. I am faxing her MD's office their portion.       [address] [facility]   Medication Assistance  [address] [facility], and [employer]    (B) [phone]  (I) [phone]

## 2021-12-14 ENCOUNTER — CARE COORDINATION (OUTPATIENT)
Dept: CARE COORDINATION | Age: 76
End: 2021-12-14

## 2021-12-14 NOTE — CARE COORDINATION
Ambulatory Care Coordination Note  12/14/2021  CM Risk Score: 5  Charlson 10 Year Mortality Risk Score: 98%     ACC: Holley Cartagena, RN    Summary Note: Spoke with Sarah Beth Fox for continued NURA followup. Discussed her continued complications following her arm shoulder surgery. States she had follow up appt with [facility], Dr. Pilar NOEL this past Wed and was started on steroids. Was informed that she could possibly have an infection so they did an aspiration biopsy while there. States she is finished with steroids today. Her blood sugars have increased since use of steroids and she has adjusted her insulin to cover. States she was informed by [facility] to stop therapy at this time as they feel it is aggravating her arm. May have to do bone scan but feels it is too early since surgery to complete it. Sarah Beth Fox states she is going to [place] on Jan 24th for 4 weeks and is to follow up with [facility] in Feb when she gets home. Plan  -reinforce education completed at [address] visit  -collaborate with [facility] as needed  -encourage blood sugar tracking and reporting  -collaborate with Mireya Tellez as she is currently working with her on application for [facility]  -reinforce use of PCP office, myself, PCP office and walk in clinic for issues that can be resolved in office to prevent unnecessary utilization  Diabetes Assessment    Medic Alert ID: No  Meal Planning: Avoidance of concentrated sweets   How often do you test your blood sugar?: Meals   Do you have barriers with adherence to non-pharmacologic self-management interventions?  (Nutrition/Exercise/Self-Monitoring): No   Have you ever had to go to the ED for symptoms of low blood sugar?: No       Increase or Decrease trend in Blood Sugars   Do you have hypoglycemia symptoms?: No   Last Blood Sugar Value: 300   Blood Sugar Monitoring Regimen: Morning Fasting, At Bedtime   Blood Sugar Trends: Fluctuating (Comment: with recent use of steroid)       and   General Assessment    Do you have any barriers: family and ACM support  Confidence: 9/10  Anticipated Goal Completion Date: 11/13/21 Update 12/22/21              Prior to Admission medications    Medication Sig Start Date End Date Taking? Authorizing Provider   metFORMIN (GLUCOPHAGE-XR) 500 MG extended release tablet Take 2 tablets by mouth 2 times daily 12/13/21  Yes Ella Campuzano DO   levothyroxine (SYNTHROID) 112 MCG tablet Take 1 tablet by mouth Daily 12/13/21  Yes Ella Campuzano DO   hydrALAZINE (APRESOLINE) 50 MG tablet Take 1 tablet by mouth 2 times daily 11/8/21  Yes Mickey Durand MD   losartan (COZAAR) 100 MG tablet Take 1 tablet by mouth daily 11/8/21  Yes Mickey Durand MD   ELIQUIS 5 MG TABS tablet TAKE 1 TABLET BY MOUTH TWICE A DAY 11/3/21  Yes Mickey Durand MD   sulfamethoxazole-trimethoprim (BACTRIM DS;SEPTRA DS) 800-160 MG per tablet  9/2/21  Yes Historical Provider, MD   HYDROcodone-acetaminophen (NORCO) 5-325 MG per tablet Take 1 tablet by mouth every 6 hours as needed for Pain.     Yes Historical Provider, MD   Multiple Vitamins-Minerals (EYE VITAMINS & MINERALS PO) Take by mouth daily   Yes Historical Provider, MD   metoprolol tartrate (LOPRESSOR) 50 MG tablet Take 1 tablet by mouth 2 times daily 8/5/21  Yes Ella Campuzano DO   simvastatin (ZOCOR) 40 MG tablet TAKE 1 TABLET NIGHTLY 7/8/21  Yes Ella Campuzano DO   alendronate (FOSAMAX) 70 MG tablet Take 1 tablet by mouth every 7 days 7/8/21  Yes Ella Campuzano DO   insulin glargine (BASAGLAR KWIKPEN) 100 UNIT/ML injection pen Inject 15 Units into the skin every morning (before breakfast) 4/16/21  Yes Ella Campuzano DO   glimepiride (AMARYL) 4 MG tablet Take 1 tablet by mouth 2 times daily 3/5/21  Yes Ella Campuzano DO   Calcium Carbonate-Vitamin D (OYSTER SHELL CALCIUM/D) 500-200 MG-UNIT TABS Take 1 tablet by mouth 2 times daily 1/13/21 7/7/22 Yes Chad Lombardo MD   vitamin D3 (CHOLECALCIFEROL) 25 MCG (1000 UT) TABS tablet Take 0.5 tablets by mouth daily  Patient taking differently: Take 1,000 Units by mouth daily  1/13/21  Yes Teto Tsai MD   aspirin 81 MG EC tablet Take 81 mg by mouth daily   Yes Historical Provider, MD   vitamin B-12 (CYANOCOBALAMIN) 1000 MCG tablet Take 1,000 mcg by mouth daily   Yes Historical Provider, MD   folic acid (FOLVITE) 1 MG tablet Take 3 mg by mouth 2 times daily    Yes Historical Provider, MD       Future Appointments   Date Time Provider Zach Elisa   3/4/2022  8:40 AM DO GENESIS Khan PCT [address]   3/31/2022 10:00 AM Ellis Sparks MD N [facility] [address]   10/5/2022 10:15 AM Kimberly Fernandez

## 2021-12-15 ENCOUNTER — CARE COORDINATION (OUTPATIENT)
Dept: CARE COORDINATION | Age: 76
End: 2021-12-15

## 2021-12-15 NOTE — CARE COORDINATION
I spoke with Melody Alston and she stated she dropped her info off to the office to fax to me, I haven't gotten it yet but will keep an eye out.         [address] [facility]   Medication Assistance  [address] [facility], and [email or web address]    (R) [phone] (P) [phone]

## 2021-12-20 ENCOUNTER — CARE COORDINATION (OUTPATIENT)
Dept: CARE COORDINATION | Age: 76
End: 2021-12-20

## 2021-12-20 NOTE — CARE COORDINATION
Patient applications for the PAP programs for 2022 were completed and faxed into the manufacturers for review. Once we get notice of an approval or denial I will be in touch with the patient.            [address] [facility]   Medication Assistance  [address] [place] [address], and [employer]    (A) [phone] (R) [phone]

## 2021-12-28 ENCOUNTER — CARE COORDINATION (OUTPATIENT)
Dept: CARE COORDINATION | Age: 76
End: 2021-12-28

## 2021-12-28 NOTE — CARE COORDINATION
I called to check on the status of her application through [facility] for 2022, still pending but should be done soon.         [address] [facility]   Medication Assistance  [address] [facility], and [employer]    R) [phone] (Y) [phone]

## 2022-01-04 ENCOUNTER — CARE COORDINATION (OUTPATIENT)
Dept: CARE COORDINATION | Age: 77
End: 2022-01-04

## 2022-01-04 NOTE — CARE COORDINATION
I called to check the status of her application through [facility], she was approved for all of 2022. I called to let Joao Dunlap know and gave her the phone number to help her reorder.         [address] [facility]   Medication Assistance  [address] [facility], and [employer]    (C) [phone]  (M) [phone]

## 2022-01-11 ENCOUNTER — CARE COORDINATION (OUTPATIENT)
Dept: CARE COORDINATION | Age: 77
End: 2022-01-11

## 2022-01-11 NOTE — CARE COORDINATION
Ambulatory Care Coordination Note  1/11/2022  CM Risk Score: 5  Charlson 10 Year Mortality Risk Score: 98%     ACC: Stephie Jacob, RN    Summary Note: Spoke with Juice Leon for continued NURA follow up. She continues to have barriers with the healing of her arm. States she is following up with [facility] and they are not able to do a bone scan until end of Feb to see if it's the rods that are moving and causing the pain. Not taking Norco for pain and Juice Leon has a call into [facility] yesterday to explain to them that Tylenol is not working to see if there is something in between the [address] and Tylenol to help manage her pain. Discussed blood sugars which are running around 200. States they are doing to [place] in 2 weeks and she is going to increase her activity level while there since it's warmer. Encouraged her to  Call me with any additional support needed from PCP office. Plan  -reinforce education completed  -collaborate with Dania Salmon as needed for medication assistance which has been approved from [facility] for 2022  -ensure pain is being managed  -collaborate with [facility] as needed  -work towards graduation  Diabetes Assessment    Medic Alert ID: No  Meal Planning: Avoidance of concentrated sweets   How often do you test your blood sugar?: Meals   Do you have barriers with adherence to non-pharmacologic self-management interventions? (Nutrition/Exercise/Self-Monitoring): No   Have you ever had to go to the ED for symptoms of low blood sugar?: No       No patient-reported symptoms       and   General Assessment    Do you have any symptoms that are causing concern?: Yes  Progression since Onset: Unchanged  Reported Symptoms: Pain, Other (Comment: limited range of motion in shoulder)               Care Coordination Interventions    Program Enrollment: Rising Risk  Referral from Primary Care Provider: Yes  Suggested Interventions and Community Resources  Diabetes Education:  In Process  Home Health Services: Not Started  Meals on Wheels: Declined  Medication Assistance Program: In Process  Occupational Therapy: Not Started  Physical Therapy: Not Started  Registered Dietician: Cortney Dinh Work: Not Started  Transportation Support: Declined  Zone Management Tools: In Process         Goals Addressed                    This Visit's Progress      Conditions and Symptoms (pt-stated)   On track      I will schedule office visits, as directed by my provider. I will keep my appointment or reschedule if I have to cancel. I will notify my provider of any barriers to my plan of care. I will follow my Zone Management tool to seek urgent or emergent care. I will notify my provider of any symptoms that indicate a worsening of my condition. DM  Barriers: need for additional support and education  Plan for overcoming my barriers: family and ACM support  Confidence: 10/10  Anticipated Goal Completion Date: 11/13/21 Update 12/22/21 Update 2/11/22          Reduce Falls  (pt-stated)   On track      I will reduce my risk of falls by the following:  be proactive with fall interventions and prevention  DM/ hx of falls  Barriers: need for additional support and education  Plan for overcoming my barriers: family and ACM  Confidence: 9/10  Anticipated Goal Completion Date: 11/13/21 Update 12/22/21 Update 2/11/22        Self Monitoring (pt-stated)   On track      Self-Monitored Blood Glucose - I will notify my provider if I have any blood sugar readings less than 70 more than 2 times a month. DM  None Recently Recorded    Barriers: need for additional support and education  Plan for overcoming my barriers: family and ACM support  Confidence: 9/10  Anticipated Goal Completion Date: 11/13/21 Update 12/22/21 Update 2/11/22              Prior to Admission medications    Medication Sig Start Date End Date Taking?  Authorizing Provider   metFORMIN (GLUCOPHAGE-XR) 500 MG extended release tablet Take 2 tablets by mouth 2 times daily 12/13/21  Yes Consuelo Diane DO   levothyroxine (SYNTHROID) 112 MCG tablet Take 1 tablet by mouth Daily 12/13/21  Yes Consuelo Diane DO   hydrALAZINE (APRESOLINE) 50 MG tablet Take 1 tablet by mouth 2 times daily 11/8/21  Yes Laquita Hi MD   losartan (COZAAR) 100 MG tablet Take 1 tablet by mouth daily 11/8/21  Yes Laquita Hi MD   ELIQUIS 5 MG TABS tablet TAKE 1 TABLET BY MOUTH TWICE A DAY 11/3/21  Yes Laquita Hi MD   sulfamethoxazole-trimethoprim (BACTRIM DS;SEPTRA DS) 800-160 MG per tablet  9/2/21  Yes Historical Provider, MD   Multiple Vitamins-Minerals (EYE VITAMINS & MINERALS PO) Take by mouth daily   Yes Historical Provider, MD   metoprolol tartrate (LOPRESSOR) 50 MG tablet Take 1 tablet by mouth 2 times daily 8/5/21  Yes Consuelo Diane DO   simvastatin (ZOCOR) 40 MG tablet TAKE 1 TABLET NIGHTLY 7/8/21  Yes Consuelo Diane DO   alendronate (FOSAMAX) 70 MG tablet Take 1 tablet by mouth every 7 days 7/8/21  Yes Consuelo Diane DO   insulin glargine (BASAGLAR KWIKPEN) 100 UNIT/ML injection pen Inject 15 Units into the skin every morning (before breakfast) 4/16/21  Yes Consuelo Diane DO   glimepiride (AMARYL) 4 MG tablet Take 1 tablet by mouth 2 times daily 3/5/21  Yes Consuelo Diane DO   Calcium Carbonate-Vitamin D (OYSTER SHELL CALCIUM/D) 500-200 MG-UNIT TABS Take 1 tablet by mouth 2 times daily 1/13/21 7/7/22 Yes Meño Rajput MD   vitamin D3 (CHOLECALCIFEROL) 25 MCG (1000 UT) TABS tablet Take 0.5 tablets by mouth daily  Patient taking differently: Take 1,000 Units by mouth daily  1/13/21  Yes Meño Rajput MD   aspirin 81 MG EC tablet Take 81 mg by mouth daily   Yes Historical Provider, MD   vitamin B-12 (CYANOCOBALAMIN) 1000 MCG tablet Take 1,000 mcg by mouth daily   Yes Historical Provider, MD   folic acid (FOLVITE) 1 MG tablet Take 3 mg by mouth 2 times daily    Yes Historical Provider, MD   HYDROcodone-acetaminophen (NORCO) 5-325 MG per tablet Take 1 tablet by mouth every 6 hours as needed for Pain.   Patient not taking: Reported on 1/11/2022    Historical Provider, MD       Future Appointments   Date Time Provider Zach Elisa   3/4/2022  8:40 AM DO GENESIS [facility] [place]   3/31/2022 10:00 AM Christy [facility]idd [facility] [place]   10/5/2022 10:15 AM Kimberly Garcia 47

## 2022-02-01 ENCOUNTER — CARE COORDINATION (OUTPATIENT)
Dept: CARE COORDINATION | Age: 77
End: 2022-02-01

## 2022-02-01 NOTE — CARE COORDINATION
ACM did not complete follow up call as Pastora Dalton is currently in [place]. Will complete follow up upon return home and work towards graduation if no new barriers present.

## 2022-02-15 ENCOUNTER — CARE COORDINATION (OUTPATIENT)
Dept: CARE COORDINATION | Age: 77
End: 2022-02-15

## 2022-02-15 NOTE — CARE COORDINATION
Dr. Masha Wu, I have been working with [facility] on Care Coordination via [address] [place] [address] to provide support and education to help manage chronic conditions. She has met those goals and all education has been completed with no new barriers noted. I would like to graduate pt from Care Coordination at this time pending PCP approval.  Do you approve of this discharge? Please advise. Thank you.

## 2022-02-15 NOTE — CARE COORDINATION
Ambulatory Care Coordination Note  2/15/2022  CM Risk Score: 5  Charlson 10 Year Mortality Risk Score: 98%     ACC: Sridevi Perry RN    Summary Note: Spoke with Betty Ruth. She was in [facility] during call. State she is doing well overall. I informed her that I would be discharging her from [address] and follow up calls from [address] [place] [address].  Encouraged her to keep my contact information and to call me with any new barriers, questions or concerns. Care Coordination Interventions    Program Enrollment: Rising Risk  Referral from Primary Care Provider: Yes  Suggested Interventions and Community Resources  Diabetes Education: In Process  Home Health Services: Not Started  Meals on Wheels: Declined  Medication Assistance Program: In Process  Occupational Therapy: Not Started  Physical Therapy: Not Started  Registered Dietician: Cortney Dinh Work: Not Started  Transportation Support: Declined  Zone Management Tools: In Process         Goals Addressed                    This Visit's Progress      COMPLETED: Conditions and Symptoms (pt-stated)         I will schedule office visits, as directed by my provider. I will keep my appointment or reschedule if I have to cancel. I will notify my provider of any barriers to my plan of care. I will follow my Zone Management tool to seek urgent or emergent care. I will notify my provider of any symptoms that indicate a worsening of my condition.   DM  Barriers: need for additional support and education  Plan for overcoming my barriers: family and ACM support  Confidence: 10/10  Anticipated Goal Completion Date: 11/13/21 Update 12/22/21 Update 2/11/22          COMPLETED: Reduce Falls  (pt-stated)         I will reduce my risk of falls by the following:  be proactive with fall interventions and prevention  DM/ hx of falls  Barriers: need for additional support and education  Plan for overcoming my barriers: family and ACM  Confidence: 9/10  Anticipated Goal DO Christy   glimepiride (AMARYL) 4 MG tablet Take 1 tablet by mouth 2 times daily 3/5/21   Braeden Altamirano DO   Calcium Carbonate-Vitamin D (OYSTER SHELL CALCIUM/D) 500-200 MG-UNIT TABS Take 1 tablet by mouth 2 times daily 1/13/21 7/7/22  Brooke Heller MD   vitamin D3 (CHOLECALCIFEROL) 25 MCG (1000 UT) TABS tablet Take 0.5 tablets by mouth daily  Patient taking differently: Take 1,000 Units by mouth daily  1/13/21   Brooke Heller MD   aspirin 81 MG EC tablet Take 81 mg by mouth daily    Historical Provider, MD   vitamin B-12 (CYANOCOBALAMIN) 1000 MCG tablet Take 1,000 mcg by mouth daily    Historical Provider, MD   folic acid (FOLVITE) 1 MG tablet Take 3 mg by mouth 2 times daily     Historical Provider, MD       Future Appointments   Date Time Provider Zach De La Cruzi   3/4/2022  8:40 AM Braeden [facility] [place] [facility]   3/31/2022 10:00 AM [address], MD N [facility] [place]   10/5/2022 10:15 AM Kimberly Hernandez

## 2022-02-25 RX ORDER — HYDRALAZINE HYDROCHLORIDE 50 MG/1
50 TABLET, FILM COATED ORAL 2 TIMES DAILY
Qty: 180 TABLET | Refills: 0 | Status: SHIPPED | OUTPATIENT
Start: 2022-02-25 | End: 2022-03-31 | Stop reason: SDUPTHER

## 2022-02-25 RX ORDER — LOSARTAN POTASSIUM 100 MG/1
100 TABLET ORAL DAILY
Qty: 90 TABLET | Refills: 0 | Status: SHIPPED | OUTPATIENT
Start: 2022-02-25 | End: 2022-03-31 | Stop reason: SDUPTHER

## 2022-02-25 NOTE — TELEPHONE ENCOUNTER
Satnam Mendez called requesting a refill on the following medications:  Requested Prescriptions     Pending Prescriptions Disp Refills    hydrALAZINE (APRESOLINE) 50 MG tablet 180 tablet 1     Sig: Take 1 tablet by mouth 2 times daily    losartan (COZAAR) 100 MG tablet 90 tablet 1     Sig: Take 1 tablet by mouth daily     Pharmacy verified: [address] [place] [address] Ngoc Min      Date of last visit: 9-  Date of next visit (if applicable): 2/84/4922

## 2022-03-04 ENCOUNTER — HOSPITAL ENCOUNTER (OUTPATIENT)
Dept: WOMENS IMAGING | Age: 77
Discharge: HOME | End: 2022-03-04
Payer: MEDICARE

## 2022-03-04 ENCOUNTER — OFFICE VISIT (OUTPATIENT)
Dept: FAMILY MEDICINE CLINIC | Age: 77
End: 2022-03-04
Payer: MEDICARE

## 2022-03-04 VITALS
SYSTOLIC BLOOD PRESSURE: 128 MMHG | HEIGHT: 65 IN | BODY MASS INDEX: 32.42 KG/M2 | DIASTOLIC BLOOD PRESSURE: 68 MMHG | TEMPERATURE: 98 F | OXYGEN SATURATION: 98 % | WEIGHT: 194.6 LBS | RESPIRATION RATE: 14 BRPM | HEART RATE: 73 BPM

## 2022-03-04 DIAGNOSIS — E11.69 TYPE 2 DIABETES MELLITUS WITH OTHER SPECIFIED COMPLICATION, WITH LONG-TERM CURRENT USE OF INSULIN (HCC): ICD-10-CM

## 2022-03-04 DIAGNOSIS — Z79.4 TYPE 2 DIABETES MELLITUS WITH OTHER SPECIFIED COMPLICATION, WITH LONG-TERM CURRENT USE OF INSULIN (HCC): ICD-10-CM

## 2022-03-04 DIAGNOSIS — I48.20 CHRONIC ATRIAL FIBRILLATION (HCC): ICD-10-CM

## 2022-03-04 DIAGNOSIS — Z00.00 MEDICARE ANNUAL WELLNESS VISIT, SUBSEQUENT: Primary | ICD-10-CM

## 2022-03-04 DIAGNOSIS — Z12.31 VISIT FOR SCREENING MAMMOGRAM: ICD-10-CM

## 2022-03-04 DIAGNOSIS — I10 ESSENTIAL HYPERTENSION: ICD-10-CM

## 2022-03-04 DIAGNOSIS — E21.3 HYPERPARATHYROIDISM, UNSPECIFIED (HCC): ICD-10-CM

## 2022-03-04 LAB — HBA1C MFR BLD: 8 % (ref 4.3–5.7)

## 2022-03-04 PROCEDURE — 3052F HG A1C>EQUAL 8.0%<EQUAL 9.0%: CPT | Performed by: FAMILY MEDICINE

## 2022-03-04 PROCEDURE — 4040F PNEUMOC VAC/ADMIN/RCVD: CPT | Performed by: FAMILY MEDICINE

## 2022-03-04 PROCEDURE — 1123F ACP DISCUSS/DSCN MKR DOCD: CPT | Performed by: FAMILY MEDICINE

## 2022-03-04 PROCEDURE — G8484 FLU IMMUNIZE NO ADMIN: HCPCS | Performed by: FAMILY MEDICINE

## 2022-03-04 PROCEDURE — G0439 PPPS, SUBSEQ VISIT: HCPCS | Performed by: FAMILY MEDICINE

## 2022-03-04 PROCEDURE — 77063 BREAST TOMOSYNTHESIS BI: CPT

## 2022-03-04 PROCEDURE — 3017F COLORECTAL CA SCREEN DOC REV: CPT | Performed by: FAMILY MEDICINE

## 2022-03-04 RX ORDER — GLIMEPIRIDE 4 MG/1
4 TABLET ORAL 2 TIMES DAILY
Qty: 180 TABLET | Refills: 3 | Status: SHIPPED | OUTPATIENT
Start: 2022-03-04

## 2022-03-04 RX ORDER — METOPROLOL TARTRATE 50 MG/1
50 TABLET, FILM COATED ORAL 2 TIMES DAILY
Qty: 180 TABLET | Refills: 3 | Status: SHIPPED | OUTPATIENT
Start: 2022-03-04

## 2022-03-04 ASSESSMENT — LIFESTYLE VARIABLES
HOW MANY STANDARD DRINKS CONTAINING ALCOHOL DO YOU HAVE ON A TYPICAL DAY: 1 OR 2
HOW OFTEN DO YOU HAVE A DRINK CONTAINING ALCOHOL: NEVER

## 2022-03-04 NOTE — PATIENT INSTRUCTIONS
Personalized Preventive Plan for Anshu Hall - 3/4/2022  Medicare offers a range of preventive health benefits. Some of the tests and screenings are paid in full while other may be subject to a deductible, co-insurance, and/or copay. Some of these benefits include a comprehensive review of your medical history including lifestyle, illnesses that may run in your family, and various assessments and screenings as appropriate. After reviewing your medical record and screening and assessments performed today your provider may have ordered immunizations, labs, imaging, and/or referrals for you. A list of these orders (if applicable) as well as your Preventive Care list are included within your After Visit Summary for your review. Other Preventive Recommendations:    · A preventive eye exam performed by an eye specialist is recommended every 1-2 years to screen for glaucoma; cataracts, macular degeneration, and other eye disorders. · A preventive dental visit is recommended every 6 months. · Try to get at least 150 minutes of exercise per week or 10,000 steps per day on a pedometer . · Order or download the FREE \"Exercise & Physical Activity: Your Everyday Guide\" from [employer]. Call 3-270.162.2382 or search [employer] online. · You need 5284-8040 mg of calcium and 9659-2657 IU of vitamin D per day. It is possible to meet your calcium requirement with diet alone, but a vitamin D supplement is usually necessary to meet this goal.  · When exposed to the sun, use a sunscreen that protects against both UVA and UVB radiation with an SPF of 30 or greater. Reapply every 2 to 3 hours or after sweating, drying off with a towel, or swimming. · Always wear a seat belt when traveling in a car. Always wear a helmet when riding a bicycle or motorcycle.

## 2022-03-04 NOTE — PROGRESS NOTES
Jaja Pickett is a 78 y.o. female that presents for     Chief Complaint   Patient presents with    Medicare AWV    Diabetes       /68   Pulse 73   Temp 98 °F (36.7 °C) (Oral)   Resp 14   Ht 5' 5\" (1.651 m)   Wt 194 lb 9.6 oz (88.3 kg)   SpO2 98%   BMI 32.38 kg/m²       HPI:      HTN    Does patient check BP regularly at home? - Not recently  Current Medication regimen - metoprolol, losartan, hydralazine  Tolerating medications well? - yes    Shortness of breath or chest pain? No  Headache or visual complaints? No  Neurologic changes like confusion? No  Extremity edema? No    BP Readings from Last 3 Encounters:   03/04/22 128/68   10/13/21 128/80   09/29/21 (!) 196/75         Diabetes Type 2    Glucose control:   Does patient check blood glucoses at home? Yes - FBG a little higher, relates this more to diet. Report of hypoglycemia: no  Lab Results   Component Value Date    LABA1C 8.0 (H) 03/04/2022     No results found for: EAG    Symptoms  Polyuria, Polydipsia or Polyphagia? No  Chest Pain, SOB, or Palpitations? -  No  New Vision complaints? No  Paresthesias of the extremities? No    Medications  Current medication were reviewed. Compliant with medications? yes  Medication side effects? No  On ACE-I or ARB? Yes  On antiplatelet therapy? Yes  On Statin? Yes      Exercise  Exercise? Trying to increase, walking regularly, was doing well  Wt Readings from Last 3 Encounters:   03/04/22 194 lb 9.6 oz (88.3 kg)   10/13/21 201 lb 6.4 oz (91.4 kg)   09/29/21 199 lb (90.3 kg)       Diet discipline?:  Low salt, fat, sugar diet?   Has been trying to get back on the [facility]    Blood pressure control:  BP Readings from Last 3 Encounters:   03/04/22 128/68   10/13/21 128/80   09/29/21 (!) 196/75       No results found for: Danika Villegas    Lab Results   Component Value Date    [address] 07/29/2021         Health Maintenance   Topic Date Due    Hepatitis C screen  Never done    Diabetic foot exam Never done    Shingles Vaccine (1 of 2) Never done    DTaP/Tdap/Td vaccine (1 - Tdap) 08/22/2002    Diabetic retinal exam  04/17/2020    TSH testing  05/19/2021    Annual Wellness Visit (AWV)  02/02/2022    Lipid screen  07/29/2022    Depression Screen  08/13/2022    Potassium monitoring  09/23/2022    Creatinine monitoring  09/23/2022    A1C test (Diabetic or Prediabetic)  03/04/2023    Colorectal Cancer Screen  06/25/2031    DEXA (modify frequency per FRAX score)  Completed    Flu vaccine  Completed    Pneumococcal 65+ years Vaccine  Completed    COVID-19 Vaccine  Completed    Hepatitis A vaccine  Aged Out    Hib vaccine  Aged Out    Meningococcal (ACWY) vaccine  Aged Out       Past Medical History:   Diagnosis Date    Arthritis     Atrial fibrillation ([address]     Cancer ([address] 2000    NON HODGKINS LYMPHOMA    Diabetes mellitus ([address]     Macon filter in place     Homozygous for MTHFR gene mutation     Hx of blood clots 2000    LEGS    Hyperlipidemia     Hypertension     TRACY on CPAP     Prolonged emergence from general anesthesia     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    Thyroid disease        Past Surgical History:   Procedure Laterality Date    APPENDECTOMY      CARDIAC CATHETERIZATION      CARDIAC SURGERY  02/2021    ablation     CHOLECYSTECTOMY      FRACTURE SURGERY      ankle surgery    FRACTURE SURGERY Right 09/2018    HUMERUS    HERNIA REPAIR      HYSTERECTOMY      PARATHYROID GLAND SURGERY N/A 10/1/2020    PARATHYROIDECTOMY, EXPLORATION OF PARATHYROIDS performed by Abigail Dean MD at [facility] OFFICE/OUTPT [facility] Right 9/19/2018    ORIF RIGHT PROXIMAL HUMERUS FX performed by Michelle Cerda MD at [address] Right 6/27/2019    REVISION RT HUMERUS FX, HARDWARE REMOVAL, HUMERAL NAIL INSERTION performed by Miguel Carreon MD at [address] [place] [address] Right 8/26/2021    HARDWARE REMOVAL RIGHT HUMERUS, CONVERSION TO RIGHT REVERSE TOTAL SHOULDER REPLACEMENT performed by Lj Bocanegra MD at [address] [facility] [place]         Social History     Tobacco Use    Smoking status: Never Smoker    Smokeless tobacco: Never Used   Vaping Use    Vaping Use: Never used   Substance Use Topics    Alcohol use: No    Drug use: No       Family History   Problem Relation Age of Onset    Heart Disease Mother     Heart Disease Father     Cancer Sister     Other Sister          I have reviewed the patient's past medical history, past surgical history, allergies, medications, social and family history and I have made updates where appropriate. Review of Systems        PHYSICAL EXAM:  /68   Pulse 73   Temp 98 °F (36.7 °C) (Oral)   Resp 14   Ht 5' 5\" (1.651 m)   Wt 194 lb 9.6 oz (88.3 kg)   SpO2 98%   BMI 32.38 kg/m²     General Appearance: well developed and well- nourished, in no acute distress  Head: normocephalic and atraumatic  ENT: external ear and ear canal normal bilaterally, nose without deformity, nasal mucosa and turbinates normal without polyps, oropharynx normal, dentition is normal for age, no lipor gum lesions noted  Neck: supple and non-tender without mass, no thyromegaly or thyroid nodules, no cervical lymphadenopathy  Pulmonary/Chest: clear to auscultation bilaterally- no wheezes, rales or rhonchi, normal air movement, no respiratory distress or retractions  Cardiovascular: normal rate, regular rhythm, normal S1 and S2, no murmurs, rubs, clicks, or gallops, distal pulses intact  Extremities: no cyanosis, clubbing or edema of the lower extremities  Psych:  Normal affect without evidence of depression or anxiety, insight and judgement are appropriate, memory appears intact  Skin: warm and dry      ASSESSMENT & PLAN  Tre Serrano was seen today for medicare awv and diabetes.     Diagnoses and all orders for this visit:    Medicare annual wellness visit, subsequent    Essential

## 2022-03-04 NOTE — PROGRESS NOTES
Medicare Annual Wellness Visit    Michael Cardozo is here for Medicare AWV and Diabetes    Shanta was seen today for medicare awv and diabetes. Diagnoses and all orders for this visit:    Medicare annual wellness visit, subsequent    Essential hypertension    Chronic atrial fibrillation ([address]    Other orders  -     POCT glycosylated hemoglobin (Hb A1C)         Recommendations for Preventive Services Due: see orders and patient instructions/AVS.  Recommended screening schedule for the next 5-10 years is provided to the patient in written form: see Patient Instructions/AVS.     No follow-ups on file. Subjective     Patient's complete Health Risk Assessment and screening values have been reviewed and are found in Flowsheets. The following problems were reviewed today and where indicated follow up appointments were made and/or referrals ordered.     Positive Risk Factor Screenings with Interventions:    Fall Risk:  Do you feel unsteady or are you worried about falling? : (!) yes  2 or more falls in past year?: no  Fall with injury in past year?: no     Fall Risk Interventions:    · Home safety tips provided             Health Habits/Nutrition:     Physical Activity: Insufficiently Active    Days of Exercise per Week: 3 days    Minutes of Exercise per Session: 30 min     Have you lost any weight without trying in the past 3 months?: No  Body mass index: (!) 32.38  Have you seen the dentist within the past year?: (!) No    Health Habits/Nutrition Interventions:  · Dental exam overdue:  patient encouraged to make appointment with his/her dentist      ADLs:  In the past 7 days, did you need help from others to take care of any of the following: Laundry, housekeeping, banking/finances, shopping, telephone use, food preparation, transportation, or taking medications?: (!) Yes  Select all that apply: (!) Food Preparation,Transportation    ADL Interventions:  · Patient declines any further evaluation/treatment for this issue          Objective   Vitals:    03/04/22 0846   BP: 128/68   Pulse: 73   Resp: 14   Temp: 98 °F (36.7 °C)   TempSrc: Oral   SpO2: 98%   Weight: 194 lb 9.6 oz (88.3 kg)   Height: 5' 5\" (1.651 m)      Body mass index is 32.38 kg/m². No Known Allergies  Prior to Visit Medications    Medication Sig Taking? Authorizing Provider   hydrALAZINE (APRESOLINE) 50 MG tablet Take 1 tablet by mouth 2 times daily  Crystal Guillen MD   losartan (COZAAR) 100 MG tablet Take 1 tablet by mouth daily  Crystal Guillen MD   metFORMIN (GLUCOPHAGE-XR) 500 MG extended release tablet Take 2 tablets by mouth 2 times daily  Kasey Rashid DO   levothyroxine (SYNTHROID) 112 MCG tablet Take 1 tablet by mouth Daily  Santiago Harrison DO   ELIQUIS 5 MG TABS tablet TAKE 1 TABLET BY MOUTH TWICE A DAY  Christy Arias MD   sulfamethoxazole-trimethoprim (BACTRIM DS;SEPTRA DS) 800-160 MG per tablet   Historical Provider, MD   HYDROcodone-acetaminophen (NORCO) 5-325 MG per tablet Take 1 tablet by mouth every 6 hours as needed for Pain.    Patient not taking: Reported on 1/11/2022  Historical Provider, MD   Multiple Vitamins-Minerals (EYE VITAMINS & MINERALS PO) Take by mouth daily  Historical Provider, MD   metoprolol tartrate (LOPRESSOR) 50 MG tablet Take 1 tablet by mouth 2 times daily  Santiago Harrison DO   simvastatin (ZOCOR) 40 MG tablet TAKE 1 TABLET NIGHTLY  Santiago Harrison DO   alendronate (FOSAMAX) 70 MG tablet Take 1 tablet by mouth every 7 days  Kasey Rashid DO   insulin glargine (BASAGLAR KWIKPEN) 100 UNIT/ML injection pen Inject 15 Units into the skin every morning (before breakfast)  Santiago Harrison DO   glimepiride (AMARYL) 4 MG tablet Take 1 tablet by mouth 2 times daily  Kasey Rashid DO   Calcium Carbonate-Vitamin D (OYSTER SHELL CALCIUM/D) 500-200 MG-UNIT TABS Take 1 tablet by mouth 2 times daily  Linda Green MD   vitamin D3 (CHOLECALCIFEROL) 25 MCG (1000 UT) TABS tablet Take 0.5 tablets by mouth daily  Patient taking differently: Take 1,000 Units by mouth daily   Joan Huber MD   aspirin 81 MG EC tablet Take 81 mg by mouth daily  Historical Provider, MD   vitamin B-12 (CYANOCOBALAMIN) 1000 MCG tablet Take 1,000 mcg by mouth daily  Historical Provider, MD   folic acid (FOLVITE) 1 MG tablet Take 3 mg by mouth 2 times daily   Historical Provider, MD Posey (Including outside providers/suppliers regularly involved in providing care):   Patient Care Team:  Marry Fritz DO as PCP - General (Family Medicine)  Marry Fritz DO [facility] PCP [facility] Empaneled Provider    Reviewed and updated this visit:  Tobacco  Allergies  Meds  Med Hx  Surg Hx  Soc Hx  Fam Hx

## 2022-03-10 ENCOUNTER — HOSPITAL ENCOUNTER (OUTPATIENT)
Dept: WOMENS IMAGING | Age: 77
Discharge: HOME | End: 2022-03-10
Payer: MEDICARE

## 2022-03-10 DIAGNOSIS — R92.2 BREAST DENSITY: ICD-10-CM

## 2022-03-10 PROCEDURE — G0279 TOMOSYNTHESIS, MAMMO: HCPCS

## 2022-03-28 DIAGNOSIS — E03.8 OTHER SPECIFIED HYPOTHYROIDISM: ICD-10-CM

## 2022-03-28 DIAGNOSIS — Z79.4 TYPE 2 DIABETES MELLITUS WITH OTHER SPECIFIED COMPLICATION, WITH LONG-TERM CURRENT USE OF INSULIN (HCC): ICD-10-CM

## 2022-03-28 DIAGNOSIS — E11.69 TYPE 2 DIABETES MELLITUS WITH OTHER SPECIFIED COMPLICATION, WITH LONG-TERM CURRENT USE OF INSULIN (HCC): ICD-10-CM

## 2022-03-28 DIAGNOSIS — E11.65 TYPE 2 DIABETES MELLITUS WITH HYPERGLYCEMIA, WITHOUT LONG-TERM CURRENT USE OF INSULIN (HCC): ICD-10-CM

## 2022-03-28 PROCEDURE — 3052F HG A1C>EQUAL 8.0%<EQUAL 9.0%: CPT | Performed by: FAMILY MEDICINE

## 2022-03-28 RX ORDER — SIMVASTATIN 40 MG
TABLET ORAL
Qty: 90 TABLET | Refills: 3 | Status: SHIPPED | OUTPATIENT
Start: 2022-03-28

## 2022-03-28 RX ORDER — LEVOTHYROXINE SODIUM 112 UG/1
112 TABLET ORAL DAILY
Qty: 90 TABLET | Refills: 3 | Status: SHIPPED | OUTPATIENT
Start: 2022-03-28

## 2022-03-28 RX ORDER — METFORMIN HYDROCHLORIDE 500 MG/1
1000 TABLET, EXTENDED RELEASE ORAL 2 TIMES DAILY
Qty: 360 TABLET | Refills: 3 | Status: SHIPPED | OUTPATIENT
Start: 2022-03-28

## 2022-03-28 RX ORDER — ALENDRONATE SODIUM 70 MG/1
70 TABLET ORAL
Qty: 12 TABLET | Refills: 3 | Status: SHIPPED | OUTPATIENT
Start: 2022-03-28

## 2022-03-31 ENCOUNTER — OFFICE VISIT (OUTPATIENT)
Dept: CARDIOLOGY CLINIC | Age: 77
End: 2022-03-31
Payer: MEDICARE

## 2022-03-31 VITALS
HEIGHT: 65 IN | SYSTOLIC BLOOD PRESSURE: 182 MMHG | WEIGHT: 192 LBS | HEART RATE: 80 BPM | DIASTOLIC BLOOD PRESSURE: 94 MMHG | BODY MASS INDEX: 31.99 KG/M2

## 2022-03-31 DIAGNOSIS — I10 PRIMARY HYPERTENSION: ICD-10-CM

## 2022-03-31 DIAGNOSIS — I48.20 CHRONIC ATRIAL FIBRILLATION (HCC): Primary | ICD-10-CM

## 2022-03-31 PROCEDURE — 99213 OFFICE O/P EST LOW 20 MIN: CPT | Performed by: NUCLEAR MEDICINE

## 2022-03-31 PROCEDURE — G8417 CALC BMI ABV UP PARAM F/U: HCPCS | Performed by: NUCLEAR MEDICINE

## 2022-03-31 PROCEDURE — 1123F ACP DISCUSS/DSCN MKR DOCD: CPT | Performed by: NUCLEAR MEDICINE

## 2022-03-31 PROCEDURE — G8399 PT W/DXA RESULTS DOCUMENT: HCPCS | Performed by: NUCLEAR MEDICINE

## 2022-03-31 PROCEDURE — G8427 DOCREV CUR MEDS BY ELIG CLIN: HCPCS | Performed by: NUCLEAR MEDICINE

## 2022-03-31 PROCEDURE — 4040F PNEUMOC VAC/ADMIN/RCVD: CPT | Performed by: NUCLEAR MEDICINE

## 2022-03-31 PROCEDURE — 93000 ELECTROCARDIOGRAM COMPLETE: CPT | Performed by: NUCLEAR MEDICINE

## 2022-03-31 PROCEDURE — 1090F PRES/ABSN URINE INCON ASSESS: CPT | Performed by: NUCLEAR MEDICINE

## 2022-03-31 PROCEDURE — G8484 FLU IMMUNIZE NO ADMIN: HCPCS | Performed by: NUCLEAR MEDICINE

## 2022-03-31 PROCEDURE — 3017F COLORECTAL CA SCREEN DOC REV: CPT | Performed by: NUCLEAR MEDICINE

## 2022-03-31 PROCEDURE — 1036F TOBACCO NON-USER: CPT | Performed by: NUCLEAR MEDICINE

## 2022-03-31 RX ORDER — HYDRALAZINE HYDROCHLORIDE 50 MG/1
50 TABLET, FILM COATED ORAL 2 TIMES DAILY
Qty: 180 TABLET | Refills: 3 | Status: SHIPPED | OUTPATIENT
Start: 2022-03-31 | End: 2022-05-18 | Stop reason: SDUPTHER

## 2022-03-31 RX ORDER — LOSARTAN POTASSIUM 100 MG/1
100 TABLET ORAL DAILY
Qty: 90 TABLET | Refills: 3 | Status: SHIPPED | OUTPATIENT
Start: 2022-03-31 | End: 2022-05-18 | Stop reason: SDUPTHER

## 2022-03-31 NOTE — PROGRESS NOTES
[address] [facility]  [address]  [place] [address]  Dept: [phone]  Dept Fax: [phone]  Loc: [phone]    Visit Date: 3/31/2022    Denis Winston is a 78 y.o. female who presents todayfor:  Chief Complaint   Patient presents with    Check-Up     EKG done today    Atrial Fibrillation    Hypertension   known A fib ablation last year   Did fair   Does have some palpitation   Some bradycardia at times  Off of anti arrhythmia   No chest pain   Some PACs at times  No dizziness  No syncope  No changes clinically   Higher BP today         HPI:  HPI  Past Medical History:   Diagnosis Date    Arthritis     Atrial fibrillation ([address]     Cancer ([address] 2000    NON HODGKINS LYMPHOMA    Diabetes mellitus ([address]     [place] filter in place     Homozygous for MTHFR gene mutation     Hx of blood clots 2000    LEGS    Hyperlipidemia     Hypertension     TRACY on CPAP     Prolonged emergence from general anesthesia     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    Thyroid disease       Past Surgical History:   Procedure Laterality Date    APPENDECTOMY      CARDIAC CATHETERIZATION      CARDIAC SURGERY  02/2021    ablation     CHOLECYSTECTOMY      FRACTURE SURGERY      ankle surgery    FRACTURE SURGERY Right 09/2018    HUMERUS    HERNIA REPAIR      HYSTERECTOMY      PARATHYROID GLAND SURGERY N/A 10/1/2020    PARATHYROIDECTOMY, EXPLORATION OF PARATHYROIDS performed by Prema Augustin MD at [facility] OFFICE/OUTPT VISIT,PROCEDURE ONLY Right 9/19/2018    ORIF RIGHT PROXIMAL HUMERUS FX performed by Cal Olson MD at [address] Right 6/27/2019    REVISION RT HUMERUS FX, HARDWARE REMOVAL, HUMERAL NAIL INSERTION performed by Ej Kerr MD at [address] Right 8/26/2021    HARDWARE REMOVAL RIGHT HUMERUS, CONVERSION TO RIGHT REVERSE TOTAL SHOULDER REPLACEMENT performed by Renny Patterson MD at [address] [facility] Megan Gutierrez       Family History   Problem Relation Age of Onset    Heart Disease Mother     Heart Disease Father     Cancer Sister     Other Sister      Social History     Tobacco Use    Smoking status: Never Smoker    Smokeless tobacco: Never Used   Substance Use Topics    Alcohol use: No      Current Outpatient Medications   Medication Sig Dispense Refill    metFORMIN (GLUCOPHAGE-XR) 500 MG extended release tablet Take 2 tablets by mouth 2 times daily 360 tablet 3    levothyroxine (SYNTHROID) 112 MCG tablet Take 1 tablet by mouth Daily 90 tablet 3    simvastatin (ZOCOR) 40 MG tablet TAKE 1 TABLET NIGHTLY 90 tablet 3    alendronate (FOSAMAX) 70 MG tablet Take 1 tablet by mouth every 7 days 12 tablet 3    metoprolol tartrate (LOPRESSOR) 50 MG tablet Take 1 tablet by mouth 2 times daily 180 tablet 3    glimepiride (AMARYL) 4 MG tablet Take 1 tablet by mouth 2 times daily 180 tablet 3    hydrALAZINE (APRESOLINE) 50 MG tablet Take 1 tablet by mouth 2 times daily 180 tablet 0    losartan (COZAAR) 100 MG tablet Take 1 tablet by mouth daily 90 tablet 0    ELIQUIS 5 MG TABS tablet TAKE 1 TABLET BY MOUTH TWICE A DAY 60 tablet 5    Multiple Vitamins-Minerals (EYE VITAMINS & MINERALS PO) Take by mouth daily      insulin glargine (BASAGLAR KWIKPEN) 100 UNIT/ML injection pen Inject 15 Units into the skin every morning (before breakfast) 5 pen 5    Calcium Carbonate-Vitamin D (OYSTER SHELL CALCIUM/D) 500-200 MG-UNIT TABS Take 1 tablet by mouth 2 times daily 180 tablet 5    vitamin D3 (CHOLECALCIFEROL) 25 MCG (1000 UT) TABS tablet Take 0.5 tablets by mouth daily (Patient taking differently: Take 1,000 Units by mouth daily ) 90 tablet 5    aspirin 81 MG EC tablet Take 81 mg by mouth daily      vitamin B-12 (CYANOCOBALAMIN) 1000 MCG tablet Take 1,000 mcg by mouth daily      folic acid (FOLVITE) 1 MG tablet Take 1 mg by mouth 3 times daily        No current facility-administered medications for this visit. No Known Allergies  Health Maintenance   Topic Date Due    Hepatitis C screen  Never done    Diabetic foot exam  Never done    Shingles Vaccine (1 of 2) Never done    DTaP/Tdap/Td vaccine (1 - Tdap) 08/22/2002    Diabetic retinal exam  04/17/2020    TSH testing  05/19/2021    Lipid screen  07/29/2022    Potassium monitoring  09/23/2022    Creatinine monitoring  09/23/2022    A1C test (Diabetic or Prediabetic)  03/04/2023    Depression Screen  03/04/2023    Annual Wellness Visit (AWV)  03/05/2023    Colorectal Cancer Screen  06/25/2031    DEXA (modify frequency per FRAX score)  Completed    Flu vaccine  Completed    Pneumococcal 65+ years Vaccine  Completed    COVID-19 Vaccine  Completed    Hepatitis A vaccine  Aged Out    Hib vaccine  Aged Out    Meningococcal (ACWY) vaccine  Aged Out       Subjective:  Review of Systems  General:   No fever, no chills, No fatigue or weight loss  Pulmonary:    No dyspnea, no wheezing  Cardiac:    Denies recent chest pain,   GI:     No nausea or vomiting, no abdominal pain  Neuro:    No dizziness or light headedness,   Musculoskeletal:  No recent active issues  Extremities:   No edema, no obvious claudication       Objective:  Physical Exam  BP (!) 182/94   Pulse 80   Ht 5' 5\" (1.651 m)   Wt 192 lb (87.1 kg)   BMI 31.95 kg/m²   General:   Well developed, well nourished  Lungs:   Clear to auscultation  Heart:    Normal S1 S2, Slight murmur. no rubs, no gallops  Abdomen:   Soft, non tender, no organomegalies, positive bowel sounds  Extremities:   No edema, no cyanosis, good peripheral pulses  Neurological:   Awake, alert, oriented. No obvious focal deficits  Musculoskelatal:  No obvious deformities    Assessment:      Diagnosis Orders   1. Chronic atrial fibrillation (HCC)  EKG 12 Lead   2. Primary hypertension     higher BP   Better usually   ECG in office was done today. I reviewed the ECG.  No acute findings      Plan:  No follow-ups on file. As above  Monitor the BP at home  Adjust meds if needed  Continue risk factor modification and medical management'  ,Thank you for allowing me to participate in the care of your patient. Please don't hesitate to contact me regarding any further issues related to the patient care]    Orders Placed:  Orders Placed This Encounter   Procedures    EKG 12 Lead     Order Specific Question:   Reason for Exam?     Answer: Other       Medications Prescribed:  No orders of the defined types were placed in this encounter. Discussed use, benefit, and side effects of prescribed medications. All patient questions answered. Pt voicedunderstanding. Instructed to continue current medications, diet and exercise. Continue risk factor modification and medical management. Patient agreed with treatment plan. Follow up as directed.     Electronically signedby Grover Cline MD on 3/31/2022 at 10:12 AM

## 2022-04-21 ENCOUNTER — HOSPITAL ENCOUNTER (OUTPATIENT)
Dept: CT IMAGING | Age: 77
Discharge: HOME | End: 2022-04-21
Payer: MEDICARE

## 2022-04-21 DIAGNOSIS — M62.519 MUSCLE WASTING AND ATROPHY, NEC, UNSP SHOULDER: ICD-10-CM

## 2022-04-21 PROCEDURE — 73200 CT UPPER EXTREMITY W/O DYE: CPT

## 2022-04-25 ENCOUNTER — HOSPITAL ENCOUNTER (OUTPATIENT)
Dept: GENERAL RADIOLOGY | Age: 77
Discharge: HOME | End: 2022-04-25

## 2022-04-25 ENCOUNTER — HOSPITAL ENCOUNTER (OUTPATIENT)
Dept: NUCLEAR MEDICINE | Age: 77
Discharge: HOME | End: 2022-04-25
Payer: MEDICARE

## 2022-04-25 DIAGNOSIS — Z00.6 EXAMINATION FOR NORMAL COMPARISON FOR CLINICAL RESEARCH: ICD-10-CM

## 2022-04-25 DIAGNOSIS — Z96.619 UNSTABLE REVERSE TOTAL SHOULDER ARTHROPLASTY, INITIAL ENCOUNTER (HCC): ICD-10-CM

## 2022-04-25 DIAGNOSIS — T84.028A UNSTABLE REVERSE TOTAL SHOULDER ARTHROPLASTY, INITIAL ENCOUNTER (HCC): ICD-10-CM

## 2022-04-25 DIAGNOSIS — M62.519 MUSCLE WASTING AND ATROPHY, NEC, UNSP SHOULDER: ICD-10-CM

## 2022-04-25 PROCEDURE — 78800 RP LOCLZJ TUM 1 AREA 1 D IMG: CPT

## 2022-04-25 PROCEDURE — A9547 IN111 OXYQUINOLINE: HCPCS | Performed by: ORTHOPAEDIC SURGERY

## 2022-04-25 PROCEDURE — A9570 INDIUM IN-111 AUTO WBC: HCPCS | Performed by: ORTHOPAEDIC SURGERY

## 2022-04-25 PROCEDURE — 3430000000 HC RX DIAGNOSTIC RADIOPHARMACEUTICAL: Performed by: ORTHOPAEDIC SURGERY

## 2022-04-25 RX ORDER — INDIUM IN-111 OXYQUINOLINE 1 UG/ML
561 SOLUTION INTRAVENOUS
Status: DISCONTINUED | OUTPATIENT
Start: 2022-04-25 | End: 2022-04-26 | Stop reason: HOSPADM

## 2022-04-25 RX ADMIN — Medication 560 MICRO CURIE: at 10:37

## 2022-04-26 ENCOUNTER — HOSPITAL ENCOUNTER (OUTPATIENT)
Dept: NUCLEAR MEDICINE | Age: 77
Discharge: HOME | End: 2022-04-26
Payer: MEDICARE

## 2022-05-18 RX ORDER — LOSARTAN POTASSIUM 100 MG/1
100 TABLET ORAL DAILY
Qty: 90 TABLET | Refills: 3 | Status: SHIPPED | OUTPATIENT
Start: 2022-05-18

## 2022-05-18 RX ORDER — HYDRALAZINE HYDROCHLORIDE 50 MG/1
50 TABLET, FILM COATED ORAL 2 TIMES DAILY
Qty: 180 TABLET | Refills: 3 | Status: SHIPPED | OUTPATIENT
Start: 2022-05-18

## 2022-07-08 ENCOUNTER — OFFICE VISIT (OUTPATIENT)
Dept: FAMILY MEDICINE CLINIC | Age: 77
End: 2022-07-08
Payer: MEDICARE

## 2022-07-08 VITALS
SYSTOLIC BLOOD PRESSURE: 132 MMHG | TEMPERATURE: 97.4 F | OXYGEN SATURATION: 99 % | BODY MASS INDEX: 31.75 KG/M2 | HEIGHT: 65 IN | WEIGHT: 190.6 LBS | DIASTOLIC BLOOD PRESSURE: 72 MMHG | RESPIRATION RATE: 16 BRPM | HEART RATE: 70 BPM

## 2022-07-08 DIAGNOSIS — I10 ESSENTIAL HYPERTENSION: ICD-10-CM

## 2022-07-08 DIAGNOSIS — L05.91 PILONIDAL CYST WITHOUT ABSCESS: Primary | ICD-10-CM

## 2022-07-08 DIAGNOSIS — E11.69 TYPE 2 DIABETES MELLITUS WITH OTHER SPECIFIED COMPLICATION, WITH LONG-TERM CURRENT USE OF INSULIN (HCC): ICD-10-CM

## 2022-07-08 DIAGNOSIS — Z79.4 TYPE 2 DIABETES MELLITUS WITH OTHER SPECIFIED COMPLICATION, WITH LONG-TERM CURRENT USE OF INSULIN (HCC): ICD-10-CM

## 2022-07-08 LAB — HBA1C MFR BLD: 7.9 % (ref 4.3–5.7)

## 2022-07-08 PROCEDURE — 1123F ACP DISCUSS/DSCN MKR DOCD: CPT | Performed by: FAMILY MEDICINE

## 2022-07-08 PROCEDURE — 3051F HG A1C>EQUAL 7.0%<8.0%: CPT | Performed by: FAMILY MEDICINE

## 2022-07-08 PROCEDURE — G8399 PT W/DXA RESULTS DOCUMENT: HCPCS | Performed by: FAMILY MEDICINE

## 2022-07-08 PROCEDURE — 2022F DILAT RTA XM EVC RTNOPTHY: CPT | Performed by: FAMILY MEDICINE

## 2022-07-08 PROCEDURE — 3017F COLORECTAL CA SCREEN DOC REV: CPT | Performed by: FAMILY MEDICINE

## 2022-07-08 PROCEDURE — 1090F PRES/ABSN URINE INCON ASSESS: CPT | Performed by: FAMILY MEDICINE

## 2022-07-08 PROCEDURE — 99214 OFFICE O/P EST MOD 30 MIN: CPT | Performed by: FAMILY MEDICINE

## 2022-07-08 PROCEDURE — G8427 DOCREV CUR MEDS BY ELIG CLIN: HCPCS | Performed by: FAMILY MEDICINE

## 2022-07-08 PROCEDURE — 1036F TOBACCO NON-USER: CPT | Performed by: FAMILY MEDICINE

## 2022-07-08 PROCEDURE — G8417 CALC BMI ABV UP PARAM F/U: HCPCS | Performed by: FAMILY MEDICINE

## 2022-07-08 RX ORDER — TIZANIDINE 4 MG/1
TABLET ORAL
COMMUNITY
Start: 2022-06-08 | End: 2022-08-05

## 2022-07-08 RX ORDER — GABAPENTIN 300 MG/1
CAPSULE ORAL
COMMUNITY
Start: 2022-06-08 | End: 2022-08-05

## 2022-07-08 NOTE — PROGRESS NOTES
Keon Samson is a 78 y.o. female that presents for     Chief Complaint   Patient presents with    Follow-up       /72   Pulse 70   Temp 97.4 °F (36.3 °C) (Infrared)   Resp 16   Ht 5' 5\" (1.651 m)   Wt 190 lb 9.6 oz (86.5 kg)   SpO2 99%   BMI 31.72 kg/m²       HPI:    Skin Lesion    HPI:    Location - tailbone area  Length of time it has been present - at least 1 year  Recent change in size, color or shape? - No  Pruritic? No  Bleeding or drainage? No  Painful? Yes - mildy      HTN    Does patient check BP regularly at home? - Not recently  Current Medication regimen - metoprolol, losartan, hydralazine  Tolerating medications well? - yes    Shortness of breath or chest pain? No  Headache or visual complaints? No  Neurologic changes like confusion? No  Extremity edema? No    BP Readings from Last 3 Encounters:   07/08/22 132/72   03/31/22 (!) 182/94 03/04/22 128/68         Diabetes Type 2    Glucose control:   Does patient check blood glucoses at home? Yes - FBG have been gradually improving, 123 this AM  Report of hypoglycemia: no  Lab Results   Component Value Date    LABA1C 7.9 (H) 07/08/2022     No results found for: EAG    Symptoms  Polyuria, Polydipsia or Polyphagia? No  Chest Pain, SOB, or Palpitations? -  No  New Vision complaints? No  Paresthesias of the extremities? No    Medications  Current medication were reviewed. Compliant with medications? yes  Medication side effects? No  On ACE-I or ARB? Yes  On antiplatelet therapy? Yes  On Statin? Yes      Exercise  Exercise? Limited due to her shoulder  Wt Readings from Last 3 Encounters:   07/08/22 190 lb 9.6 oz (86.5 kg)   03/31/22 192 lb (87.1 kg)   03/04/22 194 lb 9.6 oz (88.3 kg)       Diet discipline?:  Low salt, fat, sugar diet?   Has been     Blood pressure control:  BP Readings from Last 3 Encounters:   07/08/22 132/72   03/31/22 (!) 182/94 03/04/22 128/68       No results found for: Trenda Ohm Results Component Value Date    LDLCALC 58 07/29/2021         Health Maintenance   Topic Date Due    Diabetic foot exam  Never done    Hepatitis C screen  Never done    Shingles vaccine (1 of 2) Never done    DTaP/Tdap/Td vaccine (1 - Tdap) 08/22/2002    Pneumococcal 65+ years Vaccine (2 - PCV) 06/23/2018    Diabetic retinal exam  04/17/2020    Lipids  07/29/2022    Flu vaccine (1) 09/01/2022    Depression Screen  03/04/2023    Annual Wellness Visit (AWV)  03/05/2023    A1C test (Diabetic or Prediabetic)  07/08/2023    Colorectal Cancer Screen  06/25/2031    DEXA (modify frequency per FRAX score)  Completed    COVID-19 Vaccine  Completed    Hepatitis A vaccine  Aged Out    Hib vaccine  Aged Out    Meningococcal (ACWY) vaccine  Aged Out       Past Medical History:   Diagnosis Date    Arthritis     Atrial fibrillation ([address]     Cancer ([address] 2000    NON HODGKINS LYMPHOMA    Diabetes mellitus ([address]     [place] filter in place     Homozygous for MTHFR gene mutation     Hx of blood clots 2000    LEGS    Hyperlipidemia     Hypertension     TRACY on CPAP     Prolonged emergence from general anesthesia     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    Thyroid disease        Past Surgical History:   Procedure Laterality Date    APPENDECTOMY      CARDIAC CATHETERIZATION      CARDIAC SURGERY  02/2021    ablation     CHOLECYSTECTOMY      FRACTURE SURGERY      ankle surgery    FRACTURE SURGERY Right 09/2018    HUMERUS    HERNIA REPAIR      HYSTERECTOMY (CERVIX STATUS UNKNOWN)      PARATHYROID GLAND SURGERY N/A 10/1/2020    PARATHYROIDECTOMY, EXPLORATION OF PARATHYROIDS performed by Ayan Wilson MD at [facility] OFFICE/OUTPT VISIT,PROCEDURE ONLY Right 9/19/2018    ORIF RIGHT PROXIMAL HUMERUS FX performed by Thao Wisdom MD at [address] Right 6/27/2019    REVISION RT HUMERUS FX, HARDWARE REMOVAL, HUMERAL NAIL INSERTION performed by Carolee Cunha, MD at [address] [facility] Right 8/26/2021    HARDWARE REMOVAL RIGHT HUMERUS, CONVERSION TO RIGHT REVERSE TOTAL SHOULDER REPLACEMENT [facility] Char[facility]gore [facility] [place]         Social History     Tobacco Use    Smoking status: Never Smoker    Smokeless tobacco: Never Used   Vaping Use    Vaping Use: Never used   Substance Use Topics    Alcohol use: No    Drug use: No       Family History   Problem Relation Age of Onset    Heart Disease Mother     Heart Disease Father     Cancer Sister     Other Sister          I have reviewed the patient's past medical history, past surgical history, allergies, medications, social and family history and I have made updates where appropriate. Review of Systems        PHYSICAL EXAM:  /72   Pulse 70   Temp 97.4 °F (36.3 °C) (Infrared)   Resp 16   Ht 5' 5\" (1.651 m)   Wt 190 lb 9.6 oz (86.5 kg)   SpO2 99%   BMI 31.72 kg/m²     General Appearance: well developed and well- nourished, in no acute distress  Head: normocephalic and atraumatic  ENT: external ear and ear canal normal bilaterally, nose without deformity, nasal mucosa and turbinates normal without polyps, oropharynx normal, dentition is normal for age, no lipor gum lesions noted  Neck: supple and non-tender without mass, no thyromegaly or thyroid nodules, no cervical lymphadenopathy  Pulmonary/Chest: clear to auscultation bilaterally- no wheezes, rales or rhonchi, normal air movement, no respiratory distress or retractions  Cardiovascular: normal rate, regular rhythm, normal S1 and S2, no murmurs, rubs, clicks, or gallops, distal pulses intact  Extremities: no cyanosis, clubbing or edema of the lower extremities  Psych:  Normal affect without evidence of depression or anxiety, insight and judgement are appropriate, memory appears intact  Skin: warm and dry      ASSESSMENT & PLAN  [facility] PENELOPE was seen today for follow-up.     Diagnoses and all orders for this visit:    Pilonidal cyst without abscess    Type 2 diabetes mellitus with other specified complication, with long-term current use of insulin (HCC)  -     POCT glycosylated hemoglobin (Hb A1C)    Essential hypertension       -Wants to hold on surgery referral at this time, will let me know if this changes  -DM2 boderline uncontrolled, this appears to be more related to lifestyle, she is working to get back to her routine. Continue current dosing a basaglar and amaryl for now. -Chronic issues stable, continue current medications  -Advised to call if any issues    Return in about 6 months (around 1/8/2023). Controlled Substance Monitoring:    Acute and Chronic Pain Monitoring:   No flowsheet data found. Kristal received counseling on the following healthy behaviors: medication adherence  Reviewed prior labs and health maintenance. Continue current medications, diet and exercise. Discussed use, benefit, and side effects of prescribed medications. Barriers to medication compliance addressed. Patient given educational materials - see patient instructions. All patient questions answered. Patient voiced understanding.

## 2022-07-19 ENCOUNTER — TELEPHONE (OUTPATIENT)
Dept: CARDIOLOGY CLINIC | Age: 77
End: 2022-07-19

## 2022-07-19 NOTE — TELEPHONE ENCOUNTER
Pre op Risk Assessment    Procedure Spinal Stimulator Trial  Physician Dr. Grzegorz Spears  Date of surgery/procedure 8-19-22    Last OV 3-31-22  Last Stress 7-1-20  Last Echo 7-1-20  Last Cath 2-11-21 Ablation  Last Stent None in Epic  Is patient on blood thinners Eliquis/ASA  Hold Meds/how many days ?     Fax: [phone]

## 2022-08-05 ENCOUNTER — OFFICE VISIT (OUTPATIENT)
Dept: FAMILY MEDICINE CLINIC | Age: 77
End: 2022-08-05
Payer: MEDICARE

## 2022-08-05 VITALS
DIASTOLIC BLOOD PRESSURE: 72 MMHG | WEIGHT: 190.8 LBS | OXYGEN SATURATION: 98 % | BODY MASS INDEX: 31.79 KG/M2 | RESPIRATION RATE: 16 BRPM | SYSTOLIC BLOOD PRESSURE: 138 MMHG | HEART RATE: 63 BPM | TEMPERATURE: 98.2 F | HEIGHT: 65 IN

## 2022-08-05 DIAGNOSIS — L05.91 PILONIDAL CYST WITHOUT ABSCESS: ICD-10-CM

## 2022-08-05 DIAGNOSIS — Z01.818 PRE-OP EXAM: Primary | ICD-10-CM

## 2022-08-05 PROCEDURE — 93000 ELECTROCARDIOGRAM COMPLETE: CPT | Performed by: FAMILY MEDICINE

## 2022-08-05 PROCEDURE — 3017F COLORECTAL CA SCREEN DOC REV: CPT | Performed by: FAMILY MEDICINE

## 2022-08-05 PROCEDURE — 99213 OFFICE O/P EST LOW 20 MIN: CPT | Performed by: FAMILY MEDICINE

## 2022-08-05 PROCEDURE — 1090F PRES/ABSN URINE INCON ASSESS: CPT | Performed by: FAMILY MEDICINE

## 2022-08-05 PROCEDURE — G8427 DOCREV CUR MEDS BY ELIG CLIN: HCPCS | Performed by: FAMILY MEDICINE

## 2022-08-05 PROCEDURE — G8399 PT W/DXA RESULTS DOCUMENT: HCPCS | Performed by: FAMILY MEDICINE

## 2022-08-05 PROCEDURE — G8417 CALC BMI ABV UP PARAM F/U: HCPCS | Performed by: FAMILY MEDICINE

## 2022-08-05 PROCEDURE — 1123F ACP DISCUSS/DSCN MKR DOCD: CPT | Performed by: FAMILY MEDICINE

## 2022-08-05 PROCEDURE — 1036F TOBACCO NON-USER: CPT | Performed by: FAMILY MEDICINE

## 2022-08-05 NOTE — PROGRESS NOTES
Isaura Lan is a 78 y.o. female that presents for Follow-up (Pre op clearance)    Addendum 9/13/22:  Patient's medical history has not significantly changed since visit on 8/5/22. She may proceed with planned procedure. At the request of Dr. Ashutosh Hyatt presents for pre-operative evaluation for her surgery. Planned Surgery: Implantation of shoulder nerve stimulator. Hs been cleared by Dr Yesenia Chappell.     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, 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    S/P ablation of atrial fibrillation 02/11/2021    Persistent atrial fibrillation ([address]     Hyperparathyroidism, unspecified ([address] 02/01/2021    Status post parathyroidectomy ([address] 01/13/2021    Pathological fracture of humerus due to osteoporosis 01/13/2021    Other osteoporosis without current pathological fracture 01/13/2021    PAF (paroxysmal atrial fibrillation) ([address]     Hypotension     Hyperlipidemia     S/P parathyroidectomy ([address] 10/01/2020    Atrial fibrillation/flutter 10/01/2020    Elevated parathyroid hormone 08/24/2020    Parathyroid adenoma 08/24/2020    Hypercalcemia 08/24/2020    Osteoporosis with pathological fracture 08/24/2020    TRACY on CPAP 08/27/2019    Obesity (BMI 30-39.9) 08/27/2019    History of humerus fracture 06/27/2019    Hyponatremia 09/18/2018    Anticoagulant long-term use 09/17/2018    Microalbuminuria due to type 2 diabetes mellitus ([address] 05/17/2018    Chronic atrial fibrillation ([address] 05/17/2018    Type 2 diabetes mellitus with insulin therapy ([address] 05/17/2018    Essential hypertension 05/17/2018    Osteopenia of spine 05/17/2018    Hypothyroidism 05/17/2018    History of non-Hodgkin's lymphoma 05/17/2018       PREOPERATIVE FAMILY HISTORY  - She reports that she does not have a history of bad reaction to anesthesia.  -she does not have a family history of bleeding problems such as hemophilia, or [place] disease, or von Willebrand disease. PREOPERATIVE ALLERGIES QUESTION:  No Known Allergies      PREOPERATIVE MEDICATION QUESTION:  Current Outpatient Medications   Medication Sig Dispense Refill    hydrALAZINE (APRESOLINE) 50 MG tablet Take 1 tablet by mouth 2 times daily 180 tablet 3    losartan (COZAAR) 100 MG tablet Take 1 tablet by mouth daily 90 tablet 3    apixaban (ELIQUIS) 5 MG TABS tablet TAKE 1 TABLET BY MOUTH TWICE A  tablet 3    metFORMIN (GLUCOPHAGE-XR) 500 MG extended release tablet Take 2 tablets by mouth 2 times daily 360 tablet 3    levothyroxine (SYNTHROID) 112 MCG tablet Take 1 tablet by mouth Daily 90 tablet 3    simvastatin (ZOCOR) 40 MG tablet TAKE 1 TABLET NIGHTLY 90 tablet 3    alendronate (FOSAMAX) 70 MG tablet Take 1 tablet by mouth every 7 days 12 tablet 3    metoprolol tartrate (LOPRESSOR) 50 MG tablet Take 1 tablet by mouth 2 times daily 180 tablet 3    glimepiride (AMARYL) 4 MG tablet Take 1 tablet by mouth 2 times daily 180 tablet 3    Multiple Vitamins-Minerals (EYE VITAMINS & MINERALS PO) Take by mouth daily      insulin glargine (BASAGLAR KWIKPEN) 100 UNIT/ML injection pen Inject 15 Units into the skin every morning (before breakfast) 5 pen 5    vitamin D3 (CHOLECALCIFEROL) 25 MCG (1000 UT) TABS tablet Take 0.5 tablets by mouth daily (Patient taking differently: Take 1,000 Units by mouth in the morning.) 90 tablet 5    aspirin 81 MG EC tablet Take 81 mg by mouth daily      vitamin B-12 (CYANOCOBALAMIN) 1000 MCG tablet Take 1,000 mcg by mouth daily      folic acid (FOLVITE) 1 MG tablet Take 1 mg by mouth 3 times daily       Calcium Carbonate-Vitamin D (OYSTER SHELL CALCIUM/D) 500-200 MG-UNIT TABS Take 1 tablet by mouth 2 times daily 180 tablet 5     No current facility-administered medications for this visit.          CARDIAC RISK FACTORS  she does not have a history of UA or MI within that past 30 days  she does not have decompensated CHF or significant valvular disease  she does not have a history of significant cardiac arrhythmia  she denies chest pain, exertional dyspnea, orthopnea, palpitations or LE edema. she can walk up 1 flight of stairs or 8 steps without stopping (4 METS)    she does not have a history of CAD  she does not have a history of CHF  she does not have a history of CVA or TIA  she does not have a history of DM requiring insulin  she does not have a history of Renal insufficiency (Cr > 2.0)    EKG reveals sinus rhythm with a rate of 66. There are Q waves and are not ST segment changes. http://circ. ahajournals. org/content/116/17/e418/F2. large.jpg    PREOPERATIVE REVIEW OF SYSTEMS:  she does not have a history of MRSA or VRE. she does not have a history of seizures. she does not have a history of phlebitis or blood clots in arms or legs. she does have a history of sleep apnea. PHYSICAL EXAM:  Blood pressure 138/72, pulse 63, temperature 98.2 °F (36.8 °C), resp. rate 16, height 5' 5\" (1.651 m), weight 190 lb 12.8 oz (86.5 kg), SpO2 98 %, not currently breastfeeding. GEN: No acute distress  HEENT:  NCAT, PERRL, EOMI, Nares clear, turbinates pink, mucosa is moist.  Oropharynx  is clear. Hearing grossly intact. Dentition is normal for age. Neck: No lymphadenopathy or masses. Thyroid not palpable; no nodules or masses. Heart: RRR. S1 and S2 normal, no murmurs, clicks, gallops or rubs. No carotid bruits noted. Lungs:  CTAB,  No wheezing, ronchi, or rales. Normal symmetric air entry throughout both lung fields. Abdomen:  Soft, non tender, non distended. No rebound or guarding. No organomegaly. Extremities:  No gross deformity, erythema or edema of the lower extremities. Skin: No pathologic lesions or significant rash. Psych:  Affect appropriate. Thought process is normal without evidence of depression or psychosis. Good insight and appropriae interaction. Cognition and memory appear to be intact.     Mehdi Hobson was seen today for follow-up. Diagnoses and all orders for this visit:    Pre-op exam  -     EKG 12 Lead - Clinic Performed    Pilonidal cyst without abscess  -     Hannah De Leon MD, General Surgery, [facility]      Return if symptoms worsen or fail to improve. Per 2014 ACC/AHA guidelines, patient may proceed with planned surgery. Patient has a [place]iron Cardiac Risk Index score of 5 indicating <1% chance of perioperative cardiac complications. she will require standard VTE prophylaxis. Http://annals. org/article. aspx?ckxmwbore=485283  http://[email or web address] com/CardiacRisk_G.htm

## 2022-09-13 ENCOUNTER — TELEPHONE (OUTPATIENT)
Dept: FAMILY MEDICINE CLINIC | Age: 77
End: 2022-09-13

## 2022-09-21 ENCOUNTER — OFFICE VISIT (OUTPATIENT)
Dept: SURGERY | Age: 77
End: 2022-09-21
Payer: MEDICARE

## 2022-09-21 VITALS
BODY MASS INDEX: 31.82 KG/M2 | TEMPERATURE: 96.9 F | HEART RATE: 74 BPM | DIASTOLIC BLOOD PRESSURE: 64 MMHG | OXYGEN SATURATION: 98 % | HEIGHT: 65 IN | WEIGHT: 191 LBS | SYSTOLIC BLOOD PRESSURE: 134 MMHG

## 2022-09-21 DIAGNOSIS — L05.91 PILONIDAL CYST: Primary | ICD-10-CM

## 2022-09-21 PROCEDURE — 99203 OFFICE O/P NEW LOW 30 MIN: CPT | Performed by: SURGERY

## 2022-09-21 PROCEDURE — 1036F TOBACCO NON-USER: CPT | Performed by: SURGERY

## 2022-09-21 PROCEDURE — 1090F PRES/ABSN URINE INCON ASSESS: CPT | Performed by: SURGERY

## 2022-09-21 PROCEDURE — G8417 CALC BMI ABV UP PARAM F/U: HCPCS | Performed by: SURGERY

## 2022-09-21 PROCEDURE — 1123F ACP DISCUSS/DSCN MKR DOCD: CPT | Performed by: SURGERY

## 2022-09-21 PROCEDURE — 3017F COLORECTAL CA SCREEN DOC REV: CPT | Performed by: SURGERY

## 2022-09-21 PROCEDURE — G8399 PT W/DXA RESULTS DOCUMENT: HCPCS | Performed by: SURGERY

## 2022-09-21 PROCEDURE — G8427 DOCREV CUR MEDS BY ELIG CLIN: HCPCS | Performed by: SURGERY

## 2022-09-22 ASSESSMENT — ENCOUNTER SYMPTOMS
ALLERGIC/IMMUNOLOGIC NEGATIVE: 1
SORE THROAT: 0
BACK PAIN: 0
EYE PAIN: 0
SINUS PRESSURE: 0
COUGH: 0
STRIDOR: 0
BLOOD IN STOOL: 0
NAUSEA: 0
TROUBLE SWALLOWING: 0
RECTAL PAIN: 0
RHINORRHEA: 0
ABDOMINAL PAIN: 0
VOICE CHANGE: 0
PHOTOPHOBIA: 0
EYE DISCHARGE: 0
SHORTNESS OF BREATH: 0
ABDOMINAL DISTENTION: 0
VOMITING: 0
WHEEZING: 0
CHOKING: 0
DIARRHEA: 0
FACIAL SWELLING: 0
CHEST TIGHTNESS: 0
ANAL BLEEDING: 0
EYE ITCHING: 0
APNEA: 0
CONSTIPATION: 0
COLOR CHANGE: 0
EYE REDNESS: 0

## 2022-09-22 NOTE — PROGRESS NOTES
Cisco Moss (:  1945)     ASSESSMENT:  1. Pilonidal cyst    PLAN:  1. Schedule Kristal for excision pilonidal cyst.  2. She will undergo pre-operative clearance per anesthesia guidelines with risk factors listed under the past medical history diagnosis & problem list.  3. The risks, benefits and alternatives were discussed with Nesha Ring including non-operative management. All questions answered. She understands and wishes to proceed with surgical intervention. 4. Restrictions discussed with Nesha Ring and she expresses understanding. 5. She is advised to call back directly if there are further questions/concerns, or if her symptoms worsen prior to surgery. 6.  No signs of acute disease. No need for I&D or antibiotics at this time. SUBJECTIVE/OBJECTIVE:    Chief Complaint   Patient presents with    Surgical Consult     New patient-referred by Dr Alka Gregorio cyst     HPI  Nesha Ring is a 68-year-old female who has been suffering from a pilonidal cyst since February. She complains of intermittent moderate discomfort. Worse with sitting and when she leans against something. Occasionally will drain. Has not required any formal I&D or debridements of the area of concern. Sometimes bloody discharge. Sometimes foul-smelling. Currently, she states that it is fairly small. No active bleeding or drainage. Minimally tender. Denies any history of significant trauma to the area. Otherwise, no change in bowel function. No hematochezia or melena. No new urinary complaints. Tolerating diet. No nausea or vomiting. No new chest pain or shortness of breath. No fever, chills or sweats. Review of Systems   Constitutional:  Negative for activity change, appetite change, chills, diaphoresis, fatigue, fever and unexpected weight change.    HENT:  Negative for congestion, dental problem, drooling, ear discharge, ear pain, facial swelling, hearing loss, mouth sores, nosebleeds, postnasal drip, rhinorrhea, sinus pressure, sneezing, sore throat, tinnitus, trouble swallowing and voice change. Eyes:  Negative for photophobia, pain, discharge, redness, itching and visual disturbance. Respiratory:  Negative for apnea, cough, choking, chest tightness, shortness of breath, wheezing and stridor. Cardiovascular:  Negative for chest pain, palpitations and leg swelling. Gastrointestinal:  Negative for abdominal distention, abdominal pain, anal bleeding, blood in stool, constipation, diarrhea, nausea, rectal pain and vomiting. Endocrine: Negative. Genitourinary:  Negative for decreased urine volume, difficulty urinating, dyspareunia, dysuria, enuresis, flank pain, frequency, genital sores, hematuria, menstrual problem, pelvic pain, urgency, vaginal bleeding, vaginal discharge and vaginal pain. Musculoskeletal:  Negative for arthralgias, back pain, gait problem, joint swelling, myalgias, neck pain and neck stiffness. Skin:  Positive for wound (pilonidal cyst). Negative for color change, pallor and rash. Allergic/Immunologic: Negative. Neurological:  Negative for dizziness, tremors, seizures, syncope, facial asymmetry, speech difficulty, weakness, light-headedness, numbness and headaches. Hematological:  Negative for adenopathy. Does not bruise/bleed easily. Psychiatric/Behavioral:  Negative for agitation, behavioral problems, confusion, decreased concentration, dysphoric mood, hallucinations, self-injury, sleep disturbance and suicidal ideas. The patient is not nervous/anxious and is not hyperactive.       Past Medical History:   Diagnosis Date    Arthritis     Atrial fibrillation ([address]     Cancer ([facility] 2000    NON HODGKINS LYMPHOMA    Diabetes mellitus ([facility] [address] [facility]     [place] filter in place     Homozygous for MTHFR gene mutation     Hx of blood clots 2000    LEGS    Hyperlipidemia     Hypertension     TRACY on CPAP     Prolonged emergence from general anesthesia     Stroke (cerebrum) ([facility] [address] 3/9/15 affected right side    Thyroid disease        Past Surgical History:   Procedure Laterality Date    APPENDECTOMY      CARDIAC CATHETERIZATION      CARDIAC SURGERY  02/2021    ablation     CHOLECYSTECTOMY      FRACTURE SURGERY      ankle surgery    FRACTURE SURGERY Right 09/2018    HUMERUS    HERNIA REPAIR      HYSTERECTOMY (CERVIX STATUS UNKNOWN)      PARATHYROID GLAND SURGERY N/A 10/1/2020    PARATHYROIDECTOMY, EXPLORATION OF PARATHYROIDS performed by Tra Sánchez MD at [address]  [place] OFFICE/OUTPT VISIT,PROCEDURE ONLY Right 9/19/2018    ORIF RIGHT PROXIMAL HUMERUS FX performed by Edison Mike MD at [address] [facility] Right 6/27/2019    REVISION RT HUMERUS FX, HARDWARE REMOVAL, HUMERAL NAIL INSERTION performed by Adelso Stahl MD at [place] [address]. Right 8/26/2021    HARDWARE REMOVAL RIGHT HUMERUS, CONVERSION TO RIGHT REVERSE TOTAL SHOULDER REPLACEMENT performed by Adelso Stahl MD at [address] [place] [address]         Current Outpatient Medications   Medication Sig Dispense Refill    hydrALAZINE (APRESOLINE) 50 MG tablet Take 1 tablet by mouth 2 times daily 180 tablet 3    losartan (COZAAR) 100 MG tablet Take 1 tablet by mouth daily 90 tablet 3    apixaban (ELIQUIS) 5 MG TABS tablet TAKE 1 TABLET BY MOUTH TWICE A  tablet 3    metFORMIN (GLUCOPHAGE-XR) 500 MG extended release tablet Take 2 tablets by mouth 2 times daily 360 tablet 3    levothyroxine (SYNTHROID) 112 MCG tablet Take 1 tablet by mouth Daily 90 tablet 3    simvastatin (ZOCOR) 40 MG tablet TAKE 1 TABLET NIGHTLY 90 tablet 3    alendronate (FOSAMAX) 70 MG tablet Take 1 tablet by mouth every 7 days 12 tablet 3    metoprolol tartrate (LOPRESSOR) 50 MG tablet Take 1 tablet by mouth 2 times daily 180 tablet 3    glimepiride (AMARYL) 4 MG tablet Take 1 tablet by mouth 2 times daily 180 tablet 3    Multiple Vitamins-Minerals (EYE VITAMINS & MINERALS PO) Take by mouth daily      insulin glargine [facility] KWIKPEN) 100 UNIT/ML injection pen Inject 15 Units into the skin every morning (before breakfast) 5 pen 5    Calcium Carbonate-Vitamin D (OYSTER SHELL CALCIUM/D) 500-200 MG-UNIT TABS Take 1 tablet by mouth 2 times daily 180 tablet 5    vitamin D3 (CHOLECALCIFEROL) 25 MCG (1000 UT) TABS tablet Take 0.5 tablets by mouth daily (Patient taking differently: Take 1,000 Units by mouth daily) 90 tablet 5    aspirin 81 MG EC tablet Take 81 mg by mouth daily      vitamin B-12 (CYANOCOBALAMIN) 1000 MCG tablet Take 1,000 mcg by mouth daily      folic acid (FOLVITE) 1 MG tablet Take 1 mg by mouth 3 times daily        No current facility-administered medications for this visit.        No Known Allergies    Family History   Problem Relation Age of Onset    Heart Disease Mother     Heart Disease Father     Cancer Sister     Other Sister        Social History     Socioeconomic History    Marital status:      Spouse name: Gricel Reyna    Number of children: 4    Years of education: 12    Highest education level: High school graduate   Occupational History    Not on file   Tobacco Use    Smoking status: Never    Smokeless tobacco: Never   Vaping Use    Vaping Use: Never used   Substance and Sexual Activity    Alcohol use: No    Drug use: No    Sexual activity: Not Currently   Other Topics Concern    Not on file   Social History Narrative    Ryan Coma referral placed for high cost medication    No barriers with transportation    No community services needed     Social Determinants of Health     Financial Resource Strain: Not on file   Food Insecurity: Not on file   Transportation Needs: Not on file   Physical Activity: Insufficiently Active    Days of Exercise per Week: 3 days    Minutes of Exercise per Session: 30 min   Stress: Not on file   Social Connections: Not on file   Intimate Partner Violence: Not on file   Housing Stability: Not on file     Vitals:    09/21/22 1339   BP: 134/64   Site: Left Upper Arm   Position: Sitting   Cuff Size: Medium Adult   Pulse: 74   Temp: 96.9 °F (36.1 °C)   SpO2: 98%   Weight: 191 lb (86.6 kg)   Height: 5' 5\" (1.651 m)     Body mass index is 31.78 kg/m². Wt Readings from Last 3 Encounters:   09/21/22 191 lb (86.6 kg)   08/05/22 190 lb 12.8 oz (86.5 kg)   07/08/22 190 lb 9.6 oz (86.5 kg)     Physical Exam  Vitals reviewed. Constitutional:       General: She is not in acute distress. Appearance: She is well-developed. She is not diaphoretic. HENT:      Head: Normocephalic and atraumatic. Right Ear: External ear normal.      Left Ear: External ear normal.      Nose: Nose normal.   Eyes:      General: No scleral icterus. Right eye: No discharge. Left eye: No discharge. Conjunctiva/sclera: Conjunctivae normal.   Cardiovascular:      Rate and Rhythm: Normal rate and regular rhythm. Heart sounds: Normal heart sounds. Pulmonary:      Effort: Pulmonary effort is normal. No respiratory distress. Breath sounds: Normal breath sounds. No wheezing or rales. Chest:      Chest wall: No tenderness. Abdominal:      General: Bowel sounds are normal. There is no distension. Palpations: Abdomen is soft. There is no mass. Tenderness: There is no abdominal tenderness. There is no guarding or rebound. Musculoskeletal:         General: No tenderness. Normal range of motion. Cervical back: Normal range of motion and neck supple. Skin:     General: Skin is warm and dry. Coloration: Skin is not pale. Findings: Lesion (pilonidal cyat) present. No erythema or rash. Neurological:      Mental Status: She is alert and oriented to person, place, and time. Cranial Nerves: No cranial nerve deficit. Psychiatric:         Behavior: Behavior normal.         Thought Content:  Thought content normal.         Judgment: Judgment normal.     Lab Results   Component Value Date    WBC 4.8 06/30/2022    HGB 12.5 06/30/2022    HCT 39.6 06/30/2022 .3 (H) 06/30/2022     06/30/2022     Lab Results   Component Value Date     (L) 09/23/2021    K 5.6 (H) 09/23/2021    CL 94 (L) 09/23/2021    CO2 22 (L) 09/23/2021     Lab Results   Component Value Date    CREATININE 0.9 09/23/2021     Lab Results   Component Value Date    ALT 26 07/29/2021    AST 25 07/29/2021    ALKPHOS 45 07/29/2021    BILITOT 0.5 07/29/2021     Lab Results   Component Value Date    LIPASE 34.4 11/24/2017       Patient Active Problem List   Diagnosis    Microalbuminuria due to type 2 diabetes mellitus ([facility])    Chronic atrial fibrillation ([facility])    Type 2 diabetes mellitus with insulin therapy ([facility] [address]    Essential hypertension    Osteopenia of spine    Hypothyroidism    History of non-Hodgkin's lymphoma    Anticoagulant long-term use    Hyponatremia    History of humerus fracture    TRACY on CPAP    Obesity (BMI 30-39. 9)    Elevated parathyroid hormone    Parathyroid adenoma    Hypercalcemia    Osteoporosis with pathological fracture    S/P parathyroidectomy ([facility])    Atrial fibrillation/flutter    PAF (paroxysmal atrial fibrillation) ([facility])    Hypotension    Hyperlipidemia    Status post parathyroidectomy ([facility] [address]    Pathological fracture of humerus due to osteoporosis    Other osteoporosis without current pathological fracture    Hyperparathyroidism, unspecified ([facility])    S/P ablation of atrial fibrillation    Persistent atrial fibrillation ([facility])    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     --Christine Rao MD

## 2022-10-12 ENCOUNTER — CARE COORDINATION (OUTPATIENT)
Dept: CARE COORDINATION | Age: 77
End: 2022-10-12

## 2022-10-12 NOTE — CARE COORDINATION
Patient reached out to me to see if I can get her application renewed for 2023. I faxed the MD their portion of the application to fill out for re enrollment.       [facility]   Medication Assistance  [address] [place] [address] and [employer]    K) [phone] (h) [phone]

## 2022-10-14 ENCOUNTER — OFFICE VISIT (OUTPATIENT)
Dept: PULMONOLOGY | Age: 77
End: 2022-10-14
Payer: MEDICARE

## 2022-10-14 VITALS
SYSTOLIC BLOOD PRESSURE: 138 MMHG | BODY MASS INDEX: 31.72 KG/M2 | OXYGEN SATURATION: 95 % | DIASTOLIC BLOOD PRESSURE: 72 MMHG | TEMPERATURE: 97.8 F | HEART RATE: 73 BPM | WEIGHT: 190.4 LBS | HEIGHT: 65 IN

## 2022-10-14 DIAGNOSIS — G47.33 OSA ON CPAP: Primary | ICD-10-CM

## 2022-10-14 DIAGNOSIS — Z99.89 OSA ON CPAP: Primary | ICD-10-CM

## 2022-10-14 DIAGNOSIS — E66.9 OBESITY (BMI 30-39.9): ICD-10-CM

## 2022-10-14 PROCEDURE — 1123F ACP DISCUSS/DSCN MKR DOCD: CPT | Performed by: NURSE PRACTITIONER

## 2022-10-14 PROCEDURE — 99214 OFFICE O/P EST MOD 30 MIN: CPT | Performed by: NURSE PRACTITIONER

## 2022-10-14 PROCEDURE — 1036F TOBACCO NON-USER: CPT | Performed by: NURSE PRACTITIONER

## 2022-10-14 PROCEDURE — G8484 FLU IMMUNIZE NO ADMIN: HCPCS | Performed by: NURSE PRACTITIONER

## 2022-10-14 PROCEDURE — 1090F PRES/ABSN URINE INCON ASSESS: CPT | Performed by: NURSE PRACTITIONER

## 2022-10-14 PROCEDURE — G8417 CALC BMI ABV UP PARAM F/U: HCPCS | Performed by: NURSE PRACTITIONER

## 2022-10-14 PROCEDURE — G8427 DOCREV CUR MEDS BY ELIG CLIN: HCPCS | Performed by: NURSE PRACTITIONER

## 2022-10-14 PROCEDURE — 3017F COLORECTAL CA SCREEN DOC REV: CPT | Performed by: NURSE PRACTITIONER

## 2022-10-14 PROCEDURE — G8399 PT W/DXA RESULTS DOCUMENT: HCPCS | Performed by: NURSE PRACTITIONER

## 2022-10-14 ASSESSMENT — ENCOUNTER SYMPTOMS
COUGH: 0
ALLERGIC/IMMUNOLOGIC NEGATIVE: 1
RESPIRATORY NEGATIVE: 1
WHEEZING: 0
EYES NEGATIVE: 1
SHORTNESS OF BREATH: 0
GASTROINTESTINAL NEGATIVE: 1

## 2022-10-14 NOTE — PROGRESS NOTES
[facility]      Cathleen Robert         [id number]  10/14/2022   Chief Complaint   Patient presents with    Follow-up     1 year TRACY asa patient        Pt of Dr. Shante Avila     PAP Download:   Angelina Gal or initial AHI: 22.2     Date of initial study: 8/20/2019      Compliant  93%     Noncompliant 7 %     PAP Type AutoSet Level  8/10   Avg Hrs/Day 6  AHI: 2.8   Recorded compliance dates: 9/13/22-10/12/22  Machine/Mfg:   [x] ResMed    [] Respironics/Dreamstation   Interface:   [] Nasal    [] Nasal pillows   [x] FFM      Provider:      [x] [facility]     [][facility]     [] [employer]    [] Brennan Robbie    [] [facility]               [] [facility]      [] [employer]    [] [address]      [] Other    Neck Size: 17.75  Mallampati 4  ESS:  6  SAQLI: 72    Here is a scan of the most recent download:            Presentation:   Sal Montana presents for sleep medicine follow up for obstructive sleep apnea  Since the last visit, Sal Montana has been compliant with her CPAP therapy and continues to see benefit from its use   Awake and alert today in the office     Equipment issues: The pressure is  acceptable, the mask is acceptable     Sleep issues:  Do you feel better? Yes  More rested? Yes   Better concentration? NA    Progress History:   Since last visit any new medical issues? No  New ER or hospital visits? No  Any new or changes in medicines? No  Any new sleep medicines? No    Review of Systems -   Review of Systems   Constitutional: Negative. Negative for chills and fever. HENT: Negative. Negative for congestion. Eyes: Negative. Respiratory: Negative. Negative for cough, shortness of breath and wheezing. Cardiovascular: Negative. Negative for chest pain and leg swelling. Gastrointestinal: Negative. Endocrine: Negative. Genitourinary: Negative. Musculoskeletal: Negative. Allergic/Immunologic: Negative. Neurological: Negative. Hematological: Negative. Psychiatric/Behavioral: Negative. Negative for sleep disturbance. Physical Exam:    BMI:  Body mass index is 31.68 kg/m². Wt Readings from Last 3 Encounters:   10/14/22 190 lb 6.4 oz (86.4 kg)   09/21/22 191 lb (86.6 kg)   08/05/22 190 lb 12.8 oz (86.5 kg)     Weight stable / unchanged  Vitals: /72 (Site: Left Upper Arm, Position: Sitting, Cuff Size: Medium Adult)   Pulse 73   Temp 97.8 °F (36.6 °C) (Skin)   Ht 5' 5\" (1.651 m)   Wt 190 lb 6.4 oz (86.4 kg)   SpO2 95%   BMI 31.68 kg/m²       Physical Exam  Vitals and nursing note reviewed. Constitutional:       Appearance: She is well-developed. She is obese. HENT:      Head: Normocephalic and atraumatic. Eyes:      Conjunctiva/sclera: Conjunctivae normal.      Pupils: Pupils are equal, round, and reactive to light. Neck:      Vascular: No JVD. Cardiovascular:      Rate and Rhythm: Normal rate and regular rhythm. Heart sounds: Normal heart sounds. No murmur heard. No friction rub. No gallop. Pulmonary:      Effort: Pulmonary effort is normal. No respiratory distress. Breath sounds: Normal breath sounds. No wheezing or rales. Abdominal:      General: Bowel sounds are normal.      Palpations: Abdomen is soft. Musculoskeletal:         General: Normal range of motion. Cervical back: Normal range of motion and neck supple. Skin:     General: Skin is warm and dry. Capillary Refill: Capillary refill takes less than 2 seconds. Neurological:      Mental Status: She is alert and oriented to person, place, and time. Psychiatric:         Behavior: Behavior normal.         Thought Content: Thought content normal.         Judgment: Judgment normal.     ASSESSMENT/DIAGNOSIS     Diagnosis Orders   1. TRACY on CPAP  DME Order for CPAP as OP      2. Obesity (BMI 30-39. 9)             Plan   Do you need any equipment today?  Yes   - Download reviewed and discussed with patient  - She  was advised to continue current positive airway pressure therapy with above described pressure. - She  advised to keep good compliance with current recommended pressure to get optimal results and clinical improvement  - Recommend 7-9 hours of sleep with PAP  - She was advised to call [employer] company regarding supplies if needed.   -She call my office for earlier appointment if needed for worsening of sleep symptoms.   - She was instructed on weight loss discussed reaching out to PCP for further weight loss options   - Winsome Course was educated about my impression and plan. Patient verbalizesunderstanding.   We will see Sharl Record back in: 1 year with download    Information added by my medical assistant/LPN was reviewed today   Electronically signed by EMMANUEL Corey CNP on 10/14/2022 at 10:34 AM    [facility]  10/14/2022

## 2022-10-17 ENCOUNTER — CARE COORDINATION (OUTPATIENT)
Dept: CARE COORDINATION | Age: 77
End: 2022-10-17

## 2022-10-17 NOTE — CARE COORDINATION
I was able to fax in the patients application for assistance on her high cost medication for 2023. Once I hear back I will let the patient know.           [facility]   Medication Assistance  [address] [place] [address] and [employer]    (D) [phone]  (Y) [phone]

## 2022-10-18 NOTE — TELEPHONE ENCOUNTER
Pt notified. [facility] updated. Rx pended. Pt will use up the Eliquis she has and then switch to Xarelto.

## 2022-10-18 NOTE — TELEPHONE ENCOUNTER
I am taking Eliquis for A-Fib. My prescription insurance for 2023 wants me to change to Xarelto which will save me $2000.00 next year. Will that be possible? I dont need a new script until January.    Thank you,  Lissett Purcell (1945)

## 2022-11-02 ENCOUNTER — CARE COORDINATION (OUTPATIENT)
Dept: CARE COORDINATION | Age: 77
End: 2022-11-02

## 2022-11-02 NOTE — CARE COORDINATION
I was able to get the patient back into the [employer] for 2023.         [facility]   Medication Assistance  [address] [place] [address], and [employer]    G) [phone] (O) [phone]'

## 2023-01-03 ENCOUNTER — TELEPHONE (OUTPATIENT)
Dept: SURGERY | Age: 78
End: 2023-01-03

## 2023-01-03 NOTE — TELEPHONE ENCOUNTER
Pt of Dr. Jatin Herron last seen 3/31/22 is scheduled for pilonidal cystectomy with Dr. Cris Reeves on 2/28/23. Can she be cleared for surgery? [id number] [address] Yanes for pt to hold Eliquis 2 days prior?

## 2023-01-06 ENCOUNTER — OFFICE VISIT (OUTPATIENT)
Dept: CARDIOLOGY CLINIC | Age: 78
End: 2023-01-06

## 2023-01-06 VITALS
HEIGHT: 65 IN | WEIGHT: 190.6 LBS | BODY MASS INDEX: 31.75 KG/M2 | HEART RATE: 62 BPM | SYSTOLIC BLOOD PRESSURE: 156 MMHG | DIASTOLIC BLOOD PRESSURE: 75 MMHG

## 2023-01-06 DIAGNOSIS — I48.0 PAROXYSMAL ATRIAL FIBRILLATION (HCC): ICD-10-CM

## 2023-01-06 DIAGNOSIS — E78.01 FAMILIAL HYPERCHOLESTEROLEMIA: ICD-10-CM

## 2023-01-06 DIAGNOSIS — I10 PRIMARY HYPERTENSION: ICD-10-CM

## 2023-01-06 DIAGNOSIS — Z01.818 PREOP TESTING: Primary | ICD-10-CM

## 2023-01-06 DIAGNOSIS — R06.02 SOB (SHORTNESS OF BREATH): ICD-10-CM

## 2023-01-06 NOTE — PROGRESS NOTES
[address] [facility]  [address]  [place] [address]  Dept: [phone]  Dept Fax: [phone]  Loc: [phone]    Visit Date: 1/6/2023    Isabel Arrieta is a 78 y.o. female who presents todayfor:  Chief Complaint   Patient presents with    Cardiac Clearance     Pilonidal cystectomy    Hypertension    Palpitations    Atrial Fibrillation   Going for Pilonidal cyst surgery   Previous A fib ablation   Some palpitation  No known CAD  No chest pain  Some baseline dyspnea   Dyspnea on exertion   Not very active in general   Does have Dm for a while   More than 20 years  Not well controlled  Does have HTN   Seems a little higher  On statins for hyperlipidemia  No issues       HPI:  HPI  Past Medical History:   Diagnosis Date    Arthritis     Atrial fibrillation ([address]     Cancer ([address] 2000    NON HODGKINS LYMPHOMA    Diabetes mellitus ([address]     Morrow filter in place     Homozygous for MTHFR gene mutation     Hx of blood clots 2000    LEGS    Hyperlipidemia     Hypertension     TRACY on CPAP     Prolonged emergence from general anesthesia     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    Thyroid disease       Past Surgical History:   Procedure Laterality Date    APPENDECTOMY      CARDIAC CATHETERIZATION      CARDIAC SURGERY  02/2021    ablation     CHOLECYSTECTOMY      FRACTURE SURGERY      ankle surgery    FRACTURE SURGERY Right 09/2018    HUMERUS    HERNIA REPAIR      HYSTERECTOMY (CERVIX STATUS UNKNOWN)      PARATHYROID GLAND SURGERY N/A 10/1/2020    PARATHYROIDECTOMY, EXPLORATION OF PARATHYROIDS performed by Stacy Dickerson MD at [facility] OFFICE/OUTPT VISIT,PROCEDURE ONLY Right 9/19/2018    ORIF RIGHT PROXIMAL HUMERUS FX performed by Jean-Paul Gruber MD at [facility] Right 6/27/2019    REVISION RT HUMERUS FX, HARDWARE REMOVAL, HUMERAL NAIL INSERTION performed by Narinder Zambrano MD at [facility] SURGERY Right 8/26/2021    HARDWARE REMOVAL RIGHT HUMERUS, CONVERSION TO RIGHT REVERSE TOTAL SHOULDER REPLACEMENT performed by Sally Mcgill MD at [address] [place] [address]       Family History   Problem Relation Age of Onset    Heart Disease Mother     Heart Disease Father     Cancer Sister     Other Sister      Social History     Tobacco Use    Smoking status: Never    Smokeless tobacco: Never   Substance Use Topics    Alcohol use: No      Current Outpatient Medications   Medication Sig Dispense Refill    apixaban (ELIQUIS) 5 MG TABS tablet Take 5 mg by mouth 2 times daily      hydrALAZINE (APRESOLINE) 50 MG tablet Take 1 tablet by mouth 2 times daily 180 tablet 3    losartan (COZAAR) 100 MG tablet Take 1 tablet by mouth daily 90 tablet 3    metFORMIN (GLUCOPHAGE-XR) 500 MG extended release tablet Take 2 tablets by mouth 2 times daily 360 tablet 3    levothyroxine (SYNTHROID) 112 MCG tablet Take 1 tablet by mouth Daily 90 tablet 3    simvastatin (ZOCOR) 40 MG tablet TAKE 1 TABLET NIGHTLY 90 tablet 3    alendronate (FOSAMAX) 70 MG tablet Take 1 tablet by mouth every 7 days 12 tablet 3    metoprolol tartrate (LOPRESSOR) 50 MG tablet Take 1 tablet by mouth 2 times daily 180 tablet 3    glimepiride (AMARYL) 4 MG tablet Take 1 tablet by mouth 2 times daily 180 tablet 3    Multiple Vitamins-Minerals (EYE VITAMINS & MINERALS PO) Take by mouth daily      insulin glargine (BASAGLAR KWIKPEN) 100 UNIT/ML injection pen Inject 15 Units into the skin every morning (before breakfast) 5 pen 5    Calcium Carbonate-Vitamin D (OYSTER SHELL CALCIUM/D) 500-200 MG-UNIT TABS Take 1 tablet by mouth 2 times daily 180 tablet 5    vitamin D3 (CHOLECALCIFEROL) 25 MCG (1000 UT) TABS tablet Take 0.5 tablets by mouth daily (Patient taking differently: Take 1,000 Units by mouth daily) 90 tablet 5    aspirin 81 MG EC tablet Take 81 mg by mouth daily      vitamin B-12 (CYANOCOBALAMIN) 1000 MCG tablet Take 1,000 mcg by mouth daily      folic acid (FOLVITE) 1 MG tablet Take 1 mg by mouth 3 times daily       rivaroxaban (XARELTO) 20 MG TABS tablet Take 1 tablet by mouth Daily with supper (Patient not taking: Reported on 1/6/2023) 90 tablet 1     No current facility-administered medications for this visit. No Known Allergies  Health Maintenance   Topic Date Due    Diabetic foot exam  Never done    Hepatitis C screen  Never done    Shingles vaccine (1 of 2) Never done    DTaP/Tdap/Td vaccine (1 - Tdap) 08/22/2002    Diabetic retinal exam  04/17/2020    Lipids  07/29/2022    Depression Screen  03/04/2023    Annual Wellness Visit (AWV)  03/05/2023    A1C test (Diabetic or Prediabetic)  07/08/2023    Colorectal Cancer Screen  06/25/2031    DEXA (modify frequency per FRAX score)  Completed    Flu vaccine  Completed    Pneumococcal 65+ years Vaccine  Completed    COVID-19 Vaccine  Completed    Hepatitis A vaccine  Aged Out    Hib vaccine  Aged Out    Meningococcal (ACWY) vaccine  Aged Out       Subjective:  Review of Systems  General:   No fever, no chills, some fatigue or weight loss  Pulmonary:    some dyspnea, no wheezing  Cardiac:    Denies recent chest pain,   GI:     No nausea or vomiting, no abdominal pain  Neuro:     No dizziness or light headedness,   Musculoskeletal:  No recent active issues  Extremities:   No edema, no obvious claudication     Objective:  Physical Exam  BP (!) 156/75   Pulse 62   Ht 5' 5\" (1.651 m)   Wt 190 lb 9.6 oz (86.5 kg)   BMI 31.72 kg/m²   General:   Well developed, well nourished  Lungs:    Clear to auscultation  Heart:    Normal S1 S2, Slight murmur. no rubs, no gallops  Abdomen:   Soft, non tender, no organomegalies, positive bowel sounds  Extremities:   No edema, no cyanosis, good peripheral pulses  Neurological:   Awake, alert, oriented. No obvious focal deficits  Musculoskelatal:  No obvious deformities    Assessment:      Diagnosis Orders   1. Preop testing  EKG 12 lead      2.  Paroxysmal atrial fibrillation ([address]        3. Primary hypertension        4. Familial hypercholesterolemia        As above  Multiple risk factors  Dm for many years   Limited activity   ECG in office was done today. I reviewed the ECG. No acute findings      Plan:  No follow-ups on file. As above  Discussed a stress test   And echo   Non invasive cardiac testing will be ordered to further evaluate for any ischemic or structural heart disease as a cause of the patient symptoms. We will proceed with a Stress Cardiolite test and echo soon. Continue risk factor modification and medical management  Thank you for allowing me to participate in the care of your patient. Please don't hesitate to contact me regarding any further issues related to the patient care'    Orders Placed:  Orders Placed This Encounter   Procedures    EKG 12 lead     Order Specific Question:   Reason for Exam?     Answer: Other       Medications Prescribed:  No orders of the defined types were placed in this encounter. Discussed use, benefit, and side effects of prescribed medications. All patient questions answered. Pt voicedunderstanding. Instructed to continue current medications, diet and exercise. Continue risk factor modification and medical management. Patient agreed with treatment plan. Follow up as directed.     Electronically signedby Shey Bahena MD on 1/6/2023 at 8:30 AM

## 2023-01-09 ENCOUNTER — PATIENT MESSAGE (OUTPATIENT)
Dept: FAMILY MEDICINE CLINIC | Age: 78
End: 2023-01-09

## 2023-01-09 ENCOUNTER — TELEPHONE (OUTPATIENT)
Dept: FAMILY MEDICINE CLINIC | Age: 78
End: 2023-01-09

## 2023-01-09 DIAGNOSIS — E11.69 TYPE 2 DIABETES MELLITUS WITH OTHER SPECIFIED COMPLICATION, WITH LONG-TERM CURRENT USE OF INSULIN (HCC): Primary | ICD-10-CM

## 2023-01-09 DIAGNOSIS — Z79.4 TYPE 2 DIABETES MELLITUS WITH OTHER SPECIFIED COMPLICATION, WITH LONG-TERM CURRENT USE OF INSULIN (HCC): Primary | ICD-10-CM

## 2023-01-09 NOTE — TELEPHONE ENCOUNTER
Left message on answering machine requesting pt to call back at earliest convenience.      Will need to reschedule 1/13 appointment   Reschedule with [facility] or Jaun Jo

## 2023-01-09 NOTE — TELEPHONE ENCOUNTER
From: Maurice Morning  To: Dr. Anjali Bey: 1/9/2023 2:05 PM EST  Subject: Lab work    I have to have blood work this week for Dr. Jailyn Pandya. Do I need any blood work for my appointment on the 24th with Mahogany Yi? If so, can I get my blood work done at [facility] this week also?   Thank you,  Mami Marie   1945

## 2023-01-13 ENCOUNTER — NURSE ONLY (OUTPATIENT)
Dept: LAB | Age: 78
End: 2023-01-13

## 2023-01-13 DIAGNOSIS — E11.69 TYPE 2 DIABETES MELLITUS WITH OTHER SPECIFIED COMPLICATION, WITH LONG-TERM CURRENT USE OF INSULIN (HCC): ICD-10-CM

## 2023-01-13 DIAGNOSIS — Z79.4 TYPE 2 DIABETES MELLITUS WITH OTHER SPECIFIED COMPLICATION, WITH LONG-TERM CURRENT USE OF INSULIN (HCC): ICD-10-CM

## 2023-01-13 LAB
ALBUMIN SERPL-MCNC: 4.4 G/DL (ref 3.5–5.1)
ALP BLD-CCNC: 48 U/L (ref 38–126)
ALT SERPL-CCNC: 23 U/L (ref 11–66)
ANION GAP SERPL CALCULATED.3IONS-SCNC: 14 MEQ/L (ref 8–16)
AST SERPL-CCNC: 22 U/L (ref 5–40)
AVERAGE GLUCOSE: 192 MG/DL (ref 70–126)
BILIRUB SERPL-MCNC: 0.7 MG/DL (ref 0.3–1.2)
BUN BLDV-MCNC: 19 MG/DL (ref 7–22)
CALCIUM SERPL-MCNC: 9.7 MG/DL (ref 8.5–10.5)
CHLORIDE BLD-SCNC: 94 MEQ/L (ref 98–111)
CHOLESTEROL, TOTAL: 154 MG/DL (ref 100–199)
CO2: 25 MEQ/L (ref 23–33)
CREAT SERPL-MCNC: 0.7 MG/DL (ref 0.4–1.2)
GFR SERPL CREATININE-BSD FRML MDRD: > 60 ML/MIN/1.73M2
GLUCOSE BLD-MCNC: 196 MG/DL (ref 70–108)
HBA1C MFR BLD: 8.4 % (ref 4.4–6.4)
HDLC SERPL-MCNC: 40 MG/DL
LDL CHOLESTEROL CALCULATED: 66 MG/DL
POTASSIUM SERPL-SCNC: 4.9 MEQ/L (ref 3.5–5.2)
SODIUM BLD-SCNC: 133 MEQ/L (ref 135–145)
TOTAL PROTEIN: 6.8 G/DL (ref 6.1–8)
TRIGL SERPL-MCNC: 241 MG/DL (ref 0–199)

## 2023-01-14 LAB — HOMOCYSTEINE: 8.9 UMOL/L

## 2023-01-17 ENCOUNTER — TELEPHONE (OUTPATIENT)
Dept: FAMILY MEDICINE CLINIC | Age: 78
End: 2023-01-17

## 2023-01-17 NOTE — TELEPHONE ENCOUNTER
----- Message from EMMANUEL Lassiter CNP sent at 1/16/2023  4:58 PM EST -----  A1C , can discuss at next appt next week.

## 2023-01-19 ENCOUNTER — TELEPHONE (OUTPATIENT)
Dept: CARDIOLOGY CLINIC | Age: 78
End: 2023-01-19

## 2023-01-19 ENCOUNTER — HOSPITAL ENCOUNTER (OUTPATIENT)
Dept: NON INVASIVE DIAGNOSTICS | Age: 78
Discharge: HOME | End: 2023-01-19
Payer: MEDICARE

## 2023-01-19 DIAGNOSIS — E78.01 FAMILIAL HYPERCHOLESTEROLEMIA: ICD-10-CM

## 2023-01-19 DIAGNOSIS — I48.0 PAROXYSMAL ATRIAL FIBRILLATION (HCC): ICD-10-CM

## 2023-01-19 DIAGNOSIS — I10 PRIMARY HYPERTENSION: ICD-10-CM

## 2023-01-19 DIAGNOSIS — Z01.818 PREOP TESTING: ICD-10-CM

## 2023-01-19 DIAGNOSIS — R06.02 SOB (SHORTNESS OF BREATH): ICD-10-CM

## 2023-01-19 DIAGNOSIS — R94.39 ABNORMAL STRESS TEST: Primary | ICD-10-CM

## 2023-01-19 LAB
LV EF: 60 %
LVEF MODALITY: NORMAL

## 2023-01-19 PROCEDURE — 78452 HT MUSCLE IMAGE SPECT MULT: CPT | Performed by: NUCLEAR MEDICINE

## 2023-01-19 PROCEDURE — 6360000002 HC RX W HCPCS

## 2023-01-19 PROCEDURE — A9500 TC99M SESTAMIBI: HCPCS | Performed by: NUCLEAR MEDICINE

## 2023-01-19 PROCEDURE — 93306 TTE W/DOPPLER COMPLETE: CPT

## 2023-01-19 PROCEDURE — 3430000000 HC RX DIAGNOSTIC RADIOPHARMACEUTICAL: Performed by: NUCLEAR MEDICINE

## 2023-01-19 PROCEDURE — 93017 CV STRESS TEST TRACING ONLY: CPT | Performed by: NUCLEAR MEDICINE

## 2023-01-19 RX ORDER — TECHNETIUM TC-99M SESTAMIBI 1 MG/10ML
10.3 INJECTION INTRAVENOUS
Status: COMPLETED | OUTPATIENT
Start: 2023-01-19 | End: 2023-01-19

## 2023-01-19 RX ORDER — TECHNETIUM TC-99M SESTAMIBI 1 MG/10ML
33.1 INJECTION INTRAVENOUS
Status: COMPLETED | OUTPATIENT
Start: 2023-01-19 | End: 2023-01-19

## 2023-01-19 RX ADMIN — Medication 10.3 MILLICURIE: at 09:10

## 2023-01-19 RX ADMIN — Medication 33.1 MILLICURIE: at 10:00

## 2023-01-20 NOTE — TELEPHONE ENCOUNTER
Okay. Difficult questions to answer  Up to her  If not feeling any symptoms she might be okay with that

## 2023-01-20 NOTE — TELEPHONE ENCOUNTER
Pt leaving for [place] 1/30 and coming back 2/19, trip is all paid for. Asking if Dr Zuly Holley thinks this can wait until she gets back. She will post-pone her surgery. Please advise.

## 2023-01-23 ENCOUNTER — TELEPHONE (OUTPATIENT)
Dept: SURGERY | Age: 78
End: 2023-01-23

## 2023-01-23 NOTE — TELEPHONE ENCOUNTER
Patient called to cancel her surgery with Dr. Muna Britt on 02-. Patient scheduled for a heart cath on 02-. Patient will call back to reschedule. Canceled with Elinda Mail in [address] scheduling.

## 2023-01-24 ENCOUNTER — NURSE ONLY (OUTPATIENT)
Dept: LAB | Age: 78
End: 2023-01-24

## 2023-01-24 ENCOUNTER — OFFICE VISIT (OUTPATIENT)
Dept: FAMILY MEDICINE CLINIC | Age: 78
End: 2023-01-24

## 2023-01-24 VITALS
DIASTOLIC BLOOD PRESSURE: 72 MMHG | RESPIRATION RATE: 14 BRPM | HEIGHT: 65 IN | HEART RATE: 69 BPM | WEIGHT: 196 LBS | TEMPERATURE: 97.9 F | SYSTOLIC BLOOD PRESSURE: 148 MMHG | OXYGEN SATURATION: 97 % | BODY MASS INDEX: 32.65 KG/M2

## 2023-01-24 DIAGNOSIS — E03.8 OTHER SPECIFIED HYPOTHYROIDISM: ICD-10-CM

## 2023-01-24 DIAGNOSIS — I10 ESSENTIAL HYPERTENSION: ICD-10-CM

## 2023-01-24 DIAGNOSIS — E11.69 TYPE 2 DIABETES MELLITUS WITH OTHER SPECIFIED COMPLICATION, WITH LONG-TERM CURRENT USE OF INSULIN (HCC): Primary | ICD-10-CM

## 2023-01-24 DIAGNOSIS — Z79.4 TYPE 2 DIABETES MELLITUS WITH OTHER SPECIFIED COMPLICATION, WITH LONG-TERM CURRENT USE OF INSULIN (HCC): Primary | ICD-10-CM

## 2023-01-24 DIAGNOSIS — M85.89 OSTEOPENIA OF MULTIPLE SITES: ICD-10-CM

## 2023-01-24 DIAGNOSIS — I48.20 CHRONIC ATRIAL FIBRILLATION (HCC): ICD-10-CM

## 2023-01-24 LAB — TSH SERPL DL<=0.005 MIU/L-ACNC: 2.6 UIU/ML (ref 0.4–4.2)

## 2023-01-24 RX ORDER — ALENDRONATE SODIUM 70 MG/1
70 TABLET ORAL
Qty: 12 TABLET | Refills: 3 | Status: SHIPPED | OUTPATIENT
Start: 2023-01-24

## 2023-01-24 RX ORDER — LEVOTHYROXINE SODIUM 112 UG/1
112 TABLET ORAL DAILY
Qty: 90 TABLET | Refills: 3 | Status: SHIPPED | OUTPATIENT
Start: 2023-01-24

## 2023-01-24 RX ORDER — METOPROLOL TARTRATE 50 MG/1
50 TABLET, FILM COATED ORAL 2 TIMES DAILY
Qty: 180 TABLET | Refills: 3 | Status: SHIPPED | OUTPATIENT
Start: 2023-01-24

## 2023-01-24 RX ORDER — SIMVASTATIN 40 MG
TABLET ORAL
Qty: 90 TABLET | Refills: 3 | Status: SHIPPED | OUTPATIENT
Start: 2023-01-24

## 2023-01-24 RX ORDER — GLIMEPIRIDE 4 MG/1
4 TABLET ORAL 2 TIMES DAILY
Qty: 180 TABLET | Refills: 3 | Status: SHIPPED | OUTPATIENT
Start: 2023-01-24

## 2023-01-24 RX ORDER — METFORMIN HYDROCHLORIDE 500 MG/1
1000 TABLET, EXTENDED RELEASE ORAL 2 TIMES DAILY
Qty: 360 TABLET | Refills: 3 | Status: SHIPPED | OUTPATIENT
Start: 2023-01-24

## 2023-01-24 ASSESSMENT — ENCOUNTER SYMPTOMS
SHORTNESS OF BREATH: 0
COUGH: 0

## 2023-01-24 ASSESSMENT — PATIENT HEALTH QUESTIONNAIRE - PHQ9
SUM OF ALL RESPONSES TO PHQ QUESTIONS 1-9: 0
1. LITTLE INTEREST OR PLEASURE IN DOING THINGS: 0
SUM OF ALL RESPONSES TO PHQ QUESTIONS 1-9: 0
SUM OF ALL RESPONSES TO PHQ QUESTIONS 1-9: 0
2. FEELING DOWN, DEPRESSED OR HOPELESS: 0
SUM OF ALL RESPONSES TO PHQ QUESTIONS 1-9: 0
SUM OF ALL RESPONSES TO PHQ9 QUESTIONS 1 & 2: 0

## 2023-01-24 NOTE — Clinical Note
Going to increase to 22 units daily of basaglar for patient. Pt concerned about you needing to know that and if she needs new script. Also going on vacation 1/30 thru 2/20 and wants to make sure her insulin wont be delivered while she is gone. Does she need to call someone?    Thank you Jaja Bailon

## 2023-01-24 NOTE — PROGRESS NOTES
To get her dose updated the office can call [facility] at [phone] and give them a verbal, patient would have to call the company regarding shipments. I don't have control over the shipments, patient deals with the  when placing refills and when they get shipped.

## 2023-01-24 NOTE — PROGRESS NOTES
Christiane Gómez is a 78 y.o. female thatpresents for 6 Month Follow-Up and Diabetes      History obtained today from Patient. HPI    Diabetes Type 2    Glucose control:   Does patient check blood glucoses at home? Yes  Report of hypoglycemia: no  Lab Results   Component Value Date    LABA1C 8.4 (H) 01/13/2023     No results found for: EAG    Symptoms  Polyuria, Polydipsia or Polyphagia? No  Chest Pain, SOB, or Palpitations? -  No, had stress test, scheduled for Feb 24th for heart cath. Follows with Dr Hank Christine complaints? Recent changes thinks its due to higher  blood sugars  Paresthesias of the extremities? No    Medications  Current medication were reviewed. Compliant with medications? yes  Medication side effects? No  On ACE-I or ARB? Yes  On antiplatelet therapy? Yes  On Statin? Yes    Last Diabetic Eye Exam: last year    Exercise  Exercise? Riding stationary bike  [employer] Readings from Last 3 Encounters:   01/24/23 196 lb (88.9 kg)   01/06/23 190 lb 9.6 oz (86.5 kg)   10/14/22 190 lb 6.4 oz (86.4 kg)       Diet discipline?:  Low salt, fat, sugar diet? yes    Blood pressure control:  BP Readings from Last 3 Encounters:   01/24/23 (!) 148/72   01/06/23 (!) 156/75   10/14/22 138/72       No results found for: Eli Henao [id number]    Lab Results   Component Value Date    LDLCALC 66 01/13/2023       HTN    Does patient check BP regularly at home? - Yes - not often  Current Medication regimen - Metoprolol, hydralazine, losartan  Tolerating medications well? - yes    Shortness of breath or chest pain? No  Headache or visual complaints? As above, likely due to higher blood sugars recently  Neurologic changes like confusion? No  Extremity edema?  No    BP Readings from Last 3 Encounters:   01/24/23 (!) 148/72   01/06/23 (!) 156/75   10/14/22 138/72         I have reviewed the patient's past medical history, past surgical history, allergies,medications, social and family history and I have made updates where appropriate. Past Medical History:   Diagnosis Date    Arthritis     Atrial fibrillation [address]     Cancer ([facility] 2000    NON HODGKINS LYMPHOMA    Diabetes mellitus ([facility]     Deepika filter in place     Homozygous for MTHFR gene mutation     Hx of blood clots 2000    LEGS    Hyperlipidemia     Hypertension     TRACY on CPAP     Prolonged emergence from general anesthesia     Stroke (cerebrum) ([facility]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    Thyroid disease        Social History     Tobacco Use    Smoking status: Never    Smokeless tobacco: Never   Vaping Use    Vaping Use: Never used   Substance Use Topics    Alcohol use: No    Drug use: No       Family History   Problem Relation Age of Onset    Heart Disease Mother     Heart Disease Father     Cancer Sister     Other Sister          Review of Systems   Constitutional:  Negative for activity change and appetite change. Respiratory:  Negative for cough and shortness of breath. Cardiovascular:  Negative for chest pain and leg swelling. PHYSICAL EXAM:  BP (!) 148/72   Pulse 69   Temp 97.9 °F (36.6 °C) (Temporal)   Resp 14   Ht 5' 5\" (1.651 m)   Wt 196 lb (88.9 kg)   SpO2 97%   BMI 32.62 kg/m²     Physical Exam  Constitutional:       Appearance: Normal appearance. HENT:      Mouth/Throat:      Mouth: Mucous membranes are moist.   Cardiovascular:      Rate and Rhythm: Normal rate and regular rhythm. Heart sounds: Murmur heard. Pulmonary:      Effort: Pulmonary effort is normal.      Breath sounds: Normal breath sounds. Abdominal:      Palpations: Abdomen is soft. Skin:     General: Skin is warm and dry. Neurological:      Mental Status: She is alert and oriented to person, place, and time. Psychiatric:         Mood and Affect: Mood normal.         Behavior: Behavior normal.         Thought Content:  Thought content normal.         Judgment: Judgment normal.         ASSESSMENT & PLAN  Anival Rios was seen today for 6 month follow-up and diabetes. Diagnoses and all orders for this visit:    Type 2 diabetes mellitus with other specified complication, with long-term current use of insulin (HCC)  -     glimepiride (AMARYL) 4 MG tablet; Take 1 tablet by mouth 2 times daily  -     simvastatin (ZOCOR) 40 MG tablet; TAKE 1 TABLET NIGHTLY  -     metFORMIN (GLUCOPHAGE-XR) 500 MG extended release tablet; Take 2 tablets by mouth 2 times daily    Essential hypertension  -     metoprolol tartrate (LOPRESSOR) 50 MG tablet; Take 1 tablet by mouth 2 times daily    Chronic atrial fibrillation (HCC)  -     metoprolol tartrate (LOPRESSOR) 50 MG tablet; Take 1 tablet by mouth 2 times daily    Other specified hypothyroidism  -     levothyroxine (SYNTHROID) 112 MCG tablet; Take 1 tablet by mouth Daily  -     TSH With Reflex Ft4; Future    Osteopenia of multiple sites  -     alendronate (FOSAMAX) 70 MG tablet; Take 1 tablet by mouth every 7 days  -     Handicap placard    Uncontrolled DM, patient already taking 20 units of Lantus on the days her fasting BS are >200. Recommend going up to 22 units daily. She is working on diet and exercise. Riding stationary bike 20 min a day. No red flag sx, Obtain above testing, and Follow up with our office in 3 months    All copied or forwarded information in the progress note was verified by me to be accurate at the time of visit  Patient's past medical, surgical, social and family history were reviewed and updated     All patient questions answered. Patient voiced understanding.

## 2023-01-25 ENCOUNTER — TELEPHONE (OUTPATIENT)
Dept: FAMILY MEDICINE CLINIC | Age: 78
End: 2023-01-25

## 2023-02-21 ENCOUNTER — PRE-PROCEDURE TELEPHONE (OUTPATIENT)
Dept: INPATIENT UNIT | Age: 78
End: 2023-02-21

## 2023-02-23 ENCOUNTER — PREP FOR PROCEDURE (OUTPATIENT)
Dept: CARDIOLOGY | Age: 78
End: 2023-02-23

## 2023-02-23 RX ORDER — NITROGLYCERIN 0.4 MG/1
0.4 TABLET SUBLINGUAL EVERY 5 MIN PRN
Status: CANCELLED | OUTPATIENT
Start: 2023-02-23

## 2023-02-23 RX ORDER — SODIUM CHLORIDE 9 MG/ML
INJECTION, SOLUTION INTRAVENOUS PRN
Status: CANCELLED | OUTPATIENT
Start: 2023-02-23

## 2023-02-23 RX ORDER — ASPIRIN 325 MG
325 TABLET ORAL ONCE
Status: CANCELLED | OUTPATIENT
Start: 2023-02-23 | End: 2023-02-23

## 2023-02-23 RX ORDER — SODIUM CHLORIDE 0.9 % (FLUSH) 0.9 %
5-40 SYRINGE (ML) INJECTION EVERY 12 HOURS SCHEDULED
Status: CANCELLED | OUTPATIENT
Start: 2023-02-23

## 2023-02-23 RX ORDER — SODIUM CHLORIDE 0.9 % (FLUSH) 0.9 %
5-40 SYRINGE (ML) INJECTION PRN
Status: CANCELLED | OUTPATIENT
Start: 2023-02-23

## 2023-02-23 RX ORDER — SODIUM CHLORIDE 9 MG/ML
INJECTION, SOLUTION INTRAVENOUS CONTINUOUS
Status: CANCELLED | OUTPATIENT
Start: 2023-02-23

## 2023-02-24 ENCOUNTER — HOSPITAL ENCOUNTER (OUTPATIENT)
Dept: INPATIENT UNIT | Age: 78
Discharge: HOME | End: 2023-02-24
Attending: NUCLEAR MEDICINE | Admitting: NUCLEAR MEDICINE
Payer: MEDICARE

## 2023-02-24 VITALS
BODY MASS INDEX: 31.65 KG/M2 | HEIGHT: 65 IN | RESPIRATION RATE: 15 BRPM | TEMPERATURE: 98.4 F | HEART RATE: 73 BPM | WEIGHT: 190 LBS | SYSTOLIC BLOOD PRESSURE: 170 MMHG | OXYGEN SATURATION: 94 % | DIASTOLIC BLOOD PRESSURE: 54 MMHG

## 2023-02-24 PROBLEM — R94.39 ABNORMAL STRESS TEST: Status: ACTIVE | Noted: 2023-02-24

## 2023-02-24 LAB
ABO: NORMAL
ANION GAP SERPL CALC-SCNC: 12 MEQ/L (ref 8–16)
ANTIBODY SCREEN: NORMAL
APTT PPP: 34.9 SECONDS (ref 22–38)
BUN SERPL-MCNC: 17 MG/DL (ref 7–22)
CALCIUM SERPL-MCNC: 10.1 MG/DL (ref 8.5–10.5)
CHLORIDE SERPL-SCNC: 92 MEQ/L (ref 98–111)
CHOLEST SERPL-MCNC: 142 MG/DL (ref 100–199)
CO2 SERPL-SCNC: 27 MEQ/L (ref 23–33)
CREAT SERPL-MCNC: 0.6 MG/DL (ref 0.4–1.2)
DEPRECATED RDW RBC AUTO: 47.7 FL (ref 35–45)
EKG ATRIAL RATE: 70 BPM
EKG P AXIS: 17 DEGREES
EKG P-R INTERVAL: 320 MS
EKG Q-T INTERVAL: 420 MS
EKG QRS DURATION: 96 MS
EKG QTC CALCULATION (BAZETT): 453 MS
EKG R AXIS: -83 DEGREES
EKG T AXIS: 7 DEGREES
EKG VENTRICULAR RATE: 70 BPM
ERYTHROCYTE [DISTWIDTH] IN BLOOD BY AUTOMATED COUNT: 13 % (ref 11.5–14.5)
GFR SERPL CREATININE-BSD FRML MDRD: > 60 ML/MIN/1.73M2
GLUCOSE SERPL-MCNC: 221 MG/DL (ref 70–108)
HCT VFR BLD AUTO: 40.1 % (ref 37–47)
HDLC SERPL-MCNC: 41 MG/DL
HGB BLD-MCNC: 12.8 GM/DL (ref 12–16)
INR PPP: 0.96 (ref 0.85–1.13)
LDLC SERPL CALC-MCNC: 62 MG/DL
MCH RBC QN AUTO: 31.9 PG (ref 26–33)
MCHC RBC AUTO-ENTMCNC: 31.9 GM/DL (ref 32.2–35.5)
MCV RBC AUTO: 100 FL (ref 81–99)
PLATELET # BLD AUTO: 221 THOU/MM3 (ref 130–400)
PMV BLD AUTO: 9.2 FL (ref 9.4–12.4)
POTASSIUM SERPL-SCNC: 4.9 MEQ/L (ref 3.5–5.2)
RBC # BLD AUTO: 4.01 MILL/MM3 (ref 4.2–5.4)
RH FACTOR: NORMAL
SODIUM SERPL-SCNC: 131 MEQ/L (ref 135–145)
TRIGL SERPL-MCNC: 193 MG/DL (ref 0–199)
WBC # BLD AUTO: 4.8 THOU/MM3 (ref 4.8–10.8)

## 2023-02-24 PROCEDURE — 85730 THROMBOPLASTIN TIME PARTIAL: CPT

## 2023-02-24 PROCEDURE — 85027 COMPLETE CBC AUTOMATED: CPT

## 2023-02-24 PROCEDURE — 80061 LIPID PANEL: CPT

## 2023-02-24 PROCEDURE — 93458 L HRT ARTERY/VENTRICLE ANGIO: CPT | Performed by: NUCLEAR MEDICINE

## 2023-02-24 PROCEDURE — 80048 BASIC METABOLIC PNL TOTAL CA: CPT

## 2023-02-24 PROCEDURE — 93005 ELECTROCARDIOGRAM TRACING: CPT | Performed by: PHYSICIAN ASSISTANT

## 2023-02-24 PROCEDURE — 93010 ELECTROCARDIOGRAM REPORT: CPT | Performed by: NUCLEAR MEDICINE

## 2023-02-24 PROCEDURE — 6360000004 HC RX CONTRAST MEDICATION: Performed by: NUCLEAR MEDICINE

## 2023-02-24 PROCEDURE — 86850 RBC ANTIBODY SCREEN: CPT

## 2023-02-24 PROCEDURE — 6360000002 HC RX W HCPCS

## 2023-02-24 PROCEDURE — 2580000003 HC RX 258: Performed by: PHYSICIAN ASSISTANT

## 2023-02-24 PROCEDURE — 36415 COLL VENOUS BLD VENIPUNCTURE: CPT

## 2023-02-24 PROCEDURE — 86900 BLOOD TYPING SEROLOGIC ABO: CPT

## 2023-02-24 PROCEDURE — 86901 BLOOD TYPING SEROLOGIC RH(D): CPT

## 2023-02-24 PROCEDURE — 2500000003 HC RX 250 WO HCPCS

## 2023-02-24 PROCEDURE — 93458 L HRT ARTERY/VENTRICLE ANGIO: CPT

## 2023-02-24 PROCEDURE — 85610 PROTHROMBIN TIME: CPT

## 2023-02-24 RX ORDER — SODIUM CHLORIDE 9 MG/ML
INJECTION, SOLUTION INTRAVENOUS PRN
Status: DISCONTINUED | OUTPATIENT
Start: 2023-02-24 | End: 2023-02-24 | Stop reason: HOSPADM

## 2023-02-24 RX ORDER — SODIUM CHLORIDE 0.9 % (FLUSH) 0.9 %
5-40 SYRINGE (ML) INJECTION EVERY 12 HOURS SCHEDULED
Status: DISCONTINUED | OUTPATIENT
Start: 2023-02-24 | End: 2023-02-24 | Stop reason: HOSPADM

## 2023-02-24 RX ORDER — SODIUM CHLORIDE 0.9 % (FLUSH) 0.9 %
5-40 SYRINGE (ML) INJECTION PRN
Status: DISCONTINUED | OUTPATIENT
Start: 2023-02-24 | End: 2023-02-24 | Stop reason: HOSPADM

## 2023-02-24 RX ORDER — INSULIN GLARGINE 100 [IU]/ML
22 INJECTION, SOLUTION SUBCUTANEOUS DAILY
COMMUNITY

## 2023-02-24 RX ORDER — NITROGLYCERIN 0.4 MG/1
0.4 TABLET SUBLINGUAL EVERY 5 MIN PRN
Status: DISCONTINUED | OUTPATIENT
Start: 2023-02-24 | End: 2023-02-24 | Stop reason: HOSPADM

## 2023-02-24 RX ORDER — SODIUM CHLORIDE 9 MG/ML
INJECTION, SOLUTION INTRAVENOUS CONTINUOUS
Status: DISCONTINUED | OUTPATIENT
Start: 2023-02-24 | End: 2023-02-24 | Stop reason: HOSPADM

## 2023-02-24 RX ORDER — ASPIRIN 325 MG
325 TABLET ORAL ONCE
Status: DISCONTINUED | OUTPATIENT
Start: 2023-02-24 | End: 2023-02-24 | Stop reason: HOSPADM

## 2023-02-24 RX ORDER — ATROPINE SULFATE 0.4 MG/ML
0.5 INJECTION, SOLUTION INTRAVENOUS
Status: DISCONTINUED | OUTPATIENT
Start: 2023-02-24 | End: 2023-02-24 | Stop reason: HOSPADM

## 2023-02-24 RX ORDER — ACETAMINOPHEN 325 MG/1
650 TABLET ORAL EVERY 4 HOURS PRN
Status: DISCONTINUED | OUTPATIENT
Start: 2023-02-24 | End: 2023-02-24 | Stop reason: HOSPADM

## 2023-02-24 RX ADMIN — IOPAMIDOL 50 ML: 755 INJECTION, SOLUTION INTRAVENOUS at 12:59

## 2023-02-24 RX ADMIN — SODIUM CHLORIDE: 9 INJECTION, SOLUTION INTRAVENOUS at 10:36

## 2023-02-24 NOTE — PLAN OF CARE
Problem: Discharge Planning  Goal: Discharge to home or other facility with appropriate resources  Outcome: Completed  Flowsheets (Taken 2/24/2023 1026 by Tab Escalante RN)  Discharge to home or other facility with appropriate resources: Identify barriers to discharge with patient and caregiver

## 2023-02-24 NOTE — PROGRESS NOTES
1000  Pt admitted to  2E14 ambulatory for left heart cath. Pt NPO. Patient accompanied by spouse. Vital signs obtained. Assessment and data collection initiated. Oriented to room. Policies and procedures for 2E explained   All questions answered with no further questions at this time. Fall prevention and safety precautions discussed with patient. Care plan reviewed with patient and spouse. Patient and spouse verbalize understanding of the plan of care and contribute to goal setting.      Data collection and medication review per CHEY Rosario RN

## 2023-02-24 NOTE — DISCHARGE INSTRUCTIONS
HOLD METFORMIN OR GLUCOPHAGE  For 48 hours post procedure. May resume metformin (glucophage) on***. Monitor blood sugars and call family doctor if elevated, adhere to strict diabetic diet while off metformin. For Radial approach:  Cover site with dressing or bandaid for 5 days and change daily. Dressing from hospital  should be left intact until next morning. No lotions, creams or powder to site. Minimize movement of wrist for 24 hours, may use armboard. No lifting over 5 pounds with involved extremity for 5 days. Take it easy for 3 days, no driving for 3 days. Avoid heavy lifting, pushing or pulling. May shower in 24 hours. Do not immerse arm in water for 5 days, example bathtub, hot tub, swimming pool or dish water. Call physician for any signs bleeding, swelling, pain, redness, coolness of extremity. Notify doctor of any abnormal findings. If bleeding or swelling occurs apply firm direct pressure to site for 15 minutes and call 911/ physician. Always wash hands before touching incision area. Discharge Instructions for Cardiac Catheterization     A cardiac catheterization is a diagnostic test used to evaluate the health of the heart and its blood vessels. The test is done with a thin catheter carefully threaded into your heart from a leg or arm artery. Most likely, you will be allowed to go home the same day as the procedure. Steps to Take   Home Care  Bathe and shower as usual. But keep the wound dry and covered with a bandage for the first 24 hours. After the first 24 hours, remove the bandage before you shower. Wash the area with soap and water daily. Replace it with a new bandage after cleaning. Do not soak in a pool or tub and do not swim for one week after the test.    If there is any bleeding at the catheter site, apply firm pressure with your hands until the bleeding stops.     Diet    If advised by your doctor:   Drink plenty of fluids after the test. This will flush the x-ray dye from your system. Return to your normal diet. Physical Activity    The sedative will make you sleepy. Rest until the effects have worn off. Ask your doctor when you will be able to return to work. Do not drive until your doctor says it is okay. Avoid heavy lifting, physically demanding activities, and sexual activity for 5-7 days. Your activity will also depend upon where the catheter was inserted:   If the catheter was inserted in an arm blood vesselKeep your arm straight and still with an arm board for two hours after the procedure is completed. If a vessel in your groin was usedLie flat on your back for at least a few hours to prevent bleeding. A compression device may also be placed on your groin to prevent bleeding. Do not sit for long periods of time. Try to change positions frequently. Medications    If advised by your doctor, resume taking your normal medicines. Use acetaminophen (Tylenol) for pain relief. Do not take metformin (Glucophage) or glyburide and metformin (Glucovance) for 48 hours after the test or until your doctor says it is okay. If you had to stop taking these medications before the procedure, ask your doctor when you can resume taking them:   Anti-inflammatory drugs (eg, ibuprofen )   Blood thinners, such as warfarin (Coumadin)   Clopidogrel (Plavix)   If you are taking medicines, follow these general guidelines:   Take your medicine as directed. Do not change the amount or the schedule. Do not stop taking them without talking to your doctor. Do not share them. Know what the results and side effects. Report them to your doctor. Some drugs can be dangerous when mixed. Talk to a doctor or pharmacist if you are taking more than one drug. This includes over-the-counter medicine and herb or dietary supplements. Plan ahead for refills so you don't run out.    Lifestyle Changes    Based on the results of the test, your doctor may instruct you to make certain heart-healthy lifestyle changes. These may include dietary changes or increasing your exercise. Follow-up   The test results are generally available right after the procedure. At that point, your doctor will discuss the findings and suggest appropriate treatment options. In some cases, the results can indicate an immediate need for surgery. Schedule a follow-up appointment as directed by your doctor. Call Your Doctor If Any of the Following Occurs   Monitor your recovery once you leave the hospital. If you have a problem, alert your doctor. If any of the following occur, call your doctor:   Signs of infection, including fever and chills   Redness, swelling, increasing pain, excessive bleeding, or any discharge from the catheter insertion site   Call 911 If Any of the Following Occurs   CALL 911  if you have symptoms including:   Drooping facial muscles   Changes in vision or speech   Difficulty walking or using your limbs   Change in sensation to affected leg/hand, including numbness, feeling cold, or change in color   Extreme sweating, nausea or vomiting   Dizziness or lightheadedness   Chest pain   Rapid, irregular heartbeat   Palpitations   Cough, shortness of breath, or difficulty breathing   Weakness or fainting   If you think you have an emergency,  CALL 911  . Home going sedation advice sheet given to patient. SEDATION/ANALGESIA INFORMATION/HOME GOING ADVICE    SEDATION / ANALGESIA INFORMATION / HOME GOING ADVICE  You have received the sedation/analgesia medication during your visit     Sedation/analgesia is used during short medical procedures under controlled supervision. The medication will produce a strong relaxation. You will be able to hear, speak and follow instructions, but your memory and alertness will be decreased. You will be able to swallow and breathe on your own. During sedation/analgesia your blood pressure, heart and breathing will be watched closely.  After the procedure, you may not remember what was said or done. You may have the following effects from the medication. \"           Drowsiness, dizziness, sleepiness or confusion. \"           Difficulty remembering or delayed reaction times. \"           Loss of fine muscle control or difficulty with your balance especially while walking. \"           Difficulty focusing or blurred vision. You may not be aware of slight changes in your behavior and/or your reaction time because of the medication used during the procedure. Therefore you should follow these instructions. \"           Have someone responsible help you with your care. \"           Do not drive for 24 hours. \"           Do not operate equipment for 24 hours (lawnmowers, power tools, kitchen accessories, stove). \"           Do not drink any alcoholic beverages for a minimum of 24 hours. \"           Do not make important personal, legal or business decisions for 24 hours. \"           You may experience dizziness or lightheadedness. Move slowly and carefully, do not make sudden position changes. \"           Drink extra amounts of fluids today. \"           Increase your diet as tolerated (unless you have received specific instructions from your doctor). \"           If you feel nauseated, continue with liquids until the nausea is gone. \"           Notify your physician if you have not urinated within 8 hours after the procedure. \"           Resume your medications unless otherwise instructed. Contact your physician if you have any questions or concerns.      IF YOU REPORT TO AN EMERGENCY ROOM, DOCTOR'S OFFICE OR HOSPITAL WITHIN 24 HOURS AFTER YOUR PROCEDURE, BRING THIS SHEET AND YOUR AFTER VISIT SUMMARY WITH YOU AND GIVE IT TO THE PHYSICIAN OR NURSE ATTENDING YOU.

## 2023-02-24 NOTE — H&P
[facility]  Sedation/Analgesia History & Physical    Pt Name: Alysia Caraballo  Account number: [de-identified]  MRN: [id number]  YOB: 1945  Provider Performing Procedure: Hipolito Saenz MD MD [facility]  Primary Care Physician: EMMANUEL Alonzo - PINA  Date: 2/24/2023    PRE-PROCEDURE    Code Status: FULL CODE  Brief History/Pre-Procedure Diagnosis:   Abn stress  Angina   Pre op     Consent: : I have discussed with the patient risks, benefits, and alternatives (and relevant risks, benefits, and side effects related to alternatives or not receiving care), and likelihood of the success. The patient and/or representative appear to understand and agree to proceed. The discussion encompasses risks, benefits, and side effects related to the alternatives and the risks related to not receiving the proposed care, treatment, and services. MEDICAL HISTORY  []ASHD/ANGINA/MI/CHF   [x]Hypertension  [x]Diabetes  []Hyperlipidemia  []Smoking  []Family Hx of ASHD  []Additional information:       has a past medical history of Arthritis, Atrial fibrillation ([address] Cancer ([address] Diabetes mellitus ([address] [place] filter in place, Homozygous for MTHFR gene mutation, Hx of blood clots, Hyperlipidemia, Hypertension, TRACY on CPAP, Prolonged emergence from general anesthesia, Stroke (cerebrum) ([address] and Thyroid disease. SURGICAL HISTORY   has a past surgical history that includes Hysterectomy; Appendectomy; Tubal ligation; hernia repair; Cholecystectomy; pr office/outpt visit,procedure only (Right, 9/19/2018); fracture surgery; fracture surgery (Right, 09/2018); REVISION OF TOTAL SHOULDER (Right, 6/27/2019); Cardiac catheterization; Parathyroid gland surgery (N/A, 10/1/2020); Tonsillectomy; Cardiac surgery (02/2021); and shoulder surgery (Right, 8/26/2021).   Additional information:       ALLERGIES   Allergies as of 02/24/2023    (No Known Allergies)     Additional information:       MEDICATIONS   Aspirin  [x] 81 mg  [] 325 mg  [] None  Antiplatelet drug therapy use last 5 days  []No []Yes  Coumadin Use Last 5 Days []No []Yes  Other anticoagulant use last 5 days  []No []Yes    Current Facility-Administered Medications:     0.9 % sodium chloride infusion, , IntraVENous, Continuous, Juanita Searing, PA-C, Last Rate: 75 mL/hr at 02/24/23 1036, New Bag at 02/24/23 1036    sodium chloride flush 0.9 % injection 5-40 mL, 5-40 mL, IntraVENous, 2 times per day, Juanita Searing, PA-C    sodium chloride flush 0.9 % injection 5-40 mL, 5-40 mL, IntraVENous, PRN, Juanita Searing, PA-C    aspirin tablet 325 mg, 325 mg, Oral, Once, Juanita Searing, PA-C    nitroGLYCERIN (NITROSTAT) SL tablet 0.4 mg, 0.4 mg, SubLINGual, Q5 Min PRN, Juanita Flores PA-C  Prior to Admission medications    Medication Sig Start Date End Date Taking?  Authorizing Provider   glimepiride (AMARYL) 4 MG tablet Take 1 tablet by mouth 2 times daily 1/24/23   Amada Ruth, APRN - CNP   metoprolol tartrate (LOPRESSOR) 50 MG tablet Take 1 tablet by mouth 2 times daily 1/24/23   Amada Ruth, EMMANUEL - CNP   simvastatin (ZOCOR) 40 MG tablet TAKE 1 TABLET NIGHTLY 1/24/23   Amada Ruth, EMMANUEL - CNP   levothyroxine (SYNTHROID) 112 MCG tablet Take 1 tablet by mouth Daily 1/24/23   Amada Ruth, EMMANUEL - CNP   metFORMIN (GLUCOPHAGE-XR) 500 MG extended release tablet Take 2 tablets by mouth 2 times daily 1/24/23   Amada Ruth, EMMANUEL - CNP   alendronate (FOSAMAX) 70 MG tablet Take 1 tablet by mouth every 7 days 1/24/23   Amada Ruth, EMMANUEL - CNP   rivaroxaban (XARELTO) 20 MG TABS tablet Take 1 tablet by mouth Daily with supper 10/18/22   Renata Mcgill MD   hydrALAZINE (APRESOLINE) 50 MG tablet Take 1 tablet by mouth 2 times daily 5/18/22   Renata Mcgill MD   losartan (COZAAR) 100 MG tablet Take 1 tablet by mouth daily 5/18/22   Renata Mcgill MD   Multiple Vitamins-Minerals (EYE VITAMINS & MINERALS PO) Take by mouth daily    Historical Provider, MD   insulin glargine (BASAGLAR KWIKPEN) 100 UNIT/ML injection pen Inject 15 Units into the skin every morning (before breakfast)  Patient taking differently: Inject 22 Units into the skin every morning (before breakfast) 4/16/21   Virgin Longest, DO   Calcium Carbonate-Vitamin D (OYSTER SHELL CALCIUM/D) 500-200 MG-UNIT TABS Take 1 tablet by mouth 2 times daily 1/13/21 2/24/23  Debi Lara MD   vitamin D3 (CHOLECALCIFEROL) 25 MCG (1000 UT) TABS tablet Take 0.5 tablets by mouth daily  Patient taking differently: Take 1,000 Units by mouth daily 1/13/21   Debi Lara MD   aspirin 81 MG EC tablet Take 81 mg by mouth daily    Historical Provider, MD   vitamin B-12 (CYANOCOBALAMIN) 1000 MCG tablet Take 1,000 mcg by mouth daily    Historical Provider, MD   folic acid (FOLVITE) 1 MG tablet Take 5 mg by mouth daily    Historical Provider, MD     Additional information:       VITAL SIGNS   There were no vitals filed for this visit. PHYSICAL:   General: No acute distress  HEENT:  Unremarkable for age  Neck: without increased JVD, carotid pulses 2+ bilaterally without bruits  Heart: RRR, S1 & S2 WNL, S4 gallop, without murmurs or rubs    Lungs: Clear to auscultation    Abdomen: BS present, without HSM, masses, or tenderness    Extremities: without C,C,E.  Pulses 2+ bilaterally  Mental Status: Alert & Oriented        PLANNED PROCEDURE   [x]Cath  []PCI                []Pacemaker/AICD  []ROQUE             []Cardioversion []Peripheral angiography/PTA  []Other:      SEDATION  Planned agent:[x]Midazolam []Meperidine [x]Sublimaze []Morphine  []Diazepam  []Other:     ASA Classification:  []1 [x]2 []3 []4 []5  Class 1: A normal healthy patient  Class 2: Pt with mild to moderate systemic disease  Class 3: Severe systemic disease or disturbance  Class 4: Severe systemic disorders that are already life threatening.   Class 5: Moribund pt with little chances of survival, for more than 24 hours. Mallampati I Airway Classification:   []1 [x]2 []3 []4    [x]Pre-procedure diagnostic studies complete and results available. Comment:    [x]Previous sedation/anesthesia experiences assessed. Comment:    [x]The patient is an appropriate candidate to undergo the planned procedure sedation and anesthesia. (Refer to nursing sedation/analgesia documentation record)  [x]Formulation and discussion of sedation/procedure plan, risks, and expectations with patient and/or responsible adult completed. []Patient examined immediately prior to the procedure.  (Refer to nursing sedation/analgesia documentation record)    Renata Mcgill MD MD [facility] [place]  Electronically signed 2/24/2023 at 10:39 AM

## 2023-02-24 NOTE — FLOWSHEET NOTE
1415 Received report from [facility] . Patient has a vasc band on right radial which is dry and intact , no signs of bleeding or hematoma. 1415 released 2 ml of air from vasc band. 1530 released last 2 ml of air from band. 1600 removed vasc band and applied bandaid. 1630 Patient goals/plan/treatment preferences discussed by this RN. Discharge planning provided by this RN. Patient goals/plan/treatment preferences reviewed with patient/family. Patient/family verbalize understanding of discharge plan and are in agreement with goal/plan/treatment preferences. Understanding was demonstrated using the teach back method. AVS provided by this RN at time of discharge, which includes all necessary medical information pertaining to the patients current course of illness, treatment, post-discharge goals of care, and treatment preferences.

## 2023-02-24 NOTE — PROCEDURES
[address] [place] [address]                            CARDIAC CATHETERIZATION    PATIENT NAME: Janessa Gannon                     :        1945  MED REC NO:   [id number]                           ROOM:       0014  ACCOUNT NO:   [de-identified]                           ADMIT DATE: 2023  PROVIDER:     GEORGES Marreroel Radha OF PROCEDURE:  2023    CLINICAL HISTORY AND INDICATION:  This is a 57-year-old patient with  abnormal stress test, being evaluated for surgery for pilonidal cyst,  referred for cardiac cath due to her risk factors and symptoms. PROCEDURES PERFORMED:  1. Left heart cath with left ventriculogram.  2.  Coronary angiogram, right and left. 3.  Sedation, 2 of Versed and 25 of fentanyl between 12:30 and 01:00  p.m. in my presence under my supervision. BLOOD LOSS:  Less than 10 mL. PROCEDURE DETAILS:  Please refer to my catheterization detailed note. HEMODYNAMIC RESULTS AND LEFT VENTRICULOGRAM:  Left ventricular  end-diastolic pressure was 12 mmHg with no significant change before and  after contrast injection. No significant gradient across the aortic  valve to signify aortic stenosis. Left ventricular function was  globally within normal limits. EF 55%. CORONARY ARTERIOGRAM RESULTS:  1. Normal left main is patent, gives rise to left anterior descending  and left circumflex artery. 2.  Left anterior descending artery has nonobstructive mild luminal  irregularity. Diagonal artery is relatively smaller, has 80% stenosis,  possibly the cause of the ischemia in the anterior wall. 3.  Left circumflex artery is nondominant, patent. 4.  Right coronary artery is patent. There is nonobstructive diffuse  luminal irregularity. CONCLUSIONS:  1. Nonobstructive disease except for diagonal artery that is possibly  the reason for the ischemia on the stress test.  2.  Normal LV function.   3. No complication. RECOMMENDATIONS:  At this point, plan is to continue with aggressive  medical treatment. Reassess clinically. Consider intervention on the  diagonal artery if clinically indicated and not responding to medical  therapy. Otherwise, she will be able to proceed with the surgery with  an acceptable risk, which would be moderate for perioperative events.         Simeon Perez M.D.    D: 02/24/2023 13:02:59       T: 02/24/2023 13:05:30     [email or web address]  Job#: [id number]     Doc#: [id number]    CC:  Vidya Doty M.D.

## 2023-02-27 LAB
EKG ATRIAL RATE: 70 BPM
EKG P AXIS: 17 DEGREES
EKG P-R INTERVAL: 320 MS
EKG Q-T INTERVAL: 420 MS
EKG QRS DURATION: 96 MS
EKG QTC CALCULATION (BAZETT): 453 MS
EKG R AXIS: -83 DEGREES
EKG T AXIS: 7 DEGREES
EKG VENTRICULAR RATE: 70 BPM

## 2023-03-02 ENCOUNTER — TELEPHONE (OUTPATIENT)
Dept: CARDIOLOGY CLINIC | Age: 78
End: 2023-03-02

## 2023-03-02 NOTE — TELEPHONE ENCOUNTER
Pre op Risk Assessment    Procedure Stimwave Implant  Physician Dr Husam Pagan  Date of surgery/procedure 3/29/23    Last OV 1/6/23  Last Stress 1/19/23  Last Echo 1/19/23  Last Cath 2/24/23  Last Stent none in epic  Is patient on blood thinners ASA & Xarelto  Hold Meds/how many days 3    Fax: [phone]

## 2023-03-09 ENCOUNTER — TELEPHONE (OUTPATIENT)
Dept: FAMILY MEDICINE CLINIC | Age: 78
End: 2023-03-09

## 2023-03-09 ENCOUNTER — NURSE ONLY (OUTPATIENT)
Dept: LAB | Age: 78
End: 2023-03-09

## 2023-03-09 ENCOUNTER — OFFICE VISIT (OUTPATIENT)
Dept: FAMILY MEDICINE CLINIC | Age: 78
End: 2023-03-09

## 2023-03-09 VITALS
DIASTOLIC BLOOD PRESSURE: 62 MMHG | SYSTOLIC BLOOD PRESSURE: 162 MMHG | OXYGEN SATURATION: 97 % | HEART RATE: 65 BPM | WEIGHT: 189.6 LBS | TEMPERATURE: 97.4 F | RESPIRATION RATE: 14 BRPM | HEIGHT: 65 IN | BODY MASS INDEX: 31.59 KG/M2

## 2023-03-09 DIAGNOSIS — Z01.818 PREOP EXAMINATION: ICD-10-CM

## 2023-03-09 DIAGNOSIS — E87.1 HYPONATREMIA: Primary | ICD-10-CM

## 2023-03-09 DIAGNOSIS — Z79.4 TYPE 2 DIABETES MELLITUS WITH OTHER SPECIFIED COMPLICATION, WITH LONG-TERM CURRENT USE OF INSULIN (HCC): ICD-10-CM

## 2023-03-09 DIAGNOSIS — Z01.818 PRE-OP EXAM: ICD-10-CM

## 2023-03-09 DIAGNOSIS — Z01.818 PREOP EXAMINATION: Primary | ICD-10-CM

## 2023-03-09 DIAGNOSIS — E11.69 TYPE 2 DIABETES MELLITUS WITH OTHER SPECIFIED COMPLICATION, WITH LONG-TERM CURRENT USE OF INSULIN (HCC): ICD-10-CM

## 2023-03-09 LAB
ANION GAP SERPL CALC-SCNC: 14 MEQ/L (ref 8–16)
BILIRUBIN URINE: NEGATIVE
BLOOD URINE, POC: NEGATIVE
BUN SERPL-MCNC: 15 MG/DL (ref 7–22)
CALCIUM SERPL-MCNC: 9.9 MG/DL (ref 8.5–10.5)
CHARACTER, URINE: ABNORMAL
CHLORIDE SERPL-SCNC: 91 MEQ/L (ref 98–111)
CO2 SERPL-SCNC: 26 MEQ/L (ref 23–33)
COLOR, URINE: ABNORMAL
CREAT SERPL-MCNC: 0.6 MG/DL (ref 0.4–1.2)
CRP SERPL-MCNC: < 0.3 MG/DL (ref 0–1)
ERYTHROCYTE [SEDIMENTATION RATE] IN BLOOD BY WESTERGREN METHOD: 35 MM/HR (ref 0–20)
GFR SERPL CREATININE-BSD FRML MDRD: > 60 ML/MIN/1.73M2
GLUCOSE SERPL-MCNC: 221 MG/DL (ref 70–108)
GLUCOSE URINE: NEGATIVE MG/DL
HBA1C MFR BLD: 7.9 % (ref 4.3–5.7)
KETONES, URINE: NEGATIVE
LEUKOCYTE CLUMPS, URINE: NEGATIVE
NITRITE, URINE: NEGATIVE
PH, URINE: 7 (ref 5–9)
POTASSIUM SERPL-SCNC: 5.3 MEQ/L (ref 3.5–5.2)
PROTEIN, URINE: ABNORMAL MG/DL
SODIUM SERPL-SCNC: 131 MEQ/L (ref 135–145)
SPECIFIC GRAVITY, URINE: 1.01 (ref 1–1.03)
UROBILINOGEN, URINE: 0.2 EU/DL (ref 0–1)

## 2023-03-09 NOTE — TELEPHONE ENCOUNTER
----- Message from EMMANUEL Johns CNP sent at 3/9/2023  4:17 PM EST -----  Sodium is low and potassium is elevated. Kidney function is stable. Can we recheck BMP beginning of next week, along with some other labs.

## 2023-03-09 NOTE — PROGRESS NOTES
Kelsey Em is a 70 y.o. female that presents for Pre-op Exam    At the request of Dr. Mami Murphy presents for pre-operative evaluation for her surgery. 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, 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    S/P ablation of atrial fibrillation 02/11/2021    Persistent atrial fibrillation ([address]     Hyperparathyroidism, unspecified ([address] 02/01/2021    Status post parathyroidectomy ([address] 01/13/2021    Pathological fracture of humerus due to osteoporosis 01/13/2021    Other osteoporosis without current pathological fracture 01/13/2021    PAF (paroxysmal atrial fibrillation) ([address]     Hypotension     Hyperlipidemia     S/P parathyroidectomy ([address] 10/01/2020    Atrial fibrillation/flutter 10/01/2020    Elevated parathyroid hormone 08/24/2020    Parathyroid adenoma 08/24/2020    Hypercalcemia 08/24/2020    Osteoporosis with pathological fracture 08/24/2020    TRACY on CPAP 08/27/2019    Obesity (BMI 30-39.9) 08/27/2019    History of humerus fracture 06/27/2019    Hyponatremia 09/18/2018    Anticoagulant long-term use 09/17/2018    Microalbuminuria due to type 2 diabetes mellitus ([address] 05/17/2018    Chronic atrial fibrillation ([address] 05/17/2018    Type 2 diabetes mellitus with insulin therapy ([address] 05/17/2018    Essential hypertension 05/17/2018    Osteopenia of spine 05/17/2018    Hypothyroidism 05/17/2018    History of non-Hodgkin's lymphoma 05/17/2018       PREOPERATIVE FAMILY HISTORY  - She reports that she does not have a history of bad reaction to anesthesia.  -she does not have a family history of bleeding problems such as hemophilia, or Chase disease, or von Willebrand disease.     PREOPERATIVE ALLERGIES QUESTION:  No Known Allergies      PREOPERATIVE MEDICATION QUESTION:  Current Outpatient Medications   Medication Sig Dispense Refill    insulin glargine (BASAGLAR KWIKPEN) 100 UNIT/ML injection pen Inject 22 Units into the skin daily      glimepiride (AMARYL) 4 MG tablet Take 1 tablet by mouth 2 times daily 180 tablet 3    metoprolol tartrate (LOPRESSOR) 50 MG tablet Take 1 tablet by mouth 2 times daily 180 tablet 3    simvastatin (ZOCOR) 40 MG tablet TAKE 1 TABLET NIGHTLY 90 tablet 3    levothyroxine (SYNTHROID) 112 MCG tablet Take 1 tablet by mouth Daily 90 tablet 3    metFORMIN (GLUCOPHAGE-XR) 500 MG extended release tablet Take 2 tablets by mouth 2 times daily 360 tablet 3    alendronate (FOSAMAX) 70 MG tablet Take 1 tablet by mouth every 7 days 12 tablet 3    rivaroxaban (XARELTO) 20 MG TABS tablet Take 1 tablet by mouth Daily with supper 90 tablet 1    hydrALAZINE (APRESOLINE) 50 MG tablet Take 1 tablet by mouth 2 times daily 180 tablet 3    losartan (COZAAR) 100 MG tablet Take 1 tablet by mouth daily 90 tablet 3    Multiple Vitamins-Minerals (EYE VITAMINS & MINERALS PO) Take by mouth daily      vitamin D3 (CHOLECALCIFEROL) 25 MCG (1000 UT) TABS tablet Take 0.5 tablets by mouth daily 90 tablet 5    aspirin 81 MG EC tablet Take 81 mg by mouth daily      vitamin B-12 (CYANOCOBALAMIN) 1000 MCG tablet Take 1,000 mcg by mouth daily      folic acid (FOLVITE) 1 MG tablet Take 5 mg by mouth daily      Calcium Carbonate-Vitamin D (OYSTER SHELL CALCIUM/D) 500-200 MG-UNIT TABS Take 1 tablet by mouth 2 times daily 180 tablet 5     No current facility-administered medications for this visit. CARDIAC RISK FACTORS  she does not have a history of UA or MI within that past 30 days  she does not have decompensated CHF or significant valvular disease  she does not have a history of significant cardiac arrhythmia  she denies chest pain, exertional dyspnea, orthopnea, palpitations or LE edema.     she can walk up 1 flight of stairs or 8 steps without stopping (4 METS)    she does have a history of CAD  she does not have a history of CHF  she does have a history of CVA or TIA  she does have a history of DM requiring insulin  she does not have a history of Renal insufficiency (Cr > 2.0)    EKG reveals sinus rhythm with a rate of 70. Recent heart cath by Dr Navneet Rondon, cleared for surgery. http://circ. ahajournals. org/content/[email or web address]/17/[email or web address]/F2. 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 she does not have a history of seizures. she does + have a history of phlebitis or blood clots in arms or legs. Has jermaine filter. she does have a history of sleep apnea. she  does have a history of snoring loudly enough to be heard through a closed door. PHYSICAL EXAM:  Blood pressure (!) 162/62, pulse 65, temperature 97.4 °F (36.3 °C), temperature source Temporal, resp. rate 14, height 5' 5\" (1.651 m), weight 189 lb 9.6 oz (86 kg), SpO2 97 %, not currently breastfeeding. GEN: No acute distress  HEENT:  NCAT, PERRL, EOMI, Nares clear, turbinates pink, mucosa is moist.  Oropharynx  is clear. Hearing grossly intact. Neck: No lymphadenopathy or masses. Thyroid not palpable; no nodules or masses. Heart: RRR. S1 and S2 normal, + murmurs, clicks, gallops or rubs. No carotid bruits noted. Lungs:  CTAB,  No wheezing, ronchi, or rales. Normal symmetric air entry throughout both lung fields. Abdomen:  Soft, non tender, non distended. No rebound or guarding. No organomegaly. Extremities:  No gross deformity, erythema or edema of the lower extremities. Skin: No pathologic lesions or significant rash. Psych:  Affect appropriate. Thought process is normal without evidence of depression or psychosis. Good insight and appropriae interaction. Cognition and memory appear to be intact. Hugh Tarango was seen today for pre-op exam.    Diagnoses and all orders for this visit:    Preop examination  -     C-Reactive Protein; Future  -     Sedimentation Rate;  Future  - Basic Metabolic Panel; Future    Type 2 diabetes mellitus with other specified complication, with long-term current use of insulin ([facility])  -     POCT glycosylated hemoglobin (Hb A1C)  A1C improved to 7.9%, pt reports fasting blood sugars less than 150s. No hypoglycemia episodes. Recovering from URI, on Doxycycline for toe infection which is healing. No fevers. Cough with chest congestion. Taking Coricidin and cough medication OTC. Elevated BP today in office, recommend checking with home cuff and fu next week on BP. Will call with results of labs. Cardiology cleared patient, recent heart cath. Per 2014 ACC/AHA guidelines, patient may proceed with planned surgery. Patient has a Giron Cardiac Risk Index score of 3 indicating 15% chance of perioperative cardiac complications. she will require standard VTE prophylaxis. Http://annals. org/article. aspx?gwqzbcyyh=[email or web address]  http://[email or web address] com/CardiacRisk_G.htm

## 2023-03-14 ENCOUNTER — NURSE ONLY (OUTPATIENT)
Dept: LAB | Age: 78
End: 2023-03-14

## 2023-03-14 ENCOUNTER — TELEPHONE (OUTPATIENT)
Dept: FAMILY MEDICINE CLINIC | Age: 78
End: 2023-03-14

## 2023-03-14 DIAGNOSIS — E87.1 HYPONATREMIA: Primary | ICD-10-CM

## 2023-03-14 DIAGNOSIS — E87.1 HYPONATREMIA: ICD-10-CM

## 2023-03-14 LAB
ANION GAP SERPL CALC-SCNC: 10 MEQ/L (ref 8–16)
BUN SERPL-MCNC: 21 MG/DL (ref 7–22)
CALCIUM SERPL-MCNC: 10.2 MG/DL (ref 8.5–10.5)
CHLORIDE SERPL-SCNC: 90 MEQ/L (ref 98–111)
CO2 SERPL-SCNC: 28 MEQ/L (ref 23–33)
CREAT SERPL-MCNC: 0.6 MG/DL (ref 0.4–1.2)
GFR SERPL CREATININE-BSD FRML MDRD: > 60 ML/MIN/1.73M2
GLUCOSE SERPL-MCNC: 195 MG/DL (ref 70–108)
OSMOLALITY SERPL: 286 MOSMOL/KG (ref 275–295)
POTASSIUM SERPL-SCNC: 5 MEQ/L (ref 3.5–5.2)
SODIUM SERPL-SCNC: 128 MEQ/L (ref 135–145)
SODIUM UR-SCNC: 57 MEQ/L

## 2023-03-14 NOTE — TELEPHONE ENCOUNTER
----- Message from EMMANUEL Lu CNP sent at 3/9/2023  8:51 AM EST -----  FU on upper respiratory infection.  Thank you

## 2023-03-15 ENCOUNTER — NURSE ONLY (OUTPATIENT)
Dept: LAB | Age: 78
End: 2023-03-15

## 2023-03-15 ENCOUNTER — TELEPHONE (OUTPATIENT)
Dept: FAMILY MEDICINE CLINIC | Age: 78
End: 2023-03-15

## 2023-03-15 DIAGNOSIS — E87.1 HYPONATREMIA: ICD-10-CM

## 2023-03-15 LAB — OSMOLALITY UR: 459 MOSMOL/KG (ref 250–750)

## 2023-03-15 NOTE — TELEPHONE ENCOUNTER
Hermilo Perera thank you, can you see if patient will go to lab and give urine sample for urine osmolality, as this will help us further assess why her sodium is low. I have placed order.  Thank you

## 2023-03-15 NOTE — TELEPHONE ENCOUNTER
[id number] Monique Yanes, let her know, after discussing with another provider, I would like to refer her to nephrology to continue the work up for low sodium levels if she is not opposed to that, and if she has preference on who she sees. This should not preclude her from having her surgery. I will send office note and clearance form. Continue to update me on BPs.   Thx.

## 2023-03-15 NOTE — TELEPHONE ENCOUNTER
Patient has been informed and voiced understanding and does not have a preference of who she sees in nephrology as long as it is within [facility]

## 2023-03-15 NOTE — TELEPHONE ENCOUNTER
Pt informed and verbalized understanding.    Pt thinks she is drinking plenty of fluids  She is not taking the Hydrochlorothiazide

## 2023-03-15 NOTE — TELEPHONE ENCOUNTER
----- Message from EMMANUEL Butler CNP sent at 3/15/2023  8:45 AM EDT -----  Can we call lab and see if they can add a urine osmolality to urine collected yesterday?

## 2023-03-15 NOTE — TELEPHONE ENCOUNTER
Ok, I would recommend taking allergy pill if she is still having post nasal drainage. See if this helps. I reviewed her labs, sodium still low but potassium has returned to normal.  I am waiting to see if they can add on a lab to the urine she had collected yesterday to see if we need further work up for low sodium. Is she drinking plenty of fluids/water? Verify with her that she is not taking a Hydrochlorothiazide. I dont see it on the medication list. Just want to be sure.   Thank you

## 2023-03-17 ENCOUNTER — HOSPITAL ENCOUNTER (OUTPATIENT)
Dept: WOMENS IMAGING | Age: 78
Discharge: HOME | End: 2023-03-17
Payer: MEDICARE

## 2023-03-17 DIAGNOSIS — Z12.31 VISIT FOR SCREENING MAMMOGRAM: ICD-10-CM

## 2023-03-17 PROCEDURE — 77063 BREAST TOMOSYNTHESIS BI: CPT

## 2023-04-03 ENCOUNTER — OFFICE VISIT (OUTPATIENT)
Dept: NEPHROLOGY | Age: 78
End: 2023-04-03
Payer: MEDICARE

## 2023-04-03 VITALS
BODY MASS INDEX: 31.32 KG/M2 | OXYGEN SATURATION: 98 % | WEIGHT: 188 LBS | HEIGHT: 65 IN | DIASTOLIC BLOOD PRESSURE: 71 MMHG | HEART RATE: 73 BPM | SYSTOLIC BLOOD PRESSURE: 142 MMHG

## 2023-04-03 DIAGNOSIS — I10 HTN (HYPERTENSION), BENIGN: Primary | ICD-10-CM

## 2023-04-03 DIAGNOSIS — E87.1 HYPONATREMIA: ICD-10-CM

## 2023-04-03 PROCEDURE — 3077F SYST BP >= 140 MM HG: CPT | Performed by: INTERNAL MEDICINE

## 2023-04-03 PROCEDURE — 99204 OFFICE O/P NEW MOD 45 MIN: CPT | Performed by: INTERNAL MEDICINE

## 2023-04-03 PROCEDURE — 1036F TOBACCO NON-USER: CPT | Performed by: INTERNAL MEDICINE

## 2023-04-03 PROCEDURE — G8417 CALC BMI ABV UP PARAM F/U: HCPCS | Performed by: INTERNAL MEDICINE

## 2023-04-03 PROCEDURE — G8399 PT W/DXA RESULTS DOCUMENT: HCPCS | Performed by: INTERNAL MEDICINE

## 2023-04-03 PROCEDURE — 1090F PRES/ABSN URINE INCON ASSESS: CPT | Performed by: INTERNAL MEDICINE

## 2023-04-03 PROCEDURE — 1123F ACP DISCUSS/DSCN MKR DOCD: CPT | Performed by: INTERNAL MEDICINE

## 2023-04-03 PROCEDURE — G8427 DOCREV CUR MEDS BY ELIG CLIN: HCPCS | Performed by: INTERNAL MEDICINE

## 2023-04-03 PROCEDURE — 3078F DIAST BP <80 MM HG: CPT | Performed by: INTERNAL MEDICINE

## 2023-04-03 ASSESSMENT — ENCOUNTER SYMPTOMS
SHORTNESS OF BREATH: 1
NAUSEA: 0
EYES NEGATIVE: 1
BACK PAIN: 0
SORE THROAT: 0
COUGH: 0
EYE PAIN: 0
ABDOMINAL PAIN: 0
DIARRHEA: 1
VOMITING: 0

## 2023-04-03 NOTE — PROGRESS NOTES
CHOLECYSTECTOMY      FRACTURE SURGERY      ankle surgery    FRACTURE SURGERY Right 09/2018    HUMERUS    HERNIA REPAIR      HYSTERECTOMY (CERVIX STATUS UNKNOWN)      PARATHYROID GLAND SURGERY N/A 10/1/2020    PARATHYROIDECTOMY, EXPLORATION OF PARATHYROIDS performed by Kaykay Marsh MD at [address] [facility] OFFICE/OUTPT [address] Right 9/19/2018    ORIF RIGHT PROXIMAL HUMERUS FX performed by Efrain Butler MD at [address] [facility] Right 6/27/2019    REVISION RT HUMERUS FX, HARDWARE REMOVAL, HUMERAL NAIL INSERTION performed by Martha Bright MD at [place] [address]. Right 8/26/2021    HARDWARE REMOVAL RIGHT HUMERUS, CONVERSION TO RIGHT REVERSE TOTAL SHOULDER REPLACEMENT performed by Martha Bright MD at [address] History     Socioeconomic History    Marital status:      Spouse name: Yelitza Benson    Number of children: 3    Years of education: 16    Highest education level: High school graduate   Occupational History    Not on file   Tobacco Use    Smoking status: Never     Passive exposure: Past    Smokeless tobacco: Never   Vaping Use    Vaping Use: Never used   Substance and Sexual Activity    Alcohol use: No    Drug use: No    Sexual activity: Not Currently   Other Topics Concern    Not on file   Social History Narrative    Jamey Min referral placed for high cost medication    No barriers with transportation    No community services needed     Social Determinants of Health     Financial Resource Strain: Not on file   Food Insecurity: Not on file   Transportation Needs: Not on file   Physical Activity: Not on file   Stress: Not on file   Social Connections: Not on file   Intimate Partner Violence: Not on file   Housing Stability: Not on file     Family History   Problem Relation Age of Onset    Heart Disease Mother     Heart Disease Father     Cancer Sister     Other Sister         cirrosis     Medications & Allergies :

## 2023-04-27 ENCOUNTER — OFFICE VISIT (OUTPATIENT)
Dept: CARDIOLOGY CLINIC | Age: 78
End: 2023-04-27
Payer: MEDICARE

## 2023-04-27 VITALS
HEIGHT: 65 IN | BODY MASS INDEX: 30.82 KG/M2 | WEIGHT: 185 LBS | DIASTOLIC BLOOD PRESSURE: 72 MMHG | HEART RATE: 64 BPM | SYSTOLIC BLOOD PRESSURE: 156 MMHG

## 2023-04-27 DIAGNOSIS — I25.10 CORONARY ARTERY DISEASE INVOLVING NATIVE CORONARY ARTERY OF NATIVE HEART WITHOUT ANGINA PECTORIS: Primary | ICD-10-CM

## 2023-04-27 DIAGNOSIS — E78.01 FAMILIAL HYPERCHOLESTEROLEMIA: ICD-10-CM

## 2023-04-27 DIAGNOSIS — I10 PRIMARY HYPERTENSION: ICD-10-CM

## 2023-04-27 PROCEDURE — 3077F SYST BP >= 140 MM HG: CPT | Performed by: NUCLEAR MEDICINE

## 2023-04-27 PROCEDURE — 1036F TOBACCO NON-USER: CPT | Performed by: NUCLEAR MEDICINE

## 2023-04-27 PROCEDURE — G8417 CALC BMI ABV UP PARAM F/U: HCPCS | Performed by: NUCLEAR MEDICINE

## 2023-04-27 PROCEDURE — 1090F PRES/ABSN URINE INCON ASSESS: CPT | Performed by: NUCLEAR MEDICINE

## 2023-04-27 PROCEDURE — 99214 OFFICE O/P EST MOD 30 MIN: CPT | Performed by: NUCLEAR MEDICINE

## 2023-04-27 PROCEDURE — G8399 PT W/DXA RESULTS DOCUMENT: HCPCS | Performed by: NUCLEAR MEDICINE

## 2023-04-27 PROCEDURE — 3078F DIAST BP <80 MM HG: CPT | Performed by: NUCLEAR MEDICINE

## 2023-04-27 PROCEDURE — 1123F ACP DISCUSS/DSCN MKR DOCD: CPT | Performed by: NUCLEAR MEDICINE

## 2023-04-27 PROCEDURE — G8427 DOCREV CUR MEDS BY ELIG CLIN: HCPCS | Performed by: NUCLEAR MEDICINE

## 2023-04-27 RX ORDER — HYDRALAZINE HYDROCHLORIDE 50 MG/1
50 TABLET, FILM COATED ORAL 3 TIMES DAILY
Qty: 270 TABLET | Refills: 3 | Status: SHIPPED | OUTPATIENT
Start: 2023-04-27

## 2023-04-27 RX ORDER — HYDRALAZINE HYDROCHLORIDE 50 MG/1
50 TABLET, FILM COATED ORAL 3 TIMES DAILY
COMMUNITY
End: 2023-04-27 | Stop reason: SDUPTHER

## 2023-04-27 NOTE — PROGRESS NOTES
Patient here for check up.
HUMERUS, CONVERSION TO RIGHT REVERSE TOTAL SHOULDER REPLACEMENT performed by Claritza Orr MD at [address] [facility]       Family History   Problem Relation Age of Onset    Heart Disease Mother     Heart Disease Father     Cancer Sister     Other Sister         cirrosis     Social History     Tobacco Use    Smoking status: Never     Passive exposure: Past    Smokeless tobacco: Never   Substance Use Topics    Alcohol use: No      Current Outpatient Medications   Medication Sig Dispense Refill    metoprolol tartrate (LOPRESSOR) 50 MG tablet Take 1.5 tablets by mouth 2 times daily 180 tablet 3    insulin glargine (BASAGLAR KWIKPEN) 100 UNIT/ML injection pen Inject 24 Units into the skin daily      glimepiride (AMARYL) 4 MG tablet Take 1 tablet by mouth 2 times daily 180 tablet 3    simvastatin (ZOCOR) 40 MG tablet TAKE 1 TABLET NIGHTLY 90 tablet 3    levothyroxine (SYNTHROID) 112 MCG tablet Take 1 tablet by mouth Daily 90 tablet 3    metFORMIN (GLUCOPHAGE-XR) 500 MG extended release tablet Take 2 tablets by mouth 2 times daily 360 tablet 3    alendronate (FOSAMAX) 70 MG tablet Take 1 tablet by mouth every 7 days 12 tablet 3    rivaroxaban (XARELTO) 20 MG TABS tablet Take 1 tablet by mouth Daily with supper 90 tablet 1    hydrALAZINE (APRESOLINE) 50 MG tablet Take 1 tablet by mouth 2 times daily 180 tablet 3    losartan (COZAAR) 100 MG tablet Take 1 tablet by mouth daily 90 tablet 3    Multiple Vitamins-Minerals (EYE VITAMINS & MINERALS PO) Take by mouth daily      vitamin D3 (CHOLECALCIFEROL) 25 MCG (1000 UT) TABS tablet Take 0.5 tablets by mouth daily 90 tablet 5    aspirin 81 MG EC tablet Take 1 tablet by mouth daily      vitamin B-12 (CYANOCOBALAMIN) 1000 MCG tablet Take 1 tablet by mouth daily      folic acid (FOLVITE) 1 MG tablet Take 5 tablets by mouth daily      Calcium Carbonate-Vitamin D (OYSTER SHELL CALCIUM/D) 500-200 MG-UNIT TABS Take 1 tablet by mouth 2 times daily 180 tablet 5
right sided headache for 4 days duration, right facial droop at 8AM on morning of presentation

## 2023-05-26 RX ORDER — LOSARTAN POTASSIUM 100 MG/1
100 TABLET ORAL DAILY
Qty: 90 TABLET | Refills: 3 | Status: SHIPPED | OUTPATIENT
Start: 2023-05-26

## 2023-05-31 ENCOUNTER — NURSE ONLY (OUTPATIENT)
Dept: LAB | Age: 78
End: 2023-05-31

## 2023-05-31 DIAGNOSIS — I10 HTN (HYPERTENSION), BENIGN: ICD-10-CM

## 2023-05-31 DIAGNOSIS — E87.1 HYPONATREMIA: ICD-10-CM

## 2023-05-31 LAB
ALBUMIN SERPL BCG-MCNC: 4.4 G/DL (ref 3.5–5.1)
ALP SERPL-CCNC: 46 U/L (ref 38–126)
ALT SERPL W/O P-5'-P-CCNC: 18 U/L (ref 11–66)
ANION GAP SERPL CALC-SCNC: 12 MEQ/L (ref 8–16)
AST SERPL-CCNC: 20 U/L (ref 5–40)
BACTERIA: NORMAL
BILIRUB SERPL-MCNC: 0.6 MG/DL (ref 0.3–1.2)
BILIRUB UR QL STRIP: NEGATIVE
BUN SERPL-MCNC: 21 MG/DL (ref 7–22)
CALCIUM SERPL-MCNC: 10.1 MG/DL (ref 8.5–10.5)
CASTS #/AREA URNS LPF: NORMAL /LPF
CASTS #/AREA URNS LPF: NORMAL /LPF
CHARACTER UR: CLEAR
CHARCOAL URNS QL MICRO: NORMAL
CHLORIDE 24H UR-SRATE: 89 MEQ/L
CHLORIDE SERPL-SCNC: 99 MEQ/L (ref 98–111)
CO2 SERPL-SCNC: 26 MEQ/L (ref 23–33)
COLOR UR: YELLOW
CREAT SERPL-MCNC: 0.7 MG/DL (ref 0.4–1.2)
CREAT UR-MCNC: 48.6 MG/DL
CREAT UR-MCNC: 48.6 MG/DL
CRYSTALS URNS QL MICRO: NORMAL
EPITHELIAL CELLS, UA: NORMAL /HPF
GFR SERPL CREATININE-BSD FRML MDRD: > 60 ML/MIN/1.73M2
GLUCOSE SERPL-MCNC: 144 MG/DL (ref 70–108)
GLUCOSE UR QL STRIP.AUTO: NEGATIVE MG/DL
HGB UR QL STRIP.AUTO: NEGATIVE
KETONES UR QL STRIP.AUTO: NEGATIVE
LEUKOCYTE ESTERASE UR QL STRIP.AUTO: NEGATIVE
MICROALBUMIN UR-MCNC: 8.2 MG/DL
MICROALBUMIN/CREAT RATIO PNL UR: 169 MG/G (ref 0–30)
NITRITE UR QL STRIP.AUTO: NEGATIVE
OSMOLALITY SERPL: 294 MOSMOL/KG (ref 275–295)
OSMOLALITY UR: 431 MOSMOL/KG (ref 250–750)
PH UR STRIP.AUTO: 5.5 [PH] (ref 5–9)
POTASSIUM SERPL-SCNC: 4.9 MEQ/L (ref 3.5–5.2)
POTASSIUM UR-SCNC: 15.1 MEQ/L
PROT SERPL-MCNC: 6.9 G/DL (ref 6.1–8)
PROT UR STRIP.AUTO-MCNC: NEGATIVE MG/DL
PROT UR-MCNC: 12.7 MG/DL
PROT/CREAT 24H UR: 0.26 MG/G{CREAT}
RBC #/AREA URNS HPF: NORMAL /HPF
RENAL EPI CELLS #/AREA URNS HPF: NORMAL /[HPF]
SODIUM SERPL-SCNC: 137 MEQ/L (ref 135–145)
SODIUM UR-SCNC: 96 MEQ/L
SPECIFIC GRAVITY UA: 1.01 (ref 1–1.03)
URATE SERPL-MCNC: 4.3 MG/DL (ref 2.4–5.7)
UROBILINOGEN, URINE: 0.2 EU/DL (ref 0–1)
WBC #/AREA URNS HPF: NORMAL /HPF
YEAST LIKE FUNGI URNS QL MICRO: NORMAL

## 2023-06-05 ENCOUNTER — OFFICE VISIT (OUTPATIENT)
Dept: NEPHROLOGY | Age: 78
End: 2023-06-05
Payer: MEDICARE

## 2023-06-05 ENCOUNTER — TELEPHONE (OUTPATIENT)
Dept: NEPHROLOGY | Age: 78
End: 2023-06-05

## 2023-06-05 VITALS
HEART RATE: 72 BPM | SYSTOLIC BLOOD PRESSURE: 161 MMHG | OXYGEN SATURATION: 98 % | DIASTOLIC BLOOD PRESSURE: 73 MMHG | WEIGHT: 185 LBS | BODY MASS INDEX: 30.79 KG/M2

## 2023-06-05 DIAGNOSIS — I10 HTN (HYPERTENSION), BENIGN: Primary | ICD-10-CM

## 2023-06-05 DIAGNOSIS — N18.1 CKD (CHRONIC KIDNEY DISEASE), STAGE I: ICD-10-CM

## 2023-06-05 DIAGNOSIS — E87.1 HYPONATREMIA: ICD-10-CM

## 2023-06-05 PROCEDURE — G8417 CALC BMI ABV UP PARAM F/U: HCPCS | Performed by: INTERNAL MEDICINE

## 2023-06-05 PROCEDURE — 99213 OFFICE O/P EST LOW 20 MIN: CPT | Performed by: INTERNAL MEDICINE

## 2023-06-05 PROCEDURE — G8427 DOCREV CUR MEDS BY ELIG CLIN: HCPCS | Performed by: INTERNAL MEDICINE

## 2023-06-05 PROCEDURE — 3078F DIAST BP <80 MM HG: CPT | Performed by: INTERNAL MEDICINE

## 2023-06-05 PROCEDURE — 1036F TOBACCO NON-USER: CPT | Performed by: INTERNAL MEDICINE

## 2023-06-05 PROCEDURE — 1090F PRES/ABSN URINE INCON ASSESS: CPT | Performed by: INTERNAL MEDICINE

## 2023-06-05 PROCEDURE — 1123F ACP DISCUSS/DSCN MKR DOCD: CPT | Performed by: INTERNAL MEDICINE

## 2023-06-05 PROCEDURE — 3077F SYST BP >= 140 MM HG: CPT | Performed by: INTERNAL MEDICINE

## 2023-06-05 PROCEDURE — G8399 PT W/DXA RESULTS DOCUMENT: HCPCS | Performed by: INTERNAL MEDICINE

## 2023-06-05 NOTE — PROGRESS NOTES
performed by Echo Arizmendi MD at [address] [place] [address]          Medications :     Outpatient Medications Marked as Taking for the 6/5/23 encounter (Office Visit) with Samantha Cook MD   Medication Sig Dispense Refill    losartan (COZAAR) 100 MG tablet Take 1 tablet by mouth daily 90 tablet 3    hydrALAZINE (APRESOLINE) 50 MG tablet Take 1 tablet by mouth 3 times daily 270 tablet 3    metoprolol tartrate (LOPRESSOR) 50 MG tablet Take 1.5 tablets by mouth 2 times daily 180 tablet 3    insulin glargine (BASAGLAR KWIKPEN) 100 UNIT/ML injection pen Inject 24 Units into the skin daily      glimepiride (AMARYL) 4 MG tablet Take 1 tablet by mouth 2 times daily 180 tablet 3    simvastatin (ZOCOR) 40 MG tablet TAKE 1 TABLET NIGHTLY 90 tablet 3    levothyroxine (SYNTHROID) 112 MCG tablet Take 1 tablet by mouth Daily 90 tablet 3    metFORMIN (GLUCOPHAGE-XR) 500 MG extended release tablet Take 2 tablets by mouth 2 times daily 360 tablet 3    alendronate (FOSAMAX) 70 MG tablet Take 1 tablet by mouth every 7 days 12 tablet 3    rivaroxaban (XARELTO) 20 MG TABS tablet Take 1 tablet by mouth Daily with supper 90 tablet 1    Multiple Vitamins-Minerals (EYE VITAMINS & MINERALS PO) Take by mouth daily      Calcium Carbonate-Vitamin D (OYSTER SHELL CALCIUM/D) 500-200 MG-UNIT TABS Take 1 tablet by mouth 2 times daily 180 tablet 5    vitamin D3 (CHOLECALCIFEROL) 25 MCG (1000 UT) TABS tablet Take 0.5 tablets by mouth daily 90 tablet 5    aspirin 81 MG EC tablet Take 1 tablet by mouth daily      vitamin B-12 (CYANOCOBALAMIN) 1000 MCG tablet Take 1 tablet by mouth daily      folic acid (FOLVITE) 1 MG tablet Take 5 tablets by mouth daily         Vitals     BP (!) 161/73 (Site: Right Upper Arm, Position: Sitting, Cuff Size: Large Adult)   Pulse 72   Wt 185 lb (83.9 kg)   SpO2 98%   BMI 30.79 kg/m²  Wt Readings from Last 3 Encounters:   06/05/23 185 lb (83.9 kg)   04/27/23 185 lb (83.9 kg)   04/03/23 188 lb (85.3

## 2023-06-20 ENCOUNTER — OFFICE VISIT (OUTPATIENT)
Dept: FAMILY MEDICINE CLINIC | Age: 78
End: 2023-06-20
Payer: MEDICARE

## 2023-06-20 VITALS
WEIGHT: 184.6 LBS | TEMPERATURE: 98.2 F | HEIGHT: 65 IN | HEART RATE: 64 BPM | DIASTOLIC BLOOD PRESSURE: 76 MMHG | OXYGEN SATURATION: 99 % | SYSTOLIC BLOOD PRESSURE: 148 MMHG | BODY MASS INDEX: 30.75 KG/M2 | RESPIRATION RATE: 14 BRPM

## 2023-06-20 DIAGNOSIS — E11.69 TYPE 2 DIABETES MELLITUS WITH OTHER SPECIFIED COMPLICATION, WITH LONG-TERM CURRENT USE OF INSULIN (HCC): ICD-10-CM

## 2023-06-20 DIAGNOSIS — Z00.00 MEDICARE ANNUAL WELLNESS VISIT, SUBSEQUENT: Primary | ICD-10-CM

## 2023-06-20 DIAGNOSIS — I10 ESSENTIAL HYPERTENSION: ICD-10-CM

## 2023-06-20 DIAGNOSIS — Z23 NEED FOR VACCINATION: ICD-10-CM

## 2023-06-20 DIAGNOSIS — Z79.4 TYPE 2 DIABETES MELLITUS WITH OTHER SPECIFIED COMPLICATION, WITH LONG-TERM CURRENT USE OF INSULIN (HCC): ICD-10-CM

## 2023-06-20 PROBLEM — Z90.89 STATUS POST PARATHYROIDECTOMY: Status: RESOLVED | Noted: 2021-01-13 | Resolved: 2023-06-20

## 2023-06-20 PROBLEM — E21.3 HYPERPARATHYROIDISM, UNSPECIFIED (HCC): Status: RESOLVED | Noted: 2021-02-01 | Resolved: 2023-06-20

## 2023-06-20 PROBLEM — E89.2 STATUS POST PARATHYROIDECTOMY (HCC): Status: RESOLVED | Noted: 2021-01-13 | Resolved: 2023-06-20

## 2023-06-20 PROBLEM — Z90.89 S/P PARATHYROIDECTOMY: Status: RESOLVED | Noted: 2020-10-01 | Resolved: 2023-06-20

## 2023-06-20 PROBLEM — Z98.890 STATUS POST PARATHYROIDECTOMY: Status: RESOLVED | Noted: 2021-01-13 | Resolved: 2023-06-20

## 2023-06-20 PROBLEM — Z98.890 S/P PARATHYROIDECTOMY: Status: RESOLVED | Noted: 2020-10-01 | Resolved: 2023-06-20

## 2023-06-20 PROBLEM — E89.2 S/P PARATHYROIDECTOMY (HCC): Status: RESOLVED | Noted: 2020-10-01 | Resolved: 2023-06-20

## 2023-06-20 LAB — HBA1C MFR BLD: 7.2 % (ref 4.3–5.7)

## 2023-06-20 PROCEDURE — 3077F SYST BP >= 140 MM HG: CPT | Performed by: NURSE PRACTITIONER

## 2023-06-20 PROCEDURE — 3051F HG A1C>EQUAL 7.0%<8.0%: CPT | Performed by: NURSE PRACTITIONER

## 2023-06-20 PROCEDURE — G0439 PPPS, SUBSEQ VISIT: HCPCS | Performed by: NURSE PRACTITIONER

## 2023-06-20 PROCEDURE — 90471 IMMUNIZATION ADMIN: CPT | Performed by: NURSE PRACTITIONER

## 2023-06-20 PROCEDURE — 3078F DIAST BP <80 MM HG: CPT | Performed by: NURSE PRACTITIONER

## 2023-06-20 PROCEDURE — 1123F ACP DISCUSS/DSCN MKR DOCD: CPT | Performed by: NURSE PRACTITIONER

## 2023-06-20 PROCEDURE — 90715 TDAP VACCINE 7 YRS/> IM: CPT | Performed by: NURSE PRACTITIONER

## 2023-06-20 ASSESSMENT — PATIENT HEALTH QUESTIONNAIRE - PHQ9
SUM OF ALL RESPONSES TO PHQ QUESTIONS 1-9: 0
2. FEELING DOWN, DEPRESSED OR HOPELESS: 0
SUM OF ALL RESPONSES TO PHQ9 QUESTIONS 1 & 2: 0
1. LITTLE INTEREST OR PLEASURE IN DOING THINGS: 0
SUM OF ALL RESPONSES TO PHQ QUESTIONS 1-9: 0

## 2023-06-20 ASSESSMENT — LIFESTYLE VARIABLES
HOW MANY STANDARD DRINKS CONTAINING ALCOHOL DO YOU HAVE ON A TYPICAL DAY: PATIENT DOES NOT DRINK
HOW OFTEN DO YOU HAVE A DRINK CONTAINING ALCOHOL: NEVER

## 2023-06-20 NOTE — PATIENT INSTRUCTIONS
Metoprolol 50 mg twice a day and check BP. And send through [employer] after vacation. Preventing Falls: Care Instructions    Talk to your doctor about the medicines you take. Ask if any of them increase the risk of falls and whether they can be changed or stopped. Try to exercise regularly. It can help improve your strength and balance. This can help lower your risk of falling. Practice fall safety and prevention. Wear low-heeled shoes that fit well and give your feet good support. Talk to your doctor if you have foot problems that make this hard. Carry a cellphone or wear a medical alert device that you can use to call for help. Use stepladders instead of chairs to reach high objects. Don't climb if you're at risk for falls. Ask for help, if needed. Wear the correct eyeglasses, if you need them. Make your home safer. Remove rugs, cords, clutter, and furniture from walkways. Keep your house well lit. Use night-lights in hallways and bathrooms. Install and use sturdy handrails on stairways. Wear nonskid footwear, even inside. Don't walk barefoot or in socks without shoes. Be safe outside. Use handrails, curb cuts, and ramps whenever possible. Keep your hands free by using a shoulder bag or backpack. Try to walk in well-lit areas. Watch out for uneven ground, changes in pavement, and debris. Be careful in the winter. Walk on the grass or gravel when sidewalks are slippery. Use de-icer on steps and walkways. Add non-slip devices to shoes. Put grab bars and nonskid mats in your shower or tub and near the toilet. Try to use a shower chair or bath bench when bathing. Get into a tub or shower by putting in your weaker leg first. Get out with your strong side first. Have a phone or medical alert device in the bathroom with you. Where can you learn more? Go to http://www.werner.com/ and enter G117 to learn more about \"Preventing Falls: Care Instructions. \"  Current as of:

## 2023-06-20 NOTE — PROGRESS NOTES
Vito Chavez is a 70 y.o. female for Medicare AWV and Diabetes      Diabetes Type 2    Glucose control:   Does patient check blood glucoses at home? Yes - fasting   Report of hypoglycemia: once it was 79 in am and she felt shaky  Lab Results   Component Value Date    LABA1C 7.2 (H) 06/20/2023     No results found for: EAG    Symptoms  Polyuria, Polydipsia or Polyphagia? No  Chest Pain, SOB, or Palpitations? -  No  New Vision complaints? No  Paresthesias of the extremities? Yes    Medications  Current medication were reviewed. On Basaglar 24 units nightly, Amaryl 4mg BID, and Metformin   Compliant with medications? yes  Medication side effects? No  On ACE-I or ARB? No  On antiplatelet therapy? Yes  On Statin? Yes    Last Diabetic Eye Exam: DUE    Exercise  Exercise? Yes  Wt Readings from Last 3 Encounters:   06/20/23 184 lb 9.6 oz (83.7 kg)   06/05/23 185 lb (83.9 kg)   04/27/23 185 lb (83.9 kg)       Diet discipline?:  Low salt, fat, sugar diet? yes    Blood pressure control:  BP Readings from Last 3 Encounters:   06/20/23 (!) 148/76   06/05/23 (!) 161/73   04/27/23 (!) 156/72       Hyponatremia  Follows with Dr Cheema Meals  Restricting fluid intake 50-60 oz    CAD    Follows with Dr Adri Waite  Cath 2/2023 \"non obstructive disease except diagonal artery\"  Normal LV function    Afib  Rate controlled   On Xarelto    Uncontrolled HTN  Increased Metoprolol 75 mg BID  Dr Adri Waite added hydralazine TID    Lab Results   Component Value Date    LABMICR 8.20 05/31/2023       Lab Results   Component Value Date    LDLCALC 62 02/24/2023     HTN    Does patient check BP regularly at home? - not as often at home. Typically 140s/60s  Current Medication regimen - hydralazine, metoprolol 75 mg BID  Tolerating medications well? - yes    Shortness of breath or chest pain? No  Headache or visual complaints? No  Neurologic changes like confusion? No  Extremity edema?  Pedal edema    BP Readings from Last 3 Encounters:   06/20/23 (!)

## 2023-07-06 DIAGNOSIS — I10 ESSENTIAL HYPERTENSION: ICD-10-CM

## 2023-07-06 DIAGNOSIS — I48.20 CHRONIC ATRIAL FIBRILLATION (HCC): ICD-10-CM

## 2023-07-06 NOTE — TELEPHONE ENCOUNTER
Pt states she has been doing the 50mg BID     7/3/23 Monday 156/75AM and 150/73 PM  7/4/23 158/76 AM and 140/59 PM  7/5/23 151/71 AM    7/6/23 149/68 AM

## 2023-07-07 RX ORDER — METOPROLOL TARTRATE 50 MG/1
75 TABLET, FILM COATED ORAL 2 TIMES DAILY
Qty: 180 TABLET | Refills: 3 | Status: SHIPPED | OUTPATIENT
Start: 2023-07-07

## 2023-07-20 NOTE — TELEPHONE ENCOUNTER
Patient informed and verbalized understanding.  NO questions at this time will go to [facility] to have labs done either Monday or Tuesday Other

## 2023-07-24 NOTE — TELEPHONE ENCOUNTER
----- Message from Tran Enamorado sent at 7/24/2023  3:38 PM EDT -----  Regarding: Prescription refill   Contact: [phone]  I need a prescription for XARELTO 20mg please. I take 1/day with supper. I get a 90 day supply at [facility] in [facility] ([phone]).   Thank you,  Holly [place]ispell  1945  [phone]

## 2023-08-09 ENCOUNTER — OFFICE VISIT (OUTPATIENT)
Dept: SURGERY | Age: 78
End: 2023-08-09
Payer: MEDICARE

## 2023-08-09 ENCOUNTER — TELEPHONE (OUTPATIENT)
Dept: SURGERY | Age: 78
End: 2023-08-09

## 2023-08-09 VITALS
BODY MASS INDEX: 30.49 KG/M2 | DIASTOLIC BLOOD PRESSURE: 72 MMHG | TEMPERATURE: 96.4 F | SYSTOLIC BLOOD PRESSURE: 144 MMHG | OXYGEN SATURATION: 98 % | WEIGHT: 183 LBS | HEART RATE: 60 BPM | HEIGHT: 65 IN

## 2023-08-09 DIAGNOSIS — L05.91 PILONIDAL CYST: Primary | ICD-10-CM

## 2023-08-09 PROCEDURE — 3078F DIAST BP <80 MM HG: CPT | Performed by: SURGERY

## 2023-08-09 PROCEDURE — 3074F SYST BP LT 130 MM HG: CPT | Performed by: SURGERY

## 2023-08-09 PROCEDURE — G8427 DOCREV CUR MEDS BY ELIG CLIN: HCPCS | Performed by: SURGERY

## 2023-08-09 PROCEDURE — 99213 OFFICE O/P EST LOW 20 MIN: CPT | Performed by: SURGERY

## 2023-08-09 PROCEDURE — 1123F ACP DISCUSS/DSCN MKR DOCD: CPT | Performed by: SURGERY

## 2023-08-09 PROCEDURE — G8417 CALC BMI ABV UP PARAM F/U: HCPCS | Performed by: SURGERY

## 2023-08-09 PROCEDURE — G8399 PT W/DXA RESULTS DOCUMENT: HCPCS | Performed by: SURGERY

## 2023-08-09 PROCEDURE — 1090F PRES/ABSN URINE INCON ASSESS: CPT | Performed by: SURGERY

## 2023-08-09 PROCEDURE — 1036F TOBACCO NON-USER: CPT | Performed by: SURGERY

## 2023-08-09 NOTE — TELEPHONE ENCOUNTER
Pt of Dr. Barrie Joshi last seen 4/27/23 is scheduled for pilonidal cystectomy on 8/24/23 with Dr. Anayeli No. Can she be cleared for surgery?     (She was originally cleared on 3/323 however patient cancelled her surgery and it is now rescheduled)  Thanks

## 2023-08-11 ASSESSMENT — ENCOUNTER SYMPTOMS
FACIAL SWELLING: 0
WHEEZING: 0
BLOOD IN STOOL: 0
CHOKING: 0
SINUS PRESSURE: 0
APNEA: 0
COLOR CHANGE: 0
CHEST TIGHTNESS: 0
VOMITING: 0
EYE ITCHING: 0
RECTAL PAIN: 0
EYE PAIN: 0
NAUSEA: 0
PHOTOPHOBIA: 0
COUGH: 0
SORE THROAT: 0
ALLERGIC/IMMUNOLOGIC NEGATIVE: 1
EYE REDNESS: 0
ABDOMINAL PAIN: 0
EYE DISCHARGE: 0
STRIDOR: 0
DIARRHEA: 0
VOICE CHANGE: 0
BACK PAIN: 0
RHINORRHEA: 0
ANAL BLEEDING: 0
SHORTNESS OF BREATH: 0
CONSTIPATION: 0
TROUBLE SWALLOWING: 0
ABDOMINAL DISTENTION: 0

## 2023-08-18 ENCOUNTER — PREP FOR PROCEDURE (OUTPATIENT)
Dept: SURGERY | Age: 78
End: 2023-08-18

## 2023-08-18 RX ORDER — SODIUM CHLORIDE 9 MG/ML
INJECTION, SOLUTION INTRAVENOUS CONTINUOUS
Status: CANCELLED | OUTPATIENT
Start: 2023-08-18

## 2023-08-18 NOTE — H&P
Natali Feng (:  1945)      ASSESSMENT:  1. Chronic pilonidal cyst     PLAN:  1. Schedule Kristal for excision pilonidal cyst.  2. She will undergo pre-operative clearance per anesthesia guidelines with risk factors listed under the past medical history diagnosis & problem list.  3. The risks, benefits and alternatives were discussed with Hyun Cabrera including non-operative management. All questions answered. She understands and wishes to proceed with surgical intervention. 4. Restrictions discussed with Hyun Cabrera and she expresses understanding. 5. She is advised to call back directly if there are further questions/concerns, or if her symptoms worsen prior to surgery. 6.  No signs of acute disease. No need for I&D or antibiotics at this time. SUBJECTIVE/OBJECTIVE:          Chief Complaint   Patient presents with    Follow-up       Est pt last seen 22 - Pilonidal cyst, cleared for surgery, ready to schedule      HPI  Hyun Cabrera is a 53-year-old female who presents for follow-up due to a chronic pilonidal cyst.  She was seen last year in the office for the disease. At that time she initially was going to proceed with surgery but then decided to continue with conservative management. She admits it really has not improved. Has not required any more formal I&D's or antibiotics but states that it will intermittently swell up. Does give some fluid/blood like substance and then gets better. Seems to be a chronic issue. No active infection today per patient. No urinary complaints. Tolerating diet. Normal bowel function. No abdominal or chest pain. No shortness of breath. Denies any other skin or subcutaneous lesions that she is aware of. She states she would like to proceed with surgery in hopes to alleviate the disease process permanently. Review of Systems   Constitutional:  Negative for activity change, appetite change, chills, diaphoresis, fatigue, fever and unexpected weight change.    HENT:

## 2023-08-24 ENCOUNTER — ANESTHESIA EVENT (OUTPATIENT)
Dept: OPERATING ROOM | Age: 78
End: 2023-08-24
Payer: MEDICARE

## 2023-08-24 ENCOUNTER — ANESTHESIA (OUTPATIENT)
Dept: OPERATING ROOM | Age: 78
End: 2023-08-24
Payer: MEDICARE

## 2023-08-24 ENCOUNTER — HOSPITAL ENCOUNTER (OUTPATIENT)
Age: 78
Setting detail: OUTPATIENT SURGERY
Discharge: HOME | End: 2023-08-24
Attending: SURGERY | Admitting: SURGERY
Payer: MEDICARE

## 2023-08-24 VITALS
HEIGHT: 65 IN | WEIGHT: 183.9 LBS | OXYGEN SATURATION: 96 % | SYSTOLIC BLOOD PRESSURE: 183 MMHG | RESPIRATION RATE: 16 BRPM | HEART RATE: 59 BPM | DIASTOLIC BLOOD PRESSURE: 78 MMHG | TEMPERATURE: 96.8 F | BODY MASS INDEX: 30.64 KG/M2

## 2023-08-24 DIAGNOSIS — L05.91 PILONIDAL CYST: ICD-10-CM

## 2023-08-24 LAB
APTT PPP: 43.9 SECONDS (ref 22–38)
GLUCOSE BLD STRIP.AUTO-MCNC: 207 MG/DL (ref 70–108)
INR PPP: 0.96 (ref 0.85–1.13)
POTASSIUM SERPL-SCNC: 4.9 MEQ/L (ref 3.5–5.2)

## 2023-08-24 PROCEDURE — 7100000010 HC PHASE II RECOVERY - FIRST 15 MIN: Performed by: SURGERY

## 2023-08-24 PROCEDURE — 3600000012 HC SURGERY LEVEL 2 ADDTL 15MIN: Performed by: SURGERY

## 2023-08-24 PROCEDURE — 82948 REAGENT STRIP/BLOOD GLUCOSE: CPT

## 2023-08-24 PROCEDURE — 84132 ASSAY OF SERUM POTASSIUM: CPT

## 2023-08-24 PROCEDURE — 3700000001 HC ADD 15 MINUTES (ANESTHESIA): Performed by: SURGERY

## 2023-08-24 PROCEDURE — 7100000000 HC PACU RECOVERY - FIRST 15 MIN: Performed by: SURGERY

## 2023-08-24 PROCEDURE — C9399 UNCLASSIFIED DRUGS OR BIOLOG: HCPCS | Performed by: NURSE ANESTHETIST, CERTIFIED REGISTERED

## 2023-08-24 PROCEDURE — 7100000001 HC PACU RECOVERY - ADDTL 15 MIN: Performed by: SURGERY

## 2023-08-24 PROCEDURE — 6360000002 HC RX W HCPCS: Performed by: NURSE PRACTITIONER

## 2023-08-24 PROCEDURE — 87081 CULTURE SCREEN ONLY: CPT

## 2023-08-24 PROCEDURE — 3700000000 HC ANESTHESIA ATTENDED CARE: Performed by: SURGERY

## 2023-08-24 PROCEDURE — 6360000002 HC RX W HCPCS: Performed by: NURSE ANESTHETIST, CERTIFIED REGISTERED

## 2023-08-24 PROCEDURE — 85730 THROMBOPLASTIN TIME PARTIAL: CPT

## 2023-08-24 PROCEDURE — 7100000011 HC PHASE II RECOVERY - ADDTL 15 MIN: Performed by: SURGERY

## 2023-08-24 PROCEDURE — 3600000002 HC SURGERY LEVEL 2 BASE: Performed by: SURGERY

## 2023-08-24 PROCEDURE — 2500000003 HC RX 250 WO HCPCS: Performed by: NURSE ANESTHETIST, CERTIFIED REGISTERED

## 2023-08-24 PROCEDURE — 11771 EXC PILONIDAL CYST XTNSV: CPT | Performed by: SURGERY

## 2023-08-24 PROCEDURE — 85610 PROTHROMBIN TIME: CPT

## 2023-08-24 PROCEDURE — 2580000003 HC RX 258: Performed by: NURSE PRACTITIONER

## 2023-08-24 PROCEDURE — 88305 TISSUE EXAM BY PATHOLOGIST: CPT

## 2023-08-24 PROCEDURE — 2709999900 HC NON-CHARGEABLE SUPPLY: Performed by: SURGERY

## 2023-08-24 PROCEDURE — 36415 COLL VENOUS BLD VENIPUNCTURE: CPT

## 2023-08-24 RX ORDER — SODIUM CHLORIDE 9 MG/ML
INJECTION, SOLUTION INTRAVENOUS CONTINUOUS
Status: DISCONTINUED | OUTPATIENT
Start: 2023-08-24 | End: 2023-08-24 | Stop reason: HOSPADM

## 2023-08-24 RX ORDER — ONDANSETRON 2 MG/ML
4 INJECTION INTRAMUSCULAR; INTRAVENOUS EVERY 6 HOURS PRN
Status: CANCELLED | OUTPATIENT
Start: 2023-08-24

## 2023-08-24 RX ORDER — ACETAMINOPHEN 325 MG/1
650 TABLET ORAL EVERY 4 HOURS PRN
Status: CANCELLED | OUTPATIENT
Start: 2023-08-24

## 2023-08-24 RX ORDER — ONDANSETRON 2 MG/ML
4 INJECTION INTRAMUSCULAR; INTRAVENOUS
Status: DISCONTINUED | OUTPATIENT
Start: 2023-08-24 | End: 2023-08-24 | Stop reason: HOSPADM

## 2023-08-24 RX ORDER — SODIUM CHLORIDE 0.9 % (FLUSH) 0.9 %
5-40 SYRINGE (ML) INJECTION PRN
Status: DISCONTINUED | OUTPATIENT
Start: 2023-08-24 | End: 2023-08-24 | Stop reason: HOSPADM

## 2023-08-24 RX ORDER — SODIUM CHLORIDE 0.9 % (FLUSH) 0.9 %
5-40 SYRINGE (ML) INJECTION EVERY 12 HOURS SCHEDULED
Status: CANCELLED | OUTPATIENT
Start: 2023-08-24

## 2023-08-24 RX ORDER — SODIUM CHLORIDE 0.9 % (FLUSH) 0.9 %
5-40 SYRINGE (ML) INJECTION EVERY 12 HOURS SCHEDULED
Status: DISCONTINUED | OUTPATIENT
Start: 2023-08-24 | End: 2023-08-24 | Stop reason: HOSPADM

## 2023-08-24 RX ORDER — OXYCODONE HYDROCHLORIDE 5 MG/1
5 TABLET ORAL EVERY 4 HOURS PRN
Status: CANCELLED | OUTPATIENT
Start: 2023-08-24

## 2023-08-24 RX ORDER — HYDROMORPHONE HYDROCHLORIDE 2 MG/ML
INJECTION, SOLUTION INTRAMUSCULAR; INTRAVENOUS; SUBCUTANEOUS PRN
Status: DISCONTINUED | OUTPATIENT
Start: 2023-08-24 | End: 2023-08-24 | Stop reason: SDUPTHER

## 2023-08-24 RX ORDER — FENTANYL CITRATE 50 UG/ML
50 INJECTION, SOLUTION INTRAMUSCULAR; INTRAVENOUS EVERY 5 MIN PRN
Status: DISCONTINUED | OUTPATIENT
Start: 2023-08-24 | End: 2023-08-24 | Stop reason: HOSPADM

## 2023-08-24 RX ORDER — PROPOFOL 10 MG/ML
INJECTION, EMULSION INTRAVENOUS PRN
Status: DISCONTINUED | OUTPATIENT
Start: 2023-08-24 | End: 2023-08-24 | Stop reason: SDUPTHER

## 2023-08-24 RX ORDER — SODIUM CHLORIDE 9 MG/ML
INJECTION, SOLUTION INTRAVENOUS PRN
Status: DISCONTINUED | OUTPATIENT
Start: 2023-08-24 | End: 2023-08-24 | Stop reason: HOSPADM

## 2023-08-24 RX ORDER — LIDOCAINE HCL/PF 100 MG/5ML
SYRINGE (ML) INJECTION PRN
Status: DISCONTINUED | OUTPATIENT
Start: 2023-08-24 | End: 2023-08-24 | Stop reason: SDUPTHER

## 2023-08-24 RX ORDER — MORPHINE SULFATE 4 MG/ML
4 INJECTION, SOLUTION INTRAMUSCULAR; INTRAVENOUS
Status: CANCELLED | OUTPATIENT
Start: 2023-08-24

## 2023-08-24 RX ORDER — SODIUM CHLORIDE 0.9 % (FLUSH) 0.9 %
5-40 SYRINGE (ML) INJECTION PRN
Status: CANCELLED | OUTPATIENT
Start: 2023-08-24

## 2023-08-24 RX ORDER — SODIUM CHLORIDE 9 MG/ML
INJECTION, SOLUTION INTRAVENOUS PRN
Status: CANCELLED | OUTPATIENT
Start: 2023-08-24

## 2023-08-24 RX ORDER — KETOROLAC TROMETHAMINE 30 MG/ML
INJECTION, SOLUTION INTRAMUSCULAR; INTRAVENOUS PRN
Status: DISCONTINUED | OUTPATIENT
Start: 2023-08-24 | End: 2023-08-24 | Stop reason: SDUPTHER

## 2023-08-24 RX ORDER — DEXAMETHASONE SODIUM PHOSPHATE 10 MG/ML
INJECTION, EMULSION INTRAMUSCULAR; INTRAVENOUS PRN
Status: DISCONTINUED | OUTPATIENT
Start: 2023-08-24 | End: 2023-08-24 | Stop reason: SDUPTHER

## 2023-08-24 RX ORDER — MORPHINE SULFATE 2 MG/ML
2 INJECTION, SOLUTION INTRAMUSCULAR; INTRAVENOUS
Status: CANCELLED | OUTPATIENT
Start: 2023-08-24

## 2023-08-24 RX ORDER — SULFAMETHOXAZOLE AND TRIMETHOPRIM 800; 160 MG/1; MG/1
1 TABLET ORAL 2 TIMES DAILY
Qty: 20 TABLET | Refills: 0 | Status: SHIPPED | OUTPATIENT
Start: 2023-08-24 | End: 2023-09-03

## 2023-08-24 RX ORDER — ONDANSETRON 4 MG/1
4 TABLET, ORALLY DISINTEGRATING ORAL EVERY 8 HOURS PRN
Status: CANCELLED | OUTPATIENT
Start: 2023-08-24

## 2023-08-24 RX ORDER — OXYCODONE HYDROCHLORIDE 5 MG/1
10 TABLET ORAL EVERY 4 HOURS PRN
Status: CANCELLED | OUTPATIENT
Start: 2023-08-24

## 2023-08-24 RX ORDER — ONDANSETRON 2 MG/ML
INJECTION INTRAMUSCULAR; INTRAVENOUS PRN
Status: DISCONTINUED | OUTPATIENT
Start: 2023-08-24 | End: 2023-08-24 | Stop reason: SDUPTHER

## 2023-08-24 RX ORDER — ROCURONIUM BROMIDE 10 MG/ML
INJECTION, SOLUTION INTRAVENOUS PRN
Status: DISCONTINUED | OUTPATIENT
Start: 2023-08-24 | End: 2023-08-24 | Stop reason: SDUPTHER

## 2023-08-24 RX ORDER — PROPOFOL 10 MG/ML
INJECTION, EMULSION INTRAVENOUS CONTINUOUS PRN
Status: DISCONTINUED | OUTPATIENT
Start: 2023-08-24 | End: 2023-08-24 | Stop reason: SDUPTHER

## 2023-08-24 RX ORDER — FAMOTIDINE 20 MG/1
20 TABLET, FILM COATED ORAL 2 TIMES DAILY
Status: CANCELLED | OUTPATIENT
Start: 2023-08-24

## 2023-08-24 RX ORDER — HYDROCODONE BITARTRATE AND ACETAMINOPHEN 5; 325 MG/1; MG/1
1-2 TABLET ORAL EVERY 6 HOURS PRN
Qty: 20 TABLET | Refills: 0 | Status: SHIPPED | OUTPATIENT
Start: 2023-08-24 | End: 2023-08-30

## 2023-08-24 RX ORDER — MEPERIDINE HYDROCHLORIDE 25 MG/ML
12.5 INJECTION INTRAMUSCULAR; INTRAVENOUS; SUBCUTANEOUS EVERY 5 MIN PRN
Status: DISCONTINUED | OUTPATIENT
Start: 2023-08-24 | End: 2023-08-24 | Stop reason: HOSPADM

## 2023-08-24 RX ADMIN — KETOROLAC TROMETHAMINE 15 MG: 30 INJECTION, SOLUTION INTRAMUSCULAR; INTRAVENOUS at 09:45

## 2023-08-24 RX ADMIN — HYDROMORPHONE HYDROCHLORIDE 1 MG: 2 INJECTION INTRAMUSCULAR; INTRAVENOUS; SUBCUTANEOUS at 09:37

## 2023-08-24 RX ADMIN — DEXAMETHASONE SODIUM PHOSPHATE 10 MG: 10 INJECTION, EMULSION INTRAMUSCULAR; INTRAVENOUS at 09:37

## 2023-08-24 RX ADMIN — SUGAMMADEX 200 MG: 100 INJECTION, SOLUTION INTRAVENOUS at 10:10

## 2023-08-24 RX ADMIN — PROPOFOL 150 MG: 10 INJECTION, EMULSION INTRAVENOUS at 09:37

## 2023-08-24 RX ADMIN — PROPOFOL 125 MCG/KG/MIN: 10 INJECTION, EMULSION INTRAVENOUS at 09:40

## 2023-08-24 RX ADMIN — Medication 2000 MG: at 09:35

## 2023-08-24 RX ADMIN — SODIUM CHLORIDE: 9 INJECTION, SOLUTION INTRAVENOUS at 08:44

## 2023-08-24 RX ADMIN — ROCURONIUM BROMIDE 50 MG: 10 INJECTION INTRAVENOUS at 09:37

## 2023-08-24 RX ADMIN — ONDANSETRON 4 MG: 2 INJECTION INTRAMUSCULAR; INTRAVENOUS at 09:37

## 2023-08-24 RX ADMIN — Medication 100 MG: at 09:37

## 2023-08-24 ASSESSMENT — PAIN SCALES - GENERAL
PAINLEVEL_OUTOF10: 2

## 2023-08-24 ASSESSMENT — PAIN DESCRIPTION - ORIENTATION: ORIENTATION: MID

## 2023-08-24 ASSESSMENT — PAIN DESCRIPTION - DESCRIPTORS
DESCRIPTORS: ACHING
DESCRIPTORS: SORE;DULL

## 2023-08-24 ASSESSMENT — PAIN DESCRIPTION - LOCATION: LOCATION: SACRUM

## 2023-08-24 ASSESSMENT — PAIN DESCRIPTION - PAIN TYPE: TYPE: SURGICAL PAIN

## 2023-08-24 ASSESSMENT — PAIN - FUNCTIONAL ASSESSMENT: PAIN_FUNCTIONAL_ASSESSMENT: 0-10

## 2023-08-24 NOTE — BRIEF OP NOTE
Brief Postoperative Note      Patient: Rosa Ron  YOB: 1945  MRN: [id number]    Date of Procedure: 8/24/2023    Pre-Op Diagnosis Codes:     * Pilonidal cyst [L05.91]    Post-Op Diagnosis: Same       Procedure(s):  PILONIDAL CYSTECTOMY    Surgeon(s):  Adenike Keita MD    Assistant:  First Assistant: Curt Velásquez RN    Anesthesia: General/local    Estimated Blood Loss (mL): 5ml    Complications: None    Specimens:   ID Type Source Tests Collected by Time Destination   A : pilonidal cyst Tissue Buttock SURGICAL PATHOLOGY Adenike Keita MD 8/24/2023 1007        Implants:  * No implants in log *      Drains: * No LDAs found *    Findings: as above - see op note for details      Electronically signed by Adenike Keita MD on 8/24/2023 at 10:21 AM

## 2023-08-24 NOTE — DISCHARGE INSTRUCTIONS
DR. Coty Yi DISCHARGE INSTRUCTIONS    Pt Name: [address] [place] [address],[address] Record Number: [id number]  Today's Date: 8/24/2023    GENERAL ANESTHESIA OR SEDATION  1. Do not drive or operate hazardous machinery for 24 hours. 2. Do not make important business or personal decisions for 24 hours. 3. Do not drink alcoholic beverages or use tobacco for 24 hours. ACTIVITY INSTRUCTIONS:  [] Rest today. Resume light to normal activity tomorrow. [x] You may resume normal activity tomorrow. Do not engage in strenuous activity that may place stress on your incision. [x] Do not drive for 3-5 days and avoid heavy lifting, tugging, pullings greater than 10-20 lbs until seen in the office. DIET INSTRUCTIONS:  [x]Begin with clear liquids. If not nauseated, may increase to a low-fat diet when you desire. Greasy and spicy foods are not advised. [x]Regular diet as tolerated. []Other:     MEDICATIONS  [x]Prescription sent with you to be used as directed. []Valium   [x]Norco   []Percocet   [x]Toradol   []Bactrim (antibiotic)     Do not drink alcohol or drive while taking these medications. You may experience dizziness or drowsiness with these medications. You may also experience constipation which can be relieved with stool softners or laxatives. - Take Colace 100 mg 1-2 tabs daily as needed for constipation  - Take MiraLax 1-2 cap fulls daily as needed for constipation    [x]You may resume your daily prescription medication schedule unless otherwise specified. []Do not take 325mg Aspirin or other blood thinners such as Coumadin or Plavix for 5 days. **Pain medication at discharge - use only as prescribed- refills may be available to you at your follow up appointments if needed and warranted.   Narcotics should be used for only short term and we highly encouraged our patients to wean off appropriately and use other means for pain such as non pharmacologic measures and over the counter tylenol or ibuprofen if no restrictions apply. We do  know that surgical pain is real and will not hesitate to help eliminate some of your discomfort. However we will not be able to completely make you pain free and it is important to determine what pain level is tolerable for you **    WOUND/DRESSING INSTRUCTIONS:  Always ensure you and your care giver clean hands before and after caring for the wound. [x] Keep dressing clean and dry for 24 hours. Change when soiled or wet. [] Allow steri-strips to fall off on their own. [x] Ice operative site as needed for 20 minutes 4 times a day. [x] May wash over incision in shower in 24 hours, but do not soak in a bath.  [] Take sitz bath for 20 minutes twice daily and after bowel movements. [] Keep the abdominal binder in place during the day. May remove to shower and at night.  [] Remove packing from wound in 24 hours and replace daily. [] Empty LIZZ drain daily and record the amounts. FOLLOW-UP CARE. SPECIFICALLY WATCH FOR:   Fever over 101 degrees by mouth   Increased redness, warmth, hardness at operative site. Blood soaked dressing (small amounts of oozing may be normal.)   Increased or progressive drainage from the surgical area   Inability to urinate or blood in the urine   Pain not relieved by the medications ordered   Persistent nausea and/or vomiting, unable to retain fluids. Pain or swelling in your legs. Shortness of breath. FOLLOW-UP APPOINTMENT   []1 week   [x]2 weeks for suture removal   []Other    Call my office if you have any problem that concerns you [phone]. After hours, you can reach the answering service via the office phone number. IF YOU NEED IMMEDIATE ATTENTION, GO TO THE EMERGENCY ROOM AND YOUR DOCTOR WILL BE CONTACTED. Aishwarya Raza MD FACS  [address] [place] [address]. [phone]  Jayne Guerinbiljack Juan  Electronically signed 8/24/2023 at 9:10 AM

## 2023-08-24 NOTE — PROGRESS NOTES
[facility]. Vitals and assessment as charted. 0.9 infusing, @700ml to count from PACU. Pt has muffin and pop. Family at the bedside. Pt and family verbalized understanding of discharge criteria and call light use. Call light in reach.

## 2023-08-24 NOTE — ANESTHESIA PRE PROCEDURE
Department of Anesthesiology  Preprocedure Note       Name:  Jose Cano   Age:  70 y.o.  :  1945                                          MRN:  [id number]         Date:  2023      Surgeon: Danny Tapia):  Kirill Hernandez MD    Procedure: Procedure(s):  PILONIDAL CYSTECTOMY    Medications prior to admission:   Prior to Admission medications    Medication Sig Start Date End Date Taking?  Authorizing Provider   rivaroxaban (XARELTO) 20 MG TABS tablet Take 1 tablet by mouth Daily with supper 23   Valerie Parisi PA-C   metoprolol tartrate (LOPRESSOR) 50 MG tablet Take 1.5 tablets by mouth 2 times daily 23   EMMANUEL May CNP   hydrALAZINE (APRESOLINE) 50 MG tablet Take 1 tablet by mouth 3 times daily 23   Afshin Kerr MD   insulin glargine (BASAGLAR KWIKPEN) 100 UNIT/ML injection pen Inject 24 Units into the skin daily    Historical Provider, MD   glimepiride (AMARYL) 4 MG tablet Take 1 tablet by mouth 2 times daily 23   EMMANUEL May CNP   simvastatin (ZOCOR) 40 MG tablet TAKE 1 TABLET NIGHTLY 23   EMMANUEL May CNP   levothyroxine (SYNTHROID) 112 MCG tablet Take 1 tablet by mouth Daily 23   EMMANUEL May CNP   metFORMIN (GLUCOPHAGE-XR) 500 MG extended release tablet Take 2 tablets by mouth 2 times daily 23   EMMANUEL May CNP   alendronate (FOSAMAX) 70 MG tablet Take 1 tablet by mouth every 7 days 23   EMMANUEL May CNP   Multiple Vitamins-Minerals (EYE VITAMINS & MINERALS PO) Take by mouth daily    Historical Provider, MD   Calcium Carbonate-Vitamin D (OYSTER SHELL CALCIUM/D) 500-200 MG-UNIT TABS Take 1 tablet by mouth 2 times daily 21  Kianna Jacob MD   vitamin D3 (CHOLECALCIFEROL) 25 MCG (1000 UT) TABS tablet Take 0.5 tablets by mouth daily 21   Kianna Jacob MD   aspirin 81 MG EC tablet Take 1 tablet by mouth daily    Historical Provider, MD   vitamin B-12

## 2023-08-24 NOTE — PROGRESS NOTES
Patient oriented to Same Day department and admitted to Same Day Surgery room 17. Patient verbalized approval for first name, last initial with physician name on unit whiteboard. Plan of care reviewed with patient. Patient room whiteboard filled out and discussed with patient and responsible adult. Patient and responsible adult offered Same Day Welcome Packet to review. Call light in reach. Bed in lowest position, locked, with one bed rail up. SCDs and warming blanket in place. Appropriate arm bands on patient. Bathroom offered. All questions and concerns of patient addressed. Meds to Beds:   Patient informed of [facility]'s Meds to [facility] program during admission.  Patient is agreeable to program.   Contact information for the pharmacy and the Meds to Beds program:   Name: Quin Bamberger   Relationship to patient:child   Phone number: [phone]

## 2023-08-24 NOTE — INTERVAL H&P NOTE
Update History & Physical    The patient's History and Physical was reviewed with the patient and I examined the patient. There was no change. The surgical site was confirmed by the patient and me. Plan: The risks, benefits, expected outcome, and alternative to the recommended procedure have been discussed with the patient. Patient understands and wants to proceed with the procedure.      Electronically signed by Beau Castro MD on 8/24/2023 at 9:09 AM

## 2023-08-24 NOTE — PROGRESS NOTES
1016- pt to pacu, resp easy and unlabored, VSS, pt denies pain and nausea, pt appears in no acute distress  1025- pt resting in bed with eyes closed, resp easy and unlabored, VSS, pt denies pain and nausea, pt appears in no acute distress  1046- pt meets criteria for discharge from pacu, [facility]

## 2023-08-24 NOTE — PROGRESS NOTES
Pt has met discharge criteria and states she is ready for discharge to home. IV removed, gauze and tape applied. Dressed in own clothes and personal belongings gathered. Discharge instructions (with opioid medication education information) given to pt and family; pt and family verbalized understanding of discharge instructions, prescriptions and follow up appointments. Pt transported to discharge lobby by [facility] staff.

## 2023-08-24 NOTE — ANESTHESIA POSTPROCEDURE EVALUATION
Department of Anesthesiology  Postprocedure Note    Patient: Kristin Khoury  MRN: [id number]  YOB: 1945  Date of evaluation: 8/24/2023      Procedure Summary     Date: 08/24/23 Room / Location: [place] [address] / [facility]    Anesthesia Start: Shilpa Pedrok Anesthesia Stop: 9349    Procedure: PILONIDAL CYSTECTOMY (Buttocks) Diagnosis: Pilonidal cyst    Surgeons: Keshia Galeas MD Responsible Provider: Izabella Perez DO    Anesthesia Type: general ASA Status: 3          Anesthesia Type: No value filed.     Ochoa Phase I: Ochoa Score: 9    Ochoa Phase II: Ochoa Score: 10      Anesthesia Post Evaluation    Patient location during evaluation: PACU  Patient participation: complete - patient participated  Level of consciousness: awake and alert  Pain score: 2  Airway patency: patent  Nausea & Vomiting: no nausea and no vomiting  Complications: no  Cardiovascular status: hemodynamically stable and blood pressure returned to baseline  Respiratory status: spontaneous ventilation, room air and acceptable  Hydration status: stable  Pain management: adequate and satisfactory to patient

## 2023-08-25 ENCOUNTER — TELEPHONE (OUTPATIENT)
Dept: SURGERY | Age: 78
End: 2023-08-25

## 2023-08-25 LAB — MRSA SPEC QL CULT: NORMAL

## 2023-08-25 NOTE — TELEPHONE ENCOUNTER
Post procedural phone call placed to patient. Patient utilizing prescribed medication with adequate control of pain. Patient education against constipation, recommended increased fluid intake, progressive ambulation and stool softeners/stimulants as directed. Patient able to readback education. Patients follow up appointment verified and confirmed in chart. Time spent for patient questions. Patient to follow up with office as needed. Patient reports elevated blood glucose levels 488 in the evening and 298 this AM, glucophage on hold till tomorrow. Will clarify with provider if ok to restart today.

## 2023-08-25 NOTE — OP NOTE
[place] [address]                                OPERATIVE REPORT    PATIENT NAME: Angelika Bright                     :        1945  MED REC NO:   [id number]                           ROOM:  ACCOUNT NO:   [de-identified]                           ADMIT DATE: 2023  PROVIDER:     Cassi Palma M.D.    Sabino Lady OF PROCEDURE:  2023    PREOPERATIVE DIAGNOSIS:  Pilonidal cyst.    POSTOPERATIVE DIAGNOSIS:  Pilonidal cyst.    PROCEDURE:  Pilonidal cystectomy (4 x 2 cm). SURGEON:  Cassi Palma MD    ASSISTANT:  Tolu Woody. Lopes, [facility]    ANESTHESIA:  General/local.    ESTIMATED BLOOD LOSS:  5 mL. DRAINS:  None. COMPLICATIONS:  None. DISPOSITION:  Stable to the recovery room. INDICATIONS:  The patient is 72-year-old female who I had seen in the  office secondary to chronic pilonidal cyst.  Both operative and  nonoperative intervention plans were discussed. Risks of surgery were  further discussed. Some of the risks included but were not limited to  bleeding, infection, the need for re-operation, severe chronic  postoperative pain or numbness, major vascular or nerve injury,  cardiopulmonary complications, anesthetic complications, seroma/hematoma  formation, wound breakdown, recurrence of disease, chronic pain and  death. After all of the questions were answered in their entirety and  the patient was completely aware of current situation, she wished to  proceed with surgery. DESCRIPTION OF PROCEDURE:  After informed consent was signed and placed  on the chart, the patient was taken back to the operating room and  placed under general anesthesia. She tolerated this well throughout the  case. She was positioned in a prone jackknife position on the operating  room table. All pressure points were padded. She was on preoperative  antibiotics.   Bilateral lower extremity sequential compression devices  were

## 2023-08-25 NOTE — TELEPHONE ENCOUNTER
Patient called and update, ok to restart metformin today to assist with glucose control. Thien Gonzalez tomorrow as previously directed.

## 2023-09-11 ENCOUNTER — OFFICE VISIT (OUTPATIENT)
Dept: SURGERY | Age: 78
End: 2023-09-11

## 2023-09-11 VITALS
SYSTOLIC BLOOD PRESSURE: 132 MMHG | OXYGEN SATURATION: 98 % | RESPIRATION RATE: 16 BRPM | HEIGHT: 65 IN | DIASTOLIC BLOOD PRESSURE: 76 MMHG | TEMPERATURE: 97 F | HEART RATE: 56 BPM | BODY MASS INDEX: 31.09 KG/M2 | WEIGHT: 186.6 LBS

## 2023-09-11 DIAGNOSIS — L05.91 PILONIDAL CYST: Primary | ICD-10-CM

## 2023-09-11 PROCEDURE — 99024 POSTOP FOLLOW-UP VISIT: CPT | Performed by: NURSE PRACTITIONER

## 2023-09-11 NOTE — PROGRESS NOTES
[address] [place] [address]  Dept: [phone]  Dept Fax: [phone]  Loc: [phone]    Visit Date: 9/11/2023    Xiomara Ba is a 70 y.o. female who presents today for:  Chief Complaint   Patient presents with    Post-Op Check     S/p Pilonidal cystectomy (4 x 2 cm)-Suture removal       HPI:     IVY Weinstein is a 75-year old female patient who presents today for follow-up status post pilonidal cystectomy over 2 weeks ago with Dr. MATA [facility]. Patient is doing well. Off narcotics. Still has some incisional soreness. Sutures are all intact without breakdown. Tolerating regular diet without any nausea or vomiting. Normal bowel function. Incision healing well without any signs of infection. No fevers or chills. Urinating without difficulties. Denies any shortness of breath or chest pain. Tolerating increased activity well.      Past Medical History:   Diagnosis Date    Arthritis     Atrial fibrillation ([address])     Cancer ([address]) 2000    NON HODGKINS LYMPHOMA    Diabetes mellitus ([address])     [place] filter in place     Homozygous for MTHFR gene mutation     Hx of blood clots 2000    LEGS    Hyperlipidemia     Hypertension     TRACY on CPAP     Prolonged emergence from general anesthesia     Stroke (cerebrum) ([address])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    Thyroid disease       Past Surgical History:   Procedure Laterality Date    APPENDECTOMY      CARDIAC CATHETERIZATION      CARDIAC SURGERY  02/2021    ablation     CHOLECYSTECTOMY      FRACTURE SURGERY      ankle surgery    FRACTURE SURGERY Right 09/2018    HUMERUS    HERNIA REPAIR      HYSTERECTOMY (CERVIX STATUS UNKNOWN)      PARATHYROID GLAND SURGERY N/A 10/1/2020    PARATHYROIDECTOMY, EXPLORATION OF PARATHYROIDS performed by Bard Rolan MD at [address] [place] [address] 8/24/2023    PILONIDAL CYSTECTOMY performed by Wing Ozuna MD at [address] [place] [address]

## 2023-09-14 ASSESSMENT — ENCOUNTER SYMPTOMS
EYE PAIN: 0
WHEEZING: 0
CHOKING: 0
EYE ITCHING: 0
SORE THROAT: 0
EYE DISCHARGE: 0
SHORTNESS OF BREATH: 0
APNEA: 0
SINUS PRESSURE: 0
COLOR CHANGE: 0
NAUSEA: 0
PHOTOPHOBIA: 0
CONSTIPATION: 0
COUGH: 0
BACK PAIN: 0
DIARRHEA: 0
CHEST TIGHTNESS: 0
ABDOMINAL DISTENTION: 0
RHINORRHEA: 0
ALLERGIC/IMMUNOLOGIC NEGATIVE: 1
BLOOD IN STOOL: 0
ANAL BLEEDING: 0
VOMITING: 0
ABDOMINAL PAIN: 0

## 2023-09-19 ENCOUNTER — OFFICE VISIT (OUTPATIENT)
Dept: FAMILY MEDICINE CLINIC | Age: 78
End: 2023-09-19
Payer: MEDICARE

## 2023-09-19 VITALS
OXYGEN SATURATION: 98 % | TEMPERATURE: 97 F | RESPIRATION RATE: 14 BRPM | DIASTOLIC BLOOD PRESSURE: 38 MMHG | SYSTOLIC BLOOD PRESSURE: 138 MMHG | HEIGHT: 65 IN | WEIGHT: 185.8 LBS | HEART RATE: 60 BPM | BODY MASS INDEX: 30.96 KG/M2

## 2023-09-19 DIAGNOSIS — Z23 NEED FOR VACCINATION: Primary | ICD-10-CM

## 2023-09-19 DIAGNOSIS — E03.8 OTHER SPECIFIED HYPOTHYROIDISM: ICD-10-CM

## 2023-09-19 DIAGNOSIS — I10 ESSENTIAL HYPERTENSION: ICD-10-CM

## 2023-09-19 DIAGNOSIS — Z11.59 NEED FOR HEPATITIS B SCREENING TEST: ICD-10-CM

## 2023-09-19 DIAGNOSIS — Z79.4 TYPE 2 DIABETES MELLITUS WITH OTHER SPECIFIED COMPLICATION, WITH LONG-TERM CURRENT USE OF INSULIN (HCC): ICD-10-CM

## 2023-09-19 DIAGNOSIS — E11.69 TYPE 2 DIABETES MELLITUS WITH OTHER SPECIFIED COMPLICATION, WITH LONG-TERM CURRENT USE OF INSULIN (HCC): ICD-10-CM

## 2023-09-19 LAB — HBA1C MFR BLD: 6.2 % (ref 4.3–5.7)

## 2023-09-19 PROCEDURE — 3078F DIAST BP <80 MM HG: CPT | Performed by: NURSE PRACTITIONER

## 2023-09-19 PROCEDURE — 90677 PCV20 VACCINE IM: CPT | Performed by: NURSE PRACTITIONER

## 2023-09-19 PROCEDURE — 3044F HG A1C LEVEL LT 7.0%: CPT | Performed by: NURSE PRACTITIONER

## 2023-09-19 PROCEDURE — 1036F TOBACCO NON-USER: CPT | Performed by: NURSE PRACTITIONER

## 2023-09-19 PROCEDURE — 1090F PRES/ABSN URINE INCON ASSESS: CPT | Performed by: NURSE PRACTITIONER

## 2023-09-19 PROCEDURE — G0009 ADMIN PNEUMOCOCCAL VACCINE: HCPCS | Performed by: NURSE PRACTITIONER

## 2023-09-19 PROCEDURE — G8399 PT W/DXA RESULTS DOCUMENT: HCPCS | Performed by: NURSE PRACTITIONER

## 2023-09-19 PROCEDURE — G8427 DOCREV CUR MEDS BY ELIG CLIN: HCPCS | Performed by: NURSE PRACTITIONER

## 2023-09-19 PROCEDURE — 99214 OFFICE O/P EST MOD 30 MIN: CPT | Performed by: NURSE PRACTITIONER

## 2023-09-19 PROCEDURE — G8417 CALC BMI ABV UP PARAM F/U: HCPCS | Performed by: NURSE PRACTITIONER

## 2023-09-19 PROCEDURE — 3075F SYST BP GE 130 - 139MM HG: CPT | Performed by: NURSE PRACTITIONER

## 2023-09-19 PROCEDURE — 1123F ACP DISCUSS/DSCN MKR DOCD: CPT | Performed by: NURSE PRACTITIONER

## 2023-09-19 NOTE — PROGRESS NOTES
regular rhythm, normal S1, S2, no murmurs, rubs, clicks or gallops. Lungs: clear to auscultation, no wheezes, rales or rhonchi, symmetric air entry. Abdomen:  BS normal, soft, non tender, non distended, no rebound or guarding  Mental Status: normal mood, affect behavior, speech, dress,  and thought processes. Neuro:  Alert, muscle tone grossly normal  Skin exam: normal coloration and turgor, no rashes, no suspicious skin lesions noted. Surgical incision approximated. S/p pilonidal cyst incision and drainage      ASSESSMENT & PLAN  Suzie Weiss was seen today for follow-up and diabetes. Diagnoses and all orders for this visit:    Need for vaccination  -     Pneumococcal, PCV20, PREVNAR 20, (age 25 yrs+), IM, PF    Need for hepatitis B screening test  -     Hepatitis B Surface Antibody; Future    Type 2 diabetes mellitus with other specified complication, with long-term current use of insulin (HCC)  -     Comprehensive Metabolic Panel; Future  -     TSH With Reflex Ft4; Future  -     Lipid, Fasting; Future  -     POCT glycosylated hemoglobin (Hb A1C)   A1C controlled   Continue current medications, metformin, amaryl and basaglar  Other specified hypothyroidism  -     TSH With Reflex Ft4; Future    Essential hypertension     Continue Hydralazine and Metoprolol    Return in about 6 months (around 3/19/2024) for med check, bp check, dm check, a1c.

## 2023-10-09 ENCOUNTER — OFFICE VISIT (OUTPATIENT)
Dept: PULMONOLOGY | Age: 78
End: 2023-10-09
Payer: MEDICARE

## 2023-10-09 VITALS
WEIGHT: 185.2 LBS | HEART RATE: 64 BPM | DIASTOLIC BLOOD PRESSURE: 74 MMHG | TEMPERATURE: 98 F | HEIGHT: 65 IN | BODY MASS INDEX: 30.86 KG/M2 | OXYGEN SATURATION: 97 % | SYSTOLIC BLOOD PRESSURE: 116 MMHG

## 2023-10-09 DIAGNOSIS — G47.33 OSA ON CPAP: Primary | ICD-10-CM

## 2023-10-09 DIAGNOSIS — E66.9 OBESITY (BMI 30-39.9): ICD-10-CM

## 2023-10-09 PROCEDURE — G8427 DOCREV CUR MEDS BY ELIG CLIN: HCPCS | Performed by: NURSE PRACTITIONER

## 2023-10-09 PROCEDURE — 3074F SYST BP LT 130 MM HG: CPT | Performed by: NURSE PRACTITIONER

## 2023-10-09 PROCEDURE — 1090F PRES/ABSN URINE INCON ASSESS: CPT | Performed by: NURSE PRACTITIONER

## 2023-10-09 PROCEDURE — G8484 FLU IMMUNIZE NO ADMIN: HCPCS | Performed by: NURSE PRACTITIONER

## 2023-10-09 PROCEDURE — G8399 PT W/DXA RESULTS DOCUMENT: HCPCS | Performed by: NURSE PRACTITIONER

## 2023-10-09 PROCEDURE — 1123F ACP DISCUSS/DSCN MKR DOCD: CPT | Performed by: NURSE PRACTITIONER

## 2023-10-09 PROCEDURE — 1036F TOBACCO NON-USER: CPT | Performed by: NURSE PRACTITIONER

## 2023-10-09 PROCEDURE — G8417 CALC BMI ABV UP PARAM F/U: HCPCS | Performed by: NURSE PRACTITIONER

## 2023-10-09 PROCEDURE — 99214 OFFICE O/P EST MOD 30 MIN: CPT | Performed by: NURSE PRACTITIONER

## 2023-10-09 PROCEDURE — 3078F DIAST BP <80 MM HG: CPT | Performed by: NURSE PRACTITIONER

## 2023-10-09 ASSESSMENT — ENCOUNTER SYMPTOMS
GASTROINTESTINAL NEGATIVE: 1
ALLERGIC/IMMUNOLOGIC NEGATIVE: 1
SHORTNESS OF BREATH: 0
COUGH: 0
WHEEZING: 0
RESPIRATORY NEGATIVE: 1
EYES NEGATIVE: 1

## 2023-11-03 ENCOUNTER — CARE COORDINATION (OUTPATIENT)
Dept: CARE COORDINATION | Age: 78
End: 2023-11-03

## 2023-11-03 NOTE — CARE COORDINATION
I faxed the provider her portion of the application into [address] [place] [address] for 2024.           [facility] Special[facility]  Medication Assistance  [id number] and [address]    (N) [phone]  (M) [phone]

## 2023-11-13 ENCOUNTER — CARE COORDINATION (OUTPATIENT)
Dept: CARE COORDINATION | Age: 78
End: 2023-11-13

## 2023-11-13 NOTE — CARE COORDINATION
I was able to mail the patient their portion of the application for Medication Assistance in 2024. Patient must return the paperwork to me via email, fax, or dropping it off to the providers office.  Once I get this information back I can submit for renewal.        Brenden M[facility]  [address] [facility]   Medication Assistance  [id number] [place] [address]    (P) [phone] (R) [phone]

## 2023-11-30 ENCOUNTER — CARE COORDINATION (OUTPATIENT)
Dept: CARE COORDINATION | Age: 78
End: 2023-11-30

## 2023-11-30 NOTE — CARE COORDINATION
I was able to submit the patients application for assistance through [employer] for 2024.         [facility]   Medication Assistance  [address], and [employer]    D) [phone] (h) [phone]

## 2023-12-13 ENCOUNTER — CARE COORDINATION (OUTPATIENT)
Dept: CARE COORDINATION | Age: 78
End: 2023-12-13

## 2023-12-14 NOTE — CARE COORDINATION
Patient is approved into the [facility] for 2024 on their insulins.         [facility]   Medication Assistance  [id number] and [employer]    M) [phone] (T) [phone]

## 2024-02-28 NOTE — PLAN OF CARE
Problem: Neurological  Goal: Maximum potential motor/sensory/cognitive function  Outcome: Ongoing  Patient alert and oriented x4. Patient voices numbness/tingling to right fingers. Patient up with one assist and is non weight bearing to bilateral arms. Will monitor. Problem: Cardiovascular  Goal: No DVT, peripheral vascular complications  Outcome: Met This Shift  No signs or symptoms of DVT noted to patient this shift. Negative Eduin's sign bialterally. No redness, swelling, or warmth noted to bilateral calves. Patient compliant with wearing SCDs while in bed. Will continue to monitor. Problem: Respiratory  Goal: O2 Sat > 90%  Outcome: Completed Date Met: 09/21/18  Patient on room air with oxygen above 92%. Problem: GI  Goal: No bowel complications  Outcome: Ongoing  Patient has not had a BM since surgery. Bowel sounds active. Patient states she is passing gas. Patient compliant with taking stool softeners as ordered. Problem:   Goal: Adequate urinary output  Outcome: Ongoing  Patient has only voided 100 ml so far this shift. Will continue to monitor. Problem: Nutrition  Goal: Optimal nutrition therapy  Outcome: Met This Shift  Patient on carb control diet and tolerating well. No nausea/vomiting noted to patient this shift. Problem: Skin Integrity/Risk  Goal: No skin breakdown during hospitalization  Outcome: Met This Shift  No skin breakdown noted to patient this shift. Patient with redness to buttocks. Patient assisted with repositioning every two hours in order to prevent skin breakdown. Will monitor. Problem: Musculor/Skeletal Functional Status  Goal: Highest potential functional level  Outcome: Ongoing  Patient is non weight bearing to bilateral arms and is up with one assist. Patient working with PT/OT. Will continue to work with PT/OT. Care plan reviewed with patient. Patient verbalizes understanding of plan of care and contributes to goal setting. Return to clinic for follow up

## 2024-03-12 ENCOUNTER — NURSE ONLY (OUTPATIENT)
Dept: LAB | Age: 79
End: 2024-03-12

## 2024-03-12 DIAGNOSIS — Z11.59 NEED FOR HEPATITIS B SCREENING TEST: ICD-10-CM

## 2024-03-12 DIAGNOSIS — E11.69 TYPE 2 DIABETES MELLITUS WITH OTHER SPECIFIED COMPLICATION, WITH LONG-TERM CURRENT USE OF INSULIN (HCC): ICD-10-CM

## 2024-03-12 DIAGNOSIS — E03.8 OTHER SPECIFIED HYPOTHYROIDISM: ICD-10-CM

## 2024-03-12 DIAGNOSIS — E03.8 OTHER SPECIFIED HYPOTHYROIDISM: Primary | ICD-10-CM

## 2024-03-12 DIAGNOSIS — Z79.4 TYPE 2 DIABETES MELLITUS WITH OTHER SPECIFIED COMPLICATION, WITH LONG-TERM CURRENT USE OF INSULIN (HCC): ICD-10-CM

## 2024-03-12 DIAGNOSIS — E87.1 HYPONATREMIA: ICD-10-CM

## 2024-03-12 LAB
ALBUMIN SERPL BCG-MCNC: 4.2 G/DL (ref 3.5–5.1)
ALP SERPL-CCNC: 43 U/L (ref 38–126)
ALT SERPL W/O P-5'-P-CCNC: 18 U/L (ref 11–66)
ANION GAP SERPL CALC-SCNC: 12 MEQ/L (ref 8–16)
AST SERPL-CCNC: 20 U/L (ref 5–40)
BILIRUB SERPL-MCNC: 0.6 MG/DL (ref 0.3–1.2)
BUN SERPL-MCNC: 22 MG/DL (ref 7–22)
CALCIUM SERPL-MCNC: 10 MG/DL (ref 8.5–10.5)
CHLORIDE SERPL-SCNC: 96 MEQ/L (ref 98–111)
CHOLESTEROL, FASTING: 137 MG/DL (ref 100–199)
CO2 SERPL-SCNC: 26 MEQ/L (ref 23–33)
CREAT SERPL-MCNC: 0.9 MG/DL (ref 0.4–1.2)
GFR SERPL CREATININE-BSD FRML MDRD: > 60 ML/MIN/1.73M2
GLUCOSE SERPL-MCNC: 141 MG/DL (ref 70–108)
HBV SURFACE AB SER QL IA: NEGATIVE
HDLC SERPL-MCNC: 39 MG/DL
LDLC SERPL CALC-MCNC: 60 MG/DL
POTASSIUM SERPL-SCNC: 5 MEQ/L (ref 3.5–5.2)
PROT SERPL-MCNC: 7.1 G/DL (ref 6.1–8)
SODIUM SERPL-SCNC: 134 MEQ/L (ref 135–145)
T4 FREE SERPL-MCNC: 1.58 NG/DL (ref 0.93–1.68)
TRIGLYCERIDE, FASTING: 192 MG/DL (ref 0–199)
TSH SERPL DL<=0.005 MIU/L-ACNC: 5.04 UIU/ML (ref 0.4–4.2)

## 2024-03-13 ENCOUNTER — TELEPHONE (OUTPATIENT)
Dept: FAMILY MEDICINE CLINIC | Age: 79
End: 2024-03-13

## 2024-03-13 NOTE — TELEPHONE ENCOUNTER
----- Message from EMMANUEL Webster CNP sent at 3/12/2024  5:33 PM EDT -----  TSH is elevated, and sodium is borderline low.  Would recommend repeating labs in about 2-3 weeks to monitor

## 2024-03-19 ENCOUNTER — TELEPHONE (OUTPATIENT)
Dept: FAMILY MEDICINE CLINIC | Age: 79
End: 2024-03-19

## 2024-03-19 ENCOUNTER — OFFICE VISIT (OUTPATIENT)
Dept: FAMILY MEDICINE CLINIC | Age: 79
End: 2024-03-19
Payer: MEDICARE

## 2024-03-19 ENCOUNTER — HOSPITAL ENCOUNTER (OUTPATIENT)
Dept: WOMENS IMAGING | Age: 79
Discharge: HOME | End: 2024-03-19
Payer: MEDICARE

## 2024-03-19 VITALS
DIASTOLIC BLOOD PRESSURE: 78 MMHG | WEIGHT: 181.6 LBS | BODY MASS INDEX: 30.26 KG/M2 | RESPIRATION RATE: 14 BRPM | TEMPERATURE: 98.6 F | SYSTOLIC BLOOD PRESSURE: 132 MMHG | HEIGHT: 65 IN | OXYGEN SATURATION: 99 % | HEART RATE: 80 BPM

## 2024-03-19 DIAGNOSIS — M85.89 OSTEOPENIA OF MULTIPLE SITES: Primary | ICD-10-CM

## 2024-03-19 DIAGNOSIS — E11.69 TYPE 2 DIABETES MELLITUS WITH OTHER SPECIFIED COMPLICATION, WITH LONG-TERM CURRENT USE OF INSULIN (HCC): ICD-10-CM

## 2024-03-19 DIAGNOSIS — Z12.31 VISIT FOR SCREENING MAMMOGRAM: ICD-10-CM

## 2024-03-19 DIAGNOSIS — Z78.0 POST-MENOPAUSAL: ICD-10-CM

## 2024-03-19 DIAGNOSIS — I10 ESSENTIAL HYPERTENSION: ICD-10-CM

## 2024-03-19 DIAGNOSIS — I48.20 CHRONIC ATRIAL FIBRILLATION (HCC): ICD-10-CM

## 2024-03-19 DIAGNOSIS — M85.88 OSTEOPENIA OF SPINE: ICD-10-CM

## 2024-03-19 DIAGNOSIS — Z79.4 TYPE 2 DIABETES MELLITUS WITH OTHER SPECIFIED COMPLICATION, WITH LONG-TERM CURRENT USE OF INSULIN (HCC): ICD-10-CM

## 2024-03-19 LAB — HBA1C MFR BLD: 8.2 % (ref 4.3–5.7)

## 2024-03-19 PROCEDURE — G8417 CALC BMI ABV UP PARAM F/U: HCPCS | Performed by: NURSE PRACTITIONER

## 2024-03-19 PROCEDURE — 1036F TOBACCO NON-USER: CPT | Performed by: NURSE PRACTITIONER

## 2024-03-19 PROCEDURE — G8399 PT W/DXA RESULTS DOCUMENT: HCPCS | Performed by: NURSE PRACTITIONER

## 2024-03-19 PROCEDURE — 99214 OFFICE O/P EST MOD 30 MIN: CPT | Performed by: NURSE PRACTITIONER

## 2024-03-19 PROCEDURE — 1090F PRES/ABSN URINE INCON ASSESS: CPT | Performed by: NURSE PRACTITIONER

## 2024-03-19 PROCEDURE — G8427 DOCREV CUR MEDS BY ELIG CLIN: HCPCS | Performed by: NURSE PRACTITIONER

## 2024-03-19 PROCEDURE — G8484 FLU IMMUNIZE NO ADMIN: HCPCS | Performed by: NURSE PRACTITIONER

## 2024-03-19 PROCEDURE — 77063 BREAST TOMOSYNTHESIS BI: CPT

## 2024-03-19 PROCEDURE — 1123F ACP DISCUSS/DSCN MKR DOCD: CPT | Performed by: NURSE PRACTITIONER

## 2024-03-19 PROCEDURE — 3075F SYST BP GE 130 - 139MM HG: CPT | Performed by: NURSE PRACTITIONER

## 2024-03-19 PROCEDURE — 3078F DIAST BP <80 MM HG: CPT | Performed by: NURSE PRACTITIONER

## 2024-03-19 RX ORDER — METOPROLOL TARTRATE 50 MG/1
75 TABLET, FILM COATED ORAL 2 TIMES DAILY
Qty: 180 TABLET | Refills: 3 | Status: SHIPPED | OUTPATIENT
Start: 2024-03-19

## 2024-03-19 RX ORDER — ALENDRONATE SODIUM 70 MG/1
70 TABLET ORAL
Qty: 12 TABLET | Refills: 3 | Status: SHIPPED | OUTPATIENT
Start: 2024-03-19

## 2024-03-19 RX ORDER — GLIMEPIRIDE 4 MG/1
4 TABLET ORAL 2 TIMES DAILY
Qty: 180 TABLET | Refills: 3 | Status: SHIPPED | OUTPATIENT
Start: 2024-03-19

## 2024-03-19 RX ORDER — SIMVASTATIN 40 MG
TABLET ORAL
Qty: 90 TABLET | Refills: 3 | Status: SHIPPED | OUTPATIENT
Start: 2024-03-19

## 2024-03-19 RX ORDER — INSULIN GLARGINE 100 [IU]/ML
26 INJECTION, SOLUTION SUBCUTANEOUS DAILY
Qty: 5 ADJUSTABLE DOSE PRE-FILLED PEN SYRINGE | Refills: 5 | Status: SHIPPED | OUTPATIENT
Start: 2024-03-19 | End: 2024-03-19 | Stop reason: CLARIF

## 2024-03-19 RX ORDER — METFORMIN HYDROCHLORIDE 500 MG/1
1000 TABLET, EXTENDED RELEASE ORAL 2 TIMES DAILY
Qty: 360 TABLET | Refills: 3 | Status: SHIPPED | OUTPATIENT
Start: 2024-03-19

## 2024-03-19 SDOH — ECONOMIC STABILITY: FOOD INSECURITY: WITHIN THE PAST 12 MONTHS, THE FOOD YOU BOUGHT JUST DIDN'T LAST AND YOU DIDN'T HAVE MONEY TO GET MORE.: NEVER TRUE

## 2024-03-19 SDOH — ECONOMIC STABILITY: FOOD INSECURITY: WITHIN THE PAST 12 MONTHS, YOU WORRIED THAT YOUR FOOD WOULD RUN OUT BEFORE YOU GOT MONEY TO BUY MORE.: NEVER TRUE

## 2024-03-19 SDOH — ECONOMIC STABILITY: INCOME INSECURITY: HOW HARD IS IT FOR YOU TO PAY FOR THE VERY BASICS LIKE FOOD, HOUSING, MEDICAL CARE, AND HEATING?: NOT HARD AT ALL

## 2024-03-19 ASSESSMENT — PATIENT HEALTH QUESTIONNAIRE - PHQ9
SUM OF ALL RESPONSES TO PHQ QUESTIONS 1-9: 0
SUM OF ALL RESPONSES TO PHQ9 QUESTIONS 1 & 2: 0
SUM OF ALL RESPONSES TO PHQ QUESTIONS 1-9: 0
2. FEELING DOWN, DEPRESSED OR HOPELESS: NOT AT ALL
SUM OF ALL RESPONSES TO PHQ QUESTIONS 1-9: 0
1. LITTLE INTEREST OR PLEASURE IN DOING THINGS: NOT AT ALL
SUM OF ALL RESPONSES TO PHQ QUESTIONS 1-9: 0

## 2024-03-19 NOTE — PROGRESS NOTES
DENSITY AXIAL SKELETON; Future    Other orders  -     Discontinue: insulin glargine (BASAGLAR KWIKPEN) 100 UNIT/ML injection pen; Inject 26 Units into the skin daily        Return in about 3 months (around 6/19/2024) for dm check.

## 2024-03-19 NOTE — TELEPHONE ENCOUNTER
----- Message from EMMANUEL Webster - CNP sent at 3/19/2024 12:42 PM EDT -----  See if pt is taking losartan, I dont have it on medication list. And see if she needs refill?   Thank you

## 2024-03-19 NOTE — TELEPHONE ENCOUNTER
----- Message from EMMANUEL Webster CNP sent at 3/19/2024  9:54 AM EDT -----  Cancel script for lantus at [facility] in [place]

## 2024-03-29 ENCOUNTER — TELEPHONE (OUTPATIENT)
Dept: FAMILY MEDICINE CLINIC | Age: 79
End: 2024-03-29

## 2024-03-29 ENCOUNTER — NURSE ONLY (OUTPATIENT)
Dept: LAB | Age: 79
End: 2024-03-29

## 2024-03-29 DIAGNOSIS — E03.8 OTHER SPECIFIED HYPOTHYROIDISM: ICD-10-CM

## 2024-03-29 DIAGNOSIS — M85.88 OSTEOPENIA OF SPINE: ICD-10-CM

## 2024-03-29 DIAGNOSIS — E87.1 HYPONATREMIA: ICD-10-CM

## 2024-03-29 DIAGNOSIS — D35.1 PARATHYROID ADENOMA: Primary | ICD-10-CM

## 2024-03-29 LAB
PTH-INTACT SERPL-MCNC: 14.6 PG/ML (ref 15–65)
SODIUM SERPL-SCNC: 139 MEQ/L (ref 135–145)
TSH SERPL DL<=0.005 MIU/L-ACNC: 3.93 UIU/ML (ref 0.4–4.2)

## 2024-03-29 RX ORDER — LEVOTHYROXINE SODIUM 112 UG/1
112 TABLET ORAL DAILY
Qty: 90 TABLET | Refills: 3 | Status: SHIPPED | OUTPATIENT
Start: 2024-03-29

## 2024-03-29 NOTE — TELEPHONE ENCOUNTER
----- Message from EMMANUEL Webster - CNP sent at 3/29/2024 11:11 AM EDT -----  Thyroid and sodium labs normal  PTH mildly low, would like to repeat this next time she gets blood work done, could be transient.

## 2024-03-29 NOTE — TELEPHONE ENCOUNTER
Recent Visits  Date Type Provider Dept   03/19/24 Office Visit Chuyita Espino APRN - CNP [facility]   09/19/23 Office Visit Chuyita Espino, APRN - CNP [facility]   06/20/23 Office Visit Chuyita Espino APRN - CNP [facility]   03/09/23 Office Visit Chuytia Espino APRN - CNP [facility]   01/24/23 Office Visit Chuyita Espino, APRN - CNP [facility]   Showing recent visits within past 540 days with a meds authorizing provider and meeting all other requirements  Future Appointments  Date Type Provider Dept   06/18/24 Appointment Chuyita Espino APRN - CNP [facility]   Showing future appointments within next 150 days with a meds authorizing provider and meeting all other requirements

## 2024-04-09 ENCOUNTER — TELEPHONE (OUTPATIENT)
Dept: FAMILY MEDICINE CLINIC | Age: 79
End: 2024-04-09

## 2024-04-09 ENCOUNTER — HOSPITAL ENCOUNTER (OUTPATIENT)
Dept: WOMENS IMAGING | Age: 79
Discharge: HOME | End: 2024-04-09
Payer: MEDICARE

## 2024-04-09 DIAGNOSIS — Z78.0 POST-MENOPAUSAL: ICD-10-CM

## 2024-04-09 DIAGNOSIS — M85.89 OSTEOPENIA OF MULTIPLE SITES: Primary | ICD-10-CM

## 2024-04-09 DIAGNOSIS — M85.88 OSTEOPENIA OF SPINE: ICD-10-CM

## 2024-04-09 PROCEDURE — 77080 DXA BONE DENSITY AXIAL: CPT

## 2024-04-09 NOTE — TELEPHONE ENCOUNTER
With her previous fractures noted to be low trauma fractures as noted by Dr Dunne in the past would recommend continuing on fosamax for now   Will assess Dexa in another 2 years.  I'm going to add blood work to her PTH that I wanted rechecked

## 2024-04-09 NOTE — TELEPHONE ENCOUNTER
----- Message from EMMANUEL Webstre - CNP sent at 4/9/2024 10:39 AM EDT -----  Dexa scan with osteopenia  Continue calcium and vitamin d supplementation  Repeat In 2 years

## 2024-04-12 ENCOUNTER — TELEPHONE (OUTPATIENT)
Dept: FAMILY MEDICINE CLINIC | Age: 79
End: 2024-04-12

## 2024-04-12 ENCOUNTER — NURSE ONLY (OUTPATIENT)
Dept: LAB | Age: 79
End: 2024-04-12

## 2024-04-12 DIAGNOSIS — D35.1 PARATHYROID ADENOMA: ICD-10-CM

## 2024-04-12 DIAGNOSIS — M85.89 OSTEOPENIA OF MULTIPLE SITES: ICD-10-CM

## 2024-04-12 LAB
25(OH)D3 SERPL-MCNC: 23 NG/ML (ref 30–100)
ANION GAP SERPL CALC-SCNC: 14 MEQ/L (ref 8–16)
BUN SERPL-MCNC: 17 MG/DL (ref 7–22)
CALCIUM SERPL-MCNC: 9.5 MG/DL (ref 8.5–10.5)
CHLORIDE SERPL-SCNC: 95 MEQ/L (ref 98–111)
CO2 SERPL-SCNC: 27 MEQ/L (ref 23–33)
CREAT SERPL-MCNC: 0.7 MG/DL (ref 0.4–1.2)
GFR SERPL CREATININE-BSD FRML MDRD: 89 ML/MIN/1.73M2
GLUCOSE SERPL-MCNC: 155 MG/DL (ref 70–108)
POTASSIUM SERPL-SCNC: 4.8 MEQ/L (ref 3.5–5.2)
PTH-INTACT SERPL-MCNC: 15.6 PG/ML (ref 15–65)
SODIUM SERPL-SCNC: 136 MEQ/L (ref 135–145)

## 2024-04-12 NOTE — TELEPHONE ENCOUNTER
----- Message from EMMANUEL Webster - CNP sent at 4/12/2024 12:20 PM EDT -----  Vitamin d on low side, recommend 1000 IU of vitamin d daily if not already taking  Her Parathyroid labs returned to normal  Kidney function stable

## 2024-04-25 ENCOUNTER — OFFICE VISIT (OUTPATIENT)
Dept: CARDIOLOGY CLINIC | Age: 79
End: 2024-04-25
Payer: MEDICARE

## 2024-04-25 VITALS
WEIGHT: 188 LBS | DIASTOLIC BLOOD PRESSURE: 82 MMHG | BODY MASS INDEX: 31.32 KG/M2 | HEART RATE: 65 BPM | SYSTOLIC BLOOD PRESSURE: 168 MMHG | HEIGHT: 65 IN

## 2024-04-25 DIAGNOSIS — I10 PRIMARY HYPERTENSION: ICD-10-CM

## 2024-04-25 DIAGNOSIS — E78.01 FAMILIAL HYPERCHOLESTEROLEMIA: ICD-10-CM

## 2024-04-25 DIAGNOSIS — I48.21 PERMANENT ATRIAL FIBRILLATION (HCC): ICD-10-CM

## 2024-04-25 DIAGNOSIS — I25.10 CORONARY ARTERY DISEASE INVOLVING NATIVE CORONARY ARTERY OF NATIVE HEART WITHOUT ANGINA PECTORIS: Primary | ICD-10-CM

## 2024-04-25 PROCEDURE — 1123F ACP DISCUSS/DSCN MKR DOCD: CPT | Performed by: NUCLEAR MEDICINE

## 2024-04-25 PROCEDURE — G8428 CUR MEDS NOT DOCUMENT: HCPCS | Performed by: NUCLEAR MEDICINE

## 2024-04-25 PROCEDURE — 3079F DIAST BP 80-89 MM HG: CPT | Performed by: NUCLEAR MEDICINE

## 2024-04-25 PROCEDURE — G8399 PT W/DXA RESULTS DOCUMENT: HCPCS | Performed by: NUCLEAR MEDICINE

## 2024-04-25 PROCEDURE — 3077F SYST BP >= 140 MM HG: CPT | Performed by: NUCLEAR MEDICINE

## 2024-04-25 PROCEDURE — 99214 OFFICE O/P EST MOD 30 MIN: CPT | Performed by: NUCLEAR MEDICINE

## 2024-04-25 PROCEDURE — G8417 CALC BMI ABV UP PARAM F/U: HCPCS | Performed by: NUCLEAR MEDICINE

## 2024-04-25 PROCEDURE — 93000 ELECTROCARDIOGRAM COMPLETE: CPT | Performed by: NUCLEAR MEDICINE

## 2024-04-25 PROCEDURE — 1090F PRES/ABSN URINE INCON ASSESS: CPT | Performed by: NUCLEAR MEDICINE

## 2024-04-25 PROCEDURE — 1036F TOBACCO NON-USER: CPT | Performed by: NUCLEAR MEDICINE

## 2024-04-25 RX ORDER — LOSARTAN POTASSIUM 100 MG/1
100 TABLET ORAL DAILY
COMMUNITY
End: 2024-04-25 | Stop reason: ALTCHOICE

## 2024-04-25 RX ORDER — VALSARTAN 320 MG/1
320 TABLET ORAL DAILY
Qty: 90 TABLET | Refills: 3 | Status: SHIPPED | OUTPATIENT
Start: 2024-04-25

## 2024-04-25 RX ORDER — VALSARTAN 320 MG/1
320 TABLET ORAL DAILY
COMMUNITY
End: 2024-04-25 | Stop reason: SDUPTHER

## 2024-04-25 NOTE — PROGRESS NOTES
[facility] [place] [facility]  [facility] CARDIOLOGY  [address]  [address]  [facility] [place] [facility] [address]  Dept: [phone]  Dept Fax: [phone]  Loc: [phone]    Visit Date: 4/25/2024    Kristal Enamorado is a 79 y.o. female who presents todayfor:  Chief Complaint   Patient presents with    Follow-up    Hypertension    Hyperlipidemia    Coronary Artery Disease   Cath before  Moderate CAD  Also known A fib ablation   Higher BP today   Usually better  Some dizziness  More with position   ?? 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    Thyroid disease       Past Surgical History:   Procedure Laterality Date    APPENDECTOMY      CARDIAC CATHETERIZATION      CARDIAC SURGERY  02/2021    ablation     CHOLECYSTECTOMY      FRACTURE SURGERY      ankle surgery    FRACTURE SURGERY Right 09/2018    HUMERUS    HERNIA REPAIR      HYSTERECTOMY (CERVIX STATUS UNKNOWN)      PARATHYROID GLAND SURGERY N/A 10/1/2020    PARATHYROIDECTOMY, EXPLORATION OF PARATHYROIDS performed by Cali Dunne MD at [facility] OR    PILONIDAL CYST EXCISION N/A 8/24/2023    PILONIDAL CYSTECTOMY performed by Lane Tavarez MD at [facility] OR    KS OFFICE/OUTPT VISIT,PROCEDURE ONLY Right 9/19/2018    ORIF RIGHT PROXIMAL HUMERUS FX performed by Garry Gomez MD at [facility] OR    REVISION OF TOTAL SHOULDER Right 6/27/2019    REVISION RT HUMERUS FX, HARDWARE REMOVAL, HUMERAL NAIL INSERTION performed by

## 2024-05-03 RX ORDER — HYDRALAZINE HYDROCHLORIDE 50 MG/1
50 TABLET, FILM COATED ORAL 3 TIMES DAILY
Qty: 270 TABLET | Refills: 3 | Status: SHIPPED | OUTPATIENT
Start: 2024-05-03

## 2024-06-17 ENCOUNTER — NURSE ONLY (OUTPATIENT)
Dept: LAB | Age: 79
End: 2024-06-17

## 2024-06-17 DIAGNOSIS — I10 HTN (HYPERTENSION), BENIGN: ICD-10-CM

## 2024-06-17 DIAGNOSIS — E87.1 HYPONATREMIA: ICD-10-CM

## 2024-06-17 DIAGNOSIS — N18.1 CKD (CHRONIC KIDNEY DISEASE), STAGE I: ICD-10-CM

## 2024-06-17 LAB
ALBUMIN SERPL BCG-MCNC: 4 G/DL (ref 3.5–5.1)
ANION GAP SERPL CALC-SCNC: 9 MEQ/L (ref 8–16)
BACTERIA: ABNORMAL
BILIRUB UR QL STRIP: NEGATIVE
BUN SERPL-MCNC: 22 MG/DL (ref 7–22)
CALCIUM SERPL-MCNC: 10 MG/DL (ref 8.5–10.5)
CASTS #/AREA URNS LPF: ABNORMAL /LPF
CASTS #/AREA URNS LPF: ABNORMAL /LPF
CHARACTER UR: CLEAR
CHARCOAL URNS QL MICRO: ABNORMAL
CHLORIDE SERPL-SCNC: 99 MEQ/L (ref 98–111)
CO2 SERPL-SCNC: 29 MEQ/L (ref 23–33)
COLOR UR: YELLOW
CREAT SERPL-MCNC: 0.7 MG/DL (ref 0.4–1.2)
CREAT UR-MCNC: 46.2 MG/DL
CREAT UR-MCNC: 46.2 MG/DL
CRYSTALS URNS QL MICRO: ABNORMAL
EPITHELIAL CELLS, UA: ABNORMAL /HPF
GFR SERPL CREATININE-BSD FRML MDRD: 89 ML/MIN/1.73M2
GLUCOSE SERPL-MCNC: 173 MG/DL (ref 70–108)
GLUCOSE UR QL STRIP.AUTO: NEGATIVE MG/DL
HGB UR QL STRIP.AUTO: NEGATIVE
KETONES UR QL STRIP.AUTO: NEGATIVE
LEUKOCYTE ESTERASE UR QL STRIP.AUTO: NEGATIVE
MICROALBUMIN UR-MCNC: 11.68 MG/DL
MICROALBUMIN/CREAT RATIO PNL UR: 253 MG/G (ref 0–30)
NITRITE UR QL STRIP.AUTO: NEGATIVE
PH UR STRIP.AUTO: 5.5 [PH] (ref 5–9)
PHOSPHATE SERPL-MCNC: 3.9 MG/DL (ref 2.4–4.7)
POTASSIUM SERPL-SCNC: 4.9 MEQ/L (ref 3.5–5.2)
PROT UR STRIP.AUTO-MCNC: ABNORMAL MG/DL
PROT UR-MCNC: 17.9 MG/DL
PROT/CREAT 24H UR: 0.39 MG/G{CREAT}
RBC #/AREA URNS HPF: ABNORMAL /HPF
RENAL EPI CELLS #/AREA URNS HPF: ABNORMAL /[HPF]
SODIUM SERPL-SCNC: 137 MEQ/L (ref 135–145)
SPECIFIC GRAVITY UA: 1.01 (ref 1–1.03)
UROBILINOGEN, URINE: 0.2 EU/DL (ref 0–1)
WBC #/AREA URNS HPF: ABNORMAL /HPF
YEAST LIKE FUNGI URNS QL MICRO: ABNORMAL

## 2024-06-25 ENCOUNTER — OFFICE VISIT (OUTPATIENT)
Dept: FAMILY MEDICINE CLINIC | Age: 79
End: 2024-06-25
Payer: MEDICARE

## 2024-06-25 VITALS
BODY MASS INDEX: 31.69 KG/M2 | OXYGEN SATURATION: 97 % | WEIGHT: 190.2 LBS | TEMPERATURE: 97.2 F | DIASTOLIC BLOOD PRESSURE: 84 MMHG | HEART RATE: 63 BPM | HEIGHT: 65 IN | SYSTOLIC BLOOD PRESSURE: 148 MMHG | RESPIRATION RATE: 14 BRPM

## 2024-06-25 DIAGNOSIS — Z00.00 MEDICARE ANNUAL WELLNESS VISIT, SUBSEQUENT: Primary | ICD-10-CM

## 2024-06-25 DIAGNOSIS — E11.29 TYPE 2 DIABETES MELLITUS WITH DIABETIC MICROALBUMINURIA, WITH LONG-TERM CURRENT USE OF INSULIN (HCC): ICD-10-CM

## 2024-06-25 DIAGNOSIS — M85.89 OSTEOPENIA OF MULTIPLE SITES: ICD-10-CM

## 2024-06-25 DIAGNOSIS — Z79.4 TYPE 2 DIABETES MELLITUS WITH DIABETIC MICROALBUMINURIA, WITH LONG-TERM CURRENT USE OF INSULIN (HCC): ICD-10-CM

## 2024-06-25 DIAGNOSIS — R80.9 TYPE 2 DIABETES MELLITUS WITH DIABETIC MICROALBUMINURIA, WITH LONG-TERM CURRENT USE OF INSULIN (HCC): ICD-10-CM

## 2024-06-25 DIAGNOSIS — I10 ESSENTIAL HYPERTENSION: ICD-10-CM

## 2024-06-25 PROBLEM — M80.029A: Status: RESOLVED | Noted: 2021-01-13 | Resolved: 2024-06-25

## 2024-06-25 PROBLEM — S42.291K: Status: RESOLVED | Noted: 2021-08-26 | Resolved: 2024-06-25

## 2024-06-25 PROBLEM — E83.52 HYPERCALCEMIA: Status: RESOLVED | Noted: 2020-08-24 | Resolved: 2024-06-25

## 2024-06-25 LAB — HBA1C MFR BLD: 7.8 %

## 2024-06-25 PROCEDURE — 3079F DIAST BP 80-89 MM HG: CPT | Performed by: NURSE PRACTITIONER

## 2024-06-25 PROCEDURE — 1123F ACP DISCUSS/DSCN MKR DOCD: CPT | Performed by: NURSE PRACTITIONER

## 2024-06-25 PROCEDURE — 1090F PRES/ABSN URINE INCON ASSESS: CPT | Performed by: NURSE PRACTITIONER

## 2024-06-25 PROCEDURE — G8399 PT W/DXA RESULTS DOCUMENT: HCPCS | Performed by: NURSE PRACTITIONER

## 2024-06-25 PROCEDURE — G8417 CALC BMI ABV UP PARAM F/U: HCPCS | Performed by: NURSE PRACTITIONER

## 2024-06-25 PROCEDURE — G8427 DOCREV CUR MEDS BY ELIG CLIN: HCPCS | Performed by: NURSE PRACTITIONER

## 2024-06-25 PROCEDURE — 1036F TOBACCO NON-USER: CPT | Performed by: NURSE PRACTITIONER

## 2024-06-25 PROCEDURE — 83036 HEMOGLOBIN GLYCOSYLATED A1C: CPT | Performed by: NURSE PRACTITIONER

## 2024-06-25 PROCEDURE — 3077F SYST BP >= 140 MM HG: CPT | Performed by: NURSE PRACTITIONER

## 2024-06-25 PROCEDURE — G0439 PPPS, SUBSEQ VISIT: HCPCS | Performed by: NURSE PRACTITIONER

## 2024-06-25 PROCEDURE — 99214 OFFICE O/P EST MOD 30 MIN: CPT | Performed by: NURSE PRACTITIONER

## 2024-06-25 PROCEDURE — 3051F HG A1C>EQUAL 7.0%<8.0%: CPT | Performed by: NURSE PRACTITIONER

## 2024-06-25 RX ORDER — AMLODIPINE BESYLATE 5 MG/1
5 TABLET ORAL DAILY
Qty: 90 TABLET | Refills: 0 | Status: SHIPPED | OUTPATIENT
Start: 2024-06-25

## 2024-06-25 RX ORDER — SEMAGLUTIDE 0.68 MG/ML
0.25 INJECTION, SOLUTION SUBCUTANEOUS WEEKLY
Qty: 3 ML | Refills: 1 | Status: SHIPPED | OUTPATIENT
Start: 2024-06-25

## 2024-06-25 RX ORDER — GLIMEPIRIDE 4 MG/1
4 TABLET ORAL
Qty: 90 TABLET | Refills: 3
Start: 2024-06-25

## 2024-06-25 ASSESSMENT — PATIENT HEALTH QUESTIONNAIRE - PHQ9
SUM OF ALL RESPONSES TO PHQ QUESTIONS 1-9: 0
SUM OF ALL RESPONSES TO PHQ9 QUESTIONS 1 & 2: 0
2. FEELING DOWN, DEPRESSED OR HOPELESS: NOT AT ALL
SUM OF ALL RESPONSES TO PHQ QUESTIONS 1-9: 0
1. LITTLE INTEREST OR PLEASURE IN DOING THINGS: NOT AT ALL

## 2024-06-25 ASSESSMENT — LIFESTYLE VARIABLES
HOW OFTEN DO YOU HAVE A DRINK CONTAINING ALCOHOL: NEVER
HOW MANY STANDARD DRINKS CONTAINING ALCOHOL DO YOU HAVE ON A TYPICAL DAY: PATIENT DOES NOT DRINK

## 2024-06-25 NOTE — PROGRESS NOTES
Medicare Annual Wellness Visit    Kristal Enamorado is here for Medicare AWV and Diabetes    Assessment & Plan   Medicare annual wellness visit, subsequent  Type 2 diabetes mellitus with diabetic microalbuminuria, with long-term current use of insulin (HCC)  -     POCT glycosylated hemoglobin (Hb A1C)  Not controlled but improved   Start Ozempic, samples given. Continue Metformin, and Amaryl (daily) and Basaglar 26 units    Essential hypertension  -     amLODIPine (NORVASC) 5 MG tablet; Take 1 tablet by mouth daily, Disp-90 tablet, R-0Normal   Not  controlled, continue valsartan, metoprolol, and hydralazine  Osteopenia of multiple sites   Dexa in 2 years   Continue fosamax for now    Hyponatremia   Fu with nephrology  Recommendations for Preventive Services Due: see orders and patient instructions/AVS.  Recommended screening schedule for the next 5-10 years is provided to the patient in written form: see Patient Instructions/AVS.     Return in about 2 months (around 8/25/2024) for diabetes.     Subjective   The following acute and/or chronic problems were also addressed today:      HTN    On valsartan, metoprolol, hydralazine  Not  controlled  Recently stopped losartan and changed to valsartan  Used to be on amlodipine not sure why it was stopped        DM  At night time in the 200s  But in 150s in am fasting  Metformin, glimiperide, Basaglar 26 units  No symptoms  Diet hasn't been the greatest      Osteopenia  Last dexa 2021  Hx of parathyroid adenoma  Been on fosamax [id number]   Then put back on it in 2021 and has been on since  Will get repeat Dexa     TRACY  Follows with Pulmonary     Hyponatremia  Seeing Nephrology  Labs stable  +protein in urine     CAD  Follows with Dr Hopkins  Moderate to severe diagonal with mod disease other wise   On statin     AFib  On Xarelto             Patient's complete Health Risk Assessment and screening values have been reviewed and are found in Flowsheets. The following problems were

## 2024-06-25 NOTE — PATIENT INSTRUCTIONS
Glimepiride 4 mg daily only, not twice a day    Start Ozempic 0.25 mg weekly for 4 weeks    Start amlodipine 5 mg daily       Starting a Weight Loss Plan: Care Instructions  Overview     It can be a challenge to lose weight. But your doctor can help you make a weight-loss plan that meets your needs.  You don't have to make a lot of big changes at once. A better idea might be to focus on small changes and stick with them. When those changes become habit, you can add a few more changes.  Some people find it helpful to take an exercise or nutrition class. If you have questions, ask your doctor about seeing a registered dietitian or an exercise specialist. You might also think about joining a weight-loss support group.  If you're not ready to make changes right now, try to pick a date in the future. Then make an appointment with your doctor to talk about when and how you'll get started with a plan.  Follow-up care is a key part of your treatment and safety. Be sure to make and go to all appointments, and call your doctor if you are having problems. It's also a good idea to know your test results and keep a list of the medicines you take.  How can you care for yourself at home?  Set realistic goals. Many people expect to lose much more weight than is likely. A weight loss of 5% to 10% of your body weight may be enough to improve your health.  Get family and friends involved to provide support. Talk to them about why you are trying to lose weight, and ask them to help. They can help by participating in exercise and having meals with you, even if they may be eating something different.  Find what works best for you. If you do not have time or do not like to cook, a program that offers meal replacement bars or shakes may be better for you. Or if you like to prepare meals, finding a plan that includes daily menus and recipes may be best.  Ask your doctor about other health professionals who can help you achieve your weight

## 2024-06-26 ENCOUNTER — PATIENT MESSAGE (OUTPATIENT)
Dept: FAMILY MEDICINE CLINIC | Age: 79
End: 2024-06-26

## 2024-06-26 ENCOUNTER — OFFICE VISIT (OUTPATIENT)
Dept: NEPHROLOGY | Age: 79
End: 2024-06-26
Payer: MEDICARE

## 2024-06-26 VITALS
HEART RATE: 67 BPM | BODY MASS INDEX: 31.62 KG/M2 | DIASTOLIC BLOOD PRESSURE: 62 MMHG | WEIGHT: 190 LBS | SYSTOLIC BLOOD PRESSURE: 150 MMHG | OXYGEN SATURATION: 97 %

## 2024-06-26 DIAGNOSIS — N18.2 CKD (CHRONIC KIDNEY DISEASE), STAGE II: ICD-10-CM

## 2024-06-26 DIAGNOSIS — I10 HTN (HYPERTENSION), BENIGN: Primary | ICD-10-CM

## 2024-06-26 DIAGNOSIS — E87.1 HYPONATREMIA: ICD-10-CM

## 2024-06-26 PROCEDURE — 1090F PRES/ABSN URINE INCON ASSESS: CPT | Performed by: INTERNAL MEDICINE

## 2024-06-26 PROCEDURE — 1123F ACP DISCUSS/DSCN MKR DOCD: CPT | Performed by: INTERNAL MEDICINE

## 2024-06-26 PROCEDURE — 3077F SYST BP >= 140 MM HG: CPT | Performed by: INTERNAL MEDICINE

## 2024-06-26 PROCEDURE — G8427 DOCREV CUR MEDS BY ELIG CLIN: HCPCS | Performed by: INTERNAL MEDICINE

## 2024-06-26 PROCEDURE — 99213 OFFICE O/P EST LOW 20 MIN: CPT | Performed by: INTERNAL MEDICINE

## 2024-06-26 PROCEDURE — 3078F DIAST BP <80 MM HG: CPT | Performed by: INTERNAL MEDICINE

## 2024-06-26 PROCEDURE — G8417 CALC BMI ABV UP PARAM F/U: HCPCS | Performed by: INTERNAL MEDICINE

## 2024-06-26 PROCEDURE — 1036F TOBACCO NON-USER: CPT | Performed by: INTERNAL MEDICINE

## 2024-06-26 PROCEDURE — G2211 COMPLEX E/M VISIT ADD ON: HCPCS | Performed by: INTERNAL MEDICINE

## 2024-06-26 PROCEDURE — G8399 PT W/DXA RESULTS DOCUMENT: HCPCS | Performed by: INTERNAL MEDICINE

## 2024-06-26 NOTE — TELEPHONE ENCOUNTER
There is a program for Medicare participants that she could apply for.  Does she want us to send her a copy of it?

## 2024-06-26 NOTE — PROGRESS NOTES
[facility]        Outpatient Follow-Up note         6/26/2024 10:09 AM    Patient Name:   Kristal Enamorado  YOB: 1945  Primary Care Physician:  Chuyita Espino, APRN - CNP   [facility] [place] [facility]  [facility]  [address] [place] [address]  Dept: [phone]  Loc: [phone]     Chief Complaint / Reason for follow-up : Follow Up of hyponatremia nd microalbuminuris     Interval History :  Patient seen and examined by me.   Feels well no complaints at this time.       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    Diabetes mellitus (HCC)     [place] filter in place     Homozygous for MTHFR gene mutation     Hx of blood clots 2000    LEGS    Hypercalcemia 08/24/2020    Hyperlipidemia     Hypertension     TRACY on CPAP     Pathological fracture of humerus due to osteoporosis 01/13/2021    Prolonged emergence from general anesthesia     Stroke (cerebrum) (HCC) 03/09/20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    Thyroid disease      Past Surgical History:   Procedure Laterality Date    APPENDECTOMY      CARDIAC CATHETERIZATION      CARDIAC SURGERY  02/2021    ablation     CHOLECYSTECTOMY      FRACTURE SURGERY      ankle surgery    FRACTURE SURGERY Right 09/2018    HUMERUS    HERNIA REPAIR      HYSTERECTOMY (CERVIX STATUS UNKNOWN)      PARATHYROID GLAND SURGERY N/A 10/1/2020    PARATHYROIDECTOMY, EXPLORATION OF PARATHYROIDS performed by Cali Dunne MD at [facility] OR    PILONIDAL CYST EXCISION N/A 8/24/2023    PILONIDAL CYSTECTOMY performed by Lane Tavarez MD at [facility] OR    CT OFFICE/OUTPT VISIT,PROCEDURE ONLY Right

## 2024-06-26 NOTE — TELEPHONE ENCOUNTER
From: Kristal Enamorado  To: Chuyita Espino  Sent: 6/26/2024 8:56 AM EDT  Subject: Ozempic     I’m concerned about the cost of the Ozempic.is there something through the med assistant program that can help me with the cost?

## 2024-06-26 NOTE — PROGRESS NOTES
Losartan d/c  Valsartan was started  Ozempic started  Norvasc starting today  Glimepiride decreased from 8mg to 4 mg a day

## 2024-06-27 ENCOUNTER — TELEPHONE (OUTPATIENT)
Dept: FAMILY MEDICINE CLINIC | Age: 79
End: 2024-06-27

## 2024-06-27 NOTE — TELEPHONE ENCOUNTER
----- Message from EMMANUEL Webster CNP sent at 6/25/2024 10:11 AM EDT -----  Call pt and see if she was able to get Ozempic thank you  Electronically signed by EMMANUEL Webster CNP on 6/25/2024 at 10:11 AM

## 2024-08-26 ENCOUNTER — OFFICE VISIT (OUTPATIENT)
Dept: FAMILY MEDICINE CLINIC | Age: 79
End: 2024-08-26
Payer: MEDICARE

## 2024-08-26 VITALS
HEART RATE: 79 BPM | SYSTOLIC BLOOD PRESSURE: 138 MMHG | HEIGHT: 65 IN | RESPIRATION RATE: 14 BRPM | WEIGHT: 186.2 LBS | OXYGEN SATURATION: 99 % | TEMPERATURE: 98.2 F | BODY MASS INDEX: 31.02 KG/M2 | DIASTOLIC BLOOD PRESSURE: 76 MMHG

## 2024-08-26 DIAGNOSIS — I10 ESSENTIAL HYPERTENSION: ICD-10-CM

## 2024-08-26 DIAGNOSIS — Z79.4 TYPE 2 DIABETES MELLITUS WITH DIABETIC MICROALBUMINURIA, WITH LONG-TERM CURRENT USE OF INSULIN (HCC): Primary | ICD-10-CM

## 2024-08-26 DIAGNOSIS — R80.9 TYPE 2 DIABETES MELLITUS WITH DIABETIC MICROALBUMINURIA, WITH LONG-TERM CURRENT USE OF INSULIN (HCC): Primary | ICD-10-CM

## 2024-08-26 DIAGNOSIS — R80.9 MICROALBUMINURIA DUE TO TYPE 2 DIABETES MELLITUS (HCC): ICD-10-CM

## 2024-08-26 DIAGNOSIS — I48.20 CHRONIC ATRIAL FIBRILLATION (HCC): ICD-10-CM

## 2024-08-26 DIAGNOSIS — E03.9 HYPOTHYROIDISM, UNSPECIFIED TYPE: ICD-10-CM

## 2024-08-26 DIAGNOSIS — M85.89 OSTEOPENIA OF MULTIPLE SITES: ICD-10-CM

## 2024-08-26 DIAGNOSIS — E11.29 TYPE 2 DIABETES MELLITUS WITH DIABETIC MICROALBUMINURIA, WITH LONG-TERM CURRENT USE OF INSULIN (HCC): Primary | ICD-10-CM

## 2024-08-26 DIAGNOSIS — E11.29 MICROALBUMINURIA DUE TO TYPE 2 DIABETES MELLITUS (HCC): ICD-10-CM

## 2024-08-26 PROCEDURE — G8427 DOCREV CUR MEDS BY ELIG CLIN: HCPCS | Performed by: NURSE PRACTITIONER

## 2024-08-26 PROCEDURE — 1123F ACP DISCUSS/DSCN MKR DOCD: CPT | Performed by: NURSE PRACTITIONER

## 2024-08-26 PROCEDURE — G8417 CALC BMI ABV UP PARAM F/U: HCPCS | Performed by: NURSE PRACTITIONER

## 2024-08-26 PROCEDURE — 3051F HG A1C>EQUAL 7.0%<8.0%: CPT | Performed by: NURSE PRACTITIONER

## 2024-08-26 PROCEDURE — 3078F DIAST BP <80 MM HG: CPT | Performed by: NURSE PRACTITIONER

## 2024-08-26 PROCEDURE — 3075F SYST BP GE 130 - 139MM HG: CPT | Performed by: NURSE PRACTITIONER

## 2024-08-26 PROCEDURE — 1036F TOBACCO NON-USER: CPT | Performed by: NURSE PRACTITIONER

## 2024-08-26 PROCEDURE — 1090F PRES/ABSN URINE INCON ASSESS: CPT | Performed by: NURSE PRACTITIONER

## 2024-08-26 PROCEDURE — 99214 OFFICE O/P EST MOD 30 MIN: CPT | Performed by: NURSE PRACTITIONER

## 2024-08-26 PROCEDURE — G8399 PT W/DXA RESULTS DOCUMENT: HCPCS | Performed by: NURSE PRACTITIONER

## 2024-08-26 RX ORDER — SEMAGLUTIDE 0.68 MG/ML
0.5 INJECTION, SOLUTION SUBCUTANEOUS WEEKLY
Qty: 3 ML | Refills: 2 | Status: SHIPPED | OUTPATIENT
Start: 2024-08-26

## 2024-08-26 RX ORDER — AMLODIPINE BESYLATE 5 MG/1
5 TABLET ORAL DAILY
Qty: 90 TABLET | Refills: 3 | Status: SHIPPED | OUTPATIENT
Start: 2024-08-26

## 2024-08-26 RX ORDER — METOPROLOL TARTRATE 50 MG
50 TABLET ORAL 2 TIMES DAILY
Qty: 180 TABLET | Refills: 3 | Status: SHIPPED | OUTPATIENT
Start: 2024-08-26

## 2024-08-26 ASSESSMENT — ENCOUNTER SYMPTOMS: SHORTNESS OF BREATH: 1

## 2024-08-26 NOTE — PROGRESS NOTES
Kristal Enamorado is a 79 y.o. female thatpresents for Follow-up      History obtained today from Patient.    HPI      HTN     On valsartan, metoprolol, hydralazine  Not  controlled  Recently stopped losartan and changed to valsartan  Used to be on amlodipine not sure why it was stopped    This visit 8-  Started on Amlodipine, and was getting low BP with 75 mg of Metoprolol so only taking 50 mg BID  BP in 130s at home  No chest pain or worsening SOB  No leg swelling         DM  At night time in the 200s  But in 150s in am fasting  Metformin, glimiperide, Basaglar 26 units, started on Ozempic last month  Down 3 lbs  No symptoms  Diet hasn't been the greatest      Today 8-    BS fasting 80-90s  Now lows  Down 3 lbs  Appetite down  On 0.5 mg of Ozempic  Some nausea but getting better. Some constipation but taking Miralax for it        Osteopenia  Last dexa 2021  Hx of parathyroid adenoma  Been on fosamax 1696-7486   Then put back on it in 2021 and has been on since  Will get repeat Dexa    8-  Showed osteopenia, stable dexa  Consider stopping Fosamax      TRACY  Follows with Pulmonary     Hyponatremia  Seeing Nephrology  Labs stable  +protein in urine     CAD  Follows with Dr Hopkins  Moderate to severe diagonal with mod disease other wise   On statin     AFib  On Xarelto  Feels palpitations every once in a while  Sob stable         Breast cancer screening - 3/2024 normal  Dexa- 3/2024, osteopenia  Colon cancer screening - 2021, Dr Candelaria, repeat 5 years             I have reviewed the patient's past medical history, past surgical history, allergies,medications, social and family history and I have made updates where appropriate.    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

## 2024-09-12 ENCOUNTER — TELEPHONE (OUTPATIENT)
Dept: FAMILY MEDICINE CLINIC | Age: 79
End: 2024-09-12

## 2024-10-07 ENCOUNTER — OFFICE VISIT (OUTPATIENT)
Dept: PULMONOLOGY | Age: 79
End: 2024-10-07
Payer: MEDICARE

## 2024-10-07 VITALS
TEMPERATURE: 97.4 F | BODY MASS INDEX: 30.75 KG/M2 | OXYGEN SATURATION: 94 % | WEIGHT: 184.6 LBS | SYSTOLIC BLOOD PRESSURE: 138 MMHG | HEART RATE: 75 BPM | HEIGHT: 65 IN | DIASTOLIC BLOOD PRESSURE: 84 MMHG

## 2024-10-07 DIAGNOSIS — G47.33 OSA ON CPAP: Primary | ICD-10-CM

## 2024-10-07 DIAGNOSIS — E66.9 OBESITY (BMI 30-39.9): ICD-10-CM

## 2024-10-07 PROCEDURE — 1123F ACP DISCUSS/DSCN MKR DOCD: CPT | Performed by: NURSE PRACTITIONER

## 2024-10-07 PROCEDURE — 3075F SYST BP GE 130 - 139MM HG: CPT | Performed by: NURSE PRACTITIONER

## 2024-10-07 PROCEDURE — G8427 DOCREV CUR MEDS BY ELIG CLIN: HCPCS | Performed by: NURSE PRACTITIONER

## 2024-10-07 PROCEDURE — G8484 FLU IMMUNIZE NO ADMIN: HCPCS | Performed by: NURSE PRACTITIONER

## 2024-10-07 PROCEDURE — 99214 OFFICE O/P EST MOD 30 MIN: CPT | Performed by: NURSE PRACTITIONER

## 2024-10-07 PROCEDURE — G8417 CALC BMI ABV UP PARAM F/U: HCPCS | Performed by: NURSE PRACTITIONER

## 2024-10-07 PROCEDURE — 3079F DIAST BP 80-89 MM HG: CPT | Performed by: NURSE PRACTITIONER

## 2024-10-07 PROCEDURE — 1090F PRES/ABSN URINE INCON ASSESS: CPT | Performed by: NURSE PRACTITIONER

## 2024-10-07 PROCEDURE — 1036F TOBACCO NON-USER: CPT | Performed by: NURSE PRACTITIONER

## 2024-10-07 PROCEDURE — G8399 PT W/DXA RESULTS DOCUMENT: HCPCS | Performed by: NURSE PRACTITIONER

## 2024-10-07 ASSESSMENT — ENCOUNTER SYMPTOMS
SHORTNESS OF BREATH: 0
COUGH: 0
ALLERGIC/IMMUNOLOGIC NEGATIVE: 1
RESPIRATORY NEGATIVE: 1
GASTROINTESTINAL NEGATIVE: 1
WHEEZING: 0
EYES NEGATIVE: 1

## 2024-10-07 NOTE — PROGRESS NOTES
to keep good compliance with current recommended pressure to get optimal results and clinical improvement  - Recommend 7-9 hours of sleep with PAP  - She was advised to call [employer] regarding supplies if needed.   -She call my office for earlier appointment if needed for worsening of sleep symptoms.   - She was instructed on weight loss  - Kristal was educated about my impression and plan. Patient verbalizesunderstanding.  We will see Kristal A Fei back in: 1 year with download    Information added by my medical assistant/LPN was reviewed today   Electronically signed by EMMANUEL Coats CNP on 10/7/2024 at 10:58 AM

## 2024-10-15 ENCOUNTER — CARE COORDINATION (OUTPATIENT)
Dept: CARE COORDINATION | Age: 79
End: 2024-10-15

## 2024-10-15 NOTE — CARE COORDINATION
I am working on re-enrollments for 2025 in the PAP programs and have a question for the patient. I left her a message asking her to call me back.      Tatyana Dubose [facility]  CC   Medication Assistance  [place] and [employer]    (P) [phone]  (F) [phone]

## 2024-10-25 ENCOUNTER — CARE COORDINATION (OUTPATIENT)
Dept: CARE COORDINATION | Age: 79
End: 2024-10-25

## 2024-10-25 NOTE — CARE COORDINATION
I was able to submit the patients renewal application for assistance on their high cost medication for 2025.        Tatyana Dubose [facility]  CC   Medication Assistance  [place] [facility]    (P) [phone]  (F) [phone]

## 2024-11-05 ENCOUNTER — CARE COORDINATION (OUTPATIENT)
Dept: CARE COORDINATION | Age: 79
End: 2024-11-05

## 2024-11-05 NOTE — CARE COORDINATION
Patient is approved into the [employer] Cares program for 2025. She needs to fix a portion of her [employer] application and will get that information to me soon.      Tatyana Dubose [facility]  CC   Medication Assistance  [place] [address] [place] [facility]    (P) [phone]  (F) [phone]     room air

## 2024-11-06 ENCOUNTER — CARE COORDINATION (OUTPATIENT)
Dept: CARE COORDINATION | Age: 79
End: 2024-11-06

## 2024-11-06 NOTE — CARE COORDINATION
I was able to submit updated information to [employer] for the patients renewal application into the program.      Tatyana Dubose [facility]  CC   Medication Assistance  [place] and [employer]    (P) [phone]  (F) [phone]

## 2024-11-12 ENCOUNTER — CARE COORDINATION (OUTPATIENT)
Dept: CARE COORDINATION | Age: 79
End: 2024-11-12

## 2024-11-12 NOTE — CARE COORDINATION
Patient has been approved into the [employer] program for 2025.        Tatyana Dubose [facility]  CC   Medication Assistance  [place] [facility]    (P) [phone]  (F) [phone]

## 2024-11-19 ENCOUNTER — TELEPHONE (OUTPATIENT)
Dept: FAMILY MEDICINE CLINIC | Age: 79
End: 2024-11-19

## 2024-11-19 ENCOUNTER — LAB (OUTPATIENT)
Dept: LAB | Age: 79
End: 2024-11-19

## 2024-11-19 DIAGNOSIS — R80.9 TYPE 2 DIABETES MELLITUS WITH DIABETIC MICROALBUMINURIA, WITH LONG-TERM CURRENT USE OF INSULIN (HCC): ICD-10-CM

## 2024-11-19 DIAGNOSIS — Z79.4 TYPE 2 DIABETES MELLITUS WITH DIABETIC MICROALBUMINURIA, WITH LONG-TERM CURRENT USE OF INSULIN (HCC): ICD-10-CM

## 2024-11-19 DIAGNOSIS — I10 ESSENTIAL HYPERTENSION: ICD-10-CM

## 2024-11-19 DIAGNOSIS — E11.29 TYPE 2 DIABETES MELLITUS WITH DIABETIC MICROALBUMINURIA, WITH LONG-TERM CURRENT USE OF INSULIN (HCC): ICD-10-CM

## 2024-11-19 DIAGNOSIS — I48.20 CHRONIC ATRIAL FIBRILLATION (HCC): ICD-10-CM

## 2024-11-19 LAB
BASOPHILS ABSOLUTE: 0 THOU/MM3 (ref 0–0.1)
BASOPHILS NFR BLD AUTO: 0.8 %
CHOLESTEROL, FASTING: 126 MG/DL (ref 100–199)
DEPRECATED RDW RBC AUTO: 48.7 FL (ref 35–45)
EOSINOPHIL NFR BLD AUTO: 2 %
EOSINOPHILS ABSOLUTE: 0.1 THOU/MM3 (ref 0–0.4)
ERYTHROCYTE [DISTWIDTH] IN BLOOD BY AUTOMATED COUNT: 13.2 % (ref 11.5–14.5)
HCT VFR BLD AUTO: 37.6 % (ref 37–47)
HDLC SERPL-MCNC: 39 MG/DL
HGB BLD-MCNC: 12 GM/DL (ref 12–16)
IMM GRANULOCYTES # BLD AUTO: 0.02 THOU/MM3 (ref 0–0.07)
IMM GRANULOCYTES NFR BLD AUTO: 0.4 %
LDLC SERPL CALC-MCNC: 50 MG/DL
LYMPHOCYTES ABSOLUTE: 1.6 THOU/MM3 (ref 1–4.8)
LYMPHOCYTES NFR BLD AUTO: 33.3 %
MCH RBC QN AUTO: 32.1 PG (ref 26–33)
MCHC RBC AUTO-ENTMCNC: 31.9 GM/DL (ref 32.2–35.5)
MCV RBC AUTO: 100.5 FL (ref 81–99)
MONOCYTES ABSOLUTE: 0.3 THOU/MM3 (ref 0.4–1.3)
MONOCYTES NFR BLD AUTO: 6.7 %
NEUTROPHILS ABSOLUTE: 2.8 THOU/MM3 (ref 1.8–7.7)
NEUTROPHILS NFR BLD AUTO: 56.8 %
NRBC BLD AUTO-RTO: 0 /100 WBC
PLATELET # BLD AUTO: 212 THOU/MM3 (ref 130–400)
PMV BLD AUTO: 9.5 FL (ref 9.4–12.4)
RBC # BLD AUTO: 3.74 MILL/MM3 (ref 4.2–5.4)
TRIGLYCERIDE, FASTING: 185 MG/DL (ref 0–199)
TSH SERPL DL<=0.005 MIU/L-ACNC: 1.65 UIU/ML (ref 0.4–4.2)
WBC # BLD AUTO: 4.9 THOU/MM3 (ref 4.8–10.8)

## 2024-11-19 NOTE — TELEPHONE ENCOUNTER
Pt informed of normal lab results . Pt voiced understanding with no further questions at this time.

## 2024-11-26 ENCOUNTER — OFFICE VISIT (OUTPATIENT)
Dept: FAMILY MEDICINE CLINIC | Age: 79
End: 2024-11-26
Payer: MEDICARE

## 2024-11-26 VITALS
BODY MASS INDEX: 30.52 KG/M2 | HEIGHT: 65 IN | HEART RATE: 72 BPM | TEMPERATURE: 97.1 F | DIASTOLIC BLOOD PRESSURE: 68 MMHG | OXYGEN SATURATION: 100 % | WEIGHT: 183.2 LBS | SYSTOLIC BLOOD PRESSURE: 124 MMHG | RESPIRATION RATE: 14 BRPM

## 2024-11-26 DIAGNOSIS — M85.89 OSTEOPENIA OF MULTIPLE SITES: ICD-10-CM

## 2024-11-26 DIAGNOSIS — I48.20 CHRONIC ATRIAL FIBRILLATION (HCC): ICD-10-CM

## 2024-11-26 DIAGNOSIS — Z79.4 TYPE 2 DIABETES MELLITUS WITH DIABETIC MICROALBUMINURIA, WITH LONG-TERM CURRENT USE OF INSULIN (HCC): ICD-10-CM

## 2024-11-26 DIAGNOSIS — E03.8 OTHER SPECIFIED HYPOTHYROIDISM: ICD-10-CM

## 2024-11-26 DIAGNOSIS — I25.10 CORONARY ARTERY DISEASE INVOLVING NATIVE CORONARY ARTERY OF NATIVE HEART WITHOUT ANGINA PECTORIS: ICD-10-CM

## 2024-11-26 DIAGNOSIS — R80.9 TYPE 2 DIABETES MELLITUS WITH DIABETIC MICROALBUMINURIA, WITH LONG-TERM CURRENT USE OF INSULIN (HCC): ICD-10-CM

## 2024-11-26 DIAGNOSIS — I10 ESSENTIAL HYPERTENSION: Primary | ICD-10-CM

## 2024-11-26 DIAGNOSIS — E11.29 TYPE 2 DIABETES MELLITUS WITH DIABETIC MICROALBUMINURIA, WITH LONG-TERM CURRENT USE OF INSULIN (HCC): ICD-10-CM

## 2024-11-26 PROBLEM — Z96.611 HISTORY OF TOTAL REPLACEMENT OF RIGHT SHOULDER JOINT: Status: ACTIVE | Noted: 2024-11-26

## 2024-11-26 LAB — HBA1C MFR BLD: 6.6 % (ref 4.3–5.7)

## 2024-11-26 PROCEDURE — 3044F HG A1C LEVEL LT 7.0%: CPT | Performed by: NURSE PRACTITIONER

## 2024-11-26 PROCEDURE — G8399 PT W/DXA RESULTS DOCUMENT: HCPCS | Performed by: NURSE PRACTITIONER

## 2024-11-26 PROCEDURE — 3078F DIAST BP <80 MM HG: CPT | Performed by: NURSE PRACTITIONER

## 2024-11-26 PROCEDURE — 1090F PRES/ABSN URINE INCON ASSESS: CPT | Performed by: NURSE PRACTITIONER

## 2024-11-26 PROCEDURE — 1159F MED LIST DOCD IN RCRD: CPT | Performed by: NURSE PRACTITIONER

## 2024-11-26 PROCEDURE — 3074F SYST BP LT 130 MM HG: CPT | Performed by: NURSE PRACTITIONER

## 2024-11-26 PROCEDURE — G8427 DOCREV CUR MEDS BY ELIG CLIN: HCPCS | Performed by: NURSE PRACTITIONER

## 2024-11-26 PROCEDURE — G8482 FLU IMMUNIZE ORDER/ADMIN: HCPCS | Performed by: NURSE PRACTITIONER

## 2024-11-26 PROCEDURE — 1123F ACP DISCUSS/DSCN MKR DOCD: CPT | Performed by: NURSE PRACTITIONER

## 2024-11-26 PROCEDURE — G8417 CALC BMI ABV UP PARAM F/U: HCPCS | Performed by: NURSE PRACTITIONER

## 2024-11-26 PROCEDURE — 1036F TOBACCO NON-USER: CPT | Performed by: NURSE PRACTITIONER

## 2024-11-26 PROCEDURE — 99214 OFFICE O/P EST MOD 30 MIN: CPT | Performed by: NURSE PRACTITIONER

## 2024-11-26 NOTE — PROGRESS NOTES
Kristal Enamorado is a 79 y.o. female thatpresents for 3 Month Follow-Up and Diabetes      History obtained today from Patient.    HPI    Diabetes    On Ozempic 0.5 mg weekly, last 2 weeks only did 0.25 mg due to running out   On Basaglar 26 units  No low Blood sugars  Amaryl and metformin   +constipation  Goes every day but only little bit at a time  Trying to do colace and miralax  Did eye exam: last week  Fasting BS 90-100s    HTN     On valsartan, metoprolol, hydralazine  Not  controlled  Recently stopped losartan and changed to valsartan  Used to be on amlodipine not sure why it was stopped     This visit 8-  Started on Amlodipine, and was getting low BP with 75 mg of Metoprolol so only taking 50 mg BID  BP in 130s at home  No chest pain or worsening SOB  No leg swelling         11-  BP at home getting low diastolic 40s-50s  Feels ok overall  Metoprolol 50 mg BID  On valsartan, and hydralazine  No sob          Osteopenia  Last dexa 2021  Hx of parathyroid adenoma  Been on fosamax [id number]   Then put back on it in 2021 and has been on since  Will get repeat Dexa     8-  Showed osteopenia, stable dexa  Consider stopping Fosamax      TRACY  Follows with Pulmonary     Hyponatremia  Seeing Nephrology  Labs stable  +protein in urine     CAD  Follows with Dr Hopkins  Moderate to severe diagonal with mod disease other wise   On statin     AFib  On Xarelto  Feels palpitations every once in a while  Sob stable          Breast cancer screening - 3/2024 normal  Dexa- 3/2024, osteopenia  Colon cancer screening - 2021, Dr Candelaria, repeat 5 years           [facility] in [place], refills will need to go there    I have reviewed the patient's past medical history, past surgical history, allergies,medications, social and family history and I have made updates where appropriate.    Past Medical History:   Diagnosis Date    Arthritis     Atrial fibrillation (HCC)     Cancer (HCC) 2000    NON HODGKINS LYMPHOMA    Closed

## 2024-12-05 RX ORDER — INSULIN GLARGINE 100 [IU]/ML
INJECTION, SOLUTION SUBCUTANEOUS
Qty: 30 ML | Refills: 0 | Status: SHIPPED | OUTPATIENT
Start: 2024-12-05

## 2024-12-05 NOTE — TELEPHONE ENCOUNTER
Recent Visits  Date Type Provider Dept   11/26/24 Office Visit Chuyita Espino, APRN - CNP [facility]   08/26/24 Office Visit Chuyita Espino, APRN [facility]   06/25/24 Office Visit Chuyita Espino, APRN - CNP [facility]   03/19/24 Office Visit Chuyita Espino, APRN [facility]   09/19/23 Office Visit Chuyita Espino, APRN - CNP [facility]   06/20/23 Office Visit Chuyita Espino, APRN - CNP [facility]   Showing recent visits within past 540 days with a meds authorizing provider and meeting all other requirements  Future Appointments  Date Type Provider Dept   02/27/25 Appointment Chuyita Espino, APRN - CNP [facility]   Showing future appointments within next 150 days with a meds authorizing provider and meeting all other requirements

## 2024-12-17 ENCOUNTER — TELEPHONE (OUTPATIENT)
Dept: FAMILY MEDICINE CLINIC | Age: 79
End: 2024-12-17

## 2024-12-17 NOTE — TELEPHONE ENCOUNTER
Received patients Ozempic through "[employer]. Called patient to inform medication was ready for .      Medication in the med room labeled.

## 2025-02-05 ENCOUNTER — TELEPHONE (OUTPATIENT)
Dept: FAMILY MEDICINE CLINIC | Age: 80
End: 2025-02-05

## 2025-02-24 SDOH — ECONOMIC STABILITY: FOOD INSECURITY: WITHIN THE PAST 12 MONTHS, THE FOOD YOU BOUGHT JUST DIDN'T LAST AND YOU DIDN'T HAVE MONEY TO GET MORE.: NEVER TRUE

## 2025-02-24 SDOH — ECONOMIC STABILITY: INCOME INSECURITY: IN THE LAST 12 MONTHS, WAS THERE A TIME WHEN YOU WERE NOT ABLE TO PAY THE MORTGAGE OR RENT ON TIME?: NO

## 2025-02-24 SDOH — ECONOMIC STABILITY: FOOD INSECURITY: WITHIN THE PAST 12 MONTHS, YOU WORRIED THAT YOUR FOOD WOULD RUN OUT BEFORE YOU GOT MONEY TO BUY MORE.: NEVER TRUE

## 2025-02-24 ASSESSMENT — PATIENT HEALTH QUESTIONNAIRE - PHQ9
SUM OF ALL RESPONSES TO PHQ9 QUESTIONS 1 & 2: 0
SUM OF ALL RESPONSES TO PHQ QUESTIONS 1-9: 0
SUM OF ALL RESPONSES TO PHQ9 QUESTIONS 1 & 2: 0
2. FEELING DOWN, DEPRESSED OR HOPELESS: NOT AT ALL
1. LITTLE INTEREST OR PLEASURE IN DOING THINGS: NOT AT ALL
SUM OF ALL RESPONSES TO PHQ QUESTIONS 1-9: 0
SUM OF ALL RESPONSES TO PHQ QUESTIONS 1-9: 0
2. FEELING DOWN, DEPRESSED OR HOPELESS: NOT AT ALL
1. LITTLE INTEREST OR PLEASURE IN DOING THINGS: NOT AT ALL
SUM OF ALL RESPONSES TO PHQ QUESTIONS 1-9: 0

## 2025-02-27 ENCOUNTER — OFFICE VISIT (OUTPATIENT)
Dept: FAMILY MEDICINE CLINIC | Age: 80
End: 2025-02-27
Payer: MEDICARE

## 2025-02-27 VITALS
RESPIRATION RATE: 14 BRPM | HEIGHT: 65 IN | DIASTOLIC BLOOD PRESSURE: 72 MMHG | HEART RATE: 75 BPM | BODY MASS INDEX: 30.52 KG/M2 | OXYGEN SATURATION: 98 % | SYSTOLIC BLOOD PRESSURE: 146 MMHG | WEIGHT: 183.2 LBS | TEMPERATURE: 97.5 F

## 2025-02-27 DIAGNOSIS — R80.9 TYPE 2 DIABETES MELLITUS WITH DIABETIC MICROALBUMINURIA, WITH LONG-TERM CURRENT USE OF INSULIN (HCC): ICD-10-CM

## 2025-02-27 DIAGNOSIS — Z79.4 TYPE 2 DIABETES MELLITUS WITH DIABETIC MICROALBUMINURIA, WITH LONG-TERM CURRENT USE OF INSULIN (HCC): ICD-10-CM

## 2025-02-27 DIAGNOSIS — E03.8 OTHER SPECIFIED HYPOTHYROIDISM: ICD-10-CM

## 2025-02-27 DIAGNOSIS — E11.29 TYPE 2 DIABETES MELLITUS WITH DIABETIC MICROALBUMINURIA, WITH LONG-TERM CURRENT USE OF INSULIN (HCC): ICD-10-CM

## 2025-02-27 DIAGNOSIS — M85.89 OSTEOPENIA OF MULTIPLE SITES: ICD-10-CM

## 2025-02-27 DIAGNOSIS — I48.91 ATRIAL FIBRILLATION, UNSPECIFIED TYPE (HCC): Primary | ICD-10-CM

## 2025-02-27 PROBLEM — E87.1 HYPONATREMIA: Status: RESOLVED | Noted: 2018-09-18 | Resolved: 2025-02-27

## 2025-02-27 PROBLEM — I48.20 CHRONIC ATRIAL FIBRILLATION (HCC): Status: RESOLVED | Noted: 2018-05-17 | Resolved: 2025-02-27

## 2025-02-27 LAB — HBA1C MFR BLD: 6.4 % (ref 4.3–5.7)

## 2025-02-27 PROCEDURE — 1036F TOBACCO NON-USER: CPT | Performed by: NURSE PRACTITIONER

## 2025-02-27 PROCEDURE — 1123F ACP DISCUSS/DSCN MKR DOCD: CPT | Performed by: NURSE PRACTITIONER

## 2025-02-27 PROCEDURE — 99214 OFFICE O/P EST MOD 30 MIN: CPT | Performed by: NURSE PRACTITIONER

## 2025-02-27 PROCEDURE — G8399 PT W/DXA RESULTS DOCUMENT: HCPCS | Performed by: NURSE PRACTITIONER

## 2025-02-27 PROCEDURE — G8417 CALC BMI ABV UP PARAM F/U: HCPCS | Performed by: NURSE PRACTITIONER

## 2025-02-27 PROCEDURE — 3078F DIAST BP <80 MM HG: CPT | Performed by: NURSE PRACTITIONER

## 2025-02-27 PROCEDURE — G8427 DOCREV CUR MEDS BY ELIG CLIN: HCPCS | Performed by: NURSE PRACTITIONER

## 2025-02-27 PROCEDURE — 3044F HG A1C LEVEL LT 7.0%: CPT | Performed by: NURSE PRACTITIONER

## 2025-02-27 PROCEDURE — 1159F MED LIST DOCD IN RCRD: CPT | Performed by: NURSE PRACTITIONER

## 2025-02-27 PROCEDURE — 3077F SYST BP >= 140 MM HG: CPT | Performed by: NURSE PRACTITIONER

## 2025-02-27 PROCEDURE — 1090F PRES/ABSN URINE INCON ASSESS: CPT | Performed by: NURSE PRACTITIONER

## 2025-02-27 RX ORDER — METFORMIN HYDROCHLORIDE 500 MG/1
1000 TABLET, EXTENDED RELEASE ORAL 2 TIMES DAILY
Qty: 360 TABLET | Refills: 3 | Status: SHIPPED | OUTPATIENT
Start: 2025-02-27

## 2025-02-27 RX ORDER — LEVOTHYROXINE SODIUM 112 UG/1
112 TABLET ORAL DAILY
Qty: 90 TABLET | Refills: 3 | Status: SHIPPED | OUTPATIENT
Start: 2025-02-27

## 2025-02-27 RX ORDER — ALENDRONATE SODIUM 70 MG/1
70 TABLET ORAL
Qty: 12 TABLET | Refills: 3 | Status: SHIPPED | OUTPATIENT
Start: 2025-02-27

## 2025-02-27 RX ORDER — SIMVASTATIN 40 MG
TABLET ORAL
Qty: 90 TABLET | Refills: 3 | Status: SHIPPED | OUTPATIENT
Start: 2025-02-27

## 2025-02-27 RX ORDER — INSULIN GLARGINE 100 [IU]/ML
26 INJECTION, SOLUTION SUBCUTANEOUS NIGHTLY
Qty: 30 ML | Refills: 0
Start: 2025-02-27

## 2025-02-27 NOTE — PROGRESS NOTES
Kristal Enamorado is a 80 y.o. female thatpresents for Follow-up Chronic Condition and Diabetes      History obtained today from Patient.    HPI    Diabetes     On Ozempic 0.5 mg weekly, still some queasiness from time to time  On Basaglar 26 units  No low Blood sugars  Amaryl and metformin   +constipation  Goes every day but only little bit at a time  Trying to do colace and miralax  Did eye exam  Fasting BS this morning 170s but had cake and tacos yesterday, but normally in 100s  Had 1-2 days with lows in th 80s and was symptomatic     HTN     On valsartan, metoprolol, hydralazine and amlodipine  Recently stopped losartan and changed to valsartan  Low diastolics  Systolic BP in 130s-140s  No headaches or chest pain   Breathing is stable       Osteopenia  Last dexa 2024  Hx of parathyroid adenoma  Been on fosamax 5377-2221   Then put back on it in 2021 and has been on since    TRACY  Follows with Pulmonary     Hyponatremia  Seeing Nephrology 7/2025 with labs  Labs stable  +protein in urine     CAD  Follows with Dr Hopkins  Moderate to severe diagonal with mod disease other wise   On statin     AFib  On Xarelto  Feels palpitations every once in a while  Sob stable           Breast cancer screening - 3/2024 normal  Dexa- 3/2024, osteopenia  Colon cancer screening - 2021, Dr Candelaria, repeat 5 years            [facility] in [place], refills will need to go there  I have reviewed the patient's past medical history, past surgical history, allergies,medications, social and family history and I have made updates where appropriate.    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

## 2025-03-20 ENCOUNTER — HOSPITAL ENCOUNTER (OUTPATIENT)
Dept: WOMENS IMAGING | Age: 80
Discharge: HOME | End: 2025-03-20
Payer: MEDICARE

## 2025-03-20 VITALS — HEIGHT: 65 IN | WEIGHT: 179 LBS | BODY MASS INDEX: 29.82 KG/M2

## 2025-03-20 DIAGNOSIS — Z12.31 VISIT FOR SCREENING MAMMOGRAM: ICD-10-CM

## 2025-03-20 PROCEDURE — 77063 BREAST TOMOSYNTHESIS BI: CPT

## 2025-03-31 RX ORDER — HYDRALAZINE HYDROCHLORIDE 50 MG/1
50 TABLET, FILM COATED ORAL 3 TIMES DAILY
Qty: 270 TABLET | Refills: 3 | Status: SHIPPED | OUTPATIENT
Start: 2025-03-31

## 2025-03-31 RX ORDER — VALSARTAN 320 MG/1
320 TABLET ORAL DAILY
Qty: 90 TABLET | Refills: 3 | Status: SHIPPED | OUTPATIENT
Start: 2025-03-31

## 2025-04-13 DIAGNOSIS — Z79.4 TYPE 2 DIABETES MELLITUS WITH DIABETIC MICROALBUMINURIA, WITH LONG-TERM CURRENT USE OF INSULIN (HCC): ICD-10-CM

## 2025-04-13 DIAGNOSIS — E11.29 TYPE 2 DIABETES MELLITUS WITH DIABETIC MICROALBUMINURIA, WITH LONG-TERM CURRENT USE OF INSULIN (HCC): ICD-10-CM

## 2025-04-13 DIAGNOSIS — R80.9 TYPE 2 DIABETES MELLITUS WITH DIABETIC MICROALBUMINURIA, WITH LONG-TERM CURRENT USE OF INSULIN (HCC): ICD-10-CM

## 2025-04-14 ENCOUNTER — TELEPHONE (OUTPATIENT)
Dept: FAMILY MEDICINE CLINIC | Age: 80
End: 2025-04-14

## 2025-04-14 DIAGNOSIS — E11.29 TYPE 2 DIABETES MELLITUS WITH DIABETIC MICROALBUMINURIA, WITH LONG-TERM CURRENT USE OF INSULIN (HCC): ICD-10-CM

## 2025-04-14 DIAGNOSIS — R80.9 TYPE 2 DIABETES MELLITUS WITH DIABETIC MICROALBUMINURIA, WITH LONG-TERM CURRENT USE OF INSULIN (HCC): ICD-10-CM

## 2025-04-14 DIAGNOSIS — Z79.4 TYPE 2 DIABETES MELLITUS WITH DIABETIC MICROALBUMINURIA, WITH LONG-TERM CURRENT USE OF INSULIN (HCC): ICD-10-CM

## 2025-04-14 RX ORDER — INSULIN GLARGINE 100 [IU]/ML
24 INJECTION, SOLUTION SUBCUTANEOUS NIGHTLY
Qty: 30 ML | Refills: 5
Start: 2025-04-14

## 2025-04-14 RX ORDER — INSULIN GLARGINE 100 [IU]/ML
INJECTION, SOLUTION SUBCUTANEOUS
Qty: 30 ML | Refills: 5 | Status: SHIPPED | OUTPATIENT
Start: 2025-04-14 | End: 2025-04-14

## 2025-04-14 RX ORDER — INSULIN GLARGINE 100 [IU]/ML
24 INJECTION, SOLUTION SUBCUTANEOUS NIGHTLY
Qty: 5 ADJUSTABLE DOSE PRE-FILLED PEN SYRINGE | Refills: 5 | Status: SHIPPED | OUTPATIENT
Start: 2025-04-14 | End: 2025-04-14 | Stop reason: ALTCHOICE

## 2025-04-14 NOTE — TELEPHONE ENCOUNTER
Recent Visits  Date Type Provider Dept   02/27/25 Office Visit Chuyita Espino APRN - CNP [facility]   11/26/24 Office Visit Chuyita Espino, APRN - CNP [facility]   08/26/24 Office Visit Chuyita Espino, APRN - CNP [facility]   06/25/24 Office Visit Chuyita Espino APRN - CNP [facility]   03/19/24 Office Visit Chuyita Espino, APRN - CNP [facility]   Showing recent visits within past 540 days with a meds authorizing provider and meeting all other requirements  Future Appointments  Date Type Provider Dept   07/29/25 Appointment Chuyita Espino APRN - CNP [facility]   Showing future appointments within next 150 days with a meds authorizing provider and meeting all other requirements

## 2025-04-14 NOTE — TELEPHONE ENCOUNTER
[facility] said that her insurance won't cover the Basaglar but will cover Lantis pen.    Please resend to [facility] in [place]

## 2025-04-14 NOTE — TELEPHONE ENCOUNTER
Left message on answering machine. Requested pt to call back at [phone], at their earliest convenience.

## 2025-04-14 NOTE — TELEPHONE ENCOUNTER
Pt called and stated that she does not use the Lantus she uses the Basaglar and gets it through [facility]. She said the Lantus can be cancelled.

## 2025-04-15 NOTE — TELEPHONE ENCOUNTER
Patient in not in need of any basaglar. She said she if fine and [facility] make sure it gets filled

## 2025-05-08 ENCOUNTER — OFFICE VISIT (OUTPATIENT)
Dept: CARDIOLOGY CLINIC | Age: 80
End: 2025-05-08
Payer: MEDICARE

## 2025-05-08 VITALS
DIASTOLIC BLOOD PRESSURE: 70 MMHG | HEIGHT: 65 IN | WEIGHT: 183.2 LBS | SYSTOLIC BLOOD PRESSURE: 124 MMHG | BODY MASS INDEX: 30.52 KG/M2 | HEART RATE: 69 BPM

## 2025-05-08 DIAGNOSIS — E78.01 FAMILIAL HYPERCHOLESTEROLEMIA: ICD-10-CM

## 2025-05-08 DIAGNOSIS — I48.21 PERMANENT ATRIAL FIBRILLATION (HCC): ICD-10-CM

## 2025-05-08 DIAGNOSIS — I10 PRIMARY HYPERTENSION: ICD-10-CM

## 2025-05-08 DIAGNOSIS — I25.10 CORONARY ARTERY DISEASE INVOLVING NATIVE CORONARY ARTERY OF NATIVE HEART WITHOUT ANGINA PECTORIS: Primary | ICD-10-CM

## 2025-05-08 PROCEDURE — 1090F PRES/ABSN URINE INCON ASSESS: CPT | Performed by: NUCLEAR MEDICINE

## 2025-05-08 PROCEDURE — 1036F TOBACCO NON-USER: CPT | Performed by: NUCLEAR MEDICINE

## 2025-05-08 PROCEDURE — 1123F ACP DISCUSS/DSCN MKR DOCD: CPT | Performed by: NUCLEAR MEDICINE

## 2025-05-08 PROCEDURE — G8399 PT W/DXA RESULTS DOCUMENT: HCPCS | Performed by: NUCLEAR MEDICINE

## 2025-05-08 PROCEDURE — G8427 DOCREV CUR MEDS BY ELIG CLIN: HCPCS | Performed by: NUCLEAR MEDICINE

## 2025-05-08 PROCEDURE — 3078F DIAST BP <80 MM HG: CPT | Performed by: NUCLEAR MEDICINE

## 2025-05-08 PROCEDURE — 1159F MED LIST DOCD IN RCRD: CPT | Performed by: NUCLEAR MEDICINE

## 2025-05-08 PROCEDURE — 3074F SYST BP LT 130 MM HG: CPT | Performed by: NUCLEAR MEDICINE

## 2025-05-08 PROCEDURE — 99214 OFFICE O/P EST MOD 30 MIN: CPT | Performed by: NUCLEAR MEDICINE

## 2025-05-08 PROCEDURE — 93000 ELECTROCARDIOGRAM COMPLETE: CPT | Performed by: NUCLEAR MEDICINE

## 2025-05-08 PROCEDURE — G8417 CALC BMI ABV UP PARAM F/U: HCPCS | Performed by: NUCLEAR MEDICINE

## 2025-05-08 NOTE — PROGRESS NOTES
[facility] [place] [facility]  [facility] CARDIOLOGY  [address]  [address]  [place] [address]  Dept: [phone]  Dept Fax: [phone]  Loc: [phone]    Visit Date: 5/8/2025    Kristal Enamorado is a 80 y.o. female who presents todayfor:  Chief Complaint   Patient presents with    Follow-up    Hypertension    Hyperlipidemia    Atrial Fibrillation    Coronary Artery Disease   Know moderate CAD  Also moderate AS  Last echo CARLOS MANUEL 1,1 cm 2  As well A fib ablation   Some palpitation at times  No frequent episodes  No ER visits  Had labile BP and meds adjusted by PCP  Low at times  Some dizziness  No syncope  No chest pain   No changes in breathing  Some baseline dyspnea  On statins for hyperlipidemia  No issues with the meds       HPI:  HPI  Past Medical History:   Diagnosis Date    Arthritis     Atrial fibrillation (HCC)     Cancer (HCC) 2000    NON HODGKINS LYMPHOMA    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, 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    Diabetes mellitus (HCC)     [place] filter in place     Homozygous for MTHFR gene mutation     Hx of blood clots 2000    LEGS    Hypercalcemia 08/24/2020    Hyperlipidemia     Hypertension     TRACY on CPAP     Pathological fracture of humerus due to osteoporosis 01/13/2021    Prolonged emergence from general anesthesia     Stroke (cerebrum) (HCC) 03/09/20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    Thyroid disease       Past Surgical History:   Procedure Laterality Date    APPENDECTOMY      CARDIAC CATHETERIZATION      CARDIAC SURGERY  02/2021    ablation     CHOLECYSTECTOMY      FRACTURE SURGERY      ankle surgery    FRACTURE SURGERY Right 09/2018    HUMERUS    HERNIA REPAIR      HYSTERECTOMY (CERVIX STATUS UNKNOWN)      PARATHYROID GLAND SURGERY N/A 10/01/2020    PARATHYROIDECTOMY, EXPLORATION OF PARATHYROIDS performed by Cali Dunne MD at [facility] OR    PILONIDAL CYST EXCISION N/A 08/24/2023    PILONIDAL CYSTECTOMY performed by Lane Tavarez MD at [facility]

## 2025-05-08 NOTE — PROGRESS NOTES
Patient here for follow up.  EKG done today.  Patient states she did online check-in.  Medication list up to day.

## 2025-05-09 ENCOUNTER — HOSPITAL ENCOUNTER (OUTPATIENT)
Age: 80
Discharge: HOME | End: 2025-05-11
Attending: NUCLEAR MEDICINE
Payer: MEDICARE

## 2025-05-09 DIAGNOSIS — I48.21 PERMANENT ATRIAL FIBRILLATION (HCC): ICD-10-CM

## 2025-05-09 DIAGNOSIS — I25.10 CORONARY ARTERY DISEASE INVOLVING NATIVE CORONARY ARTERY OF NATIVE HEART WITHOUT ANGINA PECTORIS: ICD-10-CM

## 2025-05-09 DIAGNOSIS — I10 PRIMARY HYPERTENSION: ICD-10-CM

## 2025-05-09 DIAGNOSIS — E78.01 FAMILIAL HYPERCHOLESTEROLEMIA: ICD-10-CM

## 2025-05-09 LAB
ECHO AO ASC DIAM: 2.8 CM
ECHO AV AREA PEAK VELOCITY: 1 CM2
ECHO AV AREA VTI: 1.1 CM2
ECHO AV CUSP MM: 1.1 CM
ECHO AV MEAN GRADIENT: 17 MMHG
ECHO AV MEAN VELOCITY: 2 M/S
ECHO AV PEAK GRADIENT: 26 MMHG
ECHO AV PEAK VELOCITY: 2.6 M/S
ECHO AV VELOCITY RATIO: 0.35
ECHO AV VTI: 59.9 CM
ECHO EST RA PRESSURE: 3 MMHG
ECHO LA AREA 2C: 19.5 CM2
ECHO LA AREA 4C: 19.6 CM2
ECHO LA DIAMETER: 3.9 CM
ECHO LA MAJOR AXIS: 6 CM
ECHO LA MINOR AXIS: 6 CM
ECHO LA VOL BP: 52 ML (ref 22–52)
ECHO LA VOL MOD A2C: 52 ML (ref 22–52)
ECHO LA VOL MOD A4C: 52 ML (ref 22–52)
ECHO LV E' LATERAL VELOCITY: 8.5 CM/S
ECHO LV E' SEPTAL VELOCITY: 6 CM/S
ECHO LV EJECTION FRACTION 3D: 60 %
ECHO LV FRACTIONAL SHORTENING: 33 % (ref 28–44)
ECHO LV INTERNAL DIMENSION DIASTOLIC: 4.6 CM (ref 3.9–5.3)
ECHO LV INTERNAL DIMENSION SYSTOLIC: 3.1 CM
ECHO LV ISOVOLUMETRIC RELAXATION TIME (IVRT): 60 MS
ECHO LV IVSD: 1.1 CM (ref 0.6–0.9)
ECHO LV MASS 2D: 158.8 G (ref 67–162)
ECHO LV POSTERIOR WALL DIASTOLIC: 0.9 CM (ref 0.6–0.9)
ECHO LV RELATIVE WALL THICKNESS RATIO: 0.39
ECHO LVOT AREA: 2.8 CM2
ECHO LVOT AV VTI INDEX: 0.39
ECHO LVOT DIAM: 1.9 CM
ECHO LVOT MEAN GRADIENT: 2 MMHG
ECHO LVOT PEAK GRADIENT: 3 MMHG
ECHO LVOT PEAK VELOCITY: 0.9 M/S
ECHO LVOT SV: 65.7 ML
ECHO LVOT VTI: 23.2 CM
ECHO MV A VELOCITY: 1.06 M/S
ECHO MV AREA VTI: 1.4 CM2
ECHO MV E DECELERATION TIME (DT): 304 MS
ECHO MV E VELOCITY: 1.37 M/S
ECHO MV E/A RATIO: 1.29
ECHO MV E/E' LATERAL: 16.12
ECHO MV E/E' RATIO (AVERAGED): 19.48
ECHO MV E/E' SEPTAL: 22.83
ECHO MV LVOT VTI INDEX: 2.08
ECHO MV MAX VELOCITY: 1.6 M/S
ECHO MV MEAN GRADIENT: 6 MMHG
ECHO MV MEAN VELOCITY: 1.1 M/S
ECHO MV PEAK GRADIENT: 10 MMHG
ECHO MV REGURGITANT PEAK GRADIENT: 44 MMHG
ECHO MV REGURGITANT PEAK VELOCITY: 3.3 M/S
ECHO MV VTI: 48.3 CM
ECHO PV MAX VELOCITY: 1 M/S
ECHO PV PEAK GRADIENT: 4 MMHG
ECHO RIGHT VENTRICULAR SYSTOLIC PRESSURE (RVSP): 29 MMHG
ECHO RV INTERNAL DIMENSION: 3.2 CM
ECHO RV TAPSE: 2 CM (ref 1.7–?)
ECHO TV E WAVE: 0.8 M/S
ECHO TV REGURGITANT MAX VELOCITY: 2.54 M/S
ECHO TV REGURGITANT PEAK GRADIENT: 26 MMHG

## 2025-05-09 PROCEDURE — 93306 TTE W/DOPPLER COMPLETE: CPT | Performed by: NUCLEAR MEDICINE

## 2025-05-09 PROCEDURE — 93306 TTE W/DOPPLER COMPLETE: CPT

## 2025-05-15 ENCOUNTER — TELEPHONE (OUTPATIENT)
Dept: FAMILY MEDICINE CLINIC | Age: 80
End: 2025-05-15

## 2025-06-16 RX ORDER — GLIMEPIRIDE 4 MG/1
4 TABLET ORAL
Qty: 90 TABLET | Refills: 3 | Status: CANCELLED | OUTPATIENT
Start: 2025-06-16

## 2025-07-01 ENCOUNTER — LAB (OUTPATIENT)
Dept: LAB | Age: 80
End: 2025-07-01

## 2025-07-01 DIAGNOSIS — N18.2 CKD (CHRONIC KIDNEY DISEASE), STAGE II: ICD-10-CM

## 2025-07-01 DIAGNOSIS — I10 HTN (HYPERTENSION), BENIGN: ICD-10-CM

## 2025-07-01 DIAGNOSIS — E87.1 HYPONATREMIA: ICD-10-CM

## 2025-07-01 LAB
ALBUMIN SERPL BCG-MCNC: 4.4 G/DL (ref 3.4–4.9)
ANION GAP SERPL CALC-SCNC: 12 MEQ/L (ref 8–16)
BACTERIA: ABNORMAL
BILIRUB UR QL STRIP: NEGATIVE
BUN SERPL-MCNC: 21 MG/DL (ref 8–23)
CALCIUM SERPL-MCNC: 10.3 MG/DL (ref 8.8–10.2)
CASTS #/AREA URNS LPF: ABNORMAL /LPF
CASTS #/AREA URNS LPF: ABNORMAL /LPF
CHARCOAL URNS QL MICRO: ABNORMAL
CHLORIDE SERPL-SCNC: 96 MEQ/L (ref 98–111)
COLOR UR: YELLOW
CREAT SERPL-MCNC: 0.8 MG/DL (ref 0.5–0.9)
CRYSTALS URNS QL MICRO: ABNORMAL
EPITHELIAL CELLS, UA: ABNORMAL /HPF
GFR SERPL CREATININE-BSD FRML MDRD: 75 ML/MIN/1.73M2
GLUCOSE UR QL STRIP.AUTO: NEGATIVE MG/DL
KETONES UR QL STRIP.AUTO: NEGATIVE
MICROALBUMIN UR-MCNC: 8.05 MG/DL
NITRITE UR QL STRIP.AUTO: NEGATIVE
PH UR STRIP.AUTO: 5.5 [PH] (ref 5–9)
PHOSPHATE SERPL-MCNC: 3.5 MG/DL (ref 2.5–4.5)
POTASSIUM SERPL-SCNC: 4.9 MEQ/L (ref 3.5–5.2)
PROT UR STRIP.AUTO-MCNC: ABNORMAL MG/DL
PROT UR-MCNC: 16.6 MG/DL
PROT/CREAT 24H UR: 0.38 MG/G{CREAT}
RBC #/AREA URNS HPF: ABNORMAL /HPF
RENAL EPI CELLS #/AREA URNS HPF: ABNORMAL /[HPF]
SODIUM SERPL-SCNC: 134 MEQ/L (ref 135–145)
SPECIFIC GRAVITY UA: 1.01 (ref 1–1.03)
UROBILINOGEN, URINE: 0.2 EU/DL (ref 0–1)
WBC #/AREA URNS HPF: ABNORMAL /HPF
YEAST LIKE FUNGI URNS QL MICRO: ABNORMAL

## 2025-07-07 ENCOUNTER — OFFICE VISIT (OUTPATIENT)
Dept: NEPHROLOGY | Age: 80
End: 2025-07-07
Payer: MEDICARE

## 2025-07-07 VITALS
OXYGEN SATURATION: 98 % | DIASTOLIC BLOOD PRESSURE: 70 MMHG | BODY MASS INDEX: 29.95 KG/M2 | SYSTOLIC BLOOD PRESSURE: 164 MMHG | HEART RATE: 78 BPM | WEIGHT: 180 LBS

## 2025-07-07 DIAGNOSIS — E87.1 HYPONATREMIA: ICD-10-CM

## 2025-07-07 DIAGNOSIS — I10 HTN (HYPERTENSION), BENIGN: Primary | ICD-10-CM

## 2025-07-07 DIAGNOSIS — N18.2 CKD (CHRONIC KIDNEY DISEASE), STAGE II: ICD-10-CM

## 2025-07-07 PROCEDURE — 1159F MED LIST DOCD IN RCRD: CPT | Performed by: INTERNAL MEDICINE

## 2025-07-07 PROCEDURE — 3077F SYST BP >= 140 MM HG: CPT | Performed by: INTERNAL MEDICINE

## 2025-07-07 PROCEDURE — G8427 DOCREV CUR MEDS BY ELIG CLIN: HCPCS | Performed by: INTERNAL MEDICINE

## 2025-07-07 PROCEDURE — 1090F PRES/ABSN URINE INCON ASSESS: CPT | Performed by: INTERNAL MEDICINE

## 2025-07-07 PROCEDURE — 3078F DIAST BP <80 MM HG: CPT | Performed by: INTERNAL MEDICINE

## 2025-07-07 PROCEDURE — G8417 CALC BMI ABV UP PARAM F/U: HCPCS | Performed by: INTERNAL MEDICINE

## 2025-07-07 PROCEDURE — 1036F TOBACCO NON-USER: CPT | Performed by: INTERNAL MEDICINE

## 2025-07-07 PROCEDURE — 99213 OFFICE O/P EST LOW 20 MIN: CPT | Performed by: INTERNAL MEDICINE

## 2025-07-07 PROCEDURE — G8399 PT W/DXA RESULTS DOCUMENT: HCPCS | Performed by: INTERNAL MEDICINE

## 2025-07-07 PROCEDURE — 1123F ACP DISCUSS/DSCN MKR DOCD: CPT | Performed by: INTERNAL MEDICINE

## 2025-07-07 NOTE — PROGRESS NOTES
[facility]        Outpatient Follow-Up note         7/7/2025 9:42 AM    Patient Name:   Kristal Enamorado  YOB: 1945  Primary Care Physician:  Chuyita Espino, APRN - CNP   [facility] [place] [facility]  [facility]  [address]  [address]  [place] [address]  Dept: [phone]  Loc: [phone]     Chief Complaint / Reason for follow-up : Follow Up of hyponatremia nd microalbuminuris     Interval History :  Patient seen and examined by me.   Feels well no complaints at this time.  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    Diabetes mellitus (HCC)     [place] filter in place     Homozygous for MTHFR gene mutation     Hx of blood clots 2000    LEGS    Hypercalcemia 08/24/2020    Hyperlipidemia     Hypertension     TRACY on CPAP     Pathological fracture of humerus due to osteoporosis 01/13/2021    Prolonged emergence from general anesthesia     Stroke (cerebrum) (HCC) 03/09/20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    Thyroid disease      Past Surgical History:   Procedure Laterality Date    APPENDECTOMY      CARDIAC CATHETERIZATION      CARDIAC SURGERY  02/2021    ablation     CHOLECYSTECTOMY      FRACTURE SURGERY      ankle surgery    FRACTURE SURGERY Right 09/2018    HUMERUS    HERNIA REPAIR      HYSTERECTOMY (CERVIX STATUS UNKNOWN)      PARATHYROID GLAND SURGERY N/A 10/01/2020    PARATHYROIDECTOMY, EXPLORATION OF PARATHYROIDS performed by Cali Dunne MD at [facility] OR    PILONIDAL CYST EXCISION N/A 08/24/2023    PILONIDAL CYSTECTOMY performed by Lane Tavarez MD at [facility] OR    UT OFFICE/OUTPT VISIT,PROCEDURE ONLY Right 09/19/2018    ORIF RIGHT PROXIMAL HUMERUS FX performed by Garry Gomez MD

## 2025-07-19 DIAGNOSIS — Z79.4 TYPE 2 DIABETES MELLITUS WITH DIABETIC MICROALBUMINURIA, WITH LONG-TERM CURRENT USE OF INSULIN (HCC): ICD-10-CM

## 2025-07-19 DIAGNOSIS — E11.29 TYPE 2 DIABETES MELLITUS WITH DIABETIC MICROALBUMINURIA, WITH LONG-TERM CURRENT USE OF INSULIN (HCC): ICD-10-CM

## 2025-07-19 DIAGNOSIS — R80.9 TYPE 2 DIABETES MELLITUS WITH DIABETIC MICROALBUMINURIA, WITH LONG-TERM CURRENT USE OF INSULIN (HCC): ICD-10-CM

## 2025-07-21 ENCOUNTER — RESULTS FOLLOW-UP (OUTPATIENT)
Dept: FAMILY MEDICINE CLINIC | Age: 80
End: 2025-07-21

## 2025-07-21 ENCOUNTER — LAB (OUTPATIENT)
Dept: LAB | Age: 80
End: 2025-07-21

## 2025-07-21 DIAGNOSIS — R80.9 TYPE 2 DIABETES MELLITUS WITH DIABETIC MICROALBUMINURIA, WITH LONG-TERM CURRENT USE OF INSULIN (HCC): ICD-10-CM

## 2025-07-21 DIAGNOSIS — Z79.4 TYPE 2 DIABETES MELLITUS WITH DIABETIC MICROALBUMINURIA, WITH LONG-TERM CURRENT USE OF INSULIN (HCC): ICD-10-CM

## 2025-07-21 DIAGNOSIS — E03.8 OTHER SPECIFIED HYPOTHYROIDISM: ICD-10-CM

## 2025-07-21 DIAGNOSIS — E11.29 TYPE 2 DIABETES MELLITUS WITH DIABETIC MICROALBUMINURIA, WITH LONG-TERM CURRENT USE OF INSULIN (HCC): ICD-10-CM

## 2025-07-21 LAB
ALBUMIN SERPL BCG-MCNC: 4.1 G/DL (ref 3.4–4.9)
ALBUMIN SERPL BCG-MCNC: 4.1 G/DL (ref 3.4–4.9)
ALP SERPL-CCNC: 40 U/L (ref 38–126)
ALP SERPL-CCNC: 41 U/L (ref 38–126)
ALT SERPL W/O P-5'-P-CCNC: 16 U/L (ref 10–35)
ALT SERPL W/O P-5'-P-CCNC: 17 U/L (ref 10–35)
ANION GAP SERPL CALC-SCNC: 13 MEQ/L (ref 8–16)
AST SERPL-CCNC: 20 U/L (ref 10–35)
AST SERPL-CCNC: 21 U/L (ref 10–35)
BASOPHILS ABSOLUTE: 0 THOU/MM3 (ref 0–0.1)
BASOPHILS NFR BLD AUTO: 0.6 %
BILIRUB CONJ SERPL-MCNC: 0.2 MG/DL (ref 0–0.2)
BILIRUB SERPL-MCNC: 0.5 MG/DL (ref 0.3–1.2)
BILIRUB SERPL-MCNC: 0.5 MG/DL (ref 0.3–1.2)
BUN SERPL-MCNC: 18 MG/DL (ref 8–23)
CALCIUM SERPL-MCNC: 10.3 MG/DL (ref 8.8–10.2)
CHLORIDE SERPL-SCNC: 96 MEQ/L (ref 98–111)
CHOLESTEROL, FASTING: 114 MG/DL (ref 100–199)
CO2 SERPL-SCNC: 27 MEQ/L (ref 22–29)
CREAT SERPL-MCNC: 0.7 MG/DL (ref 0.5–0.9)
DEPRECATED RDW RBC AUTO: 47.8 FL (ref 35–45)
EOSINOPHIL NFR BLD AUTO: 2.2 %
EOSINOPHILS ABSOLUTE: 0.1 THOU/MM3 (ref 0–0.4)
ERYTHROCYTE [DISTWIDTH] IN BLOOD BY AUTOMATED COUNT: 13.3 % (ref 11.5–14.5)
GFR SERPL CREATININE-BSD FRML MDRD: 88 ML/MIN/1.73M2
GLUCOSE SERPL-MCNC: 142 MG/DL (ref 74–109)
HCT VFR BLD AUTO: 37.3 % (ref 37–47)
HDLC SERPL-MCNC: 34 MG/DL
HGB BLD-MCNC: 12.1 GM/DL (ref 12–16)
HOMOCYSTEINE: 9.4 UMOL/L (ref 0–15)
IMM GRANULOCYTES # BLD AUTO: 0.02 THOU/MM3 (ref 0–0.07)
IMM GRANULOCYTES NFR BLD AUTO: 0.4 %
LDLC SERPL CALC-MCNC: 46 MG/DL
LYMPHOCYTES ABSOLUTE: 1.8 THOU/MM3 (ref 1–4.8)
LYMPHOCYTES NFR BLD AUTO: 35.5 %
MCH RBC QN AUTO: 31.6 PG (ref 26–33)
MCHC RBC AUTO-ENTMCNC: 32.4 GM/DL (ref 32.2–35.5)
MCV RBC AUTO: 97.4 FL (ref 81–99)
MONOCYTES ABSOLUTE: 0.4 THOU/MM3 (ref 0.4–1.3)
MONOCYTES NFR BLD AUTO: 6.9 %
NEUTROPHILS ABSOLUTE: 2.8 THOU/MM3 (ref 1.8–7.7)
NEUTROPHILS NFR BLD AUTO: 54.4 %
NRBC BLD AUTO-RTO: 0 /100 WBC
PLATELET # BLD AUTO: 212 THOU/MM3 (ref 130–400)
PMV BLD AUTO: 9.8 FL (ref 9.4–12.4)
POTASSIUM SERPL-SCNC: 4.5 MEQ/L (ref 3.5–5.2)
PROT SERPL-MCNC: 6.9 G/DL (ref 6.4–8.3)
PROT SERPL-MCNC: 6.9 G/DL (ref 6.4–8.3)
RBC # BLD AUTO: 3.83 MILL/MM3 (ref 4.2–5.4)
SODIUM SERPL-SCNC: 136 MEQ/L (ref 135–145)
TRIGLYCERIDE, FASTING: 172 MG/DL (ref 0–199)
TSH SERPL DL<=0.05 MIU/L-ACNC: 2.09 UIU/ML (ref 0.27–4.2)
WBC # BLD AUTO: 5.1 THOU/MM3 (ref 4.8–10.8)

## 2025-07-21 RX ORDER — INSULIN GLARGINE 100 [IU]/ML
INJECTION, SOLUTION SUBCUTANEOUS
Qty: 30 ML | Refills: 0 | Status: SHIPPED | OUTPATIENT
Start: 2025-07-21

## 2025-07-21 NOTE — TELEPHONE ENCOUNTER
Pt informed of lab results and to continue all medications . Pt voiced understanding with no further questions at this time.

## 2025-07-22 SDOH — HEALTH STABILITY: PHYSICAL HEALTH: ON AVERAGE, HOW MANY MINUTES DO YOU ENGAGE IN EXERCISE AT THIS LEVEL?: 10 MIN

## 2025-07-22 SDOH — HEALTH STABILITY: PHYSICAL HEALTH: ON AVERAGE, HOW MANY DAYS PER WEEK DO YOU ENGAGE IN MODERATE TO STRENUOUS EXERCISE (LIKE A BRISK WALK)?: 3 DAYS

## 2025-07-22 ASSESSMENT — LIFESTYLE VARIABLES
HOW OFTEN DO YOU HAVE A DRINK CONTAINING ALCOHOL: 1
HOW OFTEN DO YOU HAVE SIX OR MORE DRINKS ON ONE OCCASION: 1
HOW MANY STANDARD DRINKS CONTAINING ALCOHOL DO YOU HAVE ON A TYPICAL DAY: 0

## 2025-07-29 ENCOUNTER — OFFICE VISIT (OUTPATIENT)
Dept: FAMILY MEDICINE CLINIC | Age: 80
End: 2025-07-29
Payer: MEDICARE

## 2025-07-29 VITALS
HEIGHT: 65 IN | HEART RATE: 68 BPM | BODY MASS INDEX: 30.09 KG/M2 | TEMPERATURE: 96.8 F | WEIGHT: 180.6 LBS | SYSTOLIC BLOOD PRESSURE: 140 MMHG | DIASTOLIC BLOOD PRESSURE: 58 MMHG | OXYGEN SATURATION: 97 % | RESPIRATION RATE: 14 BRPM

## 2025-07-29 DIAGNOSIS — E11.29 TYPE 2 DIABETES MELLITUS WITH DIABETIC MICROALBUMINURIA, WITH LONG-TERM CURRENT USE OF INSULIN (HCC): ICD-10-CM

## 2025-07-29 DIAGNOSIS — I48.20 CHRONIC ATRIAL FIBRILLATION (HCC): ICD-10-CM

## 2025-07-29 DIAGNOSIS — R53.83 OTHER FATIGUE: ICD-10-CM

## 2025-07-29 DIAGNOSIS — Z00.00 MEDICARE ANNUAL WELLNESS VISIT, SUBSEQUENT: Primary | ICD-10-CM

## 2025-07-29 DIAGNOSIS — I10 ESSENTIAL HYPERTENSION: ICD-10-CM

## 2025-07-29 DIAGNOSIS — R79.89 LOW VITAMIN D LEVEL: ICD-10-CM

## 2025-07-29 DIAGNOSIS — D64.9 ANEMIA, UNSPECIFIED TYPE: ICD-10-CM

## 2025-07-29 DIAGNOSIS — E03.8 OTHER SPECIFIED HYPOTHYROIDISM: ICD-10-CM

## 2025-07-29 DIAGNOSIS — Z79.4 TYPE 2 DIABETES MELLITUS WITH DIABETIC MICROALBUMINURIA, WITH LONG-TERM CURRENT USE OF INSULIN (HCC): ICD-10-CM

## 2025-07-29 DIAGNOSIS — E55.9 VITAMIN D DEFICIENCY: ICD-10-CM

## 2025-07-29 DIAGNOSIS — R42 VERTIGO: ICD-10-CM

## 2025-07-29 DIAGNOSIS — R80.9 TYPE 2 DIABETES MELLITUS WITH DIABETIC MICROALBUMINURIA, WITH LONG-TERM CURRENT USE OF INSULIN (HCC): ICD-10-CM

## 2025-07-29 LAB — HBA1C MFR BLD: 6.6 % (ref 4.3–5.7)

## 2025-07-29 PROCEDURE — G0439 PPPS, SUBSEQ VISIT: HCPCS | Performed by: NURSE PRACTITIONER

## 2025-07-29 PROCEDURE — 3078F DIAST BP <80 MM HG: CPT | Performed by: NURSE PRACTITIONER

## 2025-07-29 PROCEDURE — 1159F MED LIST DOCD IN RCRD: CPT | Performed by: NURSE PRACTITIONER

## 2025-07-29 PROCEDURE — 3077F SYST BP >= 140 MM HG: CPT | Performed by: NURSE PRACTITIONER

## 2025-07-29 PROCEDURE — 1123F ACP DISCUSS/DSCN MKR DOCD: CPT | Performed by: NURSE PRACTITIONER

## 2025-07-29 PROCEDURE — 3044F HG A1C LEVEL LT 7.0%: CPT | Performed by: NURSE PRACTITIONER

## 2025-07-29 RX ORDER — MECLIZINE HYDROCHLORIDE 25 MG/1
25 TABLET ORAL 3 TIMES DAILY PRN
Qty: 15 TABLET | Refills: 0 | Status: SHIPPED | OUTPATIENT
Start: 2025-07-29 | End: 2025-08-08

## 2025-07-29 RX ORDER — METOPROLOL TARTRATE 50 MG
50 TABLET ORAL 2 TIMES DAILY
Qty: 180 TABLET | Refills: 3 | Status: SHIPPED | OUTPATIENT
Start: 2025-07-29

## 2025-07-29 RX ORDER — AMLODIPINE BESYLATE 5 MG/1
5 TABLET ORAL DAILY
Qty: 90 TABLET | Refills: 3 | Status: SHIPPED | OUTPATIENT
Start: 2025-07-29

## 2025-07-29 ASSESSMENT — PATIENT HEALTH QUESTIONNAIRE - PHQ9
2. FEELING DOWN, DEPRESSED OR HOPELESS: NOT AT ALL
SUM OF ALL RESPONSES TO PHQ QUESTIONS 1-9: 0
1. LITTLE INTEREST OR PLEASURE IN DOING THINGS: NOT AT ALL

## 2025-07-29 NOTE — PATIENT INSTRUCTIONS
participating in exercise and having meals with you, even if they may be eating something different.  Find what works best for you. If you do not have time or do not like to cook, a program that offers meal replacement bars or shakes may be better for you. Or if you like to prepare meals, finding a plan that includes daily menus and recipes may be best.  Ask your doctor about other health professionals who can help you achieve your weight-loss goals.  A dietitian can help you make healthy changes in your diet.  An exercise specialist or  can help you develop a safe and effective exercise program.  A counselor or psychiatrist can help you cope with issues such as depression, anxiety, or family problems that can make it hard to focus on weight loss.  Consider joining a support group for people who are trying to lose weight. Your doctor can suggest groups in your area.  Where can you learn more?  Go to https://[email or web address]/patientEd and enter U357 to learn more about \"Starting a Weight-Loss Plan: Care Instructions.\"  Current as of: April 30, 2024  Content Version: 14.5  © 0742-0969 [employer].   Care instructions adapted under license by [employer]. If you have questions about a medical condition or this instruction, always ask your healthcare professional. [employer] disclaims any warranty or liability for your use of this information.         A Healthy Heart: Care Instructions  Overview     Coronary artery disease, also called heart disease, occurs when a substance called plaque builds up in the vessels that supply oxygen-rich blood to your heart muscle. This can narrow the blood vessels and reduce blood flow. A heart attack happens when blood flow is completely blocked. A high-fat diet, smoking, and other factors increase the risk of heart disease.  Your doctor has found that you have a chance of having heart disease. A heart-healthy lifestyle can help keep your heart

## 2025-07-29 NOTE — PROGRESS NOTES
remains within the normal range. She experiences fatigue and low energy levels, which have been persistent for some time. She uses a CPAP machine and generally sleeps well, but does not feel fully rested upon waking. She is scheduled to see her oncologist next week for a review of her homocysteine levels. She is currently taking folic acid, vitamin D, and vitamin B12 supplements. She reports no dark, tarry stools or blood in her stools.    She is undergoing physical therapy for her shoulder, which she finds painful. She has completed three sessions so far and has been given exercises to perform at home. She experiences numbness and tingling in her feet, a condition that has persisted for several years.    Diet: Includes a lot of garden vegetables  Sleep: Uses a CPAP machine, generally sleeps well but does not feel fully rested upon waking      Last  visit:  Diabetes     On Ozempic 0.5 mg weekly, still some queasiness from time to time  On Basaglar 26 units  Blood sugars 88-89  Amaryl and metformin   +constipation  Goes every day but only little bit at a time  Trying to do colace and miralax  Did eye exam  Fasting BS this morning 170s but had cake and tacos yesterday, but normally in 100s  Had 1-2 days with lows in th 80s and was symptomatic     HTN     On valsartan, metoprolol, hydralazine and amlodipine  Recently stopped losartan and changed to valsartan  Low diastolics  Systolic BP in 130s-140s  No headaches or chest pain   Breathing is stable       Osteopenia  Last dexa 2024  Hx of parathyroid adenoma  Been on fosamax 6346-2544   Then put back on it in 2021 and has been on since     TRACY  Follows with Pulmonary/ sleep medicine     Hyponatremia  Seeing Nephrology   Labs stable  +protein in urine     CAD  Follows with Dr Hopkins  Moderate to severe diagonal with mod disease other wise   On statin     AFib  On Xarelto  Feels palpitations every once in a while  Sob stable           Breast cancer screening - 3/2024 3/2025

## 2025-08-04 ENCOUNTER — LAB (OUTPATIENT)
Dept: LAB | Age: 80
End: 2025-08-04

## 2025-08-04 DIAGNOSIS — R80.9 TYPE 2 DIABETES MELLITUS WITH DIABETIC MICROALBUMINURIA, WITH LONG-TERM CURRENT USE OF INSULIN (HCC): ICD-10-CM

## 2025-08-04 DIAGNOSIS — D64.9 ANEMIA, UNSPECIFIED TYPE: ICD-10-CM

## 2025-08-04 DIAGNOSIS — R53.83 OTHER FATIGUE: ICD-10-CM

## 2025-08-04 DIAGNOSIS — R79.89 LOW VITAMIN D LEVEL: ICD-10-CM

## 2025-08-04 DIAGNOSIS — E55.9 VITAMIN D DEFICIENCY: ICD-10-CM

## 2025-08-04 DIAGNOSIS — Z79.4 TYPE 2 DIABETES MELLITUS WITH DIABETIC MICROALBUMINURIA, WITH LONG-TERM CURRENT USE OF INSULIN (HCC): ICD-10-CM

## 2025-08-04 DIAGNOSIS — I48.20 CHRONIC ATRIAL FIBRILLATION (HCC): ICD-10-CM

## 2025-08-04 DIAGNOSIS — E11.29 TYPE 2 DIABETES MELLITUS WITH DIABETIC MICROALBUMINURIA, WITH LONG-TERM CURRENT USE OF INSULIN (HCC): ICD-10-CM

## 2025-08-04 LAB
25(OH)D3 SERPL-MCNC: 35 NG/ML (ref 30–100)
BASOPHILS ABSOLUTE: 0 THOU/MM3 (ref 0–0.1)
BASOPHILS NFR BLD AUTO: 0.9 %
DEPRECATED RDW RBC AUTO: 47.2 FL (ref 35–45)
EOSINOPHIL NFR BLD AUTO: 1.9 %
EOSINOPHILS ABSOLUTE: 0.1 THOU/MM3 (ref 0–0.4)
ERYTHROCYTE [DISTWIDTH] IN BLOOD BY AUTOMATED COUNT: 13.4 % (ref 11.5–14.5)
FERRITIN SERPL IA-MCNC: 46 NG/ML (ref 13–150)
FOLATE SERPL-MCNC: > 20 NG/ML (ref 4.6–34.8)
HCT VFR BLD AUTO: 39.4 % (ref 37–47)
HGB BLD-MCNC: 13.1 GM/DL (ref 12–16)
IMM GRANULOCYTES # BLD AUTO: 0 THOU/MM3 (ref 0–0.07)
IMM GRANULOCYTES NFR BLD AUTO: 0 %
IRON SATN MFR SERPL: 18 % (ref 20–50)
IRON SERPL-MCNC: 70 UG/DL (ref 37–145)
LYMPHOCYTES ABSOLUTE: 1.7 THOU/MM3 (ref 1–4.8)
LYMPHOCYTES NFR BLD AUTO: 32.8 %
MCH RBC QN AUTO: 31.6 PG (ref 26–33)
MCHC RBC AUTO-ENTMCNC: 33.2 GM/DL (ref 32.2–35.5)
MCV RBC AUTO: 95.2 FL (ref 81–99)
MONOCYTES ABSOLUTE: 0.4 THOU/MM3 (ref 0.4–1.3)
MONOCYTES NFR BLD AUTO: 7.7 %
NEUTROPHILS ABSOLUTE: 3 THOU/MM3 (ref 1.8–7.7)
NEUTROPHILS NFR BLD AUTO: 56.7 %
NRBC BLD AUTO-RTO: 0 /100 WBC
PLATELET # BLD AUTO: 227 THOU/MM3 (ref 130–400)
PMV BLD AUTO: 10.1 FL (ref 9.4–12.4)
RBC # BLD AUTO: 4.14 MILL/MM3 (ref 4.2–5.4)
TIBC SERPL-MCNC: 384 UG/DL (ref 171–450)
VIT B12 SERPL-MCNC: 1332 PG/ML (ref 232–1245)
WBC # BLD AUTO: 5.3 THOU/MM3 (ref 4.8–10.8)

## 2025-08-26 ENCOUNTER — TELEPHONE (OUTPATIENT)
Dept: FAMILY MEDICINE CLINIC | Age: 80
End: 2025-08-26

## (undated) DEVICE — Device

## (undated) DEVICE — ROYAL SILK SURGICAL GOWN, XXL: Brand: CONVERTORS

## (undated) DEVICE — BASIC SINGLE BASIN BTC-LF: Brand: MEDLINE INDUSTRIES, INC.

## (undated) DEVICE — SPONGE LAP W18XL18IN WHT COT 4 PLY FLD STRUNG RADPQ DISP ST

## (undated) DEVICE — INTEGUSEAL MICROBIAL SEALANT: Brand: AVANOS

## (undated) DEVICE — SPONGE GZ W4XL4IN COT 12 PLY TYP VII WVN C FLD DSGN

## (undated) DEVICE — 4-PORT MANIFOLD: Brand: NEPTUNE 2

## (undated) DEVICE — SUTURE VCRL SZ 1 L27IN ABSRB UD CT-1 L36MM 1/2 CIR J261H

## (undated) DEVICE — GLOVE SURG SZ 65 THK91MIL LTX FREE SYN POLYISOPRENE

## (undated) DEVICE — Z CONVERTED USE 2715898 SWABSTICK MEDICATED W1.75XL6.5IN 1.6ML 3.15% CHG 70% ISO

## (undated) DEVICE — 3M™ BAIR HUGGER® MULTI ACCESS BLANKET, PEDIATRIC, FULL BODY, 10 PER CASE 31000: Brand: BAIR HUGGER™

## (undated) DEVICE — SPONGE GZ W4XL4IN COT 12 PLY TYP VII WVN C FLD DSGN STERILE

## (undated) DEVICE — SPONGE,PEANUT,XRAY,ST,SM,3/8",5/CARD: Brand: MEDLINE INDUSTRIES, INC.

## (undated) DEVICE — PAD,ABDOMINAL,5"X9",ST,LF,25/BX: Brand: MEDLINE INDUSTRIES, INC.

## (undated) DEVICE — TAPE,CLOTH/SILK,CURAD,3"X10YD,LF,40/CS: Brand: CURAD

## (undated) DEVICE — SYRINGE IRRIG 60ML SFT PLIABLE BLB EZ TO GRP 1 HND USE W/

## (undated) DEVICE — TAPE ADH W1INXL10YD PLAS TRNSPAR H2O RESIST HYPOALRG CURAD

## (undated) DEVICE — 450 ML BOTTLE OF 0.05% CHLORHEXIDINE GLUCONATE IN 99.95% STERILE WATER FOR IRRIGATION, USP AND APPLICATOR.: Brand: IRRISEPT ANTIMICROBIAL WOUND LAVAGE

## (undated) DEVICE — TUBING PRSS 36 M F

## (undated) DEVICE — SOLUTION IRRIG 1000ML 0.9% SOD CHL USP POUR PLAS BTL

## (undated) DEVICE — BIT DRL DIA2.7MM PERIPH SCR REUSE FOR COMPHSVE REV SHLDR

## (undated) DEVICE — GLOVE ORANGE PI 8 1/2   MSG9085

## (undated) DEVICE — HYPODERMIC SAFETY NEEDLE: Brand: MAGELLAN

## (undated) DEVICE — GLOVE ORANGE PI 7   MSG9070

## (undated) DEVICE — CORD,CAUTERY,BIPOLAR,STERILE: Brand: MEDLINE

## (undated) DEVICE — STRYKER PERFORMANCE SERIES SAGITTAL BLADE: Brand: STRYKER PERFORMANCE SERIES

## (undated) DEVICE — APPLICATOR MEDICATED 26 CC SOLUTION HI LT ORNG CHLORAPREP

## (undated) DEVICE — GAMMEX® NON-LATEX SIZE 7.5, STERILE NEOPRENE POWDER-FREE SURGICAL GLOVE: Brand: GAMMEX

## (undated) DEVICE — TAPE ADH W2INXL10YD PLAS TRNSPAR H2O RESIST HYPOALRG CURAD

## (undated) DEVICE — SKIN AFFIX SURG ADHESIVE 72/CS 0.55ML: Brand: MEDLINE

## (undated) DEVICE — GOWN,SIRUS,NON REINFRCD,LARGE,SET IN SL: Brand: MEDLINE

## (undated) DEVICE — RESTRAINT POS UNIV HD NK ALIGN DISP FOR SHLDR PROC

## (undated) DEVICE — BREAST HERNIA: Brand: MEDLINE INDUSTRIES, INC.

## (undated) DEVICE — DRAPE,U/SHT,SPLIT,FILM,60X84,STERILE: Brand: MEDLINE

## (undated) DEVICE — SUTURE VCRL + SZ 2-0 L27IN ABSRB VLT L26MM CT-2 1/2 CIR VCP333H

## (undated) DEVICE — BIT DRILL SHORT QUICK CONN 2.7MM ST

## (undated) DEVICE — SUTURE PERMA-HAND SZ 3-0 L30IN NONABSORBABLE BLK L60MM KS 622H

## (undated) DEVICE — SUTURE STRATAFIX SPRL SZ 2-0 L9IN ABSRB VLT MH L36MM 1/2 SXPD2B408

## (undated) DEVICE — YANKAUER,BULB TIP,W/O VENT,RIGID,STERILE: Brand: MEDLINE

## (undated) DEVICE — TOTAL TRAY, DB, 100% SILI FOLEY, 16FR 10: Brand: MEDLINE

## (undated) DEVICE — TUBING, SUCTION, 1/4" X 20', STRAIGHT: Brand: MEDLINE INDUSTRIES, INC.

## (undated) DEVICE — GLOVE ORANGE PI 7 1/2   MSG9075

## (undated) DEVICE — PREP SOL PVP IODINE 4%  4 OZ/BTL

## (undated) DEVICE — DISSECTOR ENDO L13CM CVD JAW CRDLSS SONICISION

## (undated) DEVICE — APPLIER CLP L9.375IN APER 2.1MM CLS L3.8MM 20 SM TI CLP

## (undated) DEVICE — CHLORAPREP 26ML ORANGE

## (undated) DEVICE — SET CATH 20GA L1.5IN RAD ART POLYUR RADPQ W/ INTEGR 0.018IN

## (undated) DEVICE — DRESSING TRNSPAR W8XL12IN FLM SURESITE 123

## (undated) DEVICE — LINER GLOVEXL RED CUT RESIST STRL REPEL LT

## (undated) DEVICE — APPLIER LIG CLP M L11IN TI STR RNG HNDL FOR 20 CLP DISP

## (undated) DEVICE — CORD ES L12FT BPLR FRCP

## (undated) DEVICE — SLING ARM L L165IN D75IN WHT POLY MESH ENVELOP MTL SIDE

## (undated) DEVICE — PRESSURE MONITORING SET: Brand: TRUWAVE

## (undated) DEVICE — C-ARM: Brand: UNBRANDED

## (undated) DEVICE — INTENDED FOR TISSUE SEPARATION, AND OTHER PROCEDURES THAT REQUIRE A SHARP SURGICAL BLADE TO PUNCTURE OR CUT.: Brand: BARD-PARKER ® CARBON RIB-BACK BLADES

## (undated) DEVICE — GLOVE SURG SZ 9 THK91MIL LTX FREE SYN POLYISOPRENE ANTI

## (undated) DEVICE — SUTURE MCRYL SZ 3-0 L27IN ABSRB UD L24MM PS-1 3/8 CIR PRIM Y936H

## (undated) DEVICE — SUTURE VCRL + 1 L27IN ABSRB UD CT-1 L36MM 1/2 CIR TAPR PNT VCP261H

## (undated) DEVICE — SUTURE ETHBND SZ 1 L30IN NONABSORBABLE GRN OS-6 L36MM 1/2 X538H

## (undated) DEVICE — SYSTEM SKIN CLSR 22CM DERMBND PRINEO

## (undated) DEVICE — SUTURE FIBERWIRE SZ 2 W/ TAPERED NEEDLE BLUE L38IN NONABSORB BLU L26.5MM 1/2 CIRCLE AR7200

## (undated) DEVICE — BREAST HERNIA PACK: Brand: MEDLINE INDUSTRIES, INC.

## (undated) DEVICE — SOLUTION IV 1000ML 0.9% SOD CHL PH 5 INJ USP VIAFLX PLAS

## (undated) DEVICE — SLING ARM M L1375IN D75IN WHT POLY MESH ENVELOP MTL SIDE

## (undated) DEVICE — Z DISCONTINUED USE 2273272 SOLUTION SURG PREP SCRUB POV IOD 7.5% 4 OZ

## (undated) DEVICE — SOLUTION IV IRRIG WATER 1000ML POUR BRL 2F7114

## (undated) DEVICE — 3M™ STERI-DRAPE™ INSTRUMENT POUCH 1018: Brand: STERI-DRAPE™

## (undated) DEVICE — ADHESIVE SKIN CLSR 0.7ML TOP DERMBND ADV

## (undated) DEVICE — DRESSING TRNSPAR W4XL10IN FLM MIC POR SURESITE 123

## (undated) DEVICE — AEGIS 1" DISK 4MM HOLE, PEEL OPEN: Brand: MEDLINE

## (undated) DEVICE — DRESSING,GAUZE,XEROFORM,CURAD,5"X9",ST: Brand: CURAD

## (undated) DEVICE — PATIENT RETURN ELECTRODE, SINGLE-USE, CONTACT QUALITY MONITORING, ADULT, WITH 9FT CORD, FOR PATIENTS WEIGING OVER 33LBS. (15KG): Brand: MEGADYNE

## (undated) DEVICE — SYRINGE MED 10ML LUERLOCK TIP W/O SFTY DISP

## (undated) DEVICE — BAG,BANDED,W/RUBBERBAND,STERILE,30X36: Brand: MEDLINE

## (undated) DEVICE — SURESHOT HUMERAL 3.2MM AO DRILL: Brand: TRIGEN

## (undated) DEVICE — DRAIN,WOUND,15FR,3/16,FULL-FLUTED: Brand: MEDLINE

## (undated) DEVICE — EVACUATOR SURG 100CC SIL BLB SUCT RESVR FOR CLS WND DRNGE

## (undated) DEVICE — SOLUTION IV 500ML 0.9% SOD CHL PH 5 INJ USP VIAFLX PLAS

## (undated) DEVICE — PENCIL SMK EVAC ALL IN 1 DSGN ENH VISIBILITY IMPROVED AIR

## (undated) DEVICE — PILLOW ABD 6X18X25IN L

## (undated) DEVICE — GLOVE ORANGE PI 8   MSG9080

## (undated) DEVICE — CONTAINER,SPECIMEN,PNEU TUBE,4OZ,OR STRL: Brand: MEDLINE

## (undated) DEVICE — Z DISCONTINUED USE 2717541 SUTURE STRATAFIX SZ 3-0 L30CM NONABSORBABLE UD L26MM FS 3/8

## (undated) DEVICE — 3.2MM X 343MM BRAD POINT TIP                                    THREADED GUIDE PIN: Brand: TRIGEN

## (undated) DEVICE — PACK PROCEDURE SURG ORTH BASIC SRHP LF

## (undated) DEVICE — SET CATH 20GA L1.75IN RAD ART POLYUR RADPQ W/ INTEGR

## (undated) DEVICE — DRAPE C ARM W36XL30IN RECTANG BND BG AND TAPE

## (undated) DEVICE — PREMIUM DRY TRAY LF: Brand: MEDLINE INDUSTRIES, INC.

## (undated) DEVICE — NEEDLE SUT SZ 3 1/2 CIR MAYO TRCR TIP CATGUT DISP

## (undated) DEVICE — PACK-MAJOR